# Patient Record
Sex: FEMALE | Race: WHITE | NOT HISPANIC OR LATINO | URBAN - METROPOLITAN AREA
[De-identification: names, ages, dates, MRNs, and addresses within clinical notes are randomized per-mention and may not be internally consistent; named-entity substitution may affect disease eponyms.]

---

## 2018-06-07 ENCOUNTER — EMERGENCY (EMERGENCY)
Facility: HOSPITAL | Age: 38
LOS: 1 days | Discharge: HOME | End: 2018-06-07
Attending: EMERGENCY MEDICINE | Admitting: EMERGENCY MEDICINE
Payer: MEDICARE

## 2018-06-07 VITALS
OXYGEN SATURATION: 100 % | HEART RATE: 67 BPM | TEMPERATURE: 98 F | DIASTOLIC BLOOD PRESSURE: 77 MMHG | RESPIRATION RATE: 15 BRPM | SYSTOLIC BLOOD PRESSURE: 115 MMHG

## 2018-06-07 DIAGNOSIS — Z79.1 LONG TERM (CURRENT) USE OF NON-STEROIDAL ANTI-INFLAMMATORIES (NSAID): ICD-10-CM

## 2018-06-07 DIAGNOSIS — R53.83 OTHER FATIGUE: ICD-10-CM

## 2018-06-07 DIAGNOSIS — R20.0 ANESTHESIA OF SKIN: ICD-10-CM

## 2018-06-07 DIAGNOSIS — Z79.899 OTHER LONG TERM (CURRENT) DRUG THERAPY: ICD-10-CM

## 2018-06-07 DIAGNOSIS — Z79.2 LONG TERM (CURRENT) USE OF ANTIBIOTICS: ICD-10-CM

## 2018-06-07 PROCEDURE — 99283 EMERGENCY DEPT VISIT LOW MDM: CPT

## 2018-06-07 NOTE — ED PROVIDER NOTE - OBJECTIVE STATEMENT
c/o one week of fatigue, entire body feels numb. has been drinking less caffeine than usual. no fevers/n/v/abdo apin/dairrhea. no cp/sob. no dydsuira/cough. c/o L neck abscess x1 year, no changes to neck. no neck pain/mouth swelling

## 2018-06-07 NOTE — ED ADULT TRIAGE NOTE - CHIEF COMPLAINT QUOTE
pt c/o lethergy. stopped drinking caffeine this week. also with abscess in throat x1year. denies res distress

## 2018-06-07 NOTE — ED PROVIDER NOTE - MEDICAL DECISION MAKING DETAILS
fatigue x1 week. vitals wnl. pink conjunctiva, low cf anemia. no infectious sx. well appearing. will check upt    no e/o neck abscess, airway patent    to f/u w pmd

## 2018-06-07 NOTE — ED PROVIDER NOTE - PROGRESS NOTE DETAILS
pt states she has not been sexually active. refusing upt. wants to go home. dc home.  no abdo pain/ vag bleed.  low c/f complications from pregnancy   wants to f/u w pmd dc home

## 2018-06-28 ENCOUNTER — TRANSCRIPTION ENCOUNTER (OUTPATIENT)
Age: 38
End: 2018-06-28

## 2018-12-01 ENCOUNTER — EMERGENCY (EMERGENCY)
Facility: HOSPITAL | Age: 38
LOS: 1 days | Discharge: HOME | End: 2018-12-01
Attending: EMERGENCY MEDICINE | Admitting: EMERGENCY MEDICINE
Payer: MEDICARE

## 2018-12-01 VITALS
RESPIRATION RATE: 18 BRPM | HEART RATE: 76 BPM | SYSTOLIC BLOOD PRESSURE: 129 MMHG | DIASTOLIC BLOOD PRESSURE: 87 MMHG | OXYGEN SATURATION: 99 % | TEMPERATURE: 98 F

## 2018-12-01 DIAGNOSIS — Z79.2 LONG TERM (CURRENT) USE OF ANTIBIOTICS: ICD-10-CM

## 2018-12-01 DIAGNOSIS — R22.1 LOCALIZED SWELLING, MASS AND LUMP, NECK: ICD-10-CM

## 2018-12-01 DIAGNOSIS — Z79.1 LONG TERM (CURRENT) USE OF NON-STEROIDAL ANTI-INFLAMMATORIES (NSAID): ICD-10-CM

## 2018-12-01 DIAGNOSIS — Z79.899 OTHER LONG TERM (CURRENT) DRUG THERAPY: ICD-10-CM

## 2018-12-01 DIAGNOSIS — Z87.891 PERSONAL HISTORY OF NICOTINE DEPENDENCE: ICD-10-CM

## 2018-12-01 LAB
ALBUMIN SERPL ELPH-MCNC: 3.4 G/DL — SIGNIFICANT CHANGE UP (ref 3.4–5)
ALP SERPL-CCNC: 75 U/L — SIGNIFICANT CHANGE UP (ref 40–120)
ALT FLD-CCNC: 23 U/L — SIGNIFICANT CHANGE UP (ref 12–42)
ANION GAP SERPL CALC-SCNC: 5 MMOL/L — LOW (ref 9–16)
APPEARANCE UR: CLEAR — SIGNIFICANT CHANGE UP
APTT BLD: 28.9 SEC — SIGNIFICANT CHANGE UP (ref 27.5–36.3)
AST SERPL-CCNC: 21 U/L — SIGNIFICANT CHANGE UP (ref 15–37)
BASOPHILS NFR BLD AUTO: 0.5 % — SIGNIFICANT CHANGE UP (ref 0–2)
BILIRUB SERPL-MCNC: 0.2 MG/DL — SIGNIFICANT CHANGE UP (ref 0.2–1.2)
BILIRUB UR-MCNC: NEGATIVE — SIGNIFICANT CHANGE UP
BUN SERPL-MCNC: 20 MG/DL — SIGNIFICANT CHANGE UP (ref 7–23)
CALCIUM SERPL-MCNC: 8.8 MG/DL — SIGNIFICANT CHANGE UP (ref 8.5–10.5)
CHLORIDE SERPL-SCNC: 107 MMOL/L — SIGNIFICANT CHANGE UP (ref 96–108)
CO2 SERPL-SCNC: 27 MMOL/L — SIGNIFICANT CHANGE UP (ref 22–31)
COLOR SPEC: YELLOW — SIGNIFICANT CHANGE UP
CREAT SERPL-MCNC: 0.7 MG/DL — SIGNIFICANT CHANGE UP (ref 0.5–1.3)
DIFF PNL FLD: NEGATIVE — SIGNIFICANT CHANGE UP
EOSINOPHIL NFR BLD AUTO: 3.9 % — SIGNIFICANT CHANGE UP (ref 0–6)
GLUCOSE SERPL-MCNC: 106 MG/DL — HIGH (ref 70–99)
GLUCOSE UR QL: NEGATIVE — SIGNIFICANT CHANGE UP
HCG UR QL: NEGATIVE — SIGNIFICANT CHANGE UP
HCT VFR BLD CALC: 35.9 % — SIGNIFICANT CHANGE UP (ref 34.5–45)
HGB BLD-MCNC: 11.9 G/DL — SIGNIFICANT CHANGE UP (ref 11.5–15.5)
IMM GRANULOCYTES NFR BLD AUTO: 0.3 % — SIGNIFICANT CHANGE UP (ref 0–1.5)
INR BLD: 0.96 — SIGNIFICANT CHANGE UP (ref 0.88–1.16)
KETONES UR-MCNC: NEGATIVE — SIGNIFICANT CHANGE UP
LACTATE SERPL-SCNC: 0.9 MMOL/L — SIGNIFICANT CHANGE UP (ref 0.4–2)
LEUKOCYTE ESTERASE UR-ACNC: ABNORMAL
LYMPHOCYTES # BLD AUTO: 29.8 % — SIGNIFICANT CHANGE UP (ref 13–44)
MCHC RBC-ENTMCNC: 29.1 PG — SIGNIFICANT CHANGE UP (ref 27–34)
MCHC RBC-ENTMCNC: 33.1 G/DL — SIGNIFICANT CHANGE UP (ref 32–36)
MCV RBC AUTO: 87.8 FL — SIGNIFICANT CHANGE UP (ref 80–100)
MONOCYTES NFR BLD AUTO: 11.5 % — SIGNIFICANT CHANGE UP (ref 2–14)
NEUTROPHILS NFR BLD AUTO: 54 % — SIGNIFICANT CHANGE UP (ref 43–77)
NITRITE UR-MCNC: NEGATIVE — SIGNIFICANT CHANGE UP
PH UR: 6.5 — SIGNIFICANT CHANGE UP (ref 5–8)
PLATELET # BLD AUTO: 210 K/UL — SIGNIFICANT CHANGE UP (ref 150–400)
POTASSIUM SERPL-MCNC: 4.2 MMOL/L — SIGNIFICANT CHANGE UP (ref 3.5–5.3)
POTASSIUM SERPL-SCNC: 4.2 MMOL/L — SIGNIFICANT CHANGE UP (ref 3.5–5.3)
PROT SERPL-MCNC: 6.4 G/DL — SIGNIFICANT CHANGE UP (ref 6.4–8.2)
PROT UR-MCNC: NEGATIVE MG/DL — SIGNIFICANT CHANGE UP
PROTHROM AB SERPL-ACNC: 10.6 SEC — SIGNIFICANT CHANGE UP (ref 10–12.9)
RBC # BLD: 4.09 M/UL — SIGNIFICANT CHANGE UP (ref 3.8–5.2)
RBC # FLD: 13.4 % — SIGNIFICANT CHANGE UP (ref 10.3–14.5)
SODIUM SERPL-SCNC: 139 MMOL/L — SIGNIFICANT CHANGE UP (ref 132–145)
SP GR SPEC: 1.01 — SIGNIFICANT CHANGE UP (ref 1–1.03)
UROBILINOGEN FLD QL: 0.2 E.U./DL — SIGNIFICANT CHANGE UP
WBC # BLD: 6.2 K/UL — SIGNIFICANT CHANGE UP (ref 3.8–10.5)
WBC # FLD AUTO: 6.2 K/UL — SIGNIFICANT CHANGE UP (ref 3.8–10.5)

## 2018-12-01 PROCEDURE — 93010 ELECTROCARDIOGRAM REPORT: CPT

## 2018-12-01 PROCEDURE — 70491 CT SOFT TISSUE NECK W/DYE: CPT | Mod: 26

## 2018-12-01 PROCEDURE — 99284 EMERGENCY DEPT VISIT MOD MDM: CPT | Mod: 25

## 2018-12-01 RX ORDER — AMPICILLIN SODIUM AND SULBACTAM SODIUM 250; 125 MG/ML; MG/ML
3 INJECTION, POWDER, FOR SUSPENSION INTRAMUSCULAR; INTRAVENOUS ONCE
Qty: 0 | Refills: 0 | Status: COMPLETED | OUTPATIENT
Start: 2018-12-01 | End: 2018-12-01

## 2018-12-01 RX ORDER — SODIUM CHLORIDE 9 MG/ML
1850 INJECTION INTRAMUSCULAR; INTRAVENOUS; SUBCUTANEOUS ONCE
Qty: 0 | Refills: 0 | Status: COMPLETED | OUTPATIENT
Start: 2018-12-01 | End: 2018-12-01

## 2018-12-01 RX ORDER — MORPHINE SULFATE 50 MG/1
4 CAPSULE, EXTENDED RELEASE ORAL ONCE
Qty: 0 | Refills: 0 | Status: DISCONTINUED | OUTPATIENT
Start: 2018-12-01 | End: 2018-12-01

## 2018-12-01 RX ADMIN — SODIUM CHLORIDE 1850 MILLILITER(S): 9 INJECTION INTRAMUSCULAR; INTRAVENOUS; SUBCUTANEOUS at 23:51

## 2018-12-01 RX ADMIN — AMPICILLIN SODIUM AND SULBACTAM SODIUM 3 GRAM(S): 250; 125 INJECTION, POWDER, FOR SUSPENSION INTRAMUSCULAR; INTRAVENOUS at 23:51

## 2018-12-01 RX ADMIN — MORPHINE SULFATE 4 MILLIGRAM(S): 50 CAPSULE, EXTENDED RELEASE ORAL at 23:51

## 2018-12-01 RX ADMIN — AMPICILLIN SODIUM AND SULBACTAM SODIUM 200 GRAM(S): 250; 125 INJECTION, POWDER, FOR SUSPENSION INTRAMUSCULAR; INTRAVENOUS at 21:43

## 2018-12-01 RX ADMIN — SODIUM CHLORIDE 1850 MILLILITER(S): 9 INJECTION INTRAMUSCULAR; INTRAVENOUS; SUBCUTANEOUS at 21:43

## 2018-12-02 VITALS
SYSTOLIC BLOOD PRESSURE: 150 MMHG | RESPIRATION RATE: 20 BRPM | TEMPERATURE: 98 F | OXYGEN SATURATION: 98 % | DIASTOLIC BLOOD PRESSURE: 71 MMHG | HEART RATE: 69 BPM

## 2018-12-02 NOTE — ED PROVIDER NOTE - MEDICAL DECISION MAKING DETAILS
Left neck mass for months, states she had it drained in past and was told it would not come back.  States she can not stay in the hospital overnight because she has an important court date Monday morning.  No fever or chills, no shortness of breath or airway compromise.  CT scan today shows left neck mass and differential includes neoplasm or abscess.  Mass is firm to palpation with no erythema of skin or fluctuance.  Patient states she will go to [place] right after her court date tomorrow and will take oral antibiotic until then.  Will Rx Augmentin.  Pt understands she must be seen by [facility] Ne[facility] or ENT urgently.

## 2018-12-02 NOTE — ED PROVIDER NOTE - CHPI ED SYMPTOMS NEG
no vomiting/no fever/no numbness/no loss of consciousness/no nausea/no syncope/no blurred vision/no weakness/no change in level of consciousness/no chills

## 2018-12-02 NOTE — ED PROVIDER NOTE - NSFOLLOWUPINSTRUCTIONS_ED_ALL_ED_FT
Return to [place] tomorrow or see your ENT as soon as possible.  Augmentin every 12 hours for 10 days.

## 2018-12-05 ENCOUNTER — APPOINTMENT (OUTPATIENT)
Dept: OTOLARYNGOLOGY | Facility: CLINIC | Age: 38
End: 2018-12-05
Payer: MEDICARE

## 2018-12-05 VITALS
TEMPERATURE: 98.1 F | DIASTOLIC BLOOD PRESSURE: 71 MMHG | SYSTOLIC BLOOD PRESSURE: 123 MMHG | RESPIRATION RATE: 16 BRPM | OXYGEN SATURATION: 99 % | HEART RATE: 72 BPM

## 2018-12-05 PROCEDURE — 99204 OFFICE O/P NEW MOD 45 MIN: CPT | Mod: 25

## 2018-12-05 PROCEDURE — 31575 DIAGNOSTIC LARYNGOSCOPY: CPT

## 2018-12-05 PROCEDURE — 10021 FNA BX W/O IMG GDN 1ST LES: CPT

## 2018-12-07 LAB
CULTURE RESULTS: SIGNIFICANT CHANGE UP
CULTURE RESULTS: SIGNIFICANT CHANGE UP
SPECIMEN SOURCE: SIGNIFICANT CHANGE UP
SPECIMEN SOURCE: SIGNIFICANT CHANGE UP

## 2018-12-13 ENCOUNTER — APPOINTMENT (OUTPATIENT)
Dept: OTOLARYNGOLOGY | Facility: CLINIC | Age: 38
End: 2018-12-13
Payer: MEDICARE

## 2018-12-13 VITALS
OXYGEN SATURATION: 100 % | HEART RATE: 68 BPM | DIASTOLIC BLOOD PRESSURE: 66 MMHG | TEMPERATURE: 98.1 F | SYSTOLIC BLOOD PRESSURE: 99 MMHG

## 2018-12-13 PROCEDURE — 99213 OFFICE O/P EST LOW 20 MIN: CPT | Mod: 25

## 2018-12-13 PROCEDURE — 31575 DIAGNOSTIC LARYNGOSCOPY: CPT

## 2018-12-17 ENCOUNTER — APPOINTMENT (OUTPATIENT)
Dept: OTOLARYNGOLOGY | Facility: CLINIC | Age: 38
End: 2018-12-17
Payer: MEDICARE

## 2018-12-17 VITALS
HEIGHT: 65 IN | DIASTOLIC BLOOD PRESSURE: 81 MMHG | BODY MASS INDEX: 25.33 KG/M2 | SYSTOLIC BLOOD PRESSURE: 125 MMHG | WEIGHT: 152 LBS | OXYGEN SATURATION: 98 % | HEART RATE: 65 BPM

## 2018-12-17 DIAGNOSIS — Z78.9 OTHER SPECIFIED HEALTH STATUS: ICD-10-CM

## 2018-12-17 PROCEDURE — 31575 DIAGNOSTIC LARYNGOSCOPY: CPT

## 2018-12-17 PROCEDURE — 99214 OFFICE O/P EST MOD 30 MIN: CPT | Mod: 25

## 2018-12-21 ENCOUNTER — OUTPATIENT (OUTPATIENT)
Dept: OUTPATIENT SERVICES | Facility: HOSPITAL | Age: 38
LOS: 1 days | End: 2018-12-21
Payer: MEDICARE

## 2018-12-21 PROCEDURE — 78815 PET IMAGE W/CT SKULL-THIGH: CPT

## 2018-12-21 PROCEDURE — 82962 GLUCOSE BLOOD TEST: CPT

## 2018-12-21 PROCEDURE — A9552: CPT

## 2018-12-21 PROCEDURE — 78815 PET IMAGE W/CT SKULL-THIGH: CPT | Mod: 26

## 2018-12-26 DIAGNOSIS — C76.0 MALIGNANT NEOPLASM OF HEAD, FACE AND NECK: ICD-10-CM

## 2018-12-31 ENCOUNTER — APPOINTMENT (OUTPATIENT)
Dept: OTOLARYNGOLOGY | Facility: CLINIC | Age: 38
End: 2018-12-31

## 2019-01-07 ENCOUNTER — APPOINTMENT (OUTPATIENT)
Dept: OTOLARYNGOLOGY | Facility: CLINIC | Age: 39
End: 2019-01-07
Payer: MEDICARE

## 2019-01-07 VITALS
DIASTOLIC BLOOD PRESSURE: 75 MMHG | HEIGHT: 65 IN | WEIGHT: 152 LBS | BODY MASS INDEX: 25.33 KG/M2 | OXYGEN SATURATION: 98 % | HEART RATE: 71 BPM | SYSTOLIC BLOOD PRESSURE: 111 MMHG

## 2019-01-07 PROCEDURE — 99213 OFFICE O/P EST LOW 20 MIN: CPT

## 2019-01-08 NOTE — HISTORY OF PRESENT ILLNESS
[de-identified] : 37 yo F with left neck mass first noticed 2 years ago. At that time she had FNA which was non-diagnostic and received antibiotic therapy, the combination caused remission of the lesion which reappeared approximately 18 months ago. Again, she received FNA and antibiotics as the result was non-diagnostic and was told it was likely infections or immune in nature but unlikely cancer. She had seen several providers and hadn't been told that it was malignancy until recent FNA and imaging completed by Dr. 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 \par \par 12/21/18 PET/CT: Large supra and infrahyoid left neck mass measuring 5.4 cm. In view of the biopsy results, this is compatible with a diagnosis of squamous cell carcinoma. Left supraclavicular and right level 3 hypermetabolic lymphadenopathy is noted, likely associated pathologic. \par \par Her case was presented at tumor board today: plan for chemoRT and surgery to identify the primary.\par \par She is here today alone, focused on the possibility of non-sexual transmission of HPV that she has read from unknown sources and concerns about potential release of her medical records to exes/others that plan to do her harm.

## 2019-01-08 NOTE — PHYSICAL EXAM
[Normal] : no rashes [de-identified] : there is a non mobile, fixed left neck mass approximately 6cm overlying the SCM from anterior to posterior border in the mid neck level II-III, overlying skin seems uninvolved, is not fixed [de-identified] : fixed left neck node conglomerate 5-6cm in diamater, overlying SCM, skin seems uninvolved, is not fixed [de-identified] : no fixation to underlying mass

## 2019-01-08 NOTE — END OF VISIT
[>50% of Time Spent on Counseling for ____] : Greater than 50% of the encounter time was spent on counseling for [unfilled] [Time Spent: ___ minutes] : I have spent [unfilled] minutes of face to face time with the patient [] : Fellow

## 2019-01-08 NOTE — ASSESSMENT
[FreeTextEntry1] : 37 yo F with HPV + left neck klarissa mass with bilateral lymphadenopathy with unknown primary lesion based upon exam and PET/CT.\par \par Plan:\par - DL with BOT and tonsil biopsy to identify the primary lesion in order to reduce the radiation field, followed by chemoRT.  Advised her that surgical removal of the left neck lymph node is not indicated given she has bilateral lymphadenopathy and unidentified primary and that chemoRT will treat the left neck klarissa mass.  \par - PST given.\par - Long discussion regarding HPV transmission and concerns of medical record release.  Reassured pt that we are a HIPAA-compliant institution and will not release records without her consent.\par - Pt wishes to seek a second opinion and will contact us should she decide to proceed with care here. \par

## 2019-02-15 NOTE — DISCUSSION/SUMMARY
[Cancer Type / Location / Histology Subtype: ________] : Cancer Type / Location / Histology Subtype: [unfilled] [Diagnosis Date (year): ____] : Diagnosis Date (year): [unfilled] [Not Applicable] : not applicable [SPONCC] : SPONCC [Surgery] : Surgery: No [Radiation] : Radiation: No [Systemic Therapy (chemotherapy, hormonal therapy, other)] : Systemic Therapy (chemotherapy, hormonal therapy, other): No [FreeTextEntry2] : Recommended tonsillectomies and base of tongue biopsy to identify primary tumor to reduce radiation field and adverse effects.  Biopsies to be followed by chemotherapy and radiation therapy. Advised to start treatment as soon as possible. [de-identified] : Referred to radiation oncologist Marcelina Londono [phone] and medical oncologist Luther Meeks [phone] [FreeTextEntry7] : For psychosocial emotional support, please call [phone]. [FreeTextEntry8] : Sophie Haro, NP [FreeTextEntry9] : Mailed 2/15/19

## 2019-03-04 ENCOUNTER — APPOINTMENT (OUTPATIENT)
Dept: RADIATION ONCOLOGY | Facility: CLINIC | Age: 39
End: 2019-03-04
Payer: MEDICARE

## 2019-03-04 VITALS
TEMPERATURE: 97.5 F | WEIGHT: 166 LBS | RESPIRATION RATE: 16 BRPM | SYSTOLIC BLOOD PRESSURE: 128 MMHG | BODY MASS INDEX: 27.66 KG/M2 | HEIGHT: 65 IN | DIASTOLIC BLOOD PRESSURE: 82 MMHG | HEART RATE: 83 BPM | OXYGEN SATURATION: 100 %

## 2019-03-04 DIAGNOSIS — Z78.9 OTHER SPECIFIED HEALTH STATUS: ICD-10-CM

## 2019-03-04 DIAGNOSIS — Z80.6 FAMILY HISTORY OF LEUKEMIA: ICD-10-CM

## 2019-03-04 DIAGNOSIS — Z82.49 FAMILY HISTORY OF ISCHEMIC HEART DISEASE AND OTHER DISEASES OF THE CIRCULATORY SYSTEM: ICD-10-CM

## 2019-03-04 DIAGNOSIS — F32.9 MAJOR DEPRESSIVE DISORDER, SINGLE EPISODE, UNSPECIFIED: ICD-10-CM

## 2019-03-04 DIAGNOSIS — Z80.1 FAMILY HISTORY OF MALIGNANT NEOPLASM OF TRACHEA, BRONCHUS AND LUNG: ICD-10-CM

## 2019-03-04 DIAGNOSIS — Z87.891 PERSONAL HISTORY OF NICOTINE DEPENDENCE: ICD-10-CM

## 2019-03-04 PROCEDURE — 99205 OFFICE O/P NEW HI 60 MIN: CPT | Mod: 25

## 2019-03-04 NOTE — PHYSICAL EXAM
[General Appearance - Alert] : alert [General Appearance - In No Acute Distress] : in no acute distress [Normal] : normal skin color and pigmentation and no rash [Sensation] : the sensory exam was normal to light touch and pinprick [Motor Exam] : the motor exam was normal [Oriented To Time, Place, And Person] : oriented to person, place, and time [de-identified] : nonmobile LEFT neck mass, measuring approx 10 cm, with surrounding firmness (in total, area of firmness measures 15 cm). Mass extends from just below the ear to the supraclavicular space. [de-identified] : CN II- XII grossly intact.  [de-identified] : Anxious. Impaired attention span requiring frequent redirecting.

## 2019-03-04 NOTE — REVIEW OF SYSTEMS
[Patient Intake Form Reviewed] : Patient intake form was reviewed [Night Sweats] : night sweats [Fatigue] : fatigue [Recent Change In Weight] : ~T recent weight change [SOB on Exertion] : shortness of breath during exertion [Joint Pain] : joint pain [Anxiety] : anxiety [Depression] : depression [Easy Bruising] : a tendency for easy bruising [Negative] : Allergic/Immunologic [Fever] : no fever [Chills] : no chills [Dysphagia] : no dysphagia [Loss of Hearing] : no loss of hearing [Nosebleeds] : no nosebleeds [Hearing Disturbances] : no hearing disturbances [Cough] : no cough [Vomiting] : no vomiting [Constipation] : no constipation [Diarrhea] : no diarrhea [Suicidal] : not suicidal [FreeTextEntry2] : weight gain.  [FreeTextEntry4] : LEFT neck mass [FreeTextEntry7] : abdominal cramping. [FreeTextEntry9] : joint stiffness.  [de-identified] : Cold intolerance.

## 2019-03-04 NOTE — DATA REVIEWED
[FreeTextEntry1] : I have personally reviewed all relevant imaging studies (PET, CT) and I have discussed the case with the referring physician.\par

## 2019-03-04 NOTE — VITALS
[Maximal Pain Intensity: 4/10] : 4/10 [Least Pain Intensity: 0/10] : 0/10 [Pain Location: ___] : Pain Location: [unfilled] [NoTreatment Scheduled] : no treatment scheduled [Patient Refusal] : Patient refused psychosocial distress assessment [80: Normal activity with effort; some signs or symptoms of disease.] : 80: Normal activity with effort; some signs or symptoms of disease.  [ECOG Performance Status: 1 - Restricted in physically strenuous activity but ambulatory and able to carry out work of a light or sedentary nature] : Performance Status: 1 - Restricted in physically strenuous activity but ambulatory and able to carry out work of a light or sedentary nature, e.g., light house work, office work

## 2019-03-04 NOTE — HISTORY OF PRESENT ILLNESS
[FreeTextEntry1] : Ms. Rachael Foy is a 39F, former smoker (1/2 ppd x 15 years) referred by Dr. 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 \par \par Patient first noticed a left neck mass approx 2 years ago. At that time, she reportedly had drainage of the growth at [facility] that did not show any infection and received antibiotics. She states the mass was drained and she was told it was a cyst. She does not know if the specimen was sent for pathology. Remission of the mass occurred, and it reappeared approx 20 months ago. She again had FNA (non-diagnostic) and received antibiotics. Patient states she has seen other ENTs "at several other institutions," but would not tell us who she has seen or where. She reports feeling fatigued in the summer of 2018, had been to urgent care on a few occasions but did not receive a diagnosis.  Starting in the summer of 2018, she also became "displaced" due to family issues and breaking up with a boyfriend, and she was in and out of homeless shelters for several months. \par \par Patient reports she was having pain in her teeth and gums around November 2018. She went to the ED at [facility] because she thought she could get emergent dental work done at the ED.  Imaging was done. \par \par CT neck on 12/1/18 revealed the following:\par -Soft tissues mass noted in the LEFT neck extending deep to the mandible on the left compressing the submandibular gland anteriorly with internal heterogeneity which is predominantly solid. This mass is lateral to the carotid artery with apparent compression and effacement of the internal jugular vein. The mass measures up to 5 x 3.7 cm. This mass is deep to the flattened sternocleidomastoid muscle and extends to the lower left neck just  the supraclavicular fossa. The bulk of the mass appears below the left parotid gland- cannot exclude involvement of the lower portion of the parotid. \par -Soft tissue mass measuring up to 1.9 cm with central low attenuation possibly necrosis above this mass, possibly representing a necrotic lymph node. Other prominent left sided lymph nodes in the neck in the area of the jugulodigastric region and posterior triangle are also noted including a posterior triangle lymph node posterior to the sternocleidomastoid. \par \par She saw Dr. Weller for consultation on 12/5/18, and at the time reported left neck mass for many months with cervical nodes. She had FNA of the left neck mass at that time, and pathology showed squamous cell carcinoma, HPV related. She then saw Dr. Alvarado on 12/17/18 for consultation; discussed treatment options based on PET/ CT results. \par \par PET/ CT done 12/21/18 showed the following:\par Impression: Large supra and infrahyoid left neck mass measuring 5.4 cm, similar as compared to 12/1/2018, and increased in size as compared to 11/10/2016 as above. In view of the biopsy results, this is compatible with a diagnosis of squamous cell carcinoma. Left supraclavicular and right level 3 hypermetabolic lymphadenopathy is noted, likely associated pathologic lymphadenopathy.\par \par Her case was presented at [facility]; consensus was for surgery (left neck dissection, tonsillectomy, BOT biopsy), followed by chemoRT. She saw Dr. Alvarado on 1/7/19 to discuss surgical plan. However, patient grew increasingly uncomfortable with the biopsies because she does not want to undergo anesthesia. She ultimately opted against surgery and would like to proceed with chemoRT. She was referred here and to Dr. Meeks.  She has not yet seen Dr. Meeks or scheduled a consultation with him. \par \par Today, she expresses she is "not convinced" of her diagnosis and questions if the mass is due to Cushing's disease or if it could be related to high stress (she reports she has "family issues that have caused a great deal of stress"). She questions that she could have HPV related disease as she "does not partake in that kind of activity," and she fixated on this during the clinical encounter. She admits that she went to a hospital in [place] to try to get another biopsy but that they would not perform another biopsy since she has a known diagnosis of cancer. Today, she is evasive with answering questions and providing her health history. She denies any pain or dysphagia, fever, CP, SOB, cough, weight loss. She reports weight gain, as well as throat clearing. She also reports night sweats, fatigue, and "feeling cold all the time." She reports she sees a psychiatrist, who prescribes medications for anxiety and depression, but she declined to say which medications she takes or who her psychiatrist is. Upon further questioning, she stated that she is off her antidepressant. She notes that she does not have a dentist nor does she have dental coverage. She further declined to provide the name of her PCP; states she "might be looking for a new one." \par \par Of note, she had been living in shelters for a few months. She is now still homeless and has been staying with friends. She also reports she had been working but is on disability.

## 2019-05-22 ENCOUNTER — MESSAGE (OUTPATIENT)
Age: 39
End: 2019-05-22

## 2019-05-22 ENCOUNTER — MOBILE ON CALL (OUTPATIENT)
Age: 39
End: 2019-05-22

## 2019-05-23 ENCOUNTER — EMERGENCY (EMERGENCY)
Facility: HOSPITAL | Age: 39
LOS: 1 days | Discharge: HOME | End: 2019-05-23
Attending: EMERGENCY MEDICINE | Admitting: EMERGENCY MEDICINE
Payer: MEDICARE

## 2019-05-23 VITALS
TEMPERATURE: 98 F | SYSTOLIC BLOOD PRESSURE: 106 MMHG | DIASTOLIC BLOOD PRESSURE: 69 MMHG | OXYGEN SATURATION: 97 % | RESPIRATION RATE: 18 BRPM | HEART RATE: 68 BPM

## 2019-05-23 VITALS
WEIGHT: 166.45 LBS | HEART RATE: 84 BPM | SYSTOLIC BLOOD PRESSURE: 133 MMHG | RESPIRATION RATE: 18 BRPM | OXYGEN SATURATION: 99 % | TEMPERATURE: 98 F | DIASTOLIC BLOOD PRESSURE: 82 MMHG

## 2019-05-23 DIAGNOSIS — Z79.899 OTHER LONG TERM (CURRENT) DRUG THERAPY: ICD-10-CM

## 2019-05-23 DIAGNOSIS — R22.1 LOCALIZED SWELLING, MASS AND LUMP, NECK: ICD-10-CM

## 2019-05-23 DIAGNOSIS — Z79.1 LONG TERM (CURRENT) USE OF NON-STEROIDAL ANTI-INFLAMMATORIES (NSAID): ICD-10-CM

## 2019-05-23 DIAGNOSIS — Z79.2 LONG TERM (CURRENT) USE OF ANTIBIOTICS: ICD-10-CM

## 2019-05-23 DIAGNOSIS — F25.9 SCHIZOAFFECTIVE DISORDER, UNSPECIFIED: ICD-10-CM

## 2019-05-23 LAB
ALBUMIN SERPL ELPH-MCNC: 3.5 G/DL — SIGNIFICANT CHANGE UP (ref 3.3–5)
ALP SERPL-CCNC: 58 U/L — SIGNIFICANT CHANGE UP (ref 40–120)
ALT FLD-CCNC: 12 U/L — SIGNIFICANT CHANGE UP (ref 10–45)
ANION GAP SERPL CALC-SCNC: 9 MMOL/L — SIGNIFICANT CHANGE UP (ref 5–17)
APTT BLD: 28.1 SEC — SIGNIFICANT CHANGE UP (ref 27.5–36.3)
AST SERPL-CCNC: 19 U/L — SIGNIFICANT CHANGE UP (ref 10–40)
BASOPHILS # BLD AUTO: 0.02 K/UL — SIGNIFICANT CHANGE UP (ref 0–0.2)
BASOPHILS NFR BLD AUTO: 0.3 % — SIGNIFICANT CHANGE UP (ref 0–2)
BILIRUB SERPL-MCNC: <0.2 MG/DL — SIGNIFICANT CHANGE UP (ref 0.2–1.2)
BLD GP AB SCN SERPL QL: NEGATIVE — SIGNIFICANT CHANGE UP
BUN SERPL-MCNC: 10 MG/DL — SIGNIFICANT CHANGE UP (ref 7–23)
CALCIUM SERPL-MCNC: 8.6 MG/DL — SIGNIFICANT CHANGE UP (ref 8.4–10.5)
CHLORIDE SERPL-SCNC: 104 MMOL/L — SIGNIFICANT CHANGE UP (ref 96–108)
CO2 SERPL-SCNC: 26 MMOL/L — SIGNIFICANT CHANGE UP (ref 22–31)
CREAT SERPL-MCNC: 0.63 MG/DL — SIGNIFICANT CHANGE UP (ref 0.5–1.3)
EOSINOPHIL # BLD AUTO: 0.25 K/UL — SIGNIFICANT CHANGE UP (ref 0–0.5)
EOSINOPHIL NFR BLD AUTO: 3.6 % — SIGNIFICANT CHANGE UP (ref 0–6)
GLUCOSE SERPL-MCNC: 104 MG/DL — HIGH (ref 70–99)
HCT VFR BLD CALC: 35 % — SIGNIFICANT CHANGE UP (ref 34.5–45)
HGB BLD-MCNC: 11.4 G/DL — LOW (ref 11.5–15.5)
IMM GRANULOCYTES NFR BLD AUTO: 0.7 % — SIGNIFICANT CHANGE UP (ref 0–1.5)
INR BLD: 1 — SIGNIFICANT CHANGE UP (ref 0.88–1.16)
LYMPHOCYTES # BLD AUTO: 1.46 K/UL — SIGNIFICANT CHANGE UP (ref 1–3.3)
LYMPHOCYTES # BLD AUTO: 20.9 % — SIGNIFICANT CHANGE UP (ref 13–44)
MCHC RBC-ENTMCNC: 28.7 PG — SIGNIFICANT CHANGE UP (ref 27–34)
MCHC RBC-ENTMCNC: 32.6 GM/DL — SIGNIFICANT CHANGE UP (ref 32–36)
MCV RBC AUTO: 88.2 FL — SIGNIFICANT CHANGE UP (ref 80–100)
MONOCYTES # BLD AUTO: 0.73 K/UL — SIGNIFICANT CHANGE UP (ref 0–0.9)
MONOCYTES NFR BLD AUTO: 10.5 % — SIGNIFICANT CHANGE UP (ref 2–14)
NEUTROPHILS # BLD AUTO: 4.47 K/UL — SIGNIFICANT CHANGE UP (ref 1.8–7.4)
NEUTROPHILS NFR BLD AUTO: 64 % — SIGNIFICANT CHANGE UP (ref 43–77)
NRBC # BLD: 0 /100 WBCS — SIGNIFICANT CHANGE UP (ref 0–0)
PLATELET # BLD AUTO: 242 K/UL — SIGNIFICANT CHANGE UP (ref 150–400)
POTASSIUM SERPL-MCNC: 4.2 MMOL/L — SIGNIFICANT CHANGE UP (ref 3.5–5.3)
POTASSIUM SERPL-SCNC: 4.2 MMOL/L — SIGNIFICANT CHANGE UP (ref 3.5–5.3)
PROT SERPL-MCNC: 6 G/DL — SIGNIFICANT CHANGE UP (ref 6–8.3)
PROTHROM AB SERPL-ACNC: 11.3 SEC — SIGNIFICANT CHANGE UP (ref 10–12.9)
RBC # BLD: 3.97 M/UL — SIGNIFICANT CHANGE UP (ref 3.8–5.2)
RBC # FLD: 12.9 % — SIGNIFICANT CHANGE UP (ref 10.3–14.5)
RH IG SCN BLD-IMP: POSITIVE — SIGNIFICANT CHANGE UP
SODIUM SERPL-SCNC: 139 MMOL/L — SIGNIFICANT CHANGE UP (ref 135–145)
WBC # BLD: 6.98 K/UL — SIGNIFICANT CHANGE UP (ref 3.8–10.5)
WBC # FLD AUTO: 6.98 K/UL — SIGNIFICANT CHANGE UP (ref 3.8–10.5)

## 2019-05-23 PROCEDURE — 99284 EMERGENCY DEPT VISIT MOD MDM: CPT | Mod: 25

## 2019-05-23 PROCEDURE — 85730 THROMBOPLASTIN TIME PARTIAL: CPT

## 2019-05-23 PROCEDURE — 85025 COMPLETE CBC W/AUTO DIFF WBC: CPT

## 2019-05-23 PROCEDURE — 36415 COLL VENOUS BLD VENIPUNCTURE: CPT

## 2019-05-23 PROCEDURE — 83735 ASSAY OF MAGNESIUM: CPT

## 2019-05-23 PROCEDURE — 80053 COMPREHEN METABOLIC PANEL: CPT

## 2019-05-23 PROCEDURE — 86850 RBC ANTIBODY SCREEN: CPT

## 2019-05-23 PROCEDURE — 84702 CHORIONIC GONADOTROPIN TEST: CPT

## 2019-05-23 PROCEDURE — 86901 BLOOD TYPING SEROLOGIC RH(D): CPT

## 2019-05-23 PROCEDURE — 99284 EMERGENCY DEPT VISIT MOD MDM: CPT

## 2019-05-23 PROCEDURE — 85610 PROTHROMBIN TIME: CPT

## 2019-05-23 PROCEDURE — 86900 BLOOD TYPING SEROLOGIC ABO: CPT

## 2019-05-23 NOTE — CONSULT NOTE ADULT - SUBJECTIVE AND OBJECTIVE BOX
Ms. 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 The patient is well known to Dr. Alvarado and Dr. Londono and there have been numerous issues in completing appropriate diagnostic workup and pursuing appropriate treatment. [employer] notes were thoroughly reviewed with multiple failed contact notes and missed appointments noted.     ENT consulted to evaluate patient. The pt reports she feels the neck mass has grown in size in the last few weeks and is now oozing clear fluid. Denies fevers, chills, nausea, vomiting, difficulty breathing, stridor, neck pain, dysphagia, odynophagia, dysphonia, chest pain. Denies bleeding in oral cavity.  Does report occasional shortness of breath when going upstairs. She was last imaged in December 2018 and the mass has significantly grown in the interim though there is still no obvious primary. The patient continues to report concerns over "foul play" and a "malicious etiology of her HPV diagnosis". She is also very hesitant to reveal information and recounts prior history very differently from recorded documentation on [employer].     The patient does indicate she wants to pursue treatment but again is resistant to the idea of repeat CT scans or PET scans with contrast as well as the idea of anesthesia for any procedure.    Labs were all benign with no leukocytosis or fevers.    PMH: reviewed  All: NKDA  SH: former smoker    PE:  VSS  NAD, alert and oriented, unclear if patient truly has insight into the nature of her disease  Resp: breathing comfortably on RA, no stridor, speaking comfortably in full sentences  Nose: clear anteriorly  OC/OP: clear of masses or lesions, bifid uvula, no obvious tonsillar tissue, no masses noted, BOT no masses palpated, posterior OP clear  Neck: right neck normal, left neck with large fungating mass with tumor eroding through skin and dripping serous fluid, mass is fixed with multiple smaller fixed lymph nodes left cervical neck in levels 2-5, nontender to palpation   FOE; pt deferred    Labs reviewed    A/P: 39 F with HPV +, SCC of left neck with unknown primary, untreated due to patient not willing to proceed to proposed therapies and diagnostic procedures  -Consider CT neck with contrast to reassess mass with specific attention to great vessels and potential risk for carotid blowout  -Extensive discussion with patient regarding high likelihood of death if she does not pursue treatment and that she must follow up with Dr. Londono, Dr. Alvraado, and Dr. Meeks; patient indicates she will call tomorrow morning to determine appropriate plan  -Patient states she understands the severity of her diagnosis and that she will now follow up with intention of pursing treatment  -However, given her prior history of refusing or missing treatment, psychiatric history, and possible lack of capacity/insight, ED is considering psychiatric evaluation and/or involuntary admission  -Decision per ED; however, if patient is admitted, please notify ENT service as additional workup and discussion with the patient can be pursued  -If patient is discharged, please have patient urgently call and follow up with Dr. Alvarado, Dr. Londono, and Dr. Meeks  -Please page ENT with questions or concerns  D/w Dr. Alvarado fellow, Dr. Hook

## 2019-05-23 NOTE — ED PROVIDER NOTE - CARE PROVIDER_API CALL
Zacarias Alvarado)  Otolaryngology  [address] [place] [address]  Phone: [phone]  Fax: [phone]  Follow Up Time:     Marcelina Londono)  Radiation Oncology  [address] [address]  Phone: [phone]  Fax: [phone]  Follow Up Time:

## 2019-05-23 NOTE — ED PROVIDER NOTE - CARE PROVIDERS DIRECT ADDRESSES
[email or web address]

## 2019-05-23 NOTE — ED PROVIDER NOTE - OBJECTIVE STATEMENT
patient with left sided cancerous neck mass dating back > 2 years with initial thoughts as abscess process but as time passing with increase in size / lymphadenopathy and further testing / evaluations care for neck cancerous mass leaning towards Chemo/ Radiation -> Dr Alvarado following from [facility] -> patient to ED as last several weeks there has been more color change and weeping ulcerations No fever espoused

## 2019-05-23 NOTE — ED PROVIDER NOTE - ATTENDING CONTRIBUTION TO CARE
large left neck mass with ulceration.  ent consulted.  apparently well known to service and diagnosed with cancer, refusing treatment plan.  afebrile.  appears somewhat paranoid/ suspect schizophrenia.  patient became agitated after ent eval and started yelling at staff.  counseled on need for continued f/u and treatment of mass and said she would call to make appointment then walked out

## 2019-05-23 NOTE — ED ADULT NURSE NOTE - OBJECTIVE STATEMENT
Pt alert and oriented X3. Pt coming from home. Abscess to left neck diagnosed in 2016 which was later diagnosed with neck cancer. Pt states she has been asking for another biopsy, but unable to obtain. Pt is no on chemo or radiation. Pt also states "something in the mouth needs to be fixed" before starting radiation. Pt states neck mass is becoming bigger and redder. Pt represents to the ER today due to ulcers and redness to neck mass since about 1 week ago. Mass open about 3cmX 2Cm, indurated and red, draining Serosanguinous fluid. Pt denies SOB at the moment or difficulty swallowing. However, sometimes she "needs to catch her breath" while ambulating. Denies pain

## 2019-05-23 NOTE — ED PROVIDER NOTE - PROGRESS NOTE DETAILS
patient in ED with cancerous neck mass -> care in place and follow up in [facility] [address] with ENT evaluation in ED tonight -> patient states will f/u tomorrow and in fact attempted to go to Dr Alvarado office this afternoon but was closed -> difficulty with care as patient lost to follow up in past and has baseline psychiatric disease though making no such request here today does has shown some bizarre behavior including some paranoia ( very concerned when [employer] in ED for another patient ) no SI/HI espoused -> some discussion on need for psychiatric assessment towards furthering ENT care though obstacles towards this ed ( tele psych only at this time ) and likely involuntary nature that this work up may precipitate may further mar care towards ENT end of patient needing protracted courses of care -> currently stating plans to see team tomorrow after d/c tonight and d/c placed for such

## 2019-05-23 NOTE — ED PROVIDER NOTE - PHYSICAL EXAMINATION
large ulcerating weeping violaceous mass left side of neck  speech clear no resp compromise appreciated

## 2019-05-23 NOTE — ED ADULT NURSE NOTE - CHPI ED NUR SYMPTOMS NEG
no numbness/no chills/no vomiting/no weakness/no fever/no bleeding gums/no loss of consciousness/no syncope/no nausea

## 2019-05-23 NOTE — ED PROVIDER NOTE - CLINICAL SUMMARY MEDICAL DECISION MAKING FREE TEXT BOX
ENT incorporated into care and planning for furthering course for patient with known neck mass ENT incorporated into care and planning for furthering course for patient with known neck mass->plan f/u Dr Alvarado

## 2019-06-05 ENCOUNTER — APPOINTMENT (OUTPATIENT)
Dept: OTOLARYNGOLOGY | Facility: CLINIC | Age: 39
End: 2019-06-05
Payer: MEDICARE

## 2019-06-05 VITALS
OXYGEN SATURATION: 99 % | WEIGHT: 160 LBS | HEART RATE: 68 BPM | HEIGHT: 65 IN | BODY MASS INDEX: 26.66 KG/M2 | SYSTOLIC BLOOD PRESSURE: 112 MMHG | DIASTOLIC BLOOD PRESSURE: 68 MMHG

## 2019-06-05 PROBLEM — R22.9 LOCALIZED SWELLING, MASS AND LUMP, UNSPECIFIED: Chronic | Status: ACTIVE | Noted: 2019-05-23

## 2019-06-05 PROCEDURE — 99213 OFFICE O/P EST LOW 20 MIN: CPT

## 2019-06-07 ENCOUNTER — EMERGENCY (EMERGENCY)
Facility: HOSPITAL | Age: 39
LOS: 1 days | Discharge: HOME | End: 2019-06-07
Attending: EMERGENCY MEDICINE | Admitting: EMERGENCY MEDICINE
Payer: MEDICARE

## 2019-06-07 VITALS
OXYGEN SATURATION: 97 % | HEART RATE: 88 BPM | SYSTOLIC BLOOD PRESSURE: 129 MMHG | DIASTOLIC BLOOD PRESSURE: 88 MMHG | RESPIRATION RATE: 20 BRPM

## 2019-06-07 VITALS
WEIGHT: 160.06 LBS | TEMPERATURE: 98 F | HEART RATE: 81 BPM | DIASTOLIC BLOOD PRESSURE: 83 MMHG | RESPIRATION RATE: 16 BRPM | OXYGEN SATURATION: 98 % | SYSTOLIC BLOOD PRESSURE: 126 MMHG

## 2019-06-07 DIAGNOSIS — R22.1 LOCALIZED SWELLING, MASS AND LUMP, NECK: ICD-10-CM

## 2019-06-07 DIAGNOSIS — C44.42 SQUAMOUS CELL CARCINOMA OF SKIN OF SCALP AND NECK: ICD-10-CM

## 2019-06-07 LAB
ALBUMIN SERPL ELPH-MCNC: 3.1 G/DL — LOW (ref 3.4–5)
ALP SERPL-CCNC: 64 U/L — SIGNIFICANT CHANGE UP (ref 40–120)
ALT FLD-CCNC: 15 U/L — SIGNIFICANT CHANGE UP (ref 12–42)
ANION GAP SERPL CALC-SCNC: 12 MMOL/L — SIGNIFICANT CHANGE UP (ref 9–16)
APTT BLD: 28.6 SEC — SIGNIFICANT CHANGE UP (ref 27.5–36.3)
AST SERPL-CCNC: 44 U/L — HIGH (ref 15–37)
BASOPHILS NFR BLD AUTO: 0.3 % — SIGNIFICANT CHANGE UP (ref 0–2)
BILIRUB SERPL-MCNC: 0.3 MG/DL — SIGNIFICANT CHANGE UP (ref 0.2–1.2)
BUN SERPL-MCNC: 10 MG/DL — SIGNIFICANT CHANGE UP (ref 7–23)
CALCIUM SERPL-MCNC: 8.8 MG/DL — SIGNIFICANT CHANGE UP (ref 8.5–10.5)
CHLORIDE SERPL-SCNC: 104 MMOL/L — SIGNIFICANT CHANGE UP (ref 96–108)
CO2 SERPL-SCNC: 21 MMOL/L — LOW (ref 22–31)
CREAT SERPL-MCNC: 0.6 MG/DL — SIGNIFICANT CHANGE UP (ref 0.5–1.3)
EOSINOPHIL NFR BLD AUTO: 2.8 % — SIGNIFICANT CHANGE UP (ref 0–6)
GLUCOSE SERPL-MCNC: 95 MG/DL — SIGNIFICANT CHANGE UP (ref 70–99)
HCT VFR BLD CALC: 34.8 % — SIGNIFICANT CHANGE UP (ref 34.5–45)
HGB BLD-MCNC: 11.4 G/DL — LOW (ref 11.5–15.5)
IMM GRANULOCYTES NFR BLD AUTO: 0.3 % — SIGNIFICANT CHANGE UP (ref 0–1.5)
INR BLD: 1 — SIGNIFICANT CHANGE UP (ref 0.88–1.16)
LYMPHOCYTES # BLD AUTO: 18.4 % — SIGNIFICANT CHANGE UP (ref 13–44)
MCHC RBC-ENTMCNC: 28.5 PG — SIGNIFICANT CHANGE UP (ref 27–34)
MCHC RBC-ENTMCNC: 32.8 G/DL — SIGNIFICANT CHANGE UP (ref 32–36)
MCV RBC AUTO: 87 FL — SIGNIFICANT CHANGE UP (ref 80–100)
MONOCYTES NFR BLD AUTO: 9.7 % — SIGNIFICANT CHANGE UP (ref 2–14)
NEUTROPHILS NFR BLD AUTO: 68.5 % — SIGNIFICANT CHANGE UP (ref 43–77)
PLATELET # BLD AUTO: 282 K/UL — SIGNIFICANT CHANGE UP (ref 150–400)
POTASSIUM SERPL-MCNC: 4.8 MMOL/L — SIGNIFICANT CHANGE UP (ref 3.5–5.3)
POTASSIUM SERPL-SCNC: 4.8 MMOL/L — SIGNIFICANT CHANGE UP (ref 3.5–5.3)
PROT SERPL-MCNC: 6.6 G/DL — SIGNIFICANT CHANGE UP (ref 6.4–8.2)
PROTHROM AB SERPL-ACNC: 11 SEC — SIGNIFICANT CHANGE UP (ref 10–12.9)
RBC # BLD: 4 M/UL — SIGNIFICANT CHANGE UP (ref 3.8–5.2)
RBC # FLD: 13.2 % — SIGNIFICANT CHANGE UP (ref 10.3–14.5)
SODIUM SERPL-SCNC: 137 MMOL/L — SIGNIFICANT CHANGE UP (ref 132–145)
WBC # BLD: 7.4 K/UL — SIGNIFICANT CHANGE UP (ref 3.8–10.5)
WBC # FLD AUTO: 7.4 K/UL — SIGNIFICANT CHANGE UP (ref 3.8–10.5)

## 2019-06-07 PROCEDURE — 99284 EMERGENCY DEPT VISIT MOD MDM: CPT

## 2019-06-07 NOTE — ED PROVIDER NOTE - OBJECTIVE STATEMENT
Patient with pmhx of schizoaffective disorder, bipolar depression, hx of squamous cell carcinoma of L side of neck with tumor. SCC diagnosed 2.5 years ago, post biopsy and PET scan. secondary to some patient noncompliance and psychosocial her follow up has been intermittent. currently no radiation or chemo. patient is scheduled for a PET/MRI this sunday at [facility]. H&N Sx Dr Zacarias Peace, and oncologist . No recent psychiatric or medical admissions. per patient currently on no medications. patient presents after 2 short episodes of bleeding from L neck mass. Notes in the last 3 weeks, the mass has eroded to the surface, and seen H&N one week ago for evaluation. advised to keep covered and dressed. She currently applies a cloth to keep it covered. last evening had 10 min of bleeding from periphery of mass, ems arrived but declined transport to ED. called her oncologist, and advised to go to ED. Today had a similar episode, which stopped after several minutes. denies hemoptysis, denies fever, chills, facial swelling, or palpitaitons, cp, or syncope. denies visual changes, changes in voice or speech. denies sob or cough. currently not on anticoagulants.

## 2019-06-07 NOTE — ED PROVIDER NOTE - PROGRESS NOTE DETAILS
spoke to ent fellow dr nguyen, will inform dr díaz that patient is  in ed. currently there is no active bleeding, with stable vs. plan to dc patient, has appt this sunday at [facility] for imaging and pet scan.

## 2019-06-07 NOTE — PHYSICAL EXAM
[Normal] : assessment of respiratory effort is normal [FreeTextEntry1] : Neck covered with towel and scarf [de-identified] : right neck normal, left neck with large fungating mass with tumor eroding through skin and weeping serous fluid, mass is fixed with multiple smaller fixed LNs, violaceous borders. See photo. [de-identified] : able to speak in full sentences, no stridor, breathing comfortably

## 2019-06-07 NOTE — ED PROVIDER NOTE - CLINICAL SUMMARY MEDICAL DECISION MAKING FREE TEXT BOX
plan to call her H&N surgeon dr zavala, currently there is no active bleeding and vss. will place sterile dressing, discuss proper wound care management, and ck labs for coags and cbc. continue to monitor.

## 2019-06-07 NOTE — HISTORY OF PRESENT ILLNESS
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  She was previously recommended biopsy of the tonsils and BOT to identify the primary and reduce radiation effects, then post-biopsy chemoRT.  She decided not to proceed with the biopsy because she does not want to undergo general anesthesia.  She has been repeatedly instructed to commence chemoRT immediately and given the contact information for medical and radiation oncology.  She reported that the mass has been growing. Over the past 3 weeks, it has turned purplish in color and has become a weeping, bleeding sore.  She went to [facility] ER on 5/23/19 but walked out before further evaluation could be performed.  She has in the interim seen Dr. Meeks who is working on obtaining imaging studies (MRI, PET) and hospital admission for chemotherapy.  Denies dysphagia, fever, chills, nausea, vomiting, dyspnea.

## 2019-06-07 NOTE — ED PROVIDER NOTE - CARE PROVIDER_API CALL
Zacarias Alvarado)  Otolaryngology  [address] [place] [address]  Phone: [phone]  Fax: [phone]  Follow Up Time:

## 2019-06-07 NOTE — ASSESSMENT
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 Meeks for MRI, PET to evaluate progression of disease, and hospital admission for induction chemo.\par - Pt will stop by Dr. Londono's office today to make an appointment.\par - Gave pt number to michelle Porras and information on [facility] support groups.

## 2019-06-07 NOTE — ED PROVIDER NOTE - LEFT FACE
LYMPH NODE ENLARGEMENT/complex and large left lateral neck mass, over mid lateral anterior neck region approximately 5x8 cm, with open and fibrinous base, exposed fat and healing pink epithelium, with no active bleeding, no palpable thrill or audible bruits, unable to palpate carotid pulse secondary to mass effect of tumor, palpable lymphadenopathy, to supraclavicular region./TENDERNESS TO PALPATION

## 2019-06-07 NOTE — REASON FOR VISIT
[Subsequent Evaluation] : a subsequent evaluation for [FreeTextEntry2] : metastatic SCC to cervical lymph nodes, unknown primary

## 2019-06-07 NOTE — ED ADULT NURSE NOTE - OBJECTIVE STATEMENT
Pt with complaint of bleeding to the a mass on the right side of her neck. Pt reports history of lymphoma and states she has been having intermittent bleeding to the mass. No bleeding noted upon assessment. Mass has an open wound, appears red and beefy. Noted to have serous drainage. Pt assisted with cleaning wound and given telfa and dressing for home use.

## 2019-06-09 ENCOUNTER — APPOINTMENT (OUTPATIENT)
Dept: MRI IMAGING | Facility: HOSPITAL | Age: 39
End: 2019-06-09
Payer: MEDICARE

## 2019-06-09 ENCOUNTER — OUTPATIENT (OUTPATIENT)
Dept: OUTPATIENT SERVICES | Facility: HOSPITAL | Age: 39
LOS: 1 days | End: 2019-06-09
Payer: MEDICARE

## 2019-06-09 LAB — GLUCOSE BLDC GLUCOMTR-MCNC: 98 MG/DL — SIGNIFICANT CHANGE UP (ref 70–99)

## 2019-06-09 PROCEDURE — 70543 MRI ORBT/FAC/NCK W/O &W/DYE: CPT | Mod: 26

## 2019-06-09 PROCEDURE — 82962 GLUCOSE BLOOD TEST: CPT

## 2019-06-09 PROCEDURE — A9585: CPT

## 2019-06-09 PROCEDURE — 78815 PET IMAGE W/CT SKULL-THIGH: CPT

## 2019-06-09 PROCEDURE — 70543 MRI ORBT/FAC/NCK W/O &W/DYE: CPT

## 2019-06-09 PROCEDURE — A9552: CPT

## 2019-06-09 PROCEDURE — 78815 PET IMAGE W/CT SKULL-THIGH: CPT | Mod: 26

## 2019-06-14 ENCOUNTER — EMERGENCY (EMERGENCY)
Facility: HOSPITAL | Age: 39
LOS: 1 days | Discharge: HOME | End: 2019-06-14
Attending: EMERGENCY MEDICINE | Admitting: EMERGENCY MEDICINE
Payer: MEDICARE

## 2019-06-14 VITALS
SYSTOLIC BLOOD PRESSURE: 123 MMHG | TEMPERATURE: 99 F | WEIGHT: 160.06 LBS | DIASTOLIC BLOOD PRESSURE: 85 MMHG | RESPIRATION RATE: 18 BRPM | HEART RATE: 80 BPM | OXYGEN SATURATION: 100 %

## 2019-06-14 VITALS
OXYGEN SATURATION: 100 % | SYSTOLIC BLOOD PRESSURE: 126 MMHG | RESPIRATION RATE: 17 BRPM | TEMPERATURE: 99 F | DIASTOLIC BLOOD PRESSURE: 83 MMHG | HEART RATE: 75 BPM

## 2019-06-14 DIAGNOSIS — Z03.89 ENCOUNTER FOR OBSERVATION FOR OTHER SUSPECTED DISEASES AND CONDITIONS RULED OUT: ICD-10-CM

## 2019-06-14 DIAGNOSIS — Z79.899 OTHER LONG TERM (CURRENT) DRUG THERAPY: ICD-10-CM

## 2019-06-14 DIAGNOSIS — Z79.2 LONG TERM (CURRENT) USE OF ANTIBIOTICS: ICD-10-CM

## 2019-06-14 DIAGNOSIS — Z79.1 LONG TERM (CURRENT) USE OF NON-STEROIDAL ANTI-INFLAMMATORIES (NSAID): ICD-10-CM

## 2019-06-14 PROCEDURE — 93010 ELECTROCARDIOGRAM REPORT: CPT

## 2019-06-14 PROCEDURE — 99283 EMERGENCY DEPT VISIT LOW MDM: CPT

## 2019-06-14 NOTE — ED ADULT NURSE NOTE - NSIMPLEMENTINTERV_GEN_ALL_ED
Nursing Transfer Note      4/27/2017     Transfer to CT and then 6078    Transfer via Stretcher    Transfer with portable monitor/iv pump/belonging bag and wheelchair    Transported by DAVID Christina RN, JAYASHREE Guido RN and Dai PERALTA    Medicines sent: cardene and fentanyl gtt    Chart send with patient: yes    Notified:  notified    Patient reassessed at: 4/27/17 @ 1930    Upon arrival to floor: pt transferred to ICU bed and placed on bedside monitor and ventilator.  Updates given and bedside.    Implemented All Universal Safety Interventions:  [place] to call system. Call bell, personal items and telephone within reach. Instruct patient to call for assistance. Room bathroom lighting operational. Non-slip footwear when patient is off stretcher. Physically safe environment: no spills, clutter or unnecessary equipment. Stretcher in lowest position, wheels locked, appropriate side rails in place.

## 2019-06-14 NOTE — ED PROVIDER NOTE - ATTENDING CONTRIBUTION TO CARE
39F hx SCC, psych d/o, with fungating L neck mass. pt states being treated for by ENT.  pt states had some bleeding from mass and was evaluated at [place]. states they placed some sutures for bleeding.  pt was concerned as she had some R arm tingling tonight. no bleeding. no chest pain. no SOB. no vomiting. no fevers.  gen- nad  heent- ncat, clear conj, large fungating L neck mass, friable  cv -rrr  lungs -ctab  abd - soft, nt, nd  ext -wwp, no edema  neuro -aox3, steady gait, [place]  sensation intact throughout, 5/5 strength to ue  no focal neuro deficits on exam, no bleeding to mass.  doubt cva.

## 2019-06-14 NOTE — ED ADULT NURSE REASSESSMENT NOTE - NS ED NURSE REASSESS COMMENT FT1
following d/c pt. c/o chest "fullness," PA notified and re-eval, EKG ordered, edt notified for EKG, pt. asleep awaiting EKG

## 2019-06-14 NOTE — ED ADULT NURSE NOTE - CHIEF COMPLAINT QUOTE
pt. with hx of neck tumor (Lt side) presents with c/o intermittent bleeding from the tumor and pain to the site, pt. reports an episode of chest discomfort and Rt arm numbness around 10-11am.

## 2019-06-14 NOTE — ED PROVIDER NOTE - CLINICAL SUMMARY MEDICAL DECISION MAKING FREE TEXT BOX
40 y/o f hx SCC with mass to left neck, schizophrenia presents stating had vessels tied off 3 days ago because tumor has been bleeding, had episode this morning of right arm numbness which resolved.  Pt was concerned about internal bleeding which prompted ED visit.  No hematoma around mass which is on left neck, right arm is NVI, asymptomatic, no indication for labs, vss, no active bleeding, will d/c to f/u

## 2019-06-14 NOTE — ED PROVIDER NOTE - NSFOLLOWUPINSTRUCTIONS_ED_ALL_ED_FT
Chronic Wounds    WHAT YOU NEED TO KNOW:    A chronic wound is a wound that does not heal completely in 6 weeks. A wound is an injury that causes a break in the skin. There may also be damage to nearby tissues. Examples of wounds that can become chronic are deep ulcers (open sores), large burns, and infected cuts.    DISCHARGE INSTRUCTIONS:    Contact your healthcare provider if:     You have a fever.       You have increased or new pain, swelling, redness, or bleeding in or around your wound.      You have pus or a foul odor coming from your wound.      Your skin itches or has a rash.      You have open sores, blisters, or changes in the color or temperature of your skin.       You have questions or concerns about your condition or care.     Medicines:     NSAIDs, such as ibuprofen, help decrease swelling, pain, and fever. This medicine is available with or without a doctor's order. NSAIDs can cause stomach bleeding or kidney problems in certain people. If you take blood thinner medicine, always ask if NSAIDs are safe for you. Always read the medicine label and follow directions. Do not give these medicines to children under 6 months of age without direction from your child's healthcare provider.      Acetaminophen decreases pain and fever. It is available without a doctor's order. Ask how much to take and how often to take it. Follow directions. Read the labels of all other medicines you are using to see if they also contain acetaminophen, or ask your doctor or pharmacist. Acetaminophen can cause liver damage if not taken correctly. Do not use more than 4 grams (4,000 milligrams) total of acetaminophen in one day.       Antibiotics may be given to prevent or treat an infection caused by bacteria.      Take your medicine as directed. Contact your healthcare provider if you think your medicine is not helping or if you have side effects. Tell him or her if you are allergic to any medicine. Keep a list of the medicines, vitamins, and herbs you take. Include the amounts, and when and why you take them. Bring the list or the pill bottles to follow-up visits. Carry your medicine list with you in case of an emergency.    Follow up with your healthcare provider as directed: You need to return to have your wound checked. You may also need to have the bandage changed. Write down your questions so you remember to ask them during your visits.    What you need to know about wound care:     Wash your hands before and after you take care of your wound.      Keep the bandage clean and dry. Do not stop using the bandage on your wound unless your healthcare provider says it is okay.      Clean the wound and change the dressing as often as directed by your healthcare provider.     Eat healthy foods and drink liquids as directed: Healthy foods give your body the nutrients it needs to heal your wound. Liquids prevent dehydration that can decrease the blood supply to your wound. Healthy foods include fruits, vegetables, grains (breads and cereals), dairy, and protein foods. Protein foods include meat, fish, nuts, and soy products. Protein, calories, vitamin C, and zinc help wounds heal. Ask for more information about the foods you should eat to improve healing.     Do not smoke: If you smoke, it is never too late to quit. Smoking delays wound healing. Smoking also increases your risk for infection after surgery. Ask your healthcare provider for information if you need help quitting.    Prevent pressure wounds: Pressure wounds can develop when blood flow to an area is blocked. For example, you sit or lie in the same position without moving and put pressure on your heels. You can prevent pressure wounds by doing any of the following:    Change your position every 15 minutes while you are sitting.       Change your position every 2 hours while you lie in bed.      Prop your legs on pillows to lift your heels while you are lying down.      Check your skin or have someone else check your skin daily. Check the areas that are common to pressure wounds, such as elbows, heels, and buttocks. Common early signs of pressure wounds are open sores, blisters, or changes in color or temperature.

## 2019-06-14 NOTE — ED PROVIDER NOTE - OBJECTIVE STATEMENT
40 y/o f hx SCC with left neck mass presents stating the tumor has been bleeding intermittently over the past few weeks, she went to [facility] 3 days ago and "had some blood vessels tied off."  Pt stating earlier today, had "numbness to my right arm" which lasted a few minutes and went away.  Pt stating she is worried it could be internal bleeding which cause these symptoms, wants to be evaluated.  Denies fever, chills, CP, SOB, weakness, headache, all other ROS negative.

## 2019-06-14 NOTE — ED ADULT NURSE NOTE - OBJECTIVE STATEMENT
Patient came in for L neck tumor, s/p suturing on 6/11 at [facility]. Per patient site with increased bleeding. Hx of malignancy. No other c/o at this time

## 2019-06-17 ENCOUNTER — INPATIENT (INPATIENT)
Facility: HOSPITAL | Age: 39
LOS: 5 days | Discharge: HOME | DRG: 847 | End: 2019-06-23
Attending: STUDENT IN AN ORGANIZED HEALTH CARE EDUCATION/TRAINING PROGRAM | Admitting: STUDENT IN AN ORGANIZED HEALTH CARE EDUCATION/TRAINING PROGRAM
Payer: MEDICARE

## 2019-06-17 VITALS
DIASTOLIC BLOOD PRESSURE: 85 MMHG | HEART RATE: 84 BPM | RESPIRATION RATE: 16 BRPM | TEMPERATURE: 98 F | WEIGHT: 162.48 LBS | OXYGEN SATURATION: 100 % | SYSTOLIC BLOOD PRESSURE: 125 MMHG

## 2019-06-17 DIAGNOSIS — F25.8 OTHER SCHIZOAFFECTIVE DISORDERS: ICD-10-CM

## 2019-06-17 DIAGNOSIS — D64.9 ANEMIA, UNSPECIFIED: ICD-10-CM

## 2019-06-17 DIAGNOSIS — Z91.89 OTHER SPECIFIED PERSONAL RISK FACTORS, NOT ELSEWHERE CLASSIFIED: ICD-10-CM

## 2019-06-17 DIAGNOSIS — F32.9 MAJOR DEPRESSIVE DISORDER, SINGLE EPISODE, UNSPECIFIED: ICD-10-CM

## 2019-06-17 DIAGNOSIS — C44.42 SQUAMOUS CELL CARCINOMA OF SKIN OF SCALP AND NECK: ICD-10-CM

## 2019-06-17 DIAGNOSIS — R63.8 OTHER SYMPTOMS AND SIGNS CONCERNING FOOD AND FLUID INTAKE: ICD-10-CM

## 2019-06-17 LAB
ALBUMIN SERPL ELPH-MCNC: 3.7 G/DL — SIGNIFICANT CHANGE UP (ref 3.3–5)
ALP SERPL-CCNC: 52 U/L — SIGNIFICANT CHANGE UP (ref 40–120)
ALT FLD-CCNC: 11 U/L — SIGNIFICANT CHANGE UP (ref 10–45)
ANION GAP SERPL CALC-SCNC: 9 MMOL/L — SIGNIFICANT CHANGE UP (ref 5–17)
AST SERPL-CCNC: 20 U/L — SIGNIFICANT CHANGE UP (ref 10–40)
BASOPHILS # BLD AUTO: 0.02 K/UL — SIGNIFICANT CHANGE UP (ref 0–0.2)
BASOPHILS NFR BLD AUTO: 0.3 % — SIGNIFICANT CHANGE UP (ref 0–2)
BILIRUB SERPL-MCNC: 0.2 MG/DL — SIGNIFICANT CHANGE UP (ref 0.2–1.2)
BLD GP AB SCN SERPL QL: NEGATIVE — SIGNIFICANT CHANGE UP
BUN SERPL-MCNC: 7 MG/DL — SIGNIFICANT CHANGE UP (ref 7–23)
CALCIUM SERPL-MCNC: 8.7 MG/DL — SIGNIFICANT CHANGE UP (ref 8.4–10.5)
CHLORIDE SERPL-SCNC: 104 MMOL/L — SIGNIFICANT CHANGE UP (ref 96–108)
CO2 SERPL-SCNC: 25 MMOL/L — SIGNIFICANT CHANGE UP (ref 22–31)
CREAT SERPL-MCNC: 0.7 MG/DL — SIGNIFICANT CHANGE UP (ref 0.5–1.3)
EOSINOPHIL # BLD AUTO: 0.11 K/UL — SIGNIFICANT CHANGE UP (ref 0–0.5)
EOSINOPHIL NFR BLD AUTO: 1.8 % — SIGNIFICANT CHANGE UP (ref 0–6)
GLUCOSE SERPL-MCNC: 126 MG/DL — HIGH (ref 70–99)
HCT VFR BLD CALC: 27.6 % — LOW (ref 34.5–45)
HGB BLD-MCNC: 8.6 G/DL — LOW (ref 11.5–15.5)
IMM GRANULOCYTES NFR BLD AUTO: 0.6 % — SIGNIFICANT CHANGE UP (ref 0–1.5)
LDH SERPL L TO P-CCNC: 207 U/L — SIGNIFICANT CHANGE UP (ref 50–242)
LYMPHOCYTES # BLD AUTO: 1.32 K/UL — SIGNIFICANT CHANGE UP (ref 1–3.3)
LYMPHOCYTES # BLD AUTO: 21.3 % — SIGNIFICANT CHANGE UP (ref 13–44)
MAGNESIUM SERPL-MCNC: 1.8 MG/DL — SIGNIFICANT CHANGE UP (ref 1.6–2.6)
MCHC RBC-ENTMCNC: 28.5 PG — SIGNIFICANT CHANGE UP (ref 27–34)
MCHC RBC-ENTMCNC: 31.2 GM/DL — LOW (ref 32–36)
MCV RBC AUTO: 91.4 FL — SIGNIFICANT CHANGE UP (ref 80–100)
MONOCYTES # BLD AUTO: 0.57 K/UL — SIGNIFICANT CHANGE UP (ref 0–0.9)
MONOCYTES NFR BLD AUTO: 9.2 % — SIGNIFICANT CHANGE UP (ref 2–14)
NEUTROPHILS # BLD AUTO: 4.15 K/UL — SIGNIFICANT CHANGE UP (ref 1.8–7.4)
NEUTROPHILS NFR BLD AUTO: 66.8 % — SIGNIFICANT CHANGE UP (ref 43–77)
NRBC # BLD: 0 /100 WBCS — SIGNIFICANT CHANGE UP (ref 0–0)
PHOSPHATE SERPL-MCNC: 3.9 MG/DL — SIGNIFICANT CHANGE UP (ref 2.5–4.5)
PLATELET # BLD AUTO: 293 K/UL — SIGNIFICANT CHANGE UP (ref 150–400)
POTASSIUM SERPL-MCNC: 3.9 MMOL/L — SIGNIFICANT CHANGE UP (ref 3.5–5.3)
POTASSIUM SERPL-SCNC: 3.9 MMOL/L — SIGNIFICANT CHANGE UP (ref 3.5–5.3)
PROT SERPL-MCNC: 6.3 G/DL — SIGNIFICANT CHANGE UP (ref 6–8.3)
RBC # BLD: 3.02 M/UL — LOW (ref 3.8–5.2)
RBC # FLD: 13.7 % — SIGNIFICANT CHANGE UP (ref 10.3–14.5)
RH IG SCN BLD-IMP: POSITIVE — SIGNIFICANT CHANGE UP
SODIUM SERPL-SCNC: 138 MMOL/L — SIGNIFICANT CHANGE UP (ref 135–145)
URATE SERPL-MCNC: 3.3 MG/DL — SIGNIFICANT CHANGE UP (ref 2.5–7)
WBC # BLD: 6.21 K/UL — SIGNIFICANT CHANGE UP (ref 3.8–10.5)
WBC # FLD AUTO: 6.21 K/UL — SIGNIFICANT CHANGE UP (ref 3.8–10.5)

## 2019-06-17 PROCEDURE — 99223 1ST HOSP IP/OBS HIGH 75: CPT | Mod: GC

## 2019-06-17 RX ORDER — ENOXAPARIN SODIUM 100 MG/ML
40 INJECTION SUBCUTANEOUS EVERY 12 HOURS
Refills: 0 | Status: DISCONTINUED | OUTPATIENT
Start: 2019-06-17 | End: 2019-06-17

## 2019-06-17 RX ORDER — ENOXAPARIN SODIUM 100 MG/ML
40 INJECTION SUBCUTANEOUS EVERY 24 HOURS
Refills: 0 | Status: DISCONTINUED | OUTPATIENT
Start: 2019-06-17 | End: 2019-06-18

## 2019-06-17 RX ORDER — ACETAMINOPHEN 500 MG
650 TABLET ORAL EVERY 6 HOURS
Refills: 0 | Status: DISCONTINUED | OUTPATIENT
Start: 2019-06-17 | End: 2019-06-23

## 2019-06-17 RX ORDER — DEXAMETHASONE 0.5 MG/5ML
20 ELIXIR ORAL ONCE
Refills: 0 | Status: COMPLETED | OUTPATIENT
Start: 2019-06-17 | End: 2019-06-17

## 2019-06-17 RX ORDER — ONDANSETRON 8 MG/1
4 TABLET, FILM COATED ORAL EVERY 6 HOURS
Refills: 0 | Status: DISCONTINUED | OUTPATIENT
Start: 2019-06-17 | End: 2019-06-23

## 2019-06-17 RX ORDER — SODIUM CHLORIDE 9 MG/ML
1000 INJECTION INTRAMUSCULAR; INTRAVENOUS; SUBCUTANEOUS
Refills: 0 | Status: DISCONTINUED | OUTPATIENT
Start: 2019-06-17 | End: 2019-06-23

## 2019-06-17 RX ADMIN — Medication 110 MILLIGRAM(S): at 18:43

## 2019-06-17 RX ADMIN — SODIUM CHLORIDE 75 MILLILITER(S): 9 INJECTION INTRAMUSCULAR; INTRAVENOUS; SUBCUTANEOUS at 21:49

## 2019-06-17 NOTE — H&P ADULT - NSTOBACCOSCREENHP_GEN_A_NCS
Add High Risk Medication Management Associated Diagnosis?: No Date Of Last Negative Ppd (Optional): 11/17 Comments: Pt has been off Humira x2 months due to pharmacy stating he needed RF authorization from our office but we never received a request. We will resend RX today. Detail Level: Zone No

## 2019-06-17 NOTE — H&P ADULT - PROBLEM SELECTOR PLAN 1
-Patient SCC of left neck with FNA biopsy and CT imaging consistent with this.  -Follows Dr. Meeks on an outpatient basis, planned for doci, paclo, and 5FU  -Patient was generally noncompliant prior to coming  -PET scan in Dec 2018 and most recent in June 2019 showed enlarging left mass with cervical lymphadenopathy  -Follows Dr. Londono outpt as well, has not had RT yet and no chemo yet either.   -patient will need daily, CBC, CMP, Mag, uric acid, and LDH  -oncology team following, will contact head and neck sx  -NS 75 cc/hr for 10 hours  -dexamethasone 20 mg iv tonight.  -bedside wound care and light dressing. -Patient SCC of left neck with FNA biopsy and CT imaging consistent with this.  -Follows Dr. Meeks on an outpatient basis, planned for doci, paclo, and 5FU  -Patient was generally noncompliant prior to coming  -PET scan in Dec 2018 and most recent in June 2019 showed enlarging left mass with cervical lymphadenopathy  -Follows Dr. Londono outpt as well, has not had RT yet and no chemo yet either.   -patient will need daily, CBC, CMP, Mag, uric acid, and LDH  -oncology team following, will contact head and neck sx  -NS 75 cc/hr for 10 hours  -dexamethasone 20 mg iv tonight.  -bedside wound care and light dressing.  -will have PICC line placed tomorrow for chemotherapy

## 2019-06-17 NOTE — H&P ADULT - HISTORY OF PRESENT ILLNESS
Ms. Cano is a 38 y/o f w/ [id number] HPV positive SCC of the left neck, depression, and schizoaffective disorder who presented to the emergency department for chemotherapy. She noticed the mass 2.5 years ago on the left side of her face and she had some discharge associated with this. She was treated with outpatient at that time with oral antibiotics and needed to follow up with ENT but was poorly adherant to appointments at that time. She has been following more recently Dr. Meeks for oncology and is being admitted to [facility] for chemotherapy with paclitaxel, doxirubicin, and 5FU. CT neck at 12/2018 showed mass below the submandibular gland 5 by 3.7 centimeters. She had biopsy at the site with Dr. Weller FNA and this showed squamous cell carcinoma. She also follows Dr. Londono for radiation, but has not received any doses of radiation treatment. Of note patient does have a history of smoking half for 15 years.     She denies fevers, chills, nausea, vomiting, diarrhea, sob, or chest. Patient states 10 pound weight loss Ms. Cano is a 40 y/o f w/ [id number] HPV positive SCC of the left neck, depression, and schizoaffective disorder who presented to the emergency department for chemotherapy. She noticed the mass 2.5 years ago on the left side of her face and she had some discharge associated with this. She was treated with outpatient at that time with oral antibiotics and needed to follow up with ENT but was poorly adherant to appointments at that time. She has been following more recently Dr. Meeks for oncology and is being admitted to [facility] for chemotherapy with paclitaxel, doxirubicin, and 5FU. CT neck at 12/2018 showed mass below the submandibular gland 5 by 3.7 centimeters. She had biopsy at the site with Dr. Weller FNA and this showed squamous cell carcinoma. She also follows Dr. Londono for radiation, but has not received any doses of radiation treatment. Of note patient does have a history of smoking half for 15 years.     She denies fevers, chills, nausea, vomiting, diarrhea, sob, or chest. Patient states 10 pound weight loss Ms. Cano is a 40 y/o f w/ [id number] HPV positive SCC of the left neck, depression, and schizoaffective disorder who presented to the emergency department for chemotherapy. She noticed the mass 2.5 years ago on the left side of her face and she had some discharge associated with this. She was treated with outpatient at that time with oral antibiotics and needed to follow up with ENT but was poorly adherant to appointments at that time. She has been following more recently Dr. Meeks for oncology and is being admitted to [facility] for chemotherapy with paclitaxel, doxirubicin, and 5FU. CT neck at 12/2018 showed mass below the submandibular gland 5 by 3.7 centimeters. She had biopsy at the site with Dr. Weller FNA and this showed squamous cell carcinoma. She also follows Dr. Londono for radiation, but has not received any doses of radiation treatment. Of note patient does have a history of smoking half for 15 years.     She denies fevers, chills, nausea, vomiting, diarrhea, sob, or chest. Patient states 10 pound weight loss Ms. Cano is a 40 y/o f w/ [id number] HPV positive SCC of the left neck, depression, and schizoaffective disorder who presented to [facility] for chemotherapy. She noticed the mass 2.5 years ago on the left side of her face and she had some discharge associated with this. She was treated with outpatient at that time with oral antibiotics and needed to follow up with ENT but was poorly adherant to appointments at that time. She has been following more recently Dr. Meeks for oncology and is being admitted to [facility] for chemotherapy with paclitaxel, doxirubicin, and 5FU. CT neck at 12/2018 showed mass below the submandibular gland 5 by 3.7 centimeters. She had biopsy at the site with Dr. Weller FNA and this showed squamous cell carcinoma. She also follows Dr. Londono for radiation, but has not received any doses of radiation treatment. Of note patient does have a history of smoking half for 15 years.     She denies fevers, chills, nausea, vomiting, diarrhea, sob, or chest. Patient states 10 pound weight loss

## 2019-06-17 NOTE — H&P ADULT - PROBLEM SELECTOR PLAN 6
1) PCP Contacted on Admission: (Y/N) --> Name & Phone #: Follows Dr. Meeks for oncology, interested in [facility] for PCP management  2) Date of Contact with PCP:  3) PCP Contacted at Discharge: (Y/N, N/A)  4) Summary of Handoff Given to PCP:   5) Post-Discharge Appointment Date and Location:

## 2019-06-17 NOTE — H&P ADULT - PROBLEM SELECTOR PLAN 2
-Patient has anemia with Hg stable in the low 11s  -no signs of GI or  bleeding  -has had mild blood oozing at mass site, but not out of proportion. -Patient has anemia with Hg stable in the low 11s at baseline, CBC here was 8.6  -Patient is HD stable currently  -no signs of GI or  bleeding, will follow up morning iron studies  -maintain 2 active type and screen  -has had mild blood oozing at mass site, but not out of proportion.

## 2019-06-17 NOTE — H&P ADULT - PROBLEM SELECTOR PLAN 5
F 75 cc NS/hr  E replete K<4 Mag <2  N Full diet passed bedside dysphagia  lovenox for DVT prophylaxis

## 2019-06-17 NOTE — H&P ADULT - PROBLEM SELECTOR PLAN 3
-Patient has a history of depression and also anxiety  -Currently not taking any medications and does not want any current treatments  -Denies suicidal ideation

## 2019-06-17 NOTE — H&P ADULT - NSHPPHYSICALEXAM_GEN_ALL_CORE
PHYSICAL EXAM:  GENERAL: NAD, well-developed  HEAD:  Atraumatic, Normocephalic, large fungating 5 by 3 cm pinkish grey tissue on left side of neck.  Trachea midline, mild posterior lymphadenopathy palpated. Airway intact.   EYES: EOMI, PERRLA, conjunctiva and sclera clear  NECK: Supple, No JVD  CHEST/LUNG: Clear to auscultation bilaterally; No wheeze  HEART: Regular rate and rhythm; No murmurs, rubs, or gallops  ABDOMEN: Soft, Nontender, Nondistended; Bowel sounds present  EXTREMITIES:  2+ Peripheral Pulses, No clubbing, cyanosis, or edema  PSYCH: AAOx3, patient is tangential in speech  NEUROLOGY: non-focal  SKIN: No rashes or lesions

## 2019-06-18 LAB
ALBUMIN SERPL ELPH-MCNC: 3.3 G/DL — SIGNIFICANT CHANGE UP (ref 3.3–5)
ALP SERPL-CCNC: 44 U/L — SIGNIFICANT CHANGE UP (ref 40–120)
ALT FLD-CCNC: 9 U/L — LOW (ref 10–45)
ANION GAP SERPL CALC-SCNC: 8 MMOL/L — SIGNIFICANT CHANGE UP (ref 5–17)
AST SERPL-CCNC: 14 U/L — SIGNIFICANT CHANGE UP (ref 10–40)
BASOPHILS # BLD AUTO: 0.01 K/UL — SIGNIFICANT CHANGE UP (ref 0–0.2)
BASOPHILS NFR BLD AUTO: 0.1 % — SIGNIFICANT CHANGE UP (ref 0–2)
BILIRUB SERPL-MCNC: 0.2 MG/DL — SIGNIFICANT CHANGE UP (ref 0.2–1.2)
BLD GP AB SCN SERPL QL: NEGATIVE — SIGNIFICANT CHANGE UP
BUN SERPL-MCNC: 9 MG/DL — SIGNIFICANT CHANGE UP (ref 7–23)
CALCIUM SERPL-MCNC: 8.2 MG/DL — LOW (ref 8.4–10.5)
CHLORIDE SERPL-SCNC: 108 MMOL/L — SIGNIFICANT CHANGE UP (ref 96–108)
CO2 SERPL-SCNC: 20 MMOL/L — LOW (ref 22–31)
CREAT SERPL-MCNC: 0.55 MG/DL — SIGNIFICANT CHANGE UP (ref 0.5–1.3)
EOSINOPHIL # BLD AUTO: 0 K/UL — SIGNIFICANT CHANGE UP (ref 0–0.5)
EOSINOPHIL NFR BLD AUTO: 0 % — SIGNIFICANT CHANGE UP (ref 0–6)
FERRITIN SERPL-MCNC: 18 NG/ML — SIGNIFICANT CHANGE UP (ref 15–150)
FOLATE SERPL-MCNC: 14.5 NG/ML — SIGNIFICANT CHANGE UP
GLUCOSE SERPL-MCNC: 135 MG/DL — HIGH (ref 70–99)
HAPTOGLOB SERPL-MCNC: 115 MG/DL — SIGNIFICANT CHANGE UP (ref 34–200)
HAV IGM SER-ACNC: SIGNIFICANT CHANGE UP
HBV CORE AB SER-ACNC: SIGNIFICANT CHANGE UP
HBV CORE IGM SER-ACNC: SIGNIFICANT CHANGE UP
HBV SURFACE AB SER-ACNC: REACTIVE — SIGNIFICANT CHANGE UP
HBV SURFACE AG SER-ACNC: SIGNIFICANT CHANGE UP
HCG SERPL-ACNC: 0 MIU/ML — SIGNIFICANT CHANGE UP
HCG UR QL: NEGATIVE — SIGNIFICANT CHANGE UP
HCT VFR BLD CALC: 25.1 % — LOW (ref 34.5–45)
HCV AB S/CO SERPL IA: 0.07 S/CO — SIGNIFICANT CHANGE UP
HCV AB SERPL-IMP: SIGNIFICANT CHANGE UP
HGB BLD-MCNC: 8 G/DL — LOW (ref 11.5–15.5)
HIV 1+2 AB+HIV1 P24 AG SERPL QL IA: SIGNIFICANT CHANGE UP
IMM GRANULOCYTES NFR BLD AUTO: 1.2 % — SIGNIFICANT CHANGE UP (ref 0–1.5)
IRON SATN MFR SERPL: 10 % — LOW (ref 14–50)
IRON SATN MFR SERPL: 25 UG/DL — LOW (ref 30–160)
LDH SERPL L TO P-CCNC: 159 U/L — SIGNIFICANT CHANGE UP (ref 50–242)
LYMPHOCYTES # BLD AUTO: 0.37 K/UL — LOW (ref 1–3.3)
LYMPHOCYTES # BLD AUTO: 4.2 % — LOW (ref 13–44)
MAGNESIUM SERPL-MCNC: 1.7 MG/DL — SIGNIFICANT CHANGE UP (ref 1.6–2.6)
MCHC RBC-ENTMCNC: 28.3 PG — SIGNIFICANT CHANGE UP (ref 27–34)
MCHC RBC-ENTMCNC: 31.9 GM/DL — LOW (ref 32–36)
MCV RBC AUTO: 88.7 FL — SIGNIFICANT CHANGE UP (ref 80–100)
MONOCYTES # BLD AUTO: 0.3 K/UL — SIGNIFICANT CHANGE UP (ref 0–0.9)
MONOCYTES NFR BLD AUTO: 3.4 % — SIGNIFICANT CHANGE UP (ref 2–14)
NEUTROPHILS # BLD AUTO: 8.05 K/UL — HIGH (ref 1.8–7.4)
NEUTROPHILS NFR BLD AUTO: 91.1 % — HIGH (ref 43–77)
NRBC # BLD: 0 /100 WBCS — SIGNIFICANT CHANGE UP (ref 0–0)
PLATELET # BLD AUTO: 285 K/UL — SIGNIFICANT CHANGE UP (ref 150–400)
POTASSIUM SERPL-MCNC: 4 MMOL/L — SIGNIFICANT CHANGE UP (ref 3.5–5.3)
POTASSIUM SERPL-SCNC: 4 MMOL/L — SIGNIFICANT CHANGE UP (ref 3.5–5.3)
PROT SERPL-MCNC: 5.6 G/DL — LOW (ref 6–8.3)
RBC # BLD: 2.83 M/UL — LOW (ref 3.8–5.2)
RBC # FLD: 13.5 % — SIGNIFICANT CHANGE UP (ref 10.3–14.5)
RETICS #: 93.6 K/UL — SIGNIFICANT CHANGE UP (ref 25–125)
RETICS/RBC NFR: 3.2 % — HIGH (ref 0.5–2.5)
RH IG SCN BLD-IMP: POSITIVE — SIGNIFICANT CHANGE UP
SODIUM SERPL-SCNC: 136 MMOL/L — SIGNIFICANT CHANGE UP (ref 135–145)
TIBC SERPL-MCNC: 261 UG/DL — SIGNIFICANT CHANGE UP (ref 220–430)
TRANSFERRIN SERPL-MCNC: 194 MG/DL — LOW (ref 200–360)
UIBC SERPL-MCNC: 236 UG/DL — SIGNIFICANT CHANGE UP (ref 110–370)
URATE SERPL-MCNC: 2.7 MG/DL — SIGNIFICANT CHANGE UP (ref 2.5–7)
VIT B12 SERPL-MCNC: 237 PG/ML — SIGNIFICANT CHANGE UP (ref 232–1245)
WBC # BLD: 8.84 K/UL — SIGNIFICANT CHANGE UP (ref 3.8–10.5)
WBC # FLD AUTO: 8.84 K/UL — SIGNIFICANT CHANGE UP (ref 3.8–10.5)

## 2019-06-18 PROCEDURE — 36569 INSJ PICC 5 YR+ W/O IMAGING: CPT

## 2019-06-18 PROCEDURE — 76937 US GUIDE VASCULAR ACCESS: CPT | Mod: 26,59

## 2019-06-18 PROCEDURE — 99233 SBSQ HOSP IP/OBS HIGH 50: CPT | Mod: GC

## 2019-06-18 RX ORDER — FERROUS SULFATE 325(65) MG
325 TABLET ORAL
Refills: 0 | Status: DISCONTINUED | OUTPATIENT
Start: 2019-06-18 | End: 2019-06-23

## 2019-06-18 RX ORDER — CISPLATIN 1 MG/ML
135 INJECTION, SOLUTION INTRAVENOUS ONCE
Refills: 0 | Status: COMPLETED | OUTPATIENT
Start: 2019-06-18 | End: 2019-06-18

## 2019-06-18 RX ORDER — DEXAMETHASONE 0.5 MG/5ML
20 ELIXIR ORAL DAILY
Refills: 0 | Status: COMPLETED | OUTPATIENT
Start: 2019-06-18 | End: 2019-06-20

## 2019-06-18 RX ORDER — CHLORHEXIDINE GLUCONATE 213 G/1000ML
1 SOLUTION TOPICAL
Refills: 0 | Status: DISCONTINUED | OUTPATIENT
Start: 2019-06-19 | End: 2019-06-23

## 2019-06-18 RX ORDER — SODIUM CHLORIDE 9 MG/ML
10 INJECTION INTRAMUSCULAR; INTRAVENOUS; SUBCUTANEOUS
Refills: 0 | Status: DISCONTINUED | OUTPATIENT
Start: 2019-06-18 | End: 2019-06-23

## 2019-06-18 RX ORDER — SODIUM CHLORIDE 9 MG/ML
1000 INJECTION INTRAMUSCULAR; INTRAVENOUS; SUBCUTANEOUS
Refills: 0 | Status: DISCONTINUED | OUTPATIENT
Start: 2019-06-18 | End: 2019-06-23

## 2019-06-18 RX ORDER — SODIUM CHLORIDE 9 MG/ML
1500 INJECTION INTRAMUSCULAR; INTRAVENOUS; SUBCUTANEOUS
Refills: 0 | Status: DISCONTINUED | OUTPATIENT
Start: 2019-06-18 | End: 2019-06-18

## 2019-06-18 RX ORDER — FOSAPREPITANT DIMEGLUMINE 150 MG/5ML
150 INJECTION, POWDER, LYOPHILIZED, FOR SOLUTION INTRAVENOUS ONCE
Refills: 0 | Status: COMPLETED | OUTPATIENT
Start: 2019-06-18 | End: 2019-06-18

## 2019-06-18 RX ORDER — DOCETAXEL 20 MG/ML
135 INJECTION, SOLUTION, CONCENTRATE INTRAVENOUS ONCE
Refills: 0 | Status: COMPLETED | OUTPATIENT
Start: 2019-06-18 | End: 2019-06-18

## 2019-06-18 RX ORDER — FLUOROURACIL 50 MG/ML
1810 INJECTION, SOLUTION INTRAVENOUS DAILY
Refills: 0 | Status: COMPLETED | OUTPATIENT
Start: 2019-06-18 | End: 2019-06-22

## 2019-06-18 RX ORDER — ONDANSETRON 8 MG/1
16 TABLET, FILM COATED ORAL ONCE
Refills: 0 | Status: COMPLETED | OUTPATIENT
Start: 2019-06-18 | End: 2019-06-18

## 2019-06-18 RX ORDER — DOCETAXEL 20 MG/ML
135 INJECTION, SOLUTION, CONCENTRATE INTRAVENOUS ONCE
Refills: 0 | Status: DISCONTINUED | OUTPATIENT
Start: 2019-06-18 | End: 2019-06-18

## 2019-06-18 RX ADMIN — Medication 220 MILLIGRAM(S): at 15:16

## 2019-06-18 RX ADMIN — DOCETAXEL 256.75 MILLIGRAM(S): 20 INJECTION, SOLUTION, CONCENTRATE INTRAVENOUS at 16:22

## 2019-06-18 RX ADMIN — Medication 325 MILLIGRAM(S): at 18:18

## 2019-06-18 RX ADMIN — SODIUM CHLORIDE 500 MILLILITER(S): 9 INJECTION INTRAMUSCULAR; INTRAVENOUS; SUBCUTANEOUS at 14:03

## 2019-06-18 RX ADMIN — Medication 650 MILLIGRAM(S): at 07:23

## 2019-06-18 RX ADMIN — FOSAPREPITANT DIMEGLUMINE 310 MILLIGRAM(S): 150 INJECTION, POWDER, LYOPHILIZED, FOR SOLUTION INTRAVENOUS at 15:16

## 2019-06-18 RX ADMIN — FLUOROURACIL 43.18 MILLIGRAM(S): 50 INJECTION, SOLUTION INTRAVENOUS at 17:46

## 2019-06-18 RX ADMIN — SODIUM CHLORIDE 500 MILLILITER(S): 9 INJECTION INTRAMUSCULAR; INTRAVENOUS; SUBCUTANEOUS at 12:26

## 2019-06-18 RX ADMIN — ONDANSETRON 232 MILLIGRAM(S): 8 TABLET, FILM COATED ORAL at 16:06

## 2019-06-18 RX ADMIN — CISPLATIN 47.29 MILLIGRAM(S): 1 INJECTION, SOLUTION INTRAVENOUS at 17:45

## 2019-06-18 RX ADMIN — SODIUM CHLORIDE 500 MILLILITER(S): 9 INJECTION INTRAMUSCULAR; INTRAVENOUS; SUBCUTANEOUS at 13:16

## 2019-06-18 RX ADMIN — Medication 650 MILLIGRAM(S): at 07:54

## 2019-06-18 NOTE — PROGRESS NOTE ADULT - SUBJECTIVE AND OBJECTIVE BOX
Patient is a 39y old  Female who presents with a chief complaint of chemotherapy for squamous cell carcinoma (18 Jun 2019 10:03)      INTERVAL HPI/OVERNIGHT EVENTS:  ICU Vital Signs Last 24 Hrs  T(C): 36.7 (18 Jun 2019 11:04), Max: 36.9 (17 Jun 2019 21:47)  T(F): 98 (18 Jun 2019 11:04), Max: 98.5 (17 Jun 2019 21:47)  HR: 68 (18 Jun 2019 11:04) (68 - 98)  BP: 98/55 (18 Jun 2019 11:04) (98/55 - 125/85)  BP(mean): --  ABP: --  ABP(mean): --  RR: 16 (18 Jun 2019 11:04) (16 - 18)  SpO2: 98% (18 Jun 2019 11:04) (97% - 100%)    I&O's Summary    17 Jun 2019 07:01  -  18 Jun 2019 07:00  --------------------------------------------------------  IN: 1200 mL / OUT: 0 mL / NET: 1200 mL          LABS:                        8.0    8.84  )-----------( 285      ( 18 Jun 2019 08:52 )             25.1     06-18    136  |  108  |  9   ----------------------------<  135<H>  4.0   |  20<L>  |  0.55    Ca    8.2<L>      18 Jun 2019 08:52  Phos  3.9     06-17  Mg     1.7     06-18    TPro  5.6<L>  /  Alb  3.3  /  TBili  0.2  /  DBili  x   /  AST  14  /  ALT  9<L>  /  AlkPhos  44  06-18        CAPILLARY BLOOD GLUCOSE            RADIOLOGY & ADDITIONAL TESTS:    Consultant(s) Notes Reviewed:  [x ] YES  [ ] NO    MEDICATIONS  (STANDING):  enoxaparin Injectable 40 milliGRAM(s) SubCutaneous every 24 hours  sodium chloride 0.9%. 1000 milliLiter(s) (75 mL/Hr) IV Continuous <Continuous>  sodium chloride 0.9%. 1000 milliLiter(s) (500 mL/Hr) IV Continuous <Continuous>    MEDICATIONS  (PRN):  acetaminophen   Tablet .. 650 milliGRAM(s) Oral every 6 hours PRN Temp greater or equal to 38C (100.4F), Mild Pain (1 - 3)  ondansetron Injectable 4 milliGRAM(s) IV Push every 6 hours PRN Nausea      PHYSICAL EXAM:  GENERAL: well built, well nourished  HEAD:  Atraumatic, Normocephalic  EYES: EOMI, PERRLA, conjunctiva and sclera clear  ENT: No tonsillar erythema, exudates, or enlargement; Moist mucous membranes, Good dentition, No lesions  NECK: Supple, No JVD, Normal thyroid, no enlarged nodes  NERVOUS SYSTEM:  Alert & Oriented X3, Good concentration; Motor Strength 5/5 B/L upper and lower extremities; DTRs 2+ intact and symmetric, sensory intact  CHEST/LUNG: B/L good air entry; No rales, rhonchi, or wheezing  HEART: S1S2 normal, no S3, Regular rate and rhythm; No murmurs, rubs, or gallops  ABDOMEN: Soft, Nontender, Nondistended; Bowel sounds present  EXTREMITIES:  2+ Peripheral Pulses, No clubbing, cyanosis, or edema  LYMPH: No lymphadenopathy noted  SKIN: No rashes or lesions    Care Discussed with Consultants/Other Providers [ x] YES  [ ] NO Patient is a 39y old  Female who presents with a chief complaint of chemotherapy for squamous cell carcinoma (18 Jun 2019 10:03)    no neck pain  no dyspnea, cough, fevers      INTERVAL HPI/OVERNIGHT EVENTS:  ICU Vital Signs Last 24 Hrs  T(C): 36.7 (18 Jun 2019 11:04), Max: 36.9 (17 Jun 2019 21:47)  T(F): 98 (18 Jun 2019 11:04), Max: 98.5 (17 Jun 2019 21:47)  HR: 68 (18 Jun 2019 11:04) (68 - 98)  BP: 98/55 (18 Jun 2019 11:04) (98/55 - 125/85)  BP(mean): --  ABP: --  ABP(mean): --  RR: 16 (18 Jun 2019 11:04) (16 - 18)  SpO2: 98% (18 Jun 2019 11:04) (97% - 100%)    I&O's Summary    17 Jun 2019 07:01  -  18 Jun 2019 07:00  --------------------------------------------------------  IN: 1200 mL / OUT: 0 mL / NET: 1200 mL          LABS:                        8.0    8.84  )-----------( 285      ( 18 Jun 2019 08:52 )             25.1     06-18    136  |  108  |  9   ----------------------------<  135<H>  4.0   |  20<L>  |  0.55    Ca    8.2<L>      18 Jun 2019 08:52  Phos  3.9     06-17  Mg     1.7     06-18    TPro  5.6<L>  /  Alb  3.3  /  TBili  0.2  /  DBili  x   /  AST  14  /  ALT  9<L>  /  AlkPhos  44  06-18        CAPILLARY BLOOD GLUCOSE            RADIOLOGY & ADDITIONAL TESTS:    Consultant(s) Notes Reviewed:  [x ] YES  [ ] NO    MEDICATIONS  (STANDING):  enoxaparin Injectable 40 milliGRAM(s) SubCutaneous every 24 hours  sodium chloride 0.9%. 1000 milliLiter(s) (75 mL/Hr) IV Continuous <Continuous>  sodium chloride 0.9%. 1000 milliLiter(s) (500 mL/Hr) IV Continuous <Continuous>    MEDICATIONS  (PRN):  acetaminophen   Tablet .. 650 milliGRAM(s) Oral every 6 hours PRN Temp greater or equal to 38C (100.4F), Mild Pain (1 - 3)  ondansetron Injectable 4 milliGRAM(s) IV Push every 6 hours PRN Nausea      PHYSICAL EXAM:  GENERAL: well built, well nourished  HEAD:  Atraumatic, Normocephalic  EYES: EOMI, PERRLA, conjunctiva and sclera clear  ENT: No tonsillar erythema, exudates, or enlargement; Moist mucous membranes, Good dentition, No lesions  NECK: Supple, left neck fungating mass, no bleeding or purulence. mild serosang discharge.   NERVOUS SYSTEM:  Alert & Oriented X3, Good concentration; Motor Strength 5/5 B/L upper and lower extremities; DTRs 2+ intact and symmetric, sensory intact  CHEST/LUNG: B/L good air entry; No rales, rhonchi, or wheezing  HEART: S1S2 normal, no S3, Regular rate and rhythm; No murmurs, rubs, or gallops  ABDOMEN: Soft, Nontender, Nondistended; Bowel sounds present  EXTREMITIES:  2+ Peripheral Pulses, No clubbing, cyanosis, or edema  LYMPH: No lymphadenopathy noted  SKIN: No rashes or lesions    Care Discussed with Consultants/Other Providers [ x] YES  [ ] NO OVERNIGHT EVENTS:  TRANG    INTERVAL HPI:  Pt is examined at bedside. She appears well in NAD. Pt says she had acute episode of bleeding from tumor last week which was ligated at [facility] ED. Since then she has not noticed any acute bleeding but has noticed some oozing and purulence. She denies any neck pain, SOB, CP, throat pain, abdominal pain, nausea/vomiting, fevers/chills.     ICU Vital Signs Last 24 Hrs  T(C): 36.7 (18 Jun 2019 11:04), Max: 36.9 (17 Jun 2019 21:47)  T(F): 98 (18 Jun 2019 11:04), Max: 98.5 (17 Jun 2019 21:47)  HR: 68 (18 Jun 2019 11:04) (68 - 98)  BP: 98/55 (18 Jun 2019 11:04) (98/55 - 125/85)  BP(mean): --  ABP: --  ABP(mean): --  RR: 16 (18 Jun 2019 11:04) (16 - 18)  SpO2: 98% (18 Jun 2019 11:04) (97% - 100%)    I&O's Summary    17 Jun 2019 07:01  -  18 Jun 2019 07:00  --------------------------------------------------------  IN: 1200 mL / OUT: 0 mL / NET: 1200 mL          LABS:                        8.0    8.84  )-----------( 285      ( 18 Jun 2019 08:52 )             25.1     06-18    136  |  108  |  9   ----------------------------<  135<H>  4.0   |  20<L>  |  0.55    Ca    8.2<L>      18 Jun 2019 08:52  Phos  3.9     06-17  Mg     1.7     06-18    TPro  5.6<L>  /  Alb  3.3  /  TBili  0.2  /  DBili  x   /  AST  14  /  ALT  9<L>  /  AlkPhos  44  06-18        CAPILLARY BLOOD GLUCOSE            RADIOLOGY & ADDITIONAL TESTS:    Consultant(s) Notes Reviewed:  [x ] YES  [ ] NO    MEDICATIONS  (STANDING):  enoxaparin Injectable 40 milliGRAM(s) SubCutaneous every 24 hours  sodium chloride 0.9%. 1000 milliLiter(s) (75 mL/Hr) IV Continuous <Continuous>  sodium chloride 0.9%. 1000 milliLiter(s) (500 mL/Hr) IV Continuous <Continuous>    MEDICATIONS  (PRN):  acetaminophen   Tablet .. 650 milliGRAM(s) Oral every 6 hours PRN Temp greater or equal to 38C (100.4F), Mild Pain (1 - 3)  ondansetron Injectable 4 milliGRAM(s) IV Push every 6 hours PRN Nausea      PHYSICAL EXAM:  GENERAL: well built, well nourished  HEAD:  Atraumatic, Normocephalic  EYES: EOMI, PERRLA, conjunctiva and sclera clear  ENT: No tonsillar erythema, exudates, or enlargement; Moist mucous membranes, Good dentition, No lesions  NECK: Supple, left neck fungating mass, no bleeding or purulence. mild serosang discharge.   NERVOUS SYSTEM:  Alert & Oriented X3, Good concentration; Motor Strength 5/5 B/L upper and lower extremities; DTRs 2+ intact and symmetric, sensory intact  CHEST/LUNG: B/L good air entry; No rales, rhonchi, or wheezing  HEART: S1S2 normal, no S3, Regular rate and rhythm; No murmurs, rubs, or gallops  ABDOMEN: Soft, Nontender, Nondistended; Bowel sounds present  EXTREMITIES:  2+ Peripheral Pulses, No clubbing, cyanosis, or edema  LYMPH: No lymphadenopathy noted  SKIN: No rashes or lesions    Care Discussed with Consultants/Other Providers [ x] YES  [ ] NO OVERNIGHT EVENTS:  TRANG    INTERVAL HPI:  Pt is examined at bedside. She appears well in NAD. Pt says she had acute episode of bleeding from tumor last week which was ligated at [facility] ED. Since then she has not noticed any acute bleeding but has noticed some oozing and purulence. She denies any neck pain, SOB, CP, throat pain, abdominal pain, nausea/vomiting, fevers/chills.     Vital Signs Last 24 Hrs  T(C): 36.7 (18 Jun 2019 11:04), Max: 36.9 (17 Jun 2019 21:47)  T(F): 98 (18 Jun 2019 11:04), Max: 98.5 (17 Jun 2019 21:47)  HR: 68 (18 Jun 2019 11:04) (68 - 98)  BP: 98/55 (18 Jun 2019 11:04) (98/55 - 125/85)  BP(mean): --  ABP: --  ABP(mean): --  RR: 16 (18 Jun 2019 11:04) (16 - 18)  SpO2: 98% (18 Jun 2019 11:04) (97% - 100%)    I&O's Summary    17 Jun 2019 07:01  -  18 Jun 2019 07:00  --------------------------------------------------------  IN: 1200 mL / OUT: 0 mL / NET: 1200 mL          LABS:                        8.0    8.84  )-----------( 285      ( 18 Jun 2019 08:52 )             25.1     06-18    136  |  108  |  9   ----------------------------<  135<H>  4.0   |  20<L>  |  0.55    Ca    8.2<L>      18 Jun 2019 08:52  Phos  3.9     06-17  Mg     1.7     06-18    TPro  5.6<L>  /  Alb  3.3  /  TBili  0.2  /  DBili  x   /  AST  14  /  ALT  9<L>  /  AlkPhos  44  06-18        CAPILLARY BLOOD GLUCOSE            RADIOLOGY & ADDITIONAL TESTS:    Consultant(s) Notes Reviewed:  [x ] YES  [ ] NO    MEDICATIONS  (STANDING):  enoxaparin Injectable 40 milliGRAM(s) SubCutaneous every 24 hours  sodium chloride 0.9%. 1000 milliLiter(s) (75 mL/Hr) IV Continuous <Continuous>  sodium chloride 0.9%. 1000 milliLiter(s) (500 mL/Hr) IV Continuous <Continuous>    MEDICATIONS  (PRN):  acetaminophen   Tablet .. 650 milliGRAM(s) Oral every 6 hours PRN Temp greater or equal to 38C (100.4F), Mild Pain (1 - 3)  ondansetron Injectable 4 milliGRAM(s) IV Push every 6 hours PRN Nausea      PHYSICAL EXAM:  GENERAL: NAD.   HEAD:  Atraumatic, Normocephalic  EYES: EOMI, PERRLA, conjunctiva and sclera clear  NECK: Supple, left neck fungating mass, no bleeding or purulence. mild serosanguinous discharge.   NERVOUS SYSTEM:  Alert & Oriented X3, Good concentration; Motor Strength 5/5 B/L upper and lower extremities; DTRs 2+ intact and symmetric, sensory intact  CHEST/LUNG: B/L good air entry; No rales, rhonchi, or wheezing  HEART: S1S2 normal, no S3, Regular rate and rhythm; No murmurs, rubs, or gallops  ABDOMEN: Soft, Nontender, Nondistended; Bowel sounds present  EXTREMITIES:  2+ Peripheral Pulses, No clubbing, cyanosis, or edema  LYMPH: No lymphadenopathy noted  SKIN: No rashes or lesions    Care Discussed with Consultants/Other Providers [ x] YES  [ ] NO

## 2019-06-18 NOTE — PROGRESS NOTE ADULT - PROBLEM SELECTOR PLAN 2
-Patient has anemia with Hg stable in the low 11s at baseline, CBC here was 8.6  -Patient is HD stable currently  -no signs of GI or  bleeding, will follow up morning iron studies  -maintain 2 active type and screen  -has had mild blood oozing at mass site, but not out of proportion. Patient has anemia with Hg stable in the low 11s at baseline, CBC here was 8.6 on admission.   -Patient is HD stable currently  -no signs of GI or  bleeding, will follow up morning iron studies  -maintain 2 active type and screen  -has had mild blood oozing at mass site, but not out of proportion. Patient has anemia with Hg stable in the low 11s at baseline, CBC here was 8.6 on admission. No signs of GI or  bleeding. Pt reports recent hx of active bleeding from tumor and went to [facility] and bleeding site was ligated in ED. Pt unsure of hgb at the time. Iron studies show iron def anemia likely 2/2 tumor bleed. Pt has had mild blood oozing at mass site, but not out of proportion.  - Patient is HD stable currently  - maintain 2 active type and screen  - Hgb goal >7   - ENT consulted as stated above   - c/w ferrous sulfate 325mg BID   - f/up b12/folate   - continue to monitor

## 2019-06-18 NOTE — PROGRESS NOTE ADULT - ASSESSMENT
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     #Head and Neck Cancer   Very large left neck mass that has eroded; on imaging there is encasement of the internal carotid arteries as well as extension of the mass into skin, parotid and submandibular glands. She has extensive lymphadenopthy with cervical, retropharyngeal and supraclavicular nodes involved. No evidence of distant mets. Given extent of disease, patient being admitted for induction chemotherapy with DCF.     -Patient hydrated with NS overnight at 75 cc/hr  -Awaiting dual lumen PICC line placement by IR prior to administration of chemotherapy  -We will use induction DCF: Docetaxel 75 mg/m2 over 1 hour, Cisplatin 75/m2 over 24 hours, and infusional 5-FU 1000 mg/m2 over 96 hours  -Please monitor CBC and CMP including lytes such as mag and phos; uric acid and LDH WNL  -Will pre-medicate with Emend and Zofran  -Decadron 20 mg IV day prior (given already), today (day 1) and tomorrow  -Will give G-CSF 24 hours after completion of chemotherapy  -Chemo consent not obtained as of yet, patient wanted to drink her coffee first prior to signing consent. Will re-attempt.    #Anemia   Normocytic, likely due to bleeding vessel (ligated a few days ago). Iron studies show likely iron deficiency anemia  -Recommend repletion of iron stores with PO Ferrous Sulfate 325 mg PO BID  -Recommend checking B12 and folate studies    Case d/w Dr. Meeks

## 2019-06-18 NOTE — PROVIDER CONTACT NOTE (OTHER) - SITUATION
Pt with order for urine pregnancy profile. Pt using restroom but continuously forgetting to provide urine sample.

## 2019-06-18 NOTE — PROGRESS NOTE ADULT - SUBJECTIVE AND OBJECTIVE BOX
Heme/Onc Progress Note (Dr. High )  Discussed with Dr. Meeks and plan reviewed with the primary team.    The patient was seen and examined.      The patient is a 38 yo woman who was admitted yesterday for induction chemotherapy for a large left neck squamous cell carcinoma. A neck CT scan on 11/10/16 revealed a 3.1x2.9x5.0 cm multiloculated rim-enhancing fluid collection within the left level IIb soft tissues just deep to the sternocleidomastoid muscle. DDx included abscess, suppurative adenitis or infected second brachial cleft cyst. The patient was treated with antibiotics and some white material was drained from the lesion. The lesion, according to the patient came back in 2017. She again took antibiotic therapy. A CT scan was done but no additional biopsy was obtained. The patient saw Dr. Weller in early 12/18. A biopsy was obtained on 12/6/18.   This revealed a squamous cell carcinoma, HPV related. The patient was seen by Dr. Alvarado and Dr. Londono. She was referred to me but did not make an appointment. She finally saw me on 5/29/19 and I have been advising induction chemotherapy since. She was admitted yesterday and will begin chemotherapy today. She has recently been having intermittent bleeding from the eroded open large left neck mass. She has been to the ER on several occasions lately because of this.    Hb dropped to 8.0 g/dL likely due to oozing of blood from open wound of the neck mass. No purulent drainage. She has been afebrile.     ROS is otherwise negative.    Medications:  MEDICATIONS  (STANDING):  MEDICATIONS  (STANDING):  enoxaparin Injectable 40 milliGRAM(s) SubCutaneous every 24 hours  sodium chloride 0.9%. 1000 milliLiter(s) (75 mL/Hr) IV Continuous <Continuous>  sodium chloride 0.9%. 1500 milliLiter(s) (500 mL/Hr) IV Continuous <Continuous>    MEDICATIONS  (PRN):  acetaminophen   Tablet .. 650 milliGRAM(s) Oral every 6 hours PRN Temp greater or equal to 38C (100.4F), Mild Pain (1 - 3)  ondansetron Injectable 4 milliGRAM(s) IV Push every 6 hours PRN Nausea        PHYSICAL EXAM:  T(C): 36.8 (06-18-19 @ 05:24), Max: 36.9 (06-17-19 @ 21:47)  T(F): 98.2 (06-18-19 @ 05:24), Max: 98.5 (06-17-19 @ 21:47)  HR: 98 (06-18-19 @ 05:24) (83 - 98)  BP: 110/74 (06-18-19 @ 05:24) (106/84 - 125/85)  ABP: --  ABP(mean): --  RR: 18 (06-18-19 @ 05:24) (16 - 18)  SpO2: 97% (06-18-19 @ 05:24) (97% - 100%)      Gen: well developed, well nourished, sleeping but arousable   HEENT: normocephalic/atraumatic, no conjunctival pallor, no scleral icterus, no oral thrush/mucosal bleeding/mucositis  Neck: Large left neck mass that has eroded, open wound noted   Cardiovascular: RR, nl S1S2, no murmurs/rubs/gallops  Respiratory: clear air entry b/l  Gastrointestinal: BS+, soft, NT/ND, no masses, no splenomegaly, no hepatomegaly, no evidence for ascites  Extremities: no clubbing/cyanosis, no edema, no calf tenderness  Vascular:  DP/PT 2+ b/l  Neurological: CN 2-12 grossly intact, no focal deficits  Skin: no rash on visible skin        Labs:                          8.0    8.84  )-----------( 285      ( 18 Jun 2019 08:52 )             25.1     CBC Full  -  ( 18 Jun 2019 08:52 )  WBC Count : 8.84 K/uL  RBC Count : 2.83 M/uL  Hemoglobin : 8.0 g/dL  Hematocrit : 25.1 %  Platelet Count - Automated : 285 K/uL  Mean Cell Volume : 88.7 fl  Mean Cell Hemoglobin : 28.3 pg  Mean Cell Hemoglobin Concentration : 31.9 gm/dL  Auto Neutrophil # : 8.05 K/uL  Auto Lymphocyte # : 0.37 K/uL  Auto Monocyte # : 0.30 K/uL  Auto Eosinophil # : 0.00 K/uL  Auto Basophil # : 0.01 K/uL  Auto Neutrophil % : 91.1 %  Auto Lymphocyte % : 4.2 %  Auto Monocyte % : 3.4 %  Auto Eosinophil % : 0.0 %  Auto Basophil % : 0.1 %        06-18    136  |  108  |  9   ----------------------------<  135<H>  4.0   |  20<L>  |  0.55    Ca    8.2<L>      18 Jun 2019 08:52  Phos  3.9     06-17  Mg     1.7     06-18    TPro  5.6<L>  /  Alb  3.3  /  TBili  0.2  /  DBili  x   /  AST  14  /  ALT  9<L>  /  AlkPhos  44  06-18          Other Labs:    Cultures:    Pathology:    Imaging Studies:  < from: MR Orbit, Face, and/or Neck w/wo IV Cont, Bilateral (06.09.19 @ 15:48) >  EXAM:  MR ORBIT FACE AND ORNECK [facility]                          PROCEDURE DATE:  06/09/2019          INTERPRETATION:  Sagittal, axial and coronal imaging of the soft tissues   of the neck was performed both before and following intravenous contrast   administration, utilizing T1 and T2 weighting with fat suppression   techniques.    Contrast dose: 7.5 cc of intravenous Gadavist.    Clinical information: Squamous cell carcinoma, HPV-related.    The patient was unable to complete the examination, and postcontrast   sagittal and coronal images were not performed.    The current study is interpreted in conjunction with concurrent PET/CT,   as well as prior CT studies of 12/2018 and 11/2016.    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 This mass   involves the skin and extends from the level of the parotid gland   cranially to the thyroid gland caudally. It is associated with complete   encasement without narrowing of both the common and internal carotid   arteries along its medial extent. More inferiorly, the mass is   inseparable from the subclavian vasculature. Neither the left   sternocleidomastoid muscle nor the left-sided strap musculature can be   identified in their entirety. In addition to this dominant conglomerate   klarissa mass, discrete pathologic lymph nodes are identified in left levels   1, 5 and 6 and the supraclavicular fossa as well as in the left   retropharyngeal chain. On the right side, pathologic lymph nodes are   identified in levels 3 and 4, with a focus of ill-defined enhancing soft   tissue noted anterior to the right sided strap muscles at and above the   level of the clavicular head. The primary tumor is not identified within   the visualized portions of the upper aerodigestive tract, and tumor is   radiographically categorized as T0N3 utilizing the HPV mediated   oropharyngeal cancer staging form.    As above, the tumor invades the inferior aspect of the left parotid gland   and is inseparable from the left submandibular gland. The right parotid   and submandibular glands are unremarkable in appearance. Tumor is   inseparable from the left thyroid lobe with the right thyroid lobe   normal. The visualized portions of the orbital and intracranial contents   are grossly normal. The central skull base appears intact. The cervical   vertebrae are normal in height and alignment. There is no large apical   lung mass.    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 There is also pathologic lymphadenopathy in the   right infrahyoid neck. The primary tumor is not identified. Please see   report above for further detail.              "Thank you for the opportunity to participate in the care of this   patient."      < end of copied text >    < from: NM PET/CT Onc FDG Skull to Thigh, Subsq (06.09.19 @ 14:51) >  EXAM:  PETCT [facility] ONC FDG SUBS                          PROCEDURE DATE:  06/09/2019          INTERPRETATION:    PET-CT ONCOLOGIC STUDY    INDICATION: Squamous cell cancer of the left neck. Restaging to help   determine subsequent treatment strategy    TECHNIQUE: Intravenous access was established and the patient injected   with 12.1 mCi of [id number]. After resting in a quiet room   for about one hour, the patient was positioned on the scanning table and   images were obtained using standard PET/CT technique from the mid thigh   to the axilla.  This acquisition was performed with the patient's arms   lifted as high as possible over the head.    A second PET/CT acquisition of the head and neck was then performed with   the patient's arms at the side.    Simultaneous low-dose CT scanning is used for attenuation correction and   localization only.    PET images were reconstructed in axial, sagittal and coronal planes using   CT based attenuation correction. Images were displayed as PET, CT and   fused data sets as well as maximum intensity pixel projections.      COMPARISON: MRI of the head from same date and additional imaging   modalities dating back to 11/10/2016    FINDINGS:    HEAD AND NECK:  No abnormal activity is identified within the brain. There is has been   interval enlargement of the hypermetabolic left-sided neck mass which now   measures up to 11.1 cm in craniocaudal dimension with an SUV of 21.3.   There is invasion of the adjacent soft tissues structures, with   involvement of the left common and internal carotid arteries, as well as   the left-sided neck musculature.   There are enlarged, hypermetabolic lymph nodes in the left   retropharyngeal lymph region and cervical levels 1, 5, and 6.   Additionally there are enlarged hypermetabolic lymph nodes at the right   cervical levels 3 and 6, as well as the bilateral supraclavicular regions.    No abnormal uptake is seen in the thyroid.    CHEST:  No hypermetabolic lung nodules are seen. No pleural effusions.    The heart is normal in size. No pericardial effusion. There is normal   physiologic left ventricular myocardial uptake. No enlarged or FDG-avid   lymph nodes are seen in the chest.    ABDOMEN/PELVIS:  There is no adenopathy or klarissa hypermetabolism in the abdomen or pelvis.   The liver, gallbladder, pancreas and spleen are normal in appearance.   There are no adrenal nodules. Physiologic FDG excretion is seen in the   kidneys and bladder. No abdominal aortic aneurysm. No bowel abnormalities   are seen. There is no ascites.    MUSCULOSKELETAL:  Normal FDG activity is present in the axial skeleton. Stable 0.7 cm   sclerotic focus in the right sacrum without increased metabolic activity.   Stable 0.8 cm sclerotic focus in the left C3 facet without increased   metabolic activity. No hypermetabolic lytic or blastic osseous lesions   are identified.      IMPRESSION:  Compared with prior PET/CT dated 12/21/2018 has been interval enlargement   of large left-sided hypermetabolicneck mass.     There are an increased number of hypermetabolic lymph nodes in the   bilateral cervical regions, retropharyngeal region, and supraclavicular   region as above.      < end of copied text >

## 2019-06-18 NOTE — PROGRESS NOTE ADULT - ATTENDING COMMENTS
The patient is now more anemic due to ongoing intermittent bleeding from the tumor. Blood work otherwise looks good. Continue IV hydration. For double lumen PICC line insertion today. Hopefully systemic chemotherapy will begin today. The patient is now more anemic due to ongoing intermittent bleeding from the tumor. Blood work otherwise looks good. Continue IV hydration. For double lumen PICC line insertion today. Hopefully systemic chemotherapy will begin today. I concur with holding lovenox for now. I also concur with obtaining the pregnancy test.

## 2019-06-18 NOTE — PROGRESS NOTE ADULT - ASSESSMENT
40 y/o f w/ [id number] HPV positive SCC of the left neck, depression, and schizoaffective disorder who is following here for chemotherapy.

## 2019-06-18 NOTE — CONSULT NOTE ADULT - ATTENDING COMMENTS
Continue Regimen: Minocycline, clindamycin, and retain-a
Discontinue Regimen: Clindamycin soln
Detail Level: Zone
I saw Rachael on 6/19/2019, she is admitted for induction chemotherapy.  She has had recent bleeding from the fungating neck mass which is currently hemostatic.  I have discussed the case with my neurosurgical endovascular colleagues given her risk for catastrophic bleeding as her disease has completely encased her carotid.  They will obtain a CTA of the neck and are available to aid in her care should there be an issue with bleeding.

## 2019-06-18 NOTE — PROVIDER CONTACT NOTE (MEDICATION) - BACKGROUND
40 y/o female with SCC. Left neck tumor with moderate bleeding. Bed linens and pt shirt moderately soiled.

## 2019-06-18 NOTE — PROGRESS NOTE ADULT - PROBLEM SELECTOR PLAN 5
F 75 cc NS/hr  E replete K<4 Mag <2  N Full diet passed bedside dysphagia  lovenox for DVT prophylaxis F 75 cc NS/hr, Pre hydration for chemo   E replete K<4 Mag <2  N Full diet passed bedside dysphagia    DVT: hold lovenox given bleed   GI: none

## 2019-06-18 NOTE — PROVIDER CONTACT NOTE (MEDICATION) - ACTION/TREATMENT ORDERED:
ABD pad and kerlix applied to neck tumor. MD Munoz informed of above and ordered to hold Lovenox dose.

## 2019-06-18 NOTE — ADVANCED PRACTICE NURSE CONSULT - ASSESSMENT
Ms. Cano is a 40 y/o f w/ [id number] HPV positive SCC of the left neck, depression, and schizoaffective disorder who presented to [facility] for chemotherapy. Large mass to left side of neck with erosion over the top of the mass which bleeds frequently.

## 2019-06-18 NOTE — PROGRESS NOTE ADULT - SUBJECTIVE AND OBJECTIVE BOX
The patient is a 38 yo woman who was admitted yesterday for induction chemotherapy for a large left neck squamous cell carcinoma. A neck CT scan on 11/10/16 revealed a 3.1x2.9x5.0 multiloculated rim-enhancing fluid collection within the left level IIb soft tissues just deep to the sternocleidomastoid muscle. DDx included abscess, suppurative adenitis or infected second brachial cleft cyst. The patient was treated with antibiotics and some white material was drained from the lesion. The lesion, according to the patient came back in 2017. She again took antibiotic therapy. A CT scan was done but no additional biopsy was obtained. The patient saw Dr. Weller in early 12/18. A biopsy was obtained on 12/6/18.   This revealed a squamous cell carcinoma, HPV related. The patient was seen by Dr. Alvarado and Dr. Londono. She was referred to me but did not make an appointment. She finally saw me on 5/29/19 and I have been advising induction chemotherapy since. She was admitted yesterday and will begin chemotherapy today. She has recently been having intermittent bleeding from the eroded open large left neck mass. She has been to the ER on several occasions lately because of this.     CHEMOTHERAPY REGIMEN:        Day:         1                 Diet:  Protocol:   docetaxel, cisplatin and infusional 5-FU.                                IVF:      MEDICATIONS  (STANDING):  enoxaparin Injectable 40 milliGRAM(s) SubCutaneous every 24 hours  sodium chloride 0.9%. 1000 milliLiter(s) (75 mL/Hr) IV Continuous <Continuous>    MEDICATIONS  (PRN):  acetaminophen   Tablet .. 650 milliGRAM(s) Oral every 6 hours PRN Temp greater or equal to 38C (100.4F), Mild Pain (1 - 3)  ondansetron Injectable 4 milliGRAM(s) IV Push every 6 hours PRN Nausea      Allergies    No Known Allergies    Intolerances        DVT Prophylaxis: [x ] YES [ ] NO      Antibiotics: [ ] YES [x ] NO    Pain Scale (1-10):       Location:    Vital Signs Last 24 Hrs  T(C): 36.8 (18 Jun 2019 05:24), Max: 36.9 (17 Jun 2019 21:47)  T(F): 98.2 (18 Jun 2019 05:24), Max: 98.5 (17 Jun 2019 21:47)  HR: 98 (18 Jun 2019 05:24) (83 - 98)  BP: 110/74 (18 Jun 2019 05:24) (106/84 - 125/85)  BP(mean): --  RR: 18 (18 Jun 2019 05:24) (16 - 18)  SpO2: 97% (18 Jun 2019 05:24) (97% - 100%)    Drug Dosing Weight  Height (cm): 165.1 (17 Jun 2019 18:02)  Weight (kg): 73.7 (17 Jun 2019 18:02)  BMI (kg/m2): 27 (17 Jun 2019 18:02)  BSA (m2): 1.81 (17 Jun 2019 18:02)    PHYSICAL EXAM:      Constitutional: she has finally agreed to have systemic chemotherapy for her cancer.  Eyes: conjunctival pallor.  ENMT: buccal mucosa without lesions, oropharynx is clear.  Neck: large eroded left neck mass. There is bleeding from this lesion at times.  Breasts: not examined.  Back: no SPT.  Respiratory: chest clear.  Cardiovascular: S1>S2 at apex. RSR.  Gastrointestinal: soft, nontender, active bowel sounds. No palp liver, spleen or masses.  Genitourinary: voiding without difficulty.  Extremities: no leg edema.  Vascular: radial pulses equal bilaterally.    Neurological: no gross focal deficits.  Skin: warm and dry.  Lymph Nodes: left sided neck nodes present.  Musculoskeletal: full ROM.  Psychiatric: affect normal but concerned.        URINARY CATHETER: [ ] YES [x ] NO     LABS:  CBC Full  -  ( 17 Jun 2019 18:42 )  WBC Count : 6.21 K/uL  RBC Count : 3.02 M/uL  Hemoglobin : 8.6 g/dL  Hematocrit : 27.6 %  Platelet Count - Automated : 293 K/uL  Mean Cell Volume : 91.4 fl  Mean Cell Hemoglobin : 28.5 pg  Mean Cell Hemoglobin Concentration : 31.2 gm/dL  Auto Neutrophil # : 4.15 K/uL  Auto Lymphocyte # : 1.32 K/uL  Auto Monocyte # : 0.57 K/uL  Auto Eosinophil # : 0.11 K/uL  Auto Basophil # : 0.02 K/uL  Auto Neutrophil % : 66.8 %  Auto Lymphocyte % : 21.3 %  Auto Monocyte % : 9.2 %  Auto Eosinophil % : 1.8 %  Auto Basophil % : 0.3 %    06-17    138  |  104  |  7   ----------------------------<  126<H>  3.9   |  25  |  0.70    Ca    8.7      17 Jun 2019 18:42  Phos  3.9     06-17  Mg     1.8     06-17    TPro  6.3  /  Alb  3.7  /  TBili  0.2  /  DBili  x   /  AST  20  /  ALT  11  /  AlkPhos  52  06-17          CULTURES:    RADIOLOGY & ADDITIONAL STUDIES:< from: MR Orbit, Face, and/or Neck w/wo IV Cont, Bilateral (06.09.19 @ 15:48) >  IMPRESSION:    In this patient with left neck conglomerate klarissa mass that exceeds 10 cm   in maximal dimension, there is complete encasement of the left common and   internal carotid arteries as well as invasion of the skin, parotid and   submandibular glands. There is also pathologic lymphadenopathy in the   right infrahyoid neck. The primary tumor is not identified. Please see   report above for further detail.              "Thank you for the opportunity to participate in the care of this   patient."        DANIEL RODRIGUEZ M.D., ATTENDING RADIOLOGIST  This document has been electronically signed. Dylan 10 2019  2:40PM    < end of copied text >  < from: NM PET/CT Onc FDG Skull to Thigh, Subsq (06.09.19 @ 14:51) >  IMPRESSION:  Compared with prior PET/CT dated 12/21/2018 has been interval enlargement   of large left-sided hypermetabolicneck mass.     There are an increased number of hypermetabolic lymph nodes in the   bilateral cervical regions, retropharyngeal region, and supraclavicular   region as above.                "Thank you for the opportunity to participate in the care ofthis   patient."    AMPARO WHEELER M.D., RADIOLOGY RESIDENT  This document has been electronically signed.  MIRIAM HELMS M.D., ATTENDING RADIOLOGIST  This document has been electronically signed. Jun 11 2019  9:51AM              < end of copied text > The patient is a 40 yo woman who was admitted yesterday for induction chemotherapy for a large left neck squamous cell carcinoma. A neck CT scan on 11/10/16 revealed a 3.1x2.9x5.0 multiloculated rim-enhancing fluid collection within the left level IIb soft tissues just deep to the sternocleidomastoid muscle. DDx included abscess, suppurative adenitis or infected second brachial cleft cyst. The patient was treated with antibiotics and some white material was drained from the lesion. The lesion, according to the patient came back in 2017. She again took antibiotic therapy. A CT scan was done but no additional biopsy was obtained. The patient saw Dr. Weller in early 12/18. A biopsy was obtained on 12/6/18.   This revealed a squamous cell carcinoma, HPV related. The patient was seen by Dr. Alvarado and Dr. Londono. She was referred to me but did not make an appointment. She finally saw me on 5/29/19 and I have been advising induction chemotherapy since. She was admitted yesterday and will begin chemotherapy today. She has recently been having intermittent bleeding from the eroded open large left neck mass. She has been to the ER on several occasions lately because of this.     CHEMOTHERAPY REGIMEN:        Day:         1                 Diet:  Protocol:   docetaxel, cisplatin and infusional 5-FU.                                IVF:      MEDICATIONS  (STANDING):  enoxaparin Injectable 40 milliGRAM(s) SubCutaneous every 24 hours  sodium chloride 0.9%. 1000 milliLiter(s) (75 mL/Hr) IV Continuous <Continuous>    MEDICATIONS  (PRN):  acetaminophen   Tablet .. 650 milliGRAM(s) Oral every 6 hours PRN Temp greater or equal to 38C (100.4F), Mild Pain (1 - 3)  ondansetron Injectable 4 milliGRAM(s) IV Push every 6 hours PRN Nausea      Allergies    No Known Allergies    Intolerances        DVT Prophylaxis: [ ] YES [x ] NO      Antibiotics: [ ] YES [x ] NO    Pain Scale (1-10):       Location:    Vital Signs Last 24 Hrs  T(C): 36.8 (18 Jun 2019 05:24), Max: 36.9 (17 Jun 2019 21:47)  T(F): 98.2 (18 Jun 2019 05:24), Max: 98.5 (17 Jun 2019 21:47)  HR: 98 (18 Jun 2019 05:24) (83 - 98)  BP: 110/74 (18 Jun 2019 05:24) (106/84 - 125/85)  BP(mean): --  RR: 18 (18 Jun 2019 05:24) (16 - 18)  SpO2: 97% (18 Jun 2019 05:24) (97% - 100%)    Drug Dosing Weight  Height (cm): 165.1 (17 Jun 2019 18:02)  Weight (kg): 73.7 (17 Jun 2019 18:02)  BMI (kg/m2): 27 (17 Jun 2019 18:02)  BSA (m2): 1.81 (17 Jun 2019 18:02)    PHYSICAL EXAM:      Constitutional: she has finally agreed to have systemic chemotherapy for her cancer.  Eyes: conjunctival pallor.  ENMT: buccal mucosa without lesions, oropharynx is clear.  Neck: large eroded left neck mass. There is bleeding from this lesion at times.  Breasts: not examined.  Back: no SPT.  Respiratory: chest clear.  Cardiovascular: S1>S2 at apex. RSR.  Gastrointestinal: soft, nontender, active bowel sounds. No palp liver, spleen or masses.  Genitourinary: voiding without difficulty.  Extremities: no leg edema.  Vascular: radial pulses equal bilaterally.    Neurological: no gross focal deficits.  Skin: warm and dry.  Lymph Nodes: left sided neck nodes present.  Musculoskeletal: full ROM.  Psychiatric: affect normal but concerned.        URINARY CATHETER: [ ] YES [x ] NO     LABS:  CBC Full  -  ( 17 Jun 2019 18:42 )  WBC Count : 6.21 K/uL  RBC Count : 3.02 M/uL  Hemoglobin : 8.6 g/dL  Hematocrit : 27.6 %  Platelet Count - Automated : 293 K/uL  Mean Cell Volume : 91.4 fl  Mean Cell Hemoglobin : 28.5 pg  Mean Cell Hemoglobin Concentration : 31.2 gm/dL  Auto Neutrophil # : 4.15 K/uL  Auto Lymphocyte # : 1.32 K/uL  Auto Monocyte # : 0.57 K/uL  Auto Eosinophil # : 0.11 K/uL  Auto Basophil # : 0.02 K/uL  Auto Neutrophil % : 66.8 %  Auto Lymphocyte % : 21.3 %  Auto Monocyte % : 9.2 %  Auto Eosinophil % : 1.8 %  Auto Basophil % : 0.3 %    06-17    138  |  104  |  7   ----------------------------<  126<H>  3.9   |  25  |  0.70    Ca    8.7      17 Jun 2019 18:42  Phos  3.9     06-17  Mg     1.8     06-17    TPro  6.3  /  Alb  3.7  /  TBili  0.2  /  DBili  x   /  AST  20  /  ALT  11  /  AlkPhos  52  06-17          CULTURES:    RADIOLOGY & ADDITIONAL STUDIES:< from: MR Orbit, Face, and/or Neck w/wo IV Cont, Bilateral (06.09.19 @ 15:48) >  IMPRESSION:    In this patient with left neck conglomerate klarissa mass that exceeds 10 cm   in maximal dimension, there is complete encasement of the left common and   internal carotid arteries as well as invasion of the skin, parotid and   submandibular glands. There is also pathologic lymphadenopathy in the   right infrahyoid neck. The primary tumor is not identified. Please see   report above for further detail.              "Thank you for the opportunity to participate in the care of this   patient."        DANIEL RODRIGUEZ M.D., ATTENDING RADIOLOGIST  This document has been electronically signed. Dylan 10 2019  2:40PM    < end of copied text >  < from: NM PET/CT Onc FDG Skull to Thigh, Subsq (06.09.19 @ 14:51) >  IMPRESSION:  Compared with prior PET/CT dated 12/21/2018 has been interval enlargement   of large left-sided hypermetabolicneck mass.     There are an increased number of hypermetabolic lymph nodes in the   bilateral cervical regions, retropharyngeal region, and supraclavicular   region as above.                "Thank you for the opportunity to participate in the care ofthis   patient."    AMPARO WHEELER M.D., RADIOLOGY RESIDENT  This document has been electronically signed.  MIRIAM HELMS M.D., ATTENDING RADIOLOGIST  This document has been electronically signed. Jun 11 2019  9:51AM              < end of copied text > The patient is a 38 yo woman who was admitted yesterday for induction chemotherapy for a large left neck squamous cell carcinoma. A neck CT scan on 11/10/16 revealed a 3.1x2.9x5.0 cm multiloculated rim-enhancing fluid collection within the left level IIb soft tissues just deep to the sternocleidomastoid muscle. DDx included abscess, suppurative adenitis or infected second brachial cleft cyst. The patient was treated with antibiotics and some white material was drained from the lesion. The lesion, according to the patient came back in 2017. She again took antibiotic therapy. A CT scan was done but no additional biopsy was obtained. The patient saw Dr. Weller in early 12/18. A biopsy was obtained on 12/6/18.   This revealed a squamous cell carcinoma, HPV related. The patient was seen by Dr. Alvarado and Dr. Londono. She was referred to me but did not make an appointment. She finally saw me on 5/29/19 and I have been advising induction chemotherapy since. She was admitted yesterday and will begin chemotherapy today. She has recently been having intermittent bleeding from the eroded open large left neck mass. She has been to the ER on several occasions lately because of this.     CHEMOTHERAPY REGIMEN:        Day:         1                 Diet:  Protocol:   docetaxel, cisplatin and infusional 5-FU.                                IVF:      MEDICATIONS  (STANDING):  enoxaparin Injectable 40 milliGRAM(s) SubCutaneous every 24 hours  sodium chloride 0.9%. 1000 milliLiter(s) (75 mL/Hr) IV Continuous <Continuous>    MEDICATIONS  (PRN):  acetaminophen   Tablet .. 650 milliGRAM(s) Oral every 6 hours PRN Temp greater or equal to 38C (100.4F), Mild Pain (1 - 3)  ondansetron Injectable 4 milliGRAM(s) IV Push every 6 hours PRN Nausea      Allergies    No Known Allergies    Intolerances        DVT Prophylaxis: [ ] YES [x ] NO      Antibiotics: [ ] YES [x ] NO    Pain Scale (1-10):       Location:    Vital Signs Last 24 Hrs  T(C): 36.8 (18 Jun 2019 05:24), Max: 36.9 (17 Jun 2019 21:47)  T(F): 98.2 (18 Jun 2019 05:24), Max: 98.5 (17 Jun 2019 21:47)  HR: 98 (18 Jun 2019 05:24) (83 - 98)  BP: 110/74 (18 Jun 2019 05:24) (106/84 - 125/85)  BP(mean): --  RR: 18 (18 Jun 2019 05:24) (16 - 18)  SpO2: 97% (18 Jun 2019 05:24) (97% - 100%)    Drug Dosing Weight  Height (cm): 165.1 (17 Jun 2019 18:02)  Weight (kg): 73.7 (17 Jun 2019 18:02)  BMI (kg/m2): 27 (17 Jun 2019 18:02)  BSA (m2): 1.81 (17 Jun 2019 18:02)    PHYSICAL EXAM:      Constitutional: she has finally agreed to have systemic chemotherapy for her cancer.  Eyes: conjunctival pallor.  ENMT: buccal mucosa without lesions, oropharynx is clear.  Neck: large eroded left neck mass. There is bleeding from this lesion at times.  Breasts: not examined.  Back: no SPT.  Respiratory: chest clear.  Cardiovascular: S1>S2 at apex. RSR.  Gastrointestinal: soft, nontender, active bowel sounds. No palp liver, spleen or masses.  Genitourinary: voiding without difficulty.  Extremities: no leg edema.  Vascular: radial pulses equal bilaterally.    Neurological: no gross focal deficits.  Skin: warm and dry.  Lymph Nodes: left sided neck nodes present.  Musculoskeletal: full ROM.  Psychiatric: affect normal but concerned.        URINARY CATHETER: [ ] YES [x ] NO     LABS:  CBC Full  -  ( 17 Jun 2019 18:42 )  WBC Count : 6.21 K/uL  RBC Count : 3.02 M/uL  Hemoglobin : 8.6 g/dL  Hematocrit : 27.6 %  Platelet Count - Automated : 293 K/uL  Mean Cell Volume : 91.4 fl  Mean Cell Hemoglobin : 28.5 pg  Mean Cell Hemoglobin Concentration : 31.2 gm/dL  Auto Neutrophil # : 4.15 K/uL  Auto Lymphocyte # : 1.32 K/uL  Auto Monocyte # : 0.57 K/uL  Auto Eosinophil # : 0.11 K/uL  Auto Basophil # : 0.02 K/uL  Auto Neutrophil % : 66.8 %  Auto Lymphocyte % : 21.3 %  Auto Monocyte % : 9.2 %  Auto Eosinophil % : 1.8 %  Auto Basophil % : 0.3 %    06-17    138  |  104  |  7   ----------------------------<  126<H>  3.9   |  25  |  0.70    Ca    8.7      17 Jun 2019 18:42  Phos  3.9     06-17  Mg     1.8     06-17    TPro  6.3  /  Alb  3.7  /  TBili  0.2  /  DBili  x   /  AST  20  /  ALT  11  /  AlkPhos  52  06-17          CULTURES:    RADIOLOGY & ADDITIONAL STUDIES:< from: MR Orbit, Face, and/or Neck w/wo IV Cont, Bilateral (06.09.19 @ 15:48) >  IMPRESSION:    In this patient with left neck conglomerate klarissa mass that exceeds 10 cm   in maximal dimension, there is complete encasement of the left common and   internal carotid arteries as well as invasion of the skin, parotid and   submandibular glands. There is also pathologic lymphadenopathy in the   right infrahyoid neck. The primary tumor is not identified. Please see   report above for further detail.              "Thank you for the opportunity to participate in the care of this   patient."        DANIEL RODRIGUEZ M.D., ATTENDING RADIOLOGIST  This document has been electronically signed. Dylan 10 2019  2:40PM    < end of copied text >  < from: NM PET/CT Onc FDG Skull to Thigh, Subsq (06.09.19 @ 14:51) >  IMPRESSION:  Compared with prior PET/CT dated 12/21/2018 has been interval enlargement   of large left-sided hypermetabolicneck mass.     There are an increased number of hypermetabolic lymph nodes in the   bilateral cervical regions, retropharyngeal region, and supraclavicular   region as above.                "Thank you for the opportunity to participate in the care ofthis   patient."    AMPARO WHEELER M.D., RADIOLOGY RESIDENT  This document has been electronically signed.  MIRIAM HELMS M.D., ATTENDING RADIOLOGIST  This document has been electronically signed. Jun 11 2019  9:51AM              < end of copied text >

## 2019-06-18 NOTE — ADVANCED PRACTICE NURSE CONSULT - RECOMMEDATIONS
Recommend Aquacel Extra hydrofiber to be placed over top of mass to help contain drainage, change daily. Spoke with NASIM Mojica and house staff.

## 2019-06-18 NOTE — CONSULT NOTE ADULT - SUBJECTIVE AND OBJECTIVE BOX
Ms. 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 The patient is well known to Dr. Alvarado and Dr. Londono and there have been numerous issues in completing appropriate diagnostic workup and pursuing appropriate treatment. She recently had large bleeding from the tumor which was treated with ligation at [place] (exact history unclear) and now was admitted last night for initiation of therapy. ENT consulted in case of any additional bleeding and to be on board for any management the patient may require.    No respiratory or airway distress, no fevers.    PAST MEDICAL & SURGICAL HISTORY:  Depression  Squamous cell carcinoma of neck  Mass: left neck  Schizoaffective disorder  No significant past surgical history  No significant past surgical history      PE:  VSS  Gen: NAD, sleeping in bed  Resp: breathing comfortably on RA, no stridor  Neck: exophytic fungating tumor, no bleeding or purulence in left neck    06-18    136  |  108  |  9   ----------------------------<  135<H>  4.0   |  20<L>  |  0.55    Ca    8.2<L>      18 Jun 2019 08:52  Phos  3.9     06-17  Mg     1.7     06-18    TPro  5.6<L>  /  Alb  3.3  /  TBili  0.2  /  DBili  x   /  AST  14  /  ALT  9<L>  /  AlkPhos  44  06-18                        8.0    8.84  )-----------( 285      ( 18 Jun 2019 08:52 )             25.1       A/P: 39 F with [id number] 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 Alvarado if any additional management or recommendations are needed  -Continue management per primary team  -please page with questions or concerns  Seen with chief resident Ms. 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 The patient is well known to Dr. Alvarado and Dr. Londono and there have been numerous issues in completing appropriate diagnostic workup and pursuing appropriate treatment. She recently had large bleeding from the tumor which was treated with ligation at [place] (exact history unclear) and now was admitted last night for initiation of therapy. ENT consulted in case of any additional bleeding and to be on board for any management the patient may require.    No respiratory or airway distress, no fevers.    PAST MEDICAL & SURGICAL HISTORY:  Depression  Squamous cell carcinoma of neck  Mass: left neck  Schizoaffective disorder  No significant past surgical history  No significant past surgical history      PE:  VSS  Gen: NAD, sleeping in bed  Resp: breathing comfortably on RA, no stridor  Neck: exophytic fungating tumor, no bleeding or purulence in left neck    06-18    136  |  108  |  9   ----------------------------<  135<H>  4.0   |  20<L>  |  0.55    Ca    8.2<L>      18 Jun 2019 08:52  Phos  3.9     06-17  Mg     1.7     06-18    TPro  5.6<L>  /  Alb  3.3  /  TBili  0.2  /  DBili  x   /  AST  14  /  ALT  9<L>  /  AlkPhos  44  06-18                        8.0    8.84  )-----------( 285      ( 18 Jun 2019 08:52 )             25.1       A/P: 39 F with [id number] 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 Alvarado if any additional management or recommendations are needed  -Continue management per primary team  -please page with questions or concerns  Seen with chief resident    Update:  -Neuroendovascular team notified of patient in case of rare but severe bleeding event  -From an ENT standpoint, no need for additional monitoring  -Please page with questions or concerns

## 2019-06-18 NOTE — PROGRESS NOTE ADULT - PROBLEM SELECTOR PLAN 1
-Patient SCC of left neck with FNA biopsy and CT imaging consistent with this.  -Follows Dr. Meeks on an outpatient basis, planned for doci, paclo, and 5FU  -Patient was generally noncompliant prior to coming  -PET scan in Dec 2018 and most recent in June 2019 showed enlarging left mass with cervical lymphadenopathy  -Follows Dr. Londono outpt as well, has not had RT yet and no chemo yet either.   -patient will need daily, CBC, CMP, Mag, uric acid, and LDH  -oncology team following, will contact head and neck sx  -NS 75 cc/hr for 10 hours  -dexamethasone 20 mg iv tonight.  -bedside wound care and light dressing.  -will have PICC line placed tomorrow for chemotherapy Patient SCC of left neck with FNA biopsy and CT imaging consistent with this. Follows Dr. Meeks on an outpatient basis, planned for doci, paclo, and 5FU. Patient was generally noncompliant prior to coming. PET scan in Dec 2018 and most recent in June 2019 showed enlarging left mass with cervical lymphadenopathy. Follows Dr. Londono outpt as well, has not had RT yet and no chemo yet either.   -oncology team following, will contact head and neck sx and Dr. Londono   -NS 75 cc/hr for 10 hours  -dexamethasone 20 mg today   -PICC line placed today for chemotherapy tonight.   -bedside wound care and light dressing.  -f/up daily, CBC, CMP, Mag, uric acid, and LDH  - ENT consulted given recent hx of active bleeding from tumor and mass encompassing internal/carotid arteries.

## 2019-06-18 NOTE — CONSULT NOTE ADULT - SUBJECTIVE AND OBJECTIVE BOX
Vascular [facility] Service Consult Note    [facility] - [facility]OBLEM [facility]x:  Transition of care performed with sharing of clinical summary: Transition of care performed with sharing of clinical summary  Nutrition, metabolism, and development symptoms: Nutrition, metabolism, and development symptoms  Other schizoaffective disorders: Other schizoaffective disorders  Depression, unspecified depression type: Depression, unspecified depression type  Anemia, unspecified type: Anemia, unspecified type  Squamous cell carcinoma of neck: Squamous cell carcinoma of neck             Diagnosis: scc neck    Indications for Vascular Access (Check all that apply)  [  ]  Antibiotic Therapy       Antibiotic Prescribed:              [  ]  IV Hydration  [  ]  Total Parenteral Nutrition  [ x ]  Chemotherapy  [  ]  Difficult Venous Access  [  ]  CVP monitoring  [  ]  Medications with high potential for tissue necrosis on extravasation  [  ]  Other    Screening (Check all that apply)  Previous Radiation to chest  [  ] Yes      [x  ]  No  Breast Cancer                          [  ] Left     [  ]  Right    [ x ]  No  Lymph Node Dissection         [  ] Left     [  ]  Right    [ x ]  No  Pacemaker or ICD                   [  ] Left     [  ]  Right    [ x ]  No  Upper Extremity DVT             [  ] Left     [  ]  Right    [ x ]  No  Chronic Kidney Disease         [  ]  Yes     [x  ]  No  Hemodialysis                           [  ]  Yes     [  x]  No  AV Fistula/ Graft                     [  ]  Left    [  ]  Right    [ x ]  No  Temp>101F in past 24 H       [  ]  Yes     [x  ]  No  H/O PICC/Midline                   [  ]  Yes     [ x ]  No    Lab data:                        8.0    8.84  )-----------( 285      ( 18 Jun 2019 08:52 )             25.1     06-18    136  |  108  |  9   ----------------------------<  135<H>  4.0   |  20<L>  |  0.55    Ca    8.2<L>      18 Jun 2019 08:52  Phos  3.9     06-17  Mg     1.7     06-18    TPro  5.6<L>  /  Alb  3.3  /  TBili  0.2  /  DBili  x   /  AST  14  /  ALT  9<L>  /  AlkPhos  44  06-18              I have reviewed the chart, interviewed and examined the patient and determined that this patient:  [ x ] Is a candidate for a PICC line  [  ] Is a candidate for a Midline  [  ] Is not a candidate for vascular access device (reason)    Lumens:    [  ] Single  [ x ] Double

## 2019-06-18 NOTE — PROGRESS NOTE ADULT - ASSESSMENT
The patient has a large left neck SCC. The skin surface is eroded by the tumor and the open area bleeds frequently. She was in the ER at [facility] on 6/11 because of bleeding and a vessel was ligated. Her H/H has recently fallen as a result.

## 2019-06-18 NOTE — PROCEDURE NOTE - NSPOSTCAREGUIDE_GEN_A_CORE
Keep the cast/splint/dressing clean and dry/Care for catheter as per unit/ICU protocols/Verbal/written post procedure instructions were given to patient/caregiver

## 2019-06-18 NOTE — PROVIDER CONTACT NOTE (MEDICATION) - ASSESSMENT
Left neck tumor with moderate bleeding. Bed linens and pt shirt moderately soiled. VSS. Pt denied dizziness, headache, SOB, chest pain, neck pain.

## 2019-06-19 LAB
ALBUMIN SERPL ELPH-MCNC: 3 G/DL — LOW (ref 3.3–5)
ALP SERPL-CCNC: 38 U/L — LOW (ref 40–120)
ALT FLD-CCNC: 11 U/L — SIGNIFICANT CHANGE UP (ref 10–45)
ANION GAP SERPL CALC-SCNC: 10 MMOL/L — SIGNIFICANT CHANGE UP (ref 5–17)
AST SERPL-CCNC: 17 U/L — SIGNIFICANT CHANGE UP (ref 10–40)
BILIRUB SERPL-MCNC: <0.2 MG/DL — SIGNIFICANT CHANGE UP (ref 0.2–1.2)
BUN SERPL-MCNC: 9 MG/DL — SIGNIFICANT CHANGE UP (ref 7–23)
CALCIUM SERPL-MCNC: 7.5 MG/DL — LOW (ref 8.4–10.5)
CHLORIDE SERPL-SCNC: 110 MMOL/L — HIGH (ref 96–108)
CO2 SERPL-SCNC: 19 MMOL/L — LOW (ref 22–31)
CREAT SERPL-MCNC: 0.59 MG/DL — SIGNIFICANT CHANGE UP (ref 0.5–1.3)
GLUCOSE SERPL-MCNC: 144 MG/DL — HIGH (ref 70–99)
HCT VFR BLD CALC: 26.1 % — LOW (ref 34.5–45)
HGB BLD-MCNC: 8.1 G/DL — LOW (ref 11.5–15.5)
LDH SERPL L TO P-CCNC: 155 U/L — SIGNIFICANT CHANGE UP (ref 50–242)
MAGNESIUM SERPL-MCNC: 1.9 MG/DL — SIGNIFICANT CHANGE UP (ref 1.6–2.6)
MCHC RBC-ENTMCNC: 28.1 PG — SIGNIFICANT CHANGE UP (ref 27–34)
MCHC RBC-ENTMCNC: 31 GM/DL — LOW (ref 32–36)
MCV RBC AUTO: 90.6 FL — SIGNIFICANT CHANGE UP (ref 80–100)
NRBC # BLD: 0 /100 WBCS — SIGNIFICANT CHANGE UP (ref 0–0)
PHOSPHATE SERPL-MCNC: 3.1 MG/DL — SIGNIFICANT CHANGE UP (ref 2.5–4.5)
PLATELET # BLD AUTO: 272 K/UL — SIGNIFICANT CHANGE UP (ref 150–400)
POTASSIUM SERPL-MCNC: 3.9 MMOL/L — SIGNIFICANT CHANGE UP (ref 3.5–5.3)
POTASSIUM SERPL-SCNC: 3.9 MMOL/L — SIGNIFICANT CHANGE UP (ref 3.5–5.3)
PROT SERPL-MCNC: 5.3 G/DL — LOW (ref 6–8.3)
RBC # BLD: 2.88 M/UL — LOW (ref 3.8–5.2)
RBC # FLD: 13.6 % — SIGNIFICANT CHANGE UP (ref 10.3–14.5)
SODIUM SERPL-SCNC: 139 MMOL/L — SIGNIFICANT CHANGE UP (ref 135–145)
URATE SERPL-MCNC: 2.4 MG/DL — LOW (ref 2.5–7)
WBC # BLD: 7.04 K/UL — SIGNIFICANT CHANGE UP (ref 3.8–10.5)
WBC # FLD AUTO: 7.04 K/UL — SIGNIFICANT CHANGE UP (ref 3.8–10.5)

## 2019-06-19 PROCEDURE — 99233 SBSQ HOSP IP/OBS HIGH 50: CPT

## 2019-06-19 RX ADMIN — Medication 325 MILLIGRAM(S): at 12:38

## 2019-06-19 RX ADMIN — Medication 325 MILLIGRAM(S): at 21:49

## 2019-06-19 NOTE — PROGRESS NOTE ADULT - SUBJECTIVE AND OBJECTIVE BOX
Heme/Onc Progress Note (Dr. Meeks )  Discussed with Dr. Meeks and plan reviewed with the primary team.    Interval Hx:   Patient tolerated D1 of chemotherapy, currently has no acute complaints. 5-FU and cisplatin infusing.     HPI:  The patient is a 40 yo woman who was admitted yesterday for induction chemotherapy for a large left neck squamous cell carcinoma. A neck CT scan on 11/10/16 revealed a 3.1x2.9x5.0 cm multiloculated rim-enhancing fluid collection within the left level IIb soft tissues just deep to the sternocleidomastoid muscle. DDx included abscess, suppurative adenitis or infected second brachial cleft cyst. The patient was treated with antibiotics and some white material was drained from the lesion. The lesion, according to the patient came back in 2017. She again took antibiotic therapy. A CT scan was done but no additional biopsy was obtained. The patient saw Dr. Weller in early 12/18. A biopsy was obtained on 12/6/18.   This revealed a squamous cell carcinoma, HPV related. The patient was seen by Dr. Alvarado and Dr. Londono. She was referred to me but did not make an appointment. She finally saw me on 5/29/19 and I have been advising induction chemotherapy since. She was admitted yesterday and will begin chemotherapy today. She has recently been having intermittent bleeding from the eroded open large left neck mass. She has been to the ER on several occasions lately because of this.    Hb dropped to 8.0 g/dL likely due to oozing of blood from open wound of the neck mass. No purulent drainage. She has been afebrile.     ROS is otherwise negative.    Medications:  MEDICATIONS  (STANDING):  chlorhexidine 2% Cloths 1 Application(s) Topical <User Schedule>  dexamethasone  IVPB 20 milliGRAM(s) IV Intermittent daily  ferrous    sulfate 325 milliGRAM(s) Oral two times a day  fluorouracil IVPB (eMAR) 1810 milliGRAM(s) IV Intermittent daily  sodium chloride 0.9%. 1000 milliLiter(s) (500 mL/Hr) IV Continuous <Continuous>  sodium chloride 0.9%. 1000 milliLiter(s) (75 mL/Hr) IV Continuous <Continuous>    MEDICATIONS  (PRN):  acetaminophen   Tablet .. 650 milliGRAM(s) Oral every 6 hours PRN Temp greater or equal to 38C (100.4F), Mild Pain (1 - 3)  ondansetron Injectable 4 milliGRAM(s) IV Push every 6 hours PRN Nausea  sodium chloride 0.9% lock flush 10 milliLiter(s) IV Push every 1 hour PRN Pre/post blood products, medications, blood draw, and to maintain line patency          PHYSICAL EXAM:  T(C): 36.7 (06-19-19 @ 12:06), Max: 36.7 (06-18-19 @ 21:33)  T(F): 98.1 (06-19-19 @ 12:06), Max: 98.1 (06-19-19 @ 12:06)  HR: 71 (06-19-19 @ 12:06) (68 - 78)  BP: 119/72 (06-19-19 @ 12:06) (105/68 - 119/72)  ABP: --  ABP(mean): --  RR: 16 (06-19-19 @ 12:06) (16 - 18)  SpO2: 95% (06-19-19 @ 12:06) (95% - 98%)      Gen: well developed, well nourished, sleeping but arousable   HEENT: normocephalic/atraumatic, no conjunctival pallor, no scleral icterus, no oral thrush/mucosal bleeding/mucositis  Neck: Large left neck mass that has eroded, open wound noted   Cardiovascular: RR, nl S1S2, no murmurs/rubs/gallops  Respiratory: clear air entry b/l  Gastrointestinal: BS+, soft, NT/ND, no masses, no splenomegaly, no hepatomegaly, no evidence for ascites  Extremities: no clubbing/cyanosis, no edema, no calf tenderness  Vascular:  DP/PT 2+ b/l  Neurological: CN 2-12 grossly intact, no focal deficits  Skin: no rash on visible skin        Labs:                          8.0    8.84  )-----------( 285      ( 18 Jun 2019 08:52 )             25.1     CBC Full  -  ( 18 Jun 2019 08:52 )  WBC Count : 8.84 K/uL  RBC Count : 2.83 M/uL  Hemoglobin : 8.0 g/dL  Hematocrit : 25.1 %  Platelet Count - Automated : 285 K/uL  Mean Cell Volume : 88.7 fl  Mean Cell Hemoglobin : 28.3 pg  Mean Cell Hemoglobin Concentration : 31.9 gm/dL  Auto Neutrophil # : 8.05 K/uL  Auto Lymphocyte # : 0.37 K/uL  Auto Monocyte # : 0.30 K/uL  Auto Eosinophil # : 0.00 K/uL  Auto Basophil # : 0.01 K/uL  Auto Neutrophil % : 91.1 %  Auto Lymphocyte % : 4.2 %  Auto Monocyte % : 3.4 %  Auto Eosinophil % : 0.0 %  Auto Basophil % : 0.1 %        06-18    136  |  108  |  9   ----------------------------<  135<H>  4.0   |  20<L>  |  0.55    Ca    8.2<L>      18 Jun 2019 08:52  Phos  3.9     06-17  Mg     1.7     06-18    TPro  5.6<L>  /  Alb  3.3  /  TBili  0.2  /  DBili  x   /  AST  14  /  ALT  9<L>  /  AlkPhos  44  06-18          Other Labs:    Cultures:    Pathology:    Imaging Studies:  < from: MR Orbit, Face, and/or Neck w/wo IV Cont, Bilateral (06.09.19 @ 15:48) >  EXAM:  MR ORBIT FACE AND ORNECK [facility]                          PROCEDURE DATE:  06/09/2019          INTERPRETATION:  Sagittal, axial and coronal imaging of the soft tissues   of the neck was performed both before and following intravenous contrast   administration, utilizing T1 and T2 weighting with fat suppression   techniques.    Contrast dose: 7.5 cc of intravenous Gadavist.    Clinical information: Squamous cell carcinoma, HPV-related.    The patient was unable to complete the examination, and postcontrast   sagittal and coronal images were not performed.    The current study is interpreted in conjunction with concurrent PET/CT,   as well as prior CT studies of 12/2018 and 11/2016.    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 This mass   involves the skin and extends from the level of the parotid gland   cranially to the thyroid gland caudally. It is associated with complete   encasement without narrowing of both the common and internal carotid   arteries along its medial extent. More inferiorly, the mass is   inseparable from the subclavian vasculature. Neither the left   sternocleidomastoid muscle nor the left-sided strap musculature can be   identified in their entirety. In addition to this dominant conglomerate   klarissa mass, discrete pathologic lymph nodes are identified in left levels   1, 5 and 6 and the supraclavicular fossa as well as in the left   retropharyngeal chain. On the right side, pathologic lymph nodes are   identified in levels 3 and 4, with a focus of ill-defined enhancing soft   tissue noted anterior to the right sided strap muscles at and above the   level of the clavicular head. The primary tumor is not identified within   the visualized portions of the upper aerodigestive tract, and tumor is   radiographically categorized as T0N3 utilizing the HPV mediated   oropharyngeal cancer staging form.    As above, the tumor invades the inferior aspect of the left parotid gland   and is inseparable from the left submandibular gland. The right parotid   and submandibular glands are unremarkable in appearance. Tumor is   inseparable from the left thyroid lobe with the right thyroid lobe   normal. The visualized portions of the orbital and intracranial contents   are grossly normal. The central skull base appears intact. The cervical   vertebrae are normal in height and alignment. There is no large apical   lung mass.    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 There is also pathologic lymphadenopathy in the   right infrahyoid neck. The primary tumor is not identified. Please see   report above for further detail.              "Thank you for the opportunity to participate in the care of this   patient."      < end of copied text >    < from: NM PET/CT Onc FDG Skull to Thigh, Subsq (06.09.19 @ 14:51) >  EXAM:  PETCT [facility] [place] [facility]\"" ONC FDG SUBS                          PROCEDURE DATE:  06/09/2019          INTERPRETATION:    PET-CT ONCOLOGIC STUDY    INDICATION: Squamous cell cancer of the left neck. Restaging to help   determine subsequent treatment strategy    TECHNIQUE: Intravenous access was established and the patient injected   with 12.1 mCi of [id number]. After resting in a quiet room   for about one hour, the patient was positioned on the scanning table and   images were obtained using standard PET/CT technique from the mid thigh   to the axilla.  This acquisition was performed with the patient's arms   lifted as high as possible over the head.    A second PET/CT acquisition of the head and neck was then performed with   the patient's arms at the side.    Simultaneous low-dose CT scanning is used for attenuation correction and   localization only.    PET images were reconstructed in axial, sagittal and coronal planes using   CT based attenuation correction. Images were displayed as PET, CT and   fused data sets as well as maximum intensity pixel projections.      COMPARISON: MRI of the head from same date and additional imaging   modalities dating back to 11/10/2016    FINDINGS:    HEAD AND NECK:  No abnormal activity is identified within the brain. There is has been   interval enlargement of the hypermetabolic left-sided neck mass which now   measures up to 11.1 cm in craniocaudal dimension with an SUV of 21.3.   There is invasion of the adjacent soft tissues structures, with   involvement of the left common and internal carotid arteries, as well as   the left-sided neck musculature.   There are enlarged, hypermetabolic lymph nodes in the left   retropharyngeal lymph region and cervical levels 1, 5, and 6.   Additionally there are enlarged hypermetabolic lymph nodes at the right   cervical levels 3 and 6, as well as the bilateral supraclavicular regions.    No abnormal uptake is seen in the thyroid.    CHEST:  No hypermetabolic lung nodules are seen. No pleural effusions.    The heart is normal in size. No pericardial effusion. There is normal   physiologic left ventricular myocardial uptake. No enlarged or FDG-avid   lymph nodes are seen in the chest.    ABDOMEN/PELVIS:  There is no adenopathy or klarissa hypermetabolism in the abdomen or pelvis.   The liver, gallbladder, pancreas and spleen are normal in appearance.   There are no adrenal nodules. Physiologic FDG excretion is seen in the   kidneys and bladder. No abdominal aortic aneurysm. No bowel abnormalities   are seen. There is no ascites.    MUSCULOSKELETAL:  Normal FDG activity is present in the axial skeleton. Stable 0.7 cm   sclerotic focus in the right sacrum without increased metabolic activity.   Stable 0.8 cm sclerotic focus in the left C3 facet without increased   metabolic activity. No hypermetabolic lytic or blastic osseous lesions   are identified.      IMPRESSION:  Compared with prior PET/CT dated 12/21/2018 has been interval enlargement   of large left-sided hypermetabolicneck mass.     There are an increased number of hypermetabolic lymph nodes in the   bilateral cervical regions, retropharyngeal region, and supraclavicular   region as above.      < end of copied text > Heme/Onc Progress Note (Dr. Meeks )  Discussed with Dr. Meeks and plan reviewed with the primary team.    Interval Hx:   Patient tolerated D1 of chemotherapy, currently has no acute complaints. 5-FU and cisplatin infusing. She does note not having a bowel movement since admission and feeling bloated.     HPI:  The patient is a 38 yo woman who was admitted yesterday for induction chemotherapy for a large left neck squamous cell carcinoma. A neck CT scan on 11/10/16 revealed a 3.1x2.9x5.0 cm multiloculated rim-enhancing fluid collection within the left level IIb soft tissues just deep to the sternocleidomastoid muscle. DDx included abscess, suppurative adenitis or infected second brachial cleft cyst. The patient was treated with antibiotics and some white material was drained from the lesion. The lesion, according to the patient came back in 2017. She again took antibiotic therapy. A CT scan was done but no additional biopsy was obtained. The patient saw Dr. Weller in early 12/18. A biopsy was obtained on 12/6/18.   This revealed a squamous cell carcinoma, HPV related. The patient was seen by Dr. Alvarado and Dr. Londono. She was referred to me but did not make an appointment. She finally saw me on 5/29/19 and I have been advising induction chemotherapy since. She was admitted yesterday and will begin chemotherapy today. She has recently been having intermittent bleeding from the eroded open large left neck mass. She has been to the ER on several occasions lately because of this.    Hb dropped to 8.0 g/dL likely due to oozing of blood from open wound of the neck mass. No purulent drainage. She has been afebrile.     ROS is otherwise negative.    Medications:  MEDICATIONS  (STANDING):  chlorhexidine 2% Cloths 1 Application(s) Topical <User Schedule>  dexamethasone  IVPB 20 milliGRAM(s) IV Intermittent daily  ferrous    sulfate 325 milliGRAM(s) Oral two times a day  fluorouracil IVPB (eMAR) 1810 milliGRAM(s) IV Intermittent daily  sodium chloride 0.9%. 1000 milliLiter(s) (500 mL/Hr) IV Continuous <Continuous>  sodium chloride 0.9%. 1000 milliLiter(s) (75 mL/Hr) IV Continuous <Continuous>    MEDICATIONS  (PRN):  acetaminophen   Tablet .. 650 milliGRAM(s) Oral every 6 hours PRN Temp greater or equal to 38C (100.4F), Mild Pain (1 - 3)  ondansetron Injectable 4 milliGRAM(s) IV Push every 6 hours PRN Nausea  sodium chloride 0.9% lock flush 10 milliLiter(s) IV Push every 1 hour PRN Pre/post blood products, medications, blood draw, and to maintain line patency          PHYSICAL EXAM:  T(C): 36.7 (06-19-19 @ 12:06), Max: 36.7 (06-18-19 @ 21:33)  T(F): 98.1 (06-19-19 @ 12:06), Max: 98.1 (06-19-19 @ 12:06)  HR: 71 (06-19-19 @ 12:06) (68 - 78)  BP: 119/72 (06-19-19 @ 12:06) (105/68 - 119/72)  ABP: --  ABP(mean): --  RR: 16 (06-19-19 @ 12:06) (16 - 18)  SpO2: 95% (06-19-19 @ 12:06) (95% - 98%)      Gen: well developed, well nourished, sleeping but arousable   HEENT: normocephalic/atraumatic, no conjunctival pallor, no scleral icterus, no oral thrush/mucosal bleeding/mucositis  Neck: Large left neck mass that has eroded, open wound noted   Cardiovascular: RR, nl S1S2, no murmurs/rubs/gallops  Respiratory: clear air entry b/l  Gastrointestinal: BS+, soft, NT/ND, no masses, no splenomegaly, no hepatomegaly, no evidence for ascites  Extremities: no clubbing/cyanosis, no edema, no calf tenderness  Vascular:  DP/PT 2+ b/l  Neurological: CN 2-12 grossly intact, no focal deficits  Skin: no rash on visible skin        Labs:                            8.1    7.04  )-----------( 272      ( 19 Jun 2019 07:31 )             26.1         CBC Full  -  ( 19 Jun 2019 07:31 )  WBC Count : 7.04 K/uL  RBC Count : 2.88 M/uL  Hemoglobin : 8.1 g/dL  Hematocrit : 26.1 %  Platelet Count - Automated : 272 K/uL  Mean Cell Volume : 90.6 fl  Mean Cell Hemoglobin : 28.1 pg  Mean Cell Hemoglobin Concentration : 31.0 gm/dL  Auto Neutrophil # : x  Auto Lymphocyte # : x  Auto Monocyte # : x  Auto Eosinophil # : x  Auto Basophil # : x  Auto Neutrophil % : x  Auto Lymphocyte % : x  Auto Monocyte % : x  Auto Eosinophil % : x  Auto Basophil % : x        06-19    139  |  110<H>  |  9   ----------------------------<  144<H>  3.9   |  19<L>  |  0.59    Ca    7.5<L>      19 Jun 2019 07:31  Phos  3.1     06-19  Mg     1.9     06-19    TPro  5.3<L>  /  Alb  3.0<L>  /  TBili  <0.2  /  DBili  x   /  AST  17  /  ALT  11  /  AlkPhos  38<L>  06-19            Pathology:    Imaging Studies:  < from: MR Orbit, Face, and/or Neck w/wo IV Cont, Bilateral (06.09.19 @ 15:48) >  EXAM:  MR ORBIT FACE AND ORNECK [facility]                          PROCEDURE DATE:  06/09/2019          INTERPRETATION:  Sagittal, axial and coronal imaging of the soft tissues   of the neck was performed both before and following intravenous contrast   administration, utilizing T1 and T2 weighting with fat suppression   techniques.    Contrast dose: 7.5 cc of intravenous Gadavist.    Clinical information: Squamous cell carcinoma, HPV-related.    The patient was unable to complete the examination, and postcontrast   sagittal and coronal images were not performed.    The current study is interpreted in conjunction with concurrent PET/CT,   as well as prior CT studies of 12/2018 and 11/2016.    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 This mass   involves the skin and extends from the level of the parotid gland   cranially to the thyroid gland caudally. It is associated with complete   encasement without narrowing of both the common and internal carotid   arteries along its medial extent. More inferiorly, the mass is   inseparable from the subclavian vasculature. Neither the left   sternocleidomastoid muscle nor the left-sided strap musculature can be   identified in their entirety. In addition to this dominant conglomerate   klarissa mass, discrete pathologic lymph nodes are identified in left levels   1, 5 and 6 and the supraclavicular fossa as well as in the left   retropharyngeal chain. On the right side, pathologic lymph nodes are   identified in levels 3 and 4, with a focus of ill-defined enhancing soft   tissue noted anterior to the right sided strap muscles at and above the   level of the clavicular head. The primary tumor is not identified within   the visualized portions of the upper aerodigestive tract, and tumor is   radiographically categorized as T0N3 utilizing the HPV mediated   oropharyngeal cancer staging form.    As above, the tumor invades the inferior aspect of the left parotid gland   and is inseparable from the left submandibular gland. The right parotid   and submandibular glands are unremarkable in appearance. Tumor is   inseparable from the left thyroid lobe with the right thyroid lobe   normal. The visualized portions of the orbital and intracranial contents   are grossly normal. The central skull base appears intact. The cervical   vertebrae are normal in height and alignment. There is no large apical   lung mass.    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 There is also pathologic lymphadenopathy in the   right infrahyoid neck. The primary tumor is not identified. Please see   report above for further detail.              "Thank you for the opportunity to participate in the care of this   patient."      < end of copied text >    < from: NM PET/CT Onc FDG Skull to Thigh, Subsq (06.09.19 @ 14:51) >  [facility]  [facility]                          PROCEDURE DATE:  06/09/2019          INTERPRETATION:    PET-CT ONCOLOGIC STUDY    INDICATION: Squamous cell cancer of the left neck. Restaging to help   determine subsequent treatment strategy    TECHNIQUE: Intravenous access was established and the patient injected   with 12.1 mCi of [id number]. After resting in a quiet room   for about one hour, the patient was positioned on the scanning table and   images were obtained using standard PET/CT technique from the mid thigh   to the axilla.  This acquisition was performed with the patient's arms   lifted as high as possible over the head.    A second PET/CT acquisition of the head and neck was then performed with   the patient's arms at the side.    Simultaneous low-dose CT scanning is used for attenuation correction and   localization only.    PET images were reconstructed in axial, sagittal and coronal planes using   CT based attenuation correction. Images were displayed as PET, CT and   fused data sets as well as maximum intensity pixel projections.      COMPARISON: MRI of the head from same date and additional imaging   modalities dating back to 11/10/2016    FINDINGS:    HEAD AND NECK:  No abnormal activity is identified within the brain. There is has been   interval enlargement of the hypermetabolic left-sided neck mass which now   measures up to 11.1 cm in craniocaudal dimension with an SUV of 21.3.   There is invasion of the adjacent soft tissues structures, with   involvement of the left common and internal carotid arteries, as well as   the left-sided neck musculature.   There are enlarged, hypermetabolic lymph nodes in the left   retropharyngeal lymph region and cervical levels 1, 5, and 6.   Additionally there are enlarged hypermetabolic lymph nodes at the right   cervical levels 3 and 6, as well as the bilateral supraclavicular regions.    No abnormal uptake is seen in the thyroid.    CHEST:  No hypermetabolic lung nodules are seen. No pleural effusions.    The heart is normal in size. No pericardial effusion. There is normal   physiologic left ventricular myocardial uptake. No enlarged or FDG-avid   lymph nodes are seen in the chest.    ABDOMEN/PELVIS:  There is no adenopathy or klarissa hypermetabolism in the abdomen or pelvis.   The liver, gallbladder, pancreas and spleen are normal in appearance.   There are no adrenal nodules. Physiologic FDG excretion is seen in the   kidneys and bladder. No abdominal aortic aneurysm. No bowel abnormalities   are seen. There is no ascites.    MUSCULOSKELETAL:  Normal FDG activity is present in the axial skeleton. Stable 0.7 cm   sclerotic focus in the right sacrum without increased metabolic activity.   Stable 0.8 cm sclerotic focus in the left C3 facet without increased   metabolic activity. No hypermetabolic lytic or blastic osseous lesions   are identified.      IMPRESSION:  Compared with prior PET/CT dated 12/21/2018 has been interval enlargement   of large left-sided hypermetabolicneck mass.     There are an increased number of hypermetabolic lymph nodes in the   bilateral cervical regions, retropharyngeal region, and supraclavicular   region as above.      < end of copied text >

## 2019-06-19 NOTE — PROGRESS NOTE ADULT - ASSESSMENT
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

## 2019-06-19 NOTE — PROGRESS NOTE ADULT - PROBLEM SELECTOR PLAN 2
This is due to the long standing cancer and intermittent bleeding from the lesion in the left neck, She is now on iron therapy.

## 2019-06-19 NOTE — PROGRESS NOTE ADULT - SUBJECTIVE AND OBJECTIVE BOX
The patient is resting comfortably this am. Chemotherapy is now in progress. She tolerated docetaxel yesterday without difficulty. Infusional cisplatin and 5-FU are running now. She is eating a bit. She had some abdominal cramping discomfort yesterday but no bowel movement.    CHEMOTHERAPY REGIMEN:        Day:          2                Diet:  Protocol:          DCF                          IVF:      MEDICATIONS  (STANDING):  chlorhexidine 2% Cloths 1 Application(s) Topical <User Schedule>  dexamethasone  IVPB 20 milliGRAM(s) IV Intermittent daily  ferrous    sulfate 325 milliGRAM(s) Oral two times a day  fluorouracil IVPB (eMAR) 1810 milliGRAM(s) IV Intermittent daily  sodium chloride 0.9%. 1000 milliLiter(s) (500 mL/Hr) IV Continuous <Continuous>  sodium chloride 0.9%. 1000 milliLiter(s) (75 mL/Hr) IV Continuous <Continuous>    MEDICATIONS  (PRN):  acetaminophen   Tablet .. 650 milliGRAM(s) Oral every 6 hours PRN Temp greater or equal to 38C (100.4F), Mild Pain (1 - 3)  ondansetron Injectable 4 milliGRAM(s) IV Push every 6 hours PRN Nausea  sodium chloride 0.9% lock flush 10 milliLiter(s) IV Push every 1 hour PRN Pre/post blood products, medications, blood draw, and to maintain line patency      Allergies    No Known Allergies    Intolerances        DVT Prophylaxis: [ ] YES [x ] NO      Antibiotics: [ ] YES [x ] NO    Pain Scale (1-10):       Location:    Vital Signs Last 24 Hrs  T(C): 36.7 (19 Jun 2019 05:42), Max: 36.7 (18 Jun 2019 11:04)  T(F): 98 (19 Jun 2019 05:42), Max: 98 (18 Jun 2019 11:04)  HR: 68 (19 Jun 2019 05:42) (68 - 88)  BP: 111/72 (19 Jun 2019 05:42) (98/55 - 111/72)  BP(mean): --  RR: 18 (19 Jun 2019 05:42) (16 - 18)  SpO2: 98% (19 Jun 2019 05:42) (97% - 99%)    Drug Dosing Weight  Height (cm): 165.1 (17 Jun 2019 18:02)  Weight (kg): 73.7 (17 Jun 2019 18:02)  BMI (kg/m2): 27 (17 Jun 2019 18:02)  BSA (m2): 1.81 (17 Jun 2019 18:02)    PHYSICAL EXAM:      Constitutional: tolerating chemotherapy well thus far.  Eyes: conjunctival pallor.  ENMT: buccal mucosa without lesions. Oropharynx clear.  Neck: no nodes palp on the right. Left neck tumor mass persists.   Respiratory: chest clear.  Cardiovascular: S1>S2 at apex. RSR.  Gastrointestinal: soft, nontender, active bowel sounds.  Genitourinary: voiding without difficulty.    Extremities: minimal leg edema.  Neurological: no gross focal deficits.  Skin: warm and dry.  Lymph Nodes: left neck not palpated.  Musculoskeletal: full ROM.  Psychiatric: affect normal.         URINARY CATHETER: [ ] YES [x ] NO     LABS:  CBC Full  -  ( 18 Jun 2019 08:52 )  WBC Count : 8.84 K/uL  RBC Count : 2.83 M/uL  Hemoglobin : 8.0 g/dL  Hematocrit : 25.1 %  Platelet Count - Automated : 285 K/uL  Mean Cell Volume : 88.7 fl  Mean Cell Hemoglobin : 28.3 pg  Mean Cell Hemoglobin Concentration : 31.9 gm/dL  Auto Neutrophil # : 8.05 K/uL  Auto Lymphocyte # : 0.37 K/uL  Auto Monocyte # : 0.30 K/uL  Auto Eosinophil # : 0.00 K/uL  Auto Basophil # : 0.01 K/uL  Auto Neutrophil % : 91.1 %  Auto Lymphocyte % : 4.2 %  Auto Monocyte % : 3.4 %  Auto Eosinophil % : 0.0 %  Auto Basophil % : 0.1 %    06-18    136  |  108  |  9   ----------------------------<  135<H>  4.0   |  20<L>  |  0.55    Ca    8.2<L>      18 Jun 2019 08:52  Phos  3.9     06-17  Mg     1.7     06-18    TPro  5.6<L>  /  Alb  3.3  /  TBili  0.2  /  DBili  x   /  AST  14  /  ALT  9<L>  /  AlkPhos  44  06-18          CULTURES:    RADIOLOGY & ADDITIONAL STUDIES:

## 2019-06-19 NOTE — CONSULT NOTE ADULT - ASSESSMENT
38 y/o female with anemia in the setting of neck SCC s/p chemo  recommend CTA head and neck in the event she requires neurointervention  trend cbc  remainder of care per medicine  ENT following  Oncology aware  will continue to follow with primary services  as above d/w Dr. Galindo

## 2019-06-19 NOTE — PROGRESS NOTE ADULT - ASSESSMENT
The patient is tolerating chemotherapy well thus far. She offers no complaints re. the ongoing treatment. With IV hydration she is more anemic. I concur with the start of iron therapy.

## 2019-06-19 NOTE — PROGRESS NOTE ADULT - PROBLEM SELECTOR PLAN 3
-Patient has a history of depression and also anxiety  -Currently not taking any medications and does not want any current treatments  -Denies suicidal ideation Patient has a history of depression and also anxiety. Currently not taking any medications and does not want any current treatments. Denies suicidal ideation  - Pt with previous issues with psychiatry and health care in general   - pt exhibiting signs of paranoid type behavior. Not trusting medical team. Although in agreeance with plan as of now.   - Pt does not want family to know of her hospital stay  - pt refusing to be seen by psychiatry

## 2019-06-19 NOTE — CONSULT NOTE ADULT - SUBJECTIVE AND OBJECTIVE BOX
HISTORY OF PRESENT ILLNESS:   38 y/o female pmhx neck SCC recently treated at OSH for neck CA bleeding last week presented to [facility] 6/17 for chemo. Patient noted to anemic and concerned for subsequent bleeding of the tumor. Patient followed by oncology at [facility]. She denies any radiation therapy but states that it is planned. She denies any current pain, headaches. Patient reports feeling weak. Denies any trauma.     PAST MEDICAL & SURGICAL HISTORY:  Depression  Squamous cell carcinoma of neck  Mass: left neck  Schizoaffective disorder  No significant past surgical history  No significant past surgical history    FAMILY HISTORY:  No pertinent family history in first degree relatives  Family history unknown      SOCIAL HISTORY:  Tobacco Use: 15 yrs  EtOH use: social  Substance: denies    Allergies    No Known Allergies    Intolerances        REVIEW OF SYSTEMS  negative otherwise states in HPI      MEDICATIONS:  Antibiotics:    Neuro:  acetaminophen   Tablet .. 650 milliGRAM(s) Oral every 6 hours PRN  ondansetron Injectable 4 milliGRAM(s) IV Push every 6 hours PRN    Anticoagulation:    OTHER:  chlorhexidine 2% Cloths 1 Application(s) Topical <User Schedule>  dexamethasone  IVPB 20 milliGRAM(s) IV Intermittent daily  fluorouracil IVPB (eMAR) 1810 milliGRAM(s) IV Intermittent daily    IVF:  ferrous    sulfate 325 milliGRAM(s) Oral two times a day  sodium chloride 0.9% lock flush 10 milliLiter(s) IV Push every 1 hour PRN  sodium chloride 0.9%. 1000 milliLiter(s) IV Continuous <Continuous>  sodium chloride 0.9%. 1000 milliLiter(s) IV Continuous <Continuous>      Vital Signs Last 24 Hrs  T(C): 36.7 (19 Jun 2019 12:06), Max: 36.7 (18 Jun 2019 15:37)  T(F): 98.1 (19 Jun 2019 12:06), Max: 98.1 (19 Jun 2019 12:06)  HR: 71 (19 Jun 2019 12:06) (68 - 88)  BP: 119/72 (19 Jun 2019 12:06) (103/67 - 119/72)  BP(mean): --  RR: 16 (19 Jun 2019 12:06) (16 - 18)  SpO2: 95% (19 Jun 2019 12:06) (95% - 99%)    PHYSICAL EXAM:  A&O x 3, appears comfortable   left neck dressing c/d/i  EOMI, PERRL  face symmetric  neg drift  FISH x 4, no focal deficits  motor strength 5/5 throughout  sensation intact b/l    LABS:                        8.1    7.04  )-----------( 272      ( 19 Jun 2019 07:31 )             26.1     06-19    139  |  110<H>  |  9   ----------------------------<  144<H>  3.9   |  19<L>  |  0.59    Ca    7.5<L>      19 Jun 2019 07:31  Phos  3.1     06-19  Mg     1.9     06-19    TPro  5.3<L>  /  Alb  3.0<L>  /  TBili  <0.2  /  DBili  x   /  AST  17  /  ALT  11  /  AlkPhos  38<L>  06-19        CULTURES:      RADIOLOGY & ADDITIONAL STUDIES:

## 2019-06-19 NOTE — CONSULT NOTE ADULT - REASON FOR ADMISSION
chemotherapy for squamous cell carcinoma

## 2019-06-19 NOTE — PROGRESS NOTE ADULT - SUBJECTIVE AND OBJECTIVE BOX
Patient is a 39y old  Female who presents with a chief complaint of chemotherapy for squamous cell carcinoma (19 Jun 2019 06:25)      INTERVAL HPI/OVERNIGHT EVENTS:  ICU Vital Signs Last 24 Hrs  T(C): 36.7 (19 Jun 2019 05:42), Max: 36.7 (18 Jun 2019 15:37)  T(F): 98 (19 Jun 2019 05:42), Max: 98 (18 Jun 2019 15:37)  HR: 68 (19 Jun 2019 05:42) (68 - 88)  BP: 111/72 (19 Jun 2019 05:42) (103/67 - 111/72)  BP(mean): --  ABP: --  ABP(mean): --  RR: 18 (19 Jun 2019 05:42) (18 - 18)  SpO2: 98% (19 Jun 2019 05:42) (97% - 99%)    I&O's Summary    18 Jun 2019 07:01  -  19 Jun 2019 07:00  --------------------------------------------------------  IN: 4852.2 mL / OUT: 0 mL / NET: 4852.2 mL          LABS:                        8.1    7.04  )-----------( 272      ( 19 Jun 2019 07:31 )             26.1     06-19    139  |  110<H>  |  9   ----------------------------<  144<H>  3.9   |  19<L>  |  0.59    Ca    7.5<L>      19 Jun 2019 07:31  Phos  3.1     06-19  Mg     1.9     06-19    TPro  5.3<L>  /  Alb  3.0<L>  /  TBili  <0.2  /  DBili  x   /  AST  17  /  ALT  11  /  AlkPhos  38<L>  06-19        CAPILLARY BLOOD GLUCOSE            RADIOLOGY & ADDITIONAL TESTS:    Consultant(s) Notes Reviewed:  [x ] YES  [ ] NO    MEDICATIONS  (STANDING):  chlorhexidine 2% Cloths 1 Application(s) Topical <User Schedule>  dexamethasone  IVPB 20 milliGRAM(s) IV Intermittent daily  ferrous    sulfate 325 milliGRAM(s) Oral two times a day  fluorouracil IVPB (eMAR) 1810 milliGRAM(s) IV Intermittent daily  sodium chloride 0.9%. 1000 milliLiter(s) (500 mL/Hr) IV Continuous <Continuous>  sodium chloride 0.9%. 1000 milliLiter(s) (75 mL/Hr) IV Continuous <Continuous>    MEDICATIONS  (PRN):  acetaminophen   Tablet .. 650 milliGRAM(s) Oral every 6 hours PRN Temp greater or equal to 38C (100.4F), Mild Pain (1 - 3)  ondansetron Injectable 4 milliGRAM(s) IV Push every 6 hours PRN Nausea  sodium chloride 0.9% lock flush 10 milliLiter(s) IV Push every 1 hour PRN Pre/post blood products, medications, blood draw, and to maintain line patency      PHYSICAL EXAM:  GENERAL: well built, well nourished  HEAD:  Atraumatic, Normocephalic  EYES: EOMI, PERRLA, conjunctiva and sclera clear  ENT: No tonsillar erythema, exudates, or enlargement; Moist mucous membranes, Good dentition, No lesions  NECK: Supple, No JVD, Normal thyroid, no enlarged nodes  NERVOUS SYSTEM:  Alert & Oriented X3, Good concentration; Motor Strength 5/5 B/L upper and lower extremities; DTRs 2+ intact and symmetric, sensory intact  CHEST/LUNG: B/L good air entry; No rales, rhonchi, or wheezing  HEART: S1S2 normal, no S3, Regular rate and rhythm; No murmurs, rubs, or gallops  ABDOMEN: Soft, Nontender, Nondistended; Bowel sounds present  EXTREMITIES:  2+ Peripheral Pulses, No clubbing, cyanosis, or edema  LYMPH: No lymphadenopathy noted  SKIN: No rashes or lesions    Care Discussed with Consultants/Other Providers [ x] YES  [ ] NO Patient is a 39y old  Female who presents with a chief complaint of chemotherapy for squamous cell carcinoma (19 Jun 2019 06:25)      INTERVAL HPI/OVERNIGHT EVENTS:  ICU Vital Signs Last 24 Hrs  T(C): 36.7 (19 Jun 2019 05:42), Max: 36.7 (18 Jun 2019 15:37)  T(F): 98 (19 Jun 2019 05:42), Max: 98 (18 Jun 2019 15:37)  HR: 68 (19 Jun 2019 05:42) (68 - 88)  BP: 111/72 (19 Jun 2019 05:42) (103/67 - 111/72)  BP(mean): --  ABP: --  ABP(mean): --  RR: 18 (19 Jun 2019 05:42) (18 - 18)  SpO2: 98% (19 Jun 2019 05:42) (97% - 99%)    I&O's Summary    18 Jun 2019 07:01  -  19 Jun 2019 07:00  --------------------------------------------------------  IN: 4852.2 mL / OUT: 0 mL / NET: 4852.2 mL          LABS:                        8.1    7.04  )-----------( 272      ( 19 Jun 2019 07:31 )             26.1     06-19    139  |  110<H>  |  9   ----------------------------<  144<H>  3.9   |  19<L>  |  0.59    Ca    7.5<L>      19 Jun 2019 07:31  Phos  3.1     06-19  Mg     1.9     06-19    TPro  5.3<L>  /  Alb  3.0<L>  /  TBili  <0.2  /  DBili  x   /  AST  17  /  ALT  11  /  AlkPhos  38<L>  06-19        CAPILLARY BLOOD GLUCOSE            RADIOLOGY & ADDITIONAL TESTS:    Consultant(s) Notes Reviewed:  [x ] YES  [ ] NO    MEDICATIONS  (STANDING):  chlorhexidine 2% Cloths 1 Application(s) Topical <User Schedule>  dexamethasone  IVPB 20 milliGRAM(s) IV Intermittent daily  ferrous    sulfate 325 milliGRAM(s) Oral two times a day  fluorouracil IVPB (eMAR) 1810 milliGRAM(s) IV Intermittent daily  sodium chloride 0.9%. 1000 milliLiter(s) (500 mL/Hr) IV Continuous <Continuous>  sodium chloride 0.9%. 1000 milliLiter(s) (75 mL/Hr) IV Continuous <Continuous>    MEDICATIONS  (PRN):  acetaminophen   Tablet .. 650 milliGRAM(s) Oral every 6 hours PRN Temp greater or equal to 38C (100.4F), Mild Pain (1 - 3)  ondansetron Injectable 4 milliGRAM(s) IV Push every 6 hours PRN Nausea  sodium chloride 0.9% lock flush 10 milliLiter(s) IV Push every 1 hour PRN Pre/post blood products, medications, blood draw, and to maintain line patency    PHYSICAL EXAM:  GENERAL: NAD.   HEAD:  Atraumatic, Normocephalic  EYES: EOMI, PERRLA, conjunctiva and sclera clear  NECK: Supple, left neck fungating mass, no bleeding or purulence. mild serosanguinous discharge.   NERVOUS SYSTEM:  Alert & Oriented X3, Good concentration; Motor Strength 5/5 B/L upper and lower extremities; DTRs 2+ intact and symmetric, sensory intact  CHEST/LUNG: B/L good air entry; No rales, rhonchi, or wheezing  HEART: S1S2 normal, no S3, Regular rate and rhythm; No murmurs, rubs, or gallops  ABDOMEN: Soft, Nontender, Nondistended; Bowel sounds present  EXTREMITIES:  2+ Peripheral Pulses, No clubbing, cyanosis, or edema  LYMPH: No lymphadenopathy noted  SKIN: No rashes or lesions  Psych: pt exhibiting paranoid type behavior    Care Discussed with Consultants/Other Providers [ x] YES  [ ] NO

## 2019-06-19 NOTE — PROGRESS NOTE ADULT - PROBLEM SELECTOR PLAN 1
Patient SCC of left neck with FNA biopsy and CT imaging consistent with this. Follows Dr. Meeks on an outpatient basis, planned for doci, paclo, and 5FU. Patient was generally noncompliant prior to coming. PET scan in Dec 2018 and most recent in June 2019 showed enlarging left mass with cervical lymphadenopathy. Follows Dr. Londono outpt as well, has not had RT yet and no chemo yet either.   -oncology team following, will contact head and neck sx and Dr. Londono   -NS 75 cc/hr for 10 hours  -dexamethasone 20 mg today   -PICC line placed today for chemotherapy tonight.   -bedside wound care and light dressing.  -f/up daily, CBC, CMP, Mag, uric acid, and LDH  - ENT consulted given recent hx of active bleeding from tumor and mass encompassing internal/carotid arteries. Patient SCC of left neck with FNA biopsy and CT imaging consistent with this. Follows Dr. Meeks on an outpatient basis, planned for doci, paclo, and 5FU. Patient was generally noncompliant prior to coming. PET scan in Dec 2018 and most recent in June 2019 showed enlarging left mass with cervical lymphadenopathy. Follows Dr. Londono outpt as well, has not had RT yet and no chemo yet either.   -oncology team following as well at Radiation therapy Dr. Londono, ENT and neurosurgery   - s/p piCC line  - Pt tolerating chemo well.   -NS 75 cc/hr for 10 hours  -dexamethasone 20 mg today   -bedside wound care and light dressing.  -f/up daily, CBC, CMP, Mag, uric acid, and LDH  - ENT consulted given recent hx of active bleeding from tumor, recommend CTA for mass encompassing internal/carotid arteries and possible risk of severe bleeding.   - neurosurgery on board.   - f/up CTA

## 2019-06-19 NOTE — PROGRESS NOTE ADULT - PROBLEM SELECTOR PLAN 2
Patient has anemia with Hg stable in the low 11s at baseline, CBC here was 8.6 on admission. No signs of GI or  bleeding. Pt reports recent hx of active bleeding from tumor and went to [facility] and bleeding site was ligated in ED. Pt unsure of hgb at the time. Iron studies show iron def anemia likely 2/2 tumor bleed. Pt has had mild blood oozing at mass site, but not out of proportion.  - Patient is HD stable currently  - maintain 2 active type and screen  - Hgb goal >7   - ENT consulted as stated above   - c/w ferrous sulfate 325mg BID   - f/up b12/folate   - continue to monitor

## 2019-06-19 NOTE — PROGRESS NOTE ADULT - ASSESSMENT
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     #Head and Neck Cancer   Very large left neck mass that has eroded; on imaging there is encasement of the internal carotid arteries as well as extension of the mass into skin, parotid and submandibular glands. She has extensive lymphadenopthy with cervical, retropharyngeal and supraclavicular nodes involved. No evidence of distant mets. Given extent of disease, patient being admitted for induction chemotherapy with DCF.     -Patient hydrated with NS overnight at 75 cc/hr  -Awaiting dual lumen PICC line placement by IR prior to administration of chemotherapy  -We will use induction DCF: Docetaxel 75 mg/m2 over 1 hour, Cisplatin 75/m2 over 24 hours, and infusional 5-FU 1000 mg/m2 over 96 hours  -Please monitor CBC and CMP including lytes such as mag and phos; uric acid and LDH WNL  -Will pre-medicate with Emend and Zofran  -Decadron 20 mg IV day prior (given already), today (day 1) and tomorrow  -Will give G-CSF 24 hours after completion of chemotherapy  -Chemo consent not obtained as of yet, patient wanted to drink her coffee first prior to signing consent. Will re-attempt.    #Anemia   Normocytic, likely due to bleeding vessel (ligated a few days ago). Iron studies show likely iron deficiency anemia  -Recommend repletion of iron stores with PO Ferrous Sulfate 325 mg PO BID  -Recommend checking B12 and folate studies    Case d/w Dr. 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     #Head and Neck Cancer   Very large left neck mass that has eroded; on imaging there is encasement of the internal carotid arteries as well as extension of the mass into skin, parotid and submandibular glands. She has extensive lymphadenopthy with cervical, retropharyngeal and supraclavicular nodes involved. No evidence of distant mets. Given extent of disease, patient being admitted for induction chemotherapy with DCF.     -Cycle 1 Day 1/2 of induction DCF, completed Docetaxel tolerated well. 5-FU and Cisplatin infusing via PICC Line  -Please continue to monitor CBC and CMP including lytes such as mag and phos; uric acid and LDH WNL  -Decadron last dose today  -Will give G-CSF 24 hours after completion of chemotherapy  -Per neurosurgery and ENT, there is high risk for carotid artery blowout given that tumor is encasing artery; They recommended urgent CTA head and neck for further evaluation. After discussion [facility] neurosurgery over the phone, we will have patient go down for CTA head and neck after completion of the current bag of 5-FU and cisplatin is completed. We discussed with nursing staff and they will arrange with techs to have test completed after bag has finished tonight and to have the patient come right back up as soon as test is over    #Anemia   Normocytic, likely due to bleeding vessel (ligated a few days ago). Iron studies show likely iron deficiency anemia. Hb is stable.   -c/w ferrous sulfate   -CTM CBC; if hb drops acutely, recommend calling neurosurgery/ENT urgently. Transfuse to keep Hb > 7 g/dL  -B12 low normal stores and folate levels WNL; please send off MMA and homocysteine levels. Will order IM B12 injection     Case d/w Dr. Meeks

## 2019-06-19 NOTE — PROGRESS NOTE ADULT - PROBLEM SELECTOR PLAN 5
F 75 cc NS/hr, Pre hydration for chemo   E replete K<4 Mag <2  N Full diet passed bedside dysphagia    DVT: hold lovenox given bleed   GI: none F 75 cc NS/hr,   E replete K<4 Mag <2  N Full diet passed bedside dysphagia    DVT: hold lovenox given bleed   GI: none

## 2019-06-19 NOTE — PROGRESS NOTE ADULT - ATTENDING COMMENTS
Systemic chemotherapy to continue as planned. Medicine and ENT input greatly appreciated. I concur with the start of iron therapy. Continue IV hydration with normal saline around the clock at 75 cc/hr. The patient is encouraged to ambulate in the halls. Systemic chemotherapy to continue as planned. Medicine and ENT input greatly appreciated. I concur with the start of iron therapy. Continue IV hydration with normal saline around the clock at 75 cc/hr. The patient is encouraged to ambulate in the halls. Input of the Advanced Practice Nurse is also appreciated.

## 2019-06-20 LAB
ALBUMIN SERPL ELPH-MCNC: 3.2 G/DL — LOW (ref 3.3–5)
ALP SERPL-CCNC: 39 U/L — LOW (ref 40–120)
ALT FLD-CCNC: 19 U/L — SIGNIFICANT CHANGE UP (ref 10–45)
ANION GAP SERPL CALC-SCNC: 9 MMOL/L — SIGNIFICANT CHANGE UP (ref 5–17)
AST SERPL-CCNC: 31 U/L — SIGNIFICANT CHANGE UP (ref 10–40)
BILIRUB SERPL-MCNC: 0.2 MG/DL — SIGNIFICANT CHANGE UP (ref 0.2–1.2)
BUN SERPL-MCNC: 11 MG/DL — SIGNIFICANT CHANGE UP (ref 7–23)
CALCIUM SERPL-MCNC: 7.7 MG/DL — LOW (ref 8.4–10.5)
CHLORIDE SERPL-SCNC: 104 MMOL/L — SIGNIFICANT CHANGE UP (ref 96–108)
CO2 SERPL-SCNC: 24 MMOL/L — SIGNIFICANT CHANGE UP (ref 22–31)
CREAT SERPL-MCNC: 0.67 MG/DL — SIGNIFICANT CHANGE UP (ref 0.5–1.3)
EXTRA SST TUBE: SIGNIFICANT CHANGE UP
GLUCOSE SERPL-MCNC: 100 MG/DL — HIGH (ref 70–99)
HCT VFR BLD CALC: 27.9 % — LOW (ref 34.5–45)
HGB BLD-MCNC: 8.8 G/DL — LOW (ref 11.5–15.5)
LDH SERPL L TO P-CCNC: 198 U/L — SIGNIFICANT CHANGE UP (ref 50–242)
MAGNESIUM SERPL-MCNC: 1.6 MG/DL — SIGNIFICANT CHANGE UP (ref 1.6–2.6)
MCHC RBC-ENTMCNC: 28.4 PG — SIGNIFICANT CHANGE UP (ref 27–34)
MCHC RBC-ENTMCNC: 31.5 GM/DL — LOW (ref 32–36)
MCV RBC AUTO: 90 FL — SIGNIFICANT CHANGE UP (ref 80–100)
NRBC # BLD: 0 /100 WBCS — SIGNIFICANT CHANGE UP (ref 0–0)
PHOSPHATE SERPL-MCNC: 2.8 MG/DL — SIGNIFICANT CHANGE UP (ref 2.5–4.5)
PLATELET # BLD AUTO: 259 K/UL — SIGNIFICANT CHANGE UP (ref 150–400)
POTASSIUM SERPL-MCNC: 3.4 MMOL/L — LOW (ref 3.5–5.3)
POTASSIUM SERPL-SCNC: 3.4 MMOL/L — LOW (ref 3.5–5.3)
PROT SERPL-MCNC: 5.5 G/DL — LOW (ref 6–8.3)
RBC # BLD: 3.1 M/UL — LOW (ref 3.8–5.2)
RBC # FLD: 13.3 % — SIGNIFICANT CHANGE UP (ref 10.3–14.5)
SODIUM SERPL-SCNC: 137 MMOL/L — SIGNIFICANT CHANGE UP (ref 135–145)
WBC # BLD: 7.33 K/UL — SIGNIFICANT CHANGE UP (ref 3.8–10.5)
WBC # FLD AUTO: 7.33 K/UL — SIGNIFICANT CHANGE UP (ref 3.8–10.5)

## 2019-06-20 PROCEDURE — 99233 SBSQ HOSP IP/OBS HIGH 50: CPT

## 2019-06-20 PROCEDURE — 70496 CT ANGIOGRAPHY HEAD: CPT | Mod: 26

## 2019-06-20 PROCEDURE — 70498 CT ANGIOGRAPHY NECK: CPT | Mod: 26

## 2019-06-20 RX ORDER — POTASSIUM CHLORIDE 20 MEQ
40 PACKET (EA) ORAL EVERY 4 HOURS
Refills: 0 | Status: COMPLETED | OUTPATIENT
Start: 2019-06-20 | End: 2019-06-20

## 2019-06-20 RX ORDER — MAGNESIUM SULFATE 500 MG/ML
1 VIAL (ML) INJECTION ONCE
Refills: 0 | Status: COMPLETED | OUTPATIENT
Start: 2019-06-20 | End: 2019-06-20

## 2019-06-20 RX ADMIN — Medication 325 MILLIGRAM(S): at 16:26

## 2019-06-20 RX ADMIN — SODIUM CHLORIDE 75 MILLILITER(S): 9 INJECTION INTRAMUSCULAR; INTRAVENOUS; SUBCUTANEOUS at 11:15

## 2019-06-20 RX ADMIN — ONDANSETRON 4 MILLIGRAM(S): 8 TABLET, FILM COATED ORAL at 13:00

## 2019-06-20 RX ADMIN — Medication 220 MILLIGRAM(S): at 11:15

## 2019-06-20 RX ADMIN — FLUOROURACIL 43.18 MILLIGRAM(S): 50 INJECTION, SOLUTION INTRAVENOUS at 11:43

## 2019-06-20 RX ADMIN — Medication 325 MILLIGRAM(S): at 08:07

## 2019-06-20 RX ADMIN — Medication 650 MILLIGRAM(S): at 13:00

## 2019-06-20 RX ADMIN — CHLORHEXIDINE GLUCONATE 1 APPLICATION(S): 213 SOLUTION TOPICAL at 06:00

## 2019-06-20 RX ADMIN — Medication 650 MILLIGRAM(S): at 14:09

## 2019-06-20 RX ADMIN — Medication 100 GRAM(S): at 11:14

## 2019-06-20 RX ADMIN — Medication 40 MILLIEQUIVALENT(S): at 16:18

## 2019-06-20 RX ADMIN — SODIUM CHLORIDE 75 MILLILITER(S): 9 INJECTION INTRAMUSCULAR; INTRAVENOUS; SUBCUTANEOUS at 16:18

## 2019-06-20 NOTE — PROGRESS NOTE ADULT - SUBJECTIVE AND OBJECTIVE BOX
The patient is feeling better this am after an EKG was done because of some chest tightness. The EKG was fine. The patient has been off of her chemotherapy awaiting to go to radiology for a CTA scan to assess the vascular status of the large neck wound. She has tolerated chemotherapy well thus far.     CHEMOTHERAPY REGIMEN:        Day:                          Diet:  Protocol:                                    IVF:      MEDICATIONS  (STANDING):  chlorhexidine 2% Cloths 1 Application(s) Topical <User Schedule>  dexamethasone  IVPB 20 milliGRAM(s) IV Intermittent daily  ferrous    sulfate 325 milliGRAM(s) Oral two times a day  fluorouracil IVPB (eMAR) 1810 milliGRAM(s) IV Intermittent daily  sodium chloride 0.9%. 1000 milliLiter(s) (500 mL/Hr) IV Continuous <Continuous>  sodium chloride 0.9%. 1000 milliLiter(s) (75 mL/Hr) IV Continuous <Continuous>    MEDICATIONS  (PRN):  acetaminophen   Tablet .. 650 milliGRAM(s) Oral every 6 hours PRN Temp greater or equal to 38C (100.4F), Mild Pain (1 - 3)  ondansetron Injectable 4 milliGRAM(s) IV Push every 6 hours PRN Nausea  sodium chloride 0.9% lock flush 10 milliLiter(s) IV Push every 1 hour PRN Pre/post blood products, medications, blood draw, and to maintain line patency      Allergies    No Known Allergies    Intolerances        DVT Prophylaxis: [ ] YES [ x] NO      Antibiotics: [ ] YES [ x] NO    Pain Scale (1-10):       Location:    Vital Signs Last 24 Hrs  T(C): 36.9 (20 Jun 2019 06:07), Max: 36.9 (19 Jun 2019 22:00)  T(F): 98.5 (20 Jun 2019 06:07), Max: 98.5 (20 Jun 2019 06:07)  HR: 78 (20 Jun 2019 06:07) (71 - 92)  BP: 144/81 (20 Jun 2019 06:07) (119/72 - 144/81)  BP(mean): --  RR: 18 (20 Jun 2019 06:07) (16 - 18)  SpO2: 99% (20 Jun 2019 06:07) (95% - 100%)    Drug Dosing Weight  Height (cm): 165.1 (17 Jun 2019 18:02)  Weight (kg): 73.7 (17 Jun 2019 18:02)  BMI (kg/m2): 27 (17 Jun 2019 18:02)  BSA (m2): 1.81 (17 Jun 2019 18:02)    PHYSICAL EXAM:      Constitutional: more calm now after the EKG.  Eyes: conjunctival pallor.  ENMT: buccal mucosa without lesions.  Neck: left neck mass is covered.  Back: no SPT.  Respiratory: chest clear.  Cardiovascular: S1>S2 at apex. RSR.  Gastrointestinal: soft, nontender, active bowel sounds.  Genitourinary: voiding without difficulty.  Extremities: no leg edema.  Neurological: no gross focal deficits.  Skin: warm and dry.  Lymph Nodes: none palp. Left neck not examined.     Musculoskeletal: full ROM.  Psychiatric: affect normal.        URINARY CATHETER: [ ] YES x[ ] NO     LABS:  CBC Full  -  ( 19 Jun 2019 07:31 )  WBC Count : 7.04 K/uL  RBC Count : 2.88 M/uL  Hemoglobin : 8.1 g/dL  Hematocrit : 26.1 %  Platelet Count - Automated : 272 K/uL  Mean Cell Volume : 90.6 fl  Mean Cell Hemoglobin : 28.1 pg  Mean Cell Hemoglobin Concentration : 31.0 gm/dL  Auto Neutrophil # : x  Auto Lymphocyte # : x  Auto Monocyte # : x  Auto Eosinophil # : x  Auto Basophil # : x  Auto Neutrophil % : x  Auto Lymphocyte % : x  Auto Monocyte % : x  Auto Eosinophil % : x  Auto Basophil % : x    06-19    139  |  110<H>  |  9   ----------------------------<  144<H>  3.9   |  19<L>  |  0.59    Ca    7.5<L>      19 Jun 2019 07:31  Phos  3.1     06-19  Mg     1.9     06-19    TPro  5.3<L>  /  Alb  3.0<L>  /  TBili  <0.2  /  DBili  x   /  AST  17  /  ALT  11  /  AlkPhos  38<L>  06-19          CULTURES:    RADIOLOGY & ADDITIONAL STUDIES:

## 2019-06-20 NOTE — PROGRESS NOTE ADULT - ASSESSMENT
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

## 2019-06-20 NOTE — DIETITIAN INITIAL EVALUATION ADULT. - PROBLEM SELECTOR PLAN 2
-Patient has anemia with Hg stable in the low 11s at baseline, CBC here was 8.6  -Patient is HD stable currently  -no signs of GI or  bleeding, will follow up morning iron studies  -maintain 2 active type and screen  -has had mild blood oozing at mass site, but not out of proportion.

## 2019-06-20 NOTE — PROGRESS NOTE ADULT - PROBLEM SELECTOR PLAN 3
Patient has a history of depression and also anxiety. Currently not taking any medications and does not want any current treatments. Denies suicidal ideation  - Pt with previous issues with psychiatry and health care in general   - pt exhibiting signs of paranoid type behavior. Not trusting medical team. Although in agreeance with plan as of now.   - Pt does not want family to know of her hospital stay  - pt refusing to be seen by psychiatry

## 2019-06-20 NOTE — PROGRESS NOTE ADULT - SUBJECTIVE AND OBJECTIVE BOX
Patient is a 39y old  Female who presents with a chief complaint of chemotherapy for squamous cell carcinoma (20 Jun 2019 06:37)      INTERVAL HPI/OVERNIGHT EVENTS:  ICU Vital Signs Last 24 Hrs  T(C): 36.9 (20 Jun 2019 06:07), Max: 36.9 (19 Jun 2019 22:00)  T(F): 98.5 (20 Jun 2019 06:07), Max: 98.5 (20 Jun 2019 06:07)  HR: 78 (20 Jun 2019 06:07) (71 - 92)  BP: 144/81 (20 Jun 2019 06:07) (119/72 - 144/81)  BP(mean): --  ABP: --  ABP(mean): --  RR: 18 (20 Jun 2019 06:07) (16 - 18)  SpO2: 99% (20 Jun 2019 06:07) (95% - 100%)    I&O's Summary    19 Jun 2019 07:01  -  20 Jun 2019 07:00  --------------------------------------------------------  IN: 1936.8 mL / OUT: 0 mL / NET: 1936.8 mL          LABS:                        8.8    7.33  )-----------( 259      ( 20 Jun 2019 07:57 )             27.9     06-19    139  |  110<H>  |  9   ----------------------------<  144<H>  3.9   |  19<L>  |  0.59    Ca    7.5<L>      19 Jun 2019 07:31  Phos  3.1     06-19  Mg     1.9     06-19    TPro  5.3<L>  /  Alb  3.0<L>  /  TBili  <0.2  /  DBili  x   /  AST  17  /  ALT  11  /  AlkPhos  38<L>  06-19        CAPILLARY BLOOD GLUCOSE            RADIOLOGY & ADDITIONAL TESTS:    Consultant(s) Notes Reviewed:  [x ] YES  [ ] NO    MEDICATIONS  (STANDING):  chlorhexidine 2% Cloths 1 Application(s) Topical <User Schedule>  dexamethasone  IVPB 20 milliGRAM(s) IV Intermittent daily  ferrous    sulfate 325 milliGRAM(s) Oral two times a day  fluorouracil IVPB (eMAR) 1810 milliGRAM(s) IV Intermittent daily  sodium chloride 0.9%. 1000 milliLiter(s) (500 mL/Hr) IV Continuous <Continuous>  sodium chloride 0.9%. 1000 milliLiter(s) (75 mL/Hr) IV Continuous <Continuous>    MEDICATIONS  (PRN):  acetaminophen   Tablet .. 650 milliGRAM(s) Oral every 6 hours PRN Temp greater or equal to 38C (100.4F), Mild Pain (1 - 3)  ondansetron Injectable 4 milliGRAM(s) IV Push every 6 hours PRN Nausea  sodium chloride 0.9% lock flush 10 milliLiter(s) IV Push every 1 hour PRN Pre/post blood products, medications, blood draw, and to maintain line patency      PHYSICAL EXAM:  GENERAL: well built, well nourished  HEAD:  Atraumatic, Normocephalic  EYES: EOMI, PERRLA, conjunctiva and sclera clear  ENT: No tonsillar erythema, exudates, or enlargement; Moist mucous membranes, Good dentition, No lesions  NECK: Supple, No JVD, Normal thyroid, no enlarged nodes  NERVOUS SYSTEM:  Alert & Oriented X3, Good concentration; Motor Strength 5/5 B/L upper and lower extremities; DTRs 2+ intact and symmetric, sensory intact  CHEST/LUNG: B/L good air entry; No rales, rhonchi, or wheezing  HEART: S1S2 normal, no S3, Regular rate and rhythm; No murmurs, rubs, or gallops  ABDOMEN: Soft, Nontender, Nondistended; Bowel sounds present  EXTREMITIES:  2+ Peripheral Pulses, No clubbing, cyanosis, or edema  LYMPH: No lymphadenopathy noted  SKIN: No rashes or lesions    Care Discussed with Consultants/Other Providers [ x] YES  [ ] NO OVERNIGHT EVENTS:  TRANG    INTERVAL HPI:  Pt is examined at bedside.     Vital Signs Last 24 Hrs  T(C): 36.9 (20 Jun 2019 06:07), Max: 36.9 (19 Jun 2019 22:00)  T(F): 98.5 (20 Jun 2019 06:07), Max: 98.5 (20 Jun 2019 06:07)  HR: 78 (20 Jun 2019 06:07) (71 - 92)  BP: 144/81 (20 Jun 2019 06:07) (119/72 - 144/81)  BP(mean): --  ABP: --  ABP(mean): --  RR: 18 (20 Jun 2019 06:07) (16 - 18)  SpO2: 99% (20 Jun 2019 06:07) (95% - 100%)    I&O's Summary    19 Jun 2019 07:01  -  20 Jun 2019 07:00  --------------------------------------------------------  IN: 1936.8 mL / OUT: 0 mL / NET: 1936.8 mL          LABS:                        8.8    7.33  )-----------( 259      ( 20 Jun 2019 07:57 )             27.9     06-19    139  |  110<H>  |  9   ----------------------------<  144<H>  3.9   |  19<L>  |  0.59    Ca    7.5<L>      19 Jun 2019 07:31  Phos  3.1     06-19  Mg     1.9     06-19    TPro  5.3<L>  /  Alb  3.0<L>  /  TBili  <0.2  /  DBili  x   /  AST  17  /  ALT  11  /  AlkPhos  38<L>  06-19        CAPILLARY BLOOD GLUCOSE            RADIOLOGY & ADDITIONAL TESTS:    Consultant(s) Notes Reviewed:  [x ] YES  [ ] NO    MEDICATIONS  (STANDING):  chlorhexidine 2% Cloths 1 Application(s) Topical <User Schedule>  dexamethasone  IVPB 20 milliGRAM(s) IV Intermittent daily  ferrous    sulfate 325 milliGRAM(s) Oral two times a day  fluorouracil IVPB (eMAR) 1810 milliGRAM(s) IV Intermittent daily  sodium chloride 0.9%. 1000 milliLiter(s) (500 mL/Hr) IV Continuous <Continuous>  sodium chloride 0.9%. 1000 milliLiter(s) (75 mL/Hr) IV Continuous <Continuous>    MEDICATIONS  (PRN):  acetaminophen   Tablet .. 650 milliGRAM(s) Oral every 6 hours PRN Temp greater or equal to 38C (100.4F), Mild Pain (1 - 3)  ondansetron Injectable 4 milliGRAM(s) IV Push every 6 hours PRN Nausea  sodium chloride 0.9% lock flush 10 milliLiter(s) IV Push every 1 hour PRN Pre/post blood products, medications, blood draw, and to maintain line patency      PHYSICAL EXAM:  GENERAL: well built, well nourished  HEAD:  Atraumatic, Normocephalic  EYES: EOMI, PERRLA, conjunctiva and sclera clear  ENT: No tonsillar erythema, exudates, or enlargement; Moist mucous membranes, Good dentition, No lesions  NECK: Supple, No JVD, Normal thyroid, no enlarged nodes  NERVOUS SYSTEM:  Alert & Oriented X3, Good concentration; Motor Strength 5/5 B/L upper and lower extremities; DTRs 2+ intact and symmetric, sensory intact  CHEST/LUNG: B/L good air entry; No rales, rhonchi, or wheezing  HEART: S1S2 normal, no S3, Regular rate and rhythm; No murmurs, rubs, or gallops  ABDOMEN: Soft, Nontender, Nondistended; Bowel sounds present  EXTREMITIES:  2+ Peripheral Pulses, No clubbing, cyanosis, or edema  LYMPH: No lymphadenopathy noted  SKIN: No rashes or lesions    Care Discussed with Consultants/Other Providers [ x] YES  [ ] NO OVERNIGHT EVENTS:  TRANG    INTERVAL HPI:  Pt is examined at bedside.     Vital Signs Last 24 Hrs  T(C): 36.9 (20 Jun 2019 06:07), Max: 36.9 (19 Jun 2019 22:00)  T(F): 98.5 (20 Jun 2019 06:07), Max: 98.5 (20 Jun 2019 06:07)  HR: 78 (20 Jun 2019 06:07) (71 - 92)  BP: 144/81 (20 Jun 2019 06:07) (119/72 - 144/81)  BP(mean): --  ABP: --  ABP(mean): --  RR: 18 (20 Jun 2019 06:07) (16 - 18)  SpO2: 99% (20 Jun 2019 06:07) (95% - 100%)    I&O's Summary    19 Jun 2019 07:01  -  20 Jun 2019 07:00  --------------------------------------------------------  IN: 1936.8 mL / OUT: 0 mL / NET: 1936.8 mL          LABS:                        8.8    7.33  )-----------( 259      ( 20 Jun 2019 07:57 )             27.9     06-19    139  |  110<H>  |  9   ----------------------------<  144<H>  3.9   |  19<L>  |  0.59    Ca    7.5<L>      19 Jun 2019 07:31  Phos  3.1     06-19  Mg     1.9     06-19    TPro  5.3<L>  /  Alb  3.0<L>  /  TBili  <0.2  /  DBili  x   /  AST  17  /  ALT  11  /  AlkPhos  38<L>  06-19        CAPILLARY BLOOD GLUCOSE            RADIOLOGY & ADDITIONAL TESTS:    Consultant(s) Notes Reviewed:  [x ] YES  [ ] NO    MEDICATIONS  (STANDING):  chlorhexidine 2% Cloths 1 Application(s) Topical <User Schedule>  dexamethasone  IVPB 20 milliGRAM(s) IV Intermittent daily  ferrous    sulfate 325 milliGRAM(s) Oral two times a day  fluorouracil IVPB (eMAR) 1810 milliGRAM(s) IV Intermittent daily  sodium chloride 0.9%. 1000 milliLiter(s) (500 mL/Hr) IV Continuous <Continuous>  sodium chloride 0.9%. 1000 milliLiter(s) (75 mL/Hr) IV Continuous <Continuous>    MEDICATIONS  (PRN):  acetaminophen   Tablet .. 650 milliGRAM(s) Oral every 6 hours PRN Temp greater or equal to 38C (100.4F), Mild Pain (1 - 3)  ondansetron Injectable 4 milliGRAM(s) IV Push every 6 hours PRN Nausea  sodium chloride 0.9% lock flush 10 milliLiter(s) IV Push every 1 hour PRN Pre/post blood products, medications, blood draw, and to maintain line patency      PHYSICAL EXAM:  GENERAL: NAD.   HEAD:  Atraumatic, Normocephalic  EYES: EOMI, PERRLA, conjunctiva and sclera clear  NECK: Supple, left neck fungating mass, no bleeding or purulence. mild serosanguinous discharge.   NERVOUS SYSTEM:  Alert & Oriented X3, Good concentration; Motor Strength 5/5 B/L upper and lower extremities; DTRs 2+ intact and symmetric, sensory intact  CHEST/LUNG: B/L good air entry; No rales, rhonchi, or wheezing  HEART: S1S2 normal, no S3, Regular rate and rhythm; No murmurs, rubs, or gallops  ABDOMEN: Soft, Nontender, Nondistended; Bowel sounds present  EXTREMITIES:  2+ Peripheral Pulses, No clubbing, cyanosis, or edema  LYMPH: No lymphadenopathy noted  SKIN: No rashes or lesions  Psych: pt exhibiting paranoid type behavior    Care Discussed with Consultants/Other Providers [ x] YES  [ ] NO

## 2019-06-20 NOTE — PROGRESS NOTE ADULT - ASSESSMENT
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     #Head and Neck Cancer   Very large left neck mass that has eroded; on imaging there is encasement of the internal carotid arteries as well as extension of the mass into skin, parotid and submandibular glands. She has extensive lymphadenopthy with cervical, retropharyngeal and supraclavicular nodes involved. No evidence of distant mets. Given extent of disease, patient being admitted for induction chemotherapy with DCF.     -Cycle 1 Day 2 of induction DCF, tolerating chemo. Minor delay hanging second bag of chemotherapy in anticipation for CTA head adn neck. Second bag of 5-FU infusing now.   -Please continue to monitor CBC and CMP including lytes such as mag and phos; uric acid and LDH WNL  -Will give G-CSF 24 hours after completion of chemotherapy  -Cervical adenopathy seen encasing CCA, ECA, and ICA causing luminal narrowing however states that there is no psuedoaneurysm or other evidence to suggest risk of impending blowout  -f/u ENT and neurosurgery recs     #Anemia   Normocytic, likely due to bleeding vessel (ligated a few days ago). Iron studies show likely iron deficiency anemia. Hb improving.   -c/w ferrous sulfate   -CTM CBC; if hb drops acutely, recommend calling neurosurgery/ENT urgently. Transfuse to keep Hb > 7 g/dL  -B12 low normal stores and folate levels WNL; please send off MMA and homocysteine levels. Will order IM B12 injection     Case d/w Dr. Meeks

## 2019-06-20 NOTE — PROGRESS NOTE ADULT - PROBLEM SELECTOR PLAN 5
F 75 cc NS/hr,   E replete K<4 Mag <2  N Full diet passed bedside dysphagia    DVT: hold lovenox given bleed   GI: none

## 2019-06-20 NOTE — DIETITIAN INITIAL EVALUATION ADULT. - ENERGY NEEDS
Ht:5ft 5inches,IBW;125lbs +/-10%,130% of IBW.BMI:27/Increased nutrient needs due to CA demands with Chemo

## 2019-06-20 NOTE — DIETITIAN INITIAL EVALUATION ADULT. - PROBLEM SELECTOR PLAN 6
1) PCP Contacted on Admission: (Y/N) --> Name & Phone #: Follows Dr. Meeks for oncology, interested in [facility] for PCP management  2) Date of Contact with PCP:  3) PCP Contacted at Discharge: (Y/N, N/A)  4) Summary of Handoff Given to PCP:   5) Post-Discharge Appointment Date and Location:

## 2019-06-20 NOTE — DIETITIAN INITIAL EVALUATION ADULT. - OTHER INFO
38 y/o female with Squamous .cell CA of left neck admitted for chemo/PICC line.Noted history of schizo.D/O and depression.As per RN,eating adequate amounts and does not appear to be having any N/V/D or note able pain .Skin intact PU(noted bandage on neck).

## 2019-06-20 NOTE — PROGRESS NOTE ADULT - PROBLEM SELECTOR PLAN 2
Patient has anemia with Hg stable in the low 11s at baseline, CBC here was 8.6 on admission. No signs of GI or  bleeding. Pt reports recent hx of active bleeding from tumor and went to [facility] and bleeding site was ligated in ED. Pt unsure of hgb at the time. Iron studies show iron def anemia likely 2/2 tumor bleed. Pt has had mild blood oozing at mass site, but not out of proportion.  - Patient is HD stable currently  - maintain 2 active type and screen  - Hgb goal >7   - ENT consulted as stated above   - c/w ferrous sulfate 325mg BID   - f/up b12/folate   - continue to monitor Patient has anemia with Hg stable in the low 11s at baseline, CBC here was 8.6 on admission. No signs of GI or  bleeding. Pt reports recent hx of active bleeding from tumor and went to [facility] and bleeding site was ligated in ED. Pt unsure of hgb at the time. Iron studies show iron def anemia likely 2/2 tumor bleed. Pt has had mild blood oozing at mass site, but not out of proportion.  - Patient is HD stable currently  - maintain 2 active type and screen  - Hgb goal >7   - ENT consulted as stated above   - c/w ferrous sulfate 325mg BID   - continue to monitor

## 2019-06-20 NOTE — PROGRESS NOTE ADULT - ATTENDING COMMENTS
Would restart IV of NS and run it at 75 cc/hr around the clock. Resume chemotherapy upon return to the floor after the CTA is done.

## 2019-06-20 NOTE — PROGRESS NOTE ADULT - SUBJECTIVE AND OBJECTIVE BOX
Heme/Onc Progress Note (Dr. Meeks )  Discussed with Dr. Meeks and plan reviewed with the primary team.    Interval Hx:   No acute complaints overnight. Chemo was being held in anticipation for CTA head and neck which was supposed to be done last night however unable to be performed until this morning. Second bag of 5-FU started, tolerating well thus far.      HPI:  The patient is a 38 yo woman who was admitted yesterday for induction chemotherapy for a large left neck squamous cell carcinoma. A neck CT scan on 11/10/16 revealed a 3.1x2.9x5.0 cm multiloculated rim-enhancing fluid collection within the left level IIb soft tissues just deep to the sternocleidomastoid muscle. DDx included abscess, suppurative adenitis or infected second brachial cleft cyst. The patient was treated with antibiotics and some white material was drained from the lesion. The lesion, according to the patient came back in 2017. She again took antibiotic therapy. A CT scan was done but no additional biopsy was obtained. The patient saw Dr. Weller in early 12/18. A biopsy was obtained on 12/6/18.   This revealed a squamous cell carcinoma, HPV related. The patient was seen by Dr. Alvarado and Dr. Londono. She was referred to me but did not make an appointment. She finally saw me on 5/29/19 and I have been advising induction chemotherapy since. She was admitted yesterday and will begin chemotherapy today. She has recently been having intermittent bleeding from the eroded open large left neck mass. She has been to the ER on several occasions lately because of this.    Hb dropped to 8.0 g/dL likely due to oozing of blood from open wound of the neck mass. No purulent drainage. She has been afebrile.     ROS is otherwise negative.    Medications:  MEDICATIONS  (STANDING):  chlorhexidine 2% Cloths 1 Application(s) Topical <User Schedule>  ferrous    sulfate 325 milliGRAM(s) Oral two times a day  fluorouracil IVPB (eMAR) 1810 milliGRAM(s) IV Intermittent daily  potassium chloride    Tablet ER 40 milliEquivalent(s) Oral every 4 hours  sodium chloride 0.9%. 1000 milliLiter(s) (500 mL/Hr) IV Continuous <Continuous>  sodium chloride 0.9%. 1000 milliLiter(s) (75 mL/Hr) IV Continuous <Continuous>    MEDICATIONS  (PRN):  acetaminophen   Tablet .. 650 milliGRAM(s) Oral every 6 hours PRN Temp greater or equal to 38C (100.4F), Mild Pain (1 - 3)  ondansetron Injectable 4 milliGRAM(s) IV Push every 6 hours PRN Nausea  sodium chloride 0.9% lock flush 10 milliLiter(s) IV Push every 1 hour PRN Pre/post blood products, medications, blood draw, and to maintain line patency      PHYSICAL EXAM:  T(C): 37.1 (06-20-19 @ 11:11), Max: 37.1 (06-20-19 @ 11:11)  T(F): 98.7 (06-20-19 @ 11:11), Max: 98.7 (06-20-19 @ 11:11)  HR: 80 (06-20-19 @ 11:11) (78 - 92)  BP: 124/79 (06-20-19 @ 11:11) (124/79 - 144/81)  ABP: --  ABP(mean): --  RR: 18 (06-20-19 @ 11:11) (16 - 18)  SpO2: 97% (06-20-19 @ 11:11) (97% - 100%)      Gen: well developed, well nourished, sleeping but arousable   HEENT: normocephalic/atraumatic, no conjunctival pallor, no scleral icterus, no oral thrush/mucosal bleeding/mucositis  Neck: Large left neck mass that has eroded, open wound noted   Cardiovascular: RR, nl S1S2, no murmurs/rubs/gallops  Respiratory: clear air entry b/l  Gastrointestinal: BS+, soft, NT/ND, no masses, no splenomegaly, no hepatomegaly, no evidence for ascites  Extremities: no clubbing/cyanosis, no edema, no calf tenderness  Vascular:  DP/PT 2+ b/l  Neurological: CN 2-12 grossly intact, no focal deficits  Skin: no rash on visible skin        Labs:                          8.8    7.33  )-----------( 259      ( 20 Jun 2019 07:57 )             27.9         CBC Full  -  ( 20 Jun 2019 07:57 )  WBC Count : 7.33 K/uL  RBC Count : 3.10 M/uL  Hemoglobin : 8.8 g/dL  Hematocrit : 27.9 %  Platelet Count - Automated : 259 K/uL  Mean Cell Volume : 90.0 fl  Mean Cell Hemoglobin : 28.4 pg  Mean Cell Hemoglobin Concentration : 31.5 gm/dL  Auto Neutrophil # : x  Auto Lymphocyte # : x  Auto Monocyte # : x  Auto Eosinophil # : x  Auto Basophil # : x  Auto Neutrophil % : x  Auto Lymphocyte % : x  Auto Monocyte % : x  Auto Eosinophil % : x  Auto Basophil % : x        06-20    137  |  104  |  11  ----------------------------<  100<H>  3.4<L>   |  24  |  0.67    Ca    7.7<L>      20 Jun 2019 07:57  Phos  2.8     06-20  Mg     1.6     06-20    TPro  5.5<L>  /  Alb  3.2<L>  /  TBili  0.2  /  DBili  x   /  AST  31  /  ALT  19  /  AlkPhos  39<L>  06-20                Pathology:    Imaging Studies:  < from: MR Orbit, Face, and/or Neck w/wo IV Cont, Bilateral (06.09.19 @ 15:48) >  EXAM:  MR ORBIT FACE AND ORNECK [facility]                          PROCEDURE DATE:  06/09/2019          INTERPRETATION:  Sagittal, axial and coronal imaging of the soft tissues   of the neck was performed both before and following intravenous contrast   administration, utilizing T1 and T2 weighting with fat suppression   techniques.    Contrast dose: 7.5 cc of intravenous Gadavist.    Clinical information: Squamous cell carcinoma, HPV-related.    The patient was unable to complete the examination, and postcontrast   sagittal and coronal images were not performed.    The current study is interpreted in conjunction with concurrent PET/CT,   as well as prior CT studies of 12/2018 and 11/2016.    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 This mass   involves the skin and extends from the level of the parotid gland   cranially to the thyroid gland caudally. It is associated with complete   encasement without narrowing of both the common and internal carotid   arteries along its medial extent. More inferiorly, the mass is   inseparable from the subclavian vasculature. Neither the left   sternocleidomastoid muscle nor the left-sided strap musculature can be   identified in their entirety. In addition to this dominant conglomerate   klarissa mass, discrete pathologic lymph nodes are identified in left levels   1, 5 and 6 and the supraclavicular fossa as well as in the left   retropharyngeal chain. On the right side, pathologic lymph nodes are   identified in levels 3 and 4, with a focus of ill-defined enhancing soft   tissue noted anterior to the right sided strap muscles at and above the   level of the clavicular head. The primary tumor is not identified within   the visualized portions of the upper aerodigestive tract, and tumor is   radiographically categorized as T0N3 utilizing the HPV mediated   oropharyngeal cancer staging form.    As above, the tumor invades the inferior aspect of the left parotid gland   and is inseparable from the left submandibular gland. The right parotid   and submandibular glands are unremarkable in appearance. Tumor is   inseparable from the left thyroid lobe with the right thyroid lobe   normal. The visualized portions of the orbital and intracranial contents   are grossly normal. The central skull base appears intact. The cervical   vertebrae are normal in height and alignment. There is no large apical   lung mass.    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 There is also pathologic lymphadenopathy in the   right infrahyoid neck. The primary tumor is not identified. Please see   report above for further detail.              "Thank you for the opportunity to participate in the care of this   patient."      < end of copied text >    < from: NM PET/CT Onc FDG Skull to Thigh, Subsq (06.09.19 @ 14:51) >  EXAM:  PETCT [facility]\"" ONC FDG SUBS                          PROCEDURE DATE:  06/09/2019          INTERPRETATION:    PET-CT ONCOLOGIC STUDY    INDICATION: Squamous cell cancer of the left neck. Restaging to help   determine subsequent treatment strategy    TECHNIQUE: Intravenous access was established and the patient injected   with 12.1 mCi of [id number]. After resting in a quiet room   for about one hour, the patient was positioned on the scanning table and   images were obtained using standard PET/CT technique from the mid thigh   to the axilla.  This acquisition was performed with the patient's arms   lifted as high as possible over the head.    A second PET/CT acquisition of the head and neck was then performed with   the patient's arms at the side.    Simultaneous low-dose CT scanning is used for attenuation correction and   localization only.    PET images were reconstructed in axial, sagittal and coronal planes using   CT based attenuation correction. Images were displayed as PET, CT and   fused data sets as well as maximum intensity pixel projections.      COMPARISON: MRI of the head from same date and additional imaging   modalities dating back to 11/10/2016    FINDINGS:    HEAD AND NECK:  No abnormal activity is identified within the brain. There is has been   interval enlargement of the hypermetabolic left-sided neck mass which now   measures up to 11.1 cm in craniocaudal dimension with an SUV of 21.3.   There is invasion of the adjacent soft tissues structures, with   involvement of the left common and internal carotid arteries, as well as   the left-sided neck musculature.   There are enlarged, hypermetabolic lymph nodes in the left   retropharyngeal lymph region and cervical levels 1, 5, and 6.   Additionally there are enlarged hypermetabolic lymph nodes at the right   cervical levels 3 and 6, as well as the bilateral supraclavicular regions.    No abnormal uptake is seen in the thyroid.    CHEST:  No hypermetabolic lung nodules are seen. No pleural effusions.    The heart is normal in size. No pericardial effusion. There is normal   physiologic left ventricular myocardial uptake. No enlarged or FDG-avid   lymph nodes are seen in the chest.    ABDOMEN/PELVIS:  There is no adenopathy or klarissa hypermetabolism in the abdomen or pelvis.   The liver, gallbladder, pancreas and spleen are normal in appearance.   There are no adrenal nodules. Physiologic FDG excretion is seen in the   kidneys and bladder. No abdominal aortic aneurysm. No bowel abnormalities   are seen. There is no ascites.    MUSCULOSKELETAL:  Normal FDG activity is present in the axial skeleton. Stable 0.7 cm   sclerotic focus in the right sacrum without increased metabolic activity.   Stable 0.8 cm sclerotic focus in the left C3 facet without increased   metabolic activity. No hypermetabolic lytic or blastic osseous lesions   are identified.      IMPRESSION:  Compared with prior PET/CT dated 12/21/2018 has been interval enlargement   of large left-sided hypermetabolicneck mass.     There are an increased number of hypermetabolic lymph nodes in the   bilateral cervical regions, retropharyngeal region, and supraclavicular   region as above.      < end of copied text >    < from: CT Angio Neck w/ IV Cont (06.20.19 @ 10:54) >  EXAM:  CT ANGIO NECK (W)AW IC                          EXAM:  CT ANGIO BRAIN (W)AW IC                          PROCEDURE DATE:  06/20/2019          INTERPRETATION:  PROCEDURE: CTA brain with intravenous contrast.    INDICATION: Left neck squamous cell carcinoma with conglomerate   lymphadenopathy encasing the left carotid artery.    TECHNIQUE: Multiple axial thin section were obtained through the [place]   of [place] following the intravenous bolus injection of 90 cc Optiray-350.   MIP series areprovided.    COMPARISON: MRI of the neck from 06/09/2019 is reviewed    FINDINGS: The internal carotid arteries are patent at the skull base and   intracranial compartment without occlusion or high grade stenosis. The   anterior and middle cerebral arteries are patent at their 1st and 2nd   order segments, and appear symmetric caliber. The posterior circulation   shows no high grade stenosis or occlusion. The intracranial vertebral   arteries, the basilar artery and both posterior cerebral arteries are   patent with hypoplasia of the right P1 segment. There is no site of   aneurysm within the resolution limitations of CTA.    IMPRESSION: Unremarkable CTA examination of the brain.      PROCEDURE: CTA neck with intravenous contrast.    INDICATION: Left neck squamous cell carcinoma with conglomerate   lymphadenopathy encasing the left carotid artery.    TECHNIQUE: Multiple axial thin section were obtained through the neck   following the intravenous bolus injection of Optiray-350 as above. MIP   series are provided.    COMPARISON: MRI of the neck from 06/09/2019 is reviewed    FINDINGS:     As identified on the recent MRI, the left common carotid artery,   bifurcation and both proximal internal and external carotid arteries are   encasedby large left neck mass there is similar to maybe slightly   increased in size compared to 06/09/2019. Retropharyngeal edema is   increased from the prior study and could reflect third spacing of fluid.   Complete evaluation is deferred on this CTA study. If desired,   surveillance of the mass would best be accomplished with diagnostic MRI   or CT of the neck soft tissues.    There is mild narrowing of the both the left common and internal carotid   artery secondary to mass. For example, the leftcommon carotid artery   measures 6 mm in diameter, compared to 8 mm on the right. The left   internal carotid artery measures 3 mm diameter compared to 5 mm on the   right. No hemodynamically significant stenosis.     The aortic arch is normal size and shows patency, with variant 4 vessel   configuration inclusive of the left vertebral artery.    The cervical vertebral arteries are patent throughout, without   hemodynamically significant stenosis or occlusion.    Pleural effusions are partially seen.    IMPRESSION:    As seen on MRI, massive pathologic cervical lymphadenopathy encases the   left CCA, ECA and ICA with mild luminal narrowing relative to the normal   right side. No pseudoaneurysm or other evidence to portend a risk of   impending blowout.    CTA brain is normal.        < end of copied text >

## 2019-06-20 NOTE — DIETITIAN INITIAL EVALUATION ADULT. - PROBLEM SELECTOR PLAN 1
-Patient SCC of left neck with FNA biopsy and CT imaging consistent with this.  -Follows Dr. Meeks on an outpatient basis, planned for doci, paclo, and 5FU  -Patient was generally noncompliant prior to coming  -PET scan in Dec 2018 and most recent in June 2019 showed enlarging left mass with cervical lymphadenopathy  -Follows Dr. Londono outpt as well, has not had RT yet and no chemo yet either.   -patient will need daily, CBC, CMP, Mag, uric acid, and LDH  -oncology team following, will contact head and neck sx  -NS 75 cc/hr for 10 hours  -dexamethasone 20 mg iv tonight.  -bedside wound care and light dressing.  -will have PICC line placed tomorrow for chemotherapy

## 2019-06-20 NOTE — PROGRESS NOTE ADULT - PROBLEM SELECTOR PLAN 1
Patient SCC of left neck with FNA biopsy and CT imaging consistent with this. Follows Dr. Meeks on an outpatient basis, planned for doci, paclo, and 5FU. Patient was generally noncompliant prior to coming. PET scan in Dec 2018 and most recent in June 2019 showed enlarging left mass with cervical lymphadenopathy. Follows Dr. Londono outpt as well, has not had RT yet and no chemo yet either.   -oncology team following as well at Rad[facility] therapy Dr. Londono, ENT and neurosurgery   - s/p piCC line  - Pt tolerating chemo well.   -NS 75 cc/hr for 10 hours  -dexamethasone 20 mg today   -bedside wound care and light dressing.  -f/up daily, CBC, CMP, Mag, uric acid, and LDH  - ENT consulted given recent hx of active bleeding from tumor, recommend CTA for mass encompassing internal/carotid arteries and possible risk of severe bleeding.   - neurosurgery on board.   - f/up CTA Patient SCC of left neck with FNA biopsy and CT imaging consistent with this. Follows Dr. Meeks on an outpatient basis, planned for doci, paclo, and 5FU. Patient was generally noncompliant prior to coming. PET scan in Dec 2018 and most recent in June 2019 showed enlarging left mass with cervical lymphadenopathy. Follows Dr. Londono outpt as well, has not had RT yet and no chemo yet either.   -oncology team following as well at Radiation therapy Dr. Londono, ENT and neurosurgery   - s/p piCC line- Pt tolerating chemo well.   -NS 75 cc/hr    -bedside wound care and light dressing.  -f/up daily, CBC, CMP, Mag, uric acid, and LDH  - ENT consulted given recent hx of active bleeding from tumor, recommend CTA for mass encompassing internal/carotid arteries and possible risk of severe bleeding.   - CTA shows Cervical adenopathy seen encasing CCA, ECA, and ICA causing luminal narrowing however states that there is no pseudoaneurysm or other evidence to suggest risk of impending blowout  - neurosurgery on board, f/up recs

## 2019-06-21 LAB
ALBUMIN SERPL ELPH-MCNC: 3 G/DL — LOW (ref 3.3–5)
ALP SERPL-CCNC: 36 U/L — LOW (ref 40–120)
ALT FLD-CCNC: 14 U/L — SIGNIFICANT CHANGE UP (ref 10–45)
ANION GAP SERPL CALC-SCNC: 9 MMOL/L — SIGNIFICANT CHANGE UP (ref 5–17)
AST SERPL-CCNC: 19 U/L — SIGNIFICANT CHANGE UP (ref 10–40)
BILIRUB SERPL-MCNC: 0.3 MG/DL — SIGNIFICANT CHANGE UP (ref 0.2–1.2)
BUN SERPL-MCNC: 10 MG/DL — SIGNIFICANT CHANGE UP (ref 7–23)
CALCIUM SERPL-MCNC: 7.4 MG/DL — LOW (ref 8.4–10.5)
CHLORIDE SERPL-SCNC: 104 MMOL/L — SIGNIFICANT CHANGE UP (ref 96–108)
CO2 SERPL-SCNC: 24 MMOL/L — SIGNIFICANT CHANGE UP (ref 22–31)
CREAT SERPL-MCNC: 0.63 MG/DL — SIGNIFICANT CHANGE UP (ref 0.5–1.3)
GLUCOSE SERPL-MCNC: 114 MG/DL — HIGH (ref 70–99)
HCT VFR BLD CALC: 24.8 % — LOW (ref 34.5–45)
HGB BLD-MCNC: 8.1 G/DL — LOW (ref 11.5–15.5)
LDH SERPL L TO P-CCNC: 167 U/L — SIGNIFICANT CHANGE UP (ref 50–242)
MCHC RBC-ENTMCNC: 28.1 PG — SIGNIFICANT CHANGE UP (ref 27–34)
MCHC RBC-ENTMCNC: 32.7 GM/DL — SIGNIFICANT CHANGE UP (ref 32–36)
MCV RBC AUTO: 86.1 FL — SIGNIFICANT CHANGE UP (ref 80–100)
NRBC # BLD: 0 /100 WBCS — SIGNIFICANT CHANGE UP (ref 0–0)
PLATELET # BLD AUTO: 221 K/UL — SIGNIFICANT CHANGE UP (ref 150–400)
POTASSIUM SERPL-MCNC: 3.6 MMOL/L — SIGNIFICANT CHANGE UP (ref 3.5–5.3)
POTASSIUM SERPL-SCNC: 3.6 MMOL/L — SIGNIFICANT CHANGE UP (ref 3.5–5.3)
PROT SERPL-MCNC: 5.1 G/DL — LOW (ref 6–8.3)
RBC # BLD: 2.88 M/UL — LOW (ref 3.8–5.2)
RBC # FLD: 13.2 % — SIGNIFICANT CHANGE UP (ref 10.3–14.5)
SODIUM SERPL-SCNC: 137 MMOL/L — SIGNIFICANT CHANGE UP (ref 135–145)
WBC # BLD: 4.37 K/UL — SIGNIFICANT CHANGE UP (ref 3.8–10.5)
WBC # FLD AUTO: 4.37 K/UL — SIGNIFICANT CHANGE UP (ref 3.8–10.5)

## 2019-06-21 PROCEDURE — 99233 SBSQ HOSP IP/OBS HIGH 50: CPT

## 2019-06-21 RX ORDER — POTASSIUM CHLORIDE 20 MEQ
40 PACKET (EA) ORAL ONCE
Refills: 0 | Status: COMPLETED | OUTPATIENT
Start: 2019-06-21 | End: 2019-06-21

## 2019-06-21 RX ADMIN — CHLORHEXIDINE GLUCONATE 1 APPLICATION(S): 213 SOLUTION TOPICAL at 07:16

## 2019-06-21 RX ADMIN — Medication 325 MILLIGRAM(S): at 18:21

## 2019-06-21 RX ADMIN — Medication 325 MILLIGRAM(S): at 07:16

## 2019-06-21 RX ADMIN — FLUOROURACIL 43.18 MILLIGRAM(S): 50 INJECTION, SOLUTION INTRAVENOUS at 13:47

## 2019-06-21 RX ADMIN — Medication 40 MILLIEQUIVALENT(S): at 18:21

## 2019-06-21 RX ADMIN — ONDANSETRON 4 MILLIGRAM(S): 8 TABLET, FILM COATED ORAL at 11:47

## 2019-06-21 NOTE — PROGRESS NOTE ADULT - ASSESSMENT
The patient is more calm this am. The neck mass continues to bleed from time to time. Infusional 5-FU is back in progress.

## 2019-06-21 NOTE — PROGRESS NOTE ADULT - PROBLEM SELECTOR PLAN 1
Patient SCC of left neck with FNA biopsy and CT imaging consistent with this. Follows Dr. Meeks on an outpatient basis, planned for doci, paclo, and 5FU. Patient was generally noncompliant prior to coming. PET scan in Dec 2018 and most recent in June 2019 showed enlarging left mass with cervical lymphadenopathy. Follows Dr. Londono outpt as well, has not had RT yet and no chemo yet either.   -oncology team following as well at Radiation therapy Dr. Londono, ENT and neurosurgery   - s/p piCC line- Pt tolerating chemo well.   -NS 75 cc/hr    -bedside wound care and light dressing.  -f/up daily, CBC, CMP, Mag, uric acid, and LDH  - ENT consulted given recent hx of active bleeding from tumor, recommend CTA for mass encompassing internal/carotid arteries and possible risk of severe bleeding.   - CTA shows Cervical adenopathy seen encasing CCA, ECA, and ICA causing luminal narrowing however states that there is no pseudoaneurysm or other evidence to suggest risk of impending blowout  - neurosurgery on board, f/up recs Patient SCC of left neck with FNA biopsy and CT imaging consistent with this. Follows Dr. Meeks on an outpatient basis, planned for doci, paclo, and 5FU. Patient was generally noncompliant prior to coming. PET scan in Dec 2018 and most recent in June 2019 showed enlarging left mass with cervical lymphadenopathy. Follows Dr. Londono outpt as well, has not had RT yet and no chemo yet either.   -oncology team following as well at Radiation therapy Dr. Londono, ENT and neurosurgery   - s/p piCC line- Pt tolerating chemo well.   -NS 75 cc/hr    -bedside wound care and light dressing.  -f/up daily, CBC, CMP, Mag, uric acid, and LDH  - ENT consulted given recent hx of active bleeding from tumor, recommend CTA for mass encompassing internal/carotid arteries and possible risk of severe bleeding.   - CTA shows Cervical adenopathy seen encasing CCA, ECA, and ICA causing luminal narrowing however states that there is no pseudoaneurysm or other evidence to suggest risk of impending blowout

## 2019-06-21 NOTE — PROGRESS NOTE ADULT - PROBLEM SELECTOR PLAN 2
The patient continues to have intermittent bleeding from the left neck tumor site. continue po iron therapy.

## 2019-06-21 NOTE — PROGRESS NOTE ADULT - PROBLEM SELECTOR PLAN 4
-As above, patient does not want treatment currently

## 2019-06-21 NOTE — PROGRESS NOTE ADULT - ASSESSMENT
40 y/o f w/ [id number] HPV positive SCC of the left neck, depression, and schizoaffective disorder who is following here for chemotherapy

## 2019-06-21 NOTE — PROGRESS NOTE ADULT - ATTENDING COMMENTS
Induction chemotherapy to proceed. The patient is encouraged to eat and to be up OOB. Monitor electrolytes and supplement as required.

## 2019-06-21 NOTE — PROGRESS NOTE ADULT - PROBLEM SELECTOR PROBLEM 4
Other schizoaffective disorders

## 2019-06-21 NOTE — PROGRESS NOTE ADULT - SUBJECTIVE AND OBJECTIVE BOX
Patient is a 39y old  Female who presents with a chief complaint of chemotherapy for squamous cell carcinoma (21 Jun 2019 06:25)      INTERVAL HPI/OVERNIGHT EVENTS:  ICU Vital Signs Last 24 Hrs  T(C): 36.9 (21 Jun 2019 05:38), Max: 37.1 (20 Jun 2019 11:11)  T(F): 98.4 (21 Jun 2019 05:38), Max: 98.7 (20 Jun 2019 11:11)  HR: 78 (21 Jun 2019 05:38) (78 - 80)  BP: 122/77 (21 Jun 2019 05:38) (120/78 - 124/79)  BP(mean): --  ABP: --  ABP(mean): --  RR: 15 (21 Jun 2019 05:38) (15 - 18)  SpO2: 95% (21 Jun 2019 05:38) (95% - 97%)    I&O's Summary    20 Jun 2019 07:01  -  21 Jun 2019 07:00  --------------------------------------------------------  IN: 715.5 mL / OUT: 900 mL / NET: -184.5 mL          LABS:                        8.1    4.37  )-----------( 221      ( 21 Jun 2019 08:22 )             24.8     06-21    137  |  104  |  10  ----------------------------<  114<H>  3.6   |  24  |  0.63    Ca    7.4<L>      21 Jun 2019 08:22  Phos  2.8     06-20  Mg     1.6     06-20    TPro  5.1<L>  /  Alb  3.0<L>  /  TBili  0.3  /  DBili  x   /  AST  19  /  ALT  14  /  AlkPhos  36<L>  06-21        CAPILLARY BLOOD GLUCOSE            RADIOLOGY & ADDITIONAL TESTS:    Consultant(s) Notes Reviewed:  [x ] YES  [ ] NO    MEDICATIONS  (STANDING):  chlorhexidine 2% Cloths 1 Application(s) Topical <User Schedule>  ferrous    sulfate 325 milliGRAM(s) Oral two times a day  fluorouracil IVPB (eMAR) 1810 milliGRAM(s) IV Intermittent daily  sodium chloride 0.9%. 1000 milliLiter(s) (500 mL/Hr) IV Continuous <Continuous>  sodium chloride 0.9%. 1000 milliLiter(s) (75 mL/Hr) IV Continuous <Continuous>    MEDICATIONS  (PRN):  acetaminophen   Tablet .. 650 milliGRAM(s) Oral every 6 hours PRN Temp greater or equal to 38C (100.4F), Mild Pain (1 - 3)  ondansetron Injectable 4 milliGRAM(s) IV Push every 6 hours PRN Nausea  sodium chloride 0.9% lock flush 10 milliLiter(s) IV Push every 1 hour PRN Pre/post blood products, medications, blood draw, and to maintain line patency      PHYSICAL EXAM:  GENERAL: well built, well nourished  HEAD:  Atraumatic, Normocephalic  EYES: EOMI, PERRLA, conjunctiva and sclera clear  ENT: No tonsillar erythema, exudates, or enlargement; Moist mucous membranes, Good dentition, No lesions  NECK: Supple, No JVD, Normal thyroid, no enlarged nodes  NERVOUS SYSTEM:  Alert & Oriented X3, Good concentration; Motor Strength 5/5 B/L upper and lower extremities; DTRs 2+ intact and symmetric, sensory intact  CHEST/LUNG: B/L good air entry; No rales, rhonchi, or wheezing  HEART: S1S2 normal, no S3, Regular rate and rhythm; No murmurs, rubs, or gallops  ABDOMEN: Soft, Nontender, Nondistended; Bowel sounds present  EXTREMITIES:  2+ Peripheral Pulses, No clubbing, cyanosis, or edema  LYMPH: No lymphadenopathy noted  SKIN: No rashes or lesions    Care Discussed with Consultants/Other Providers [ x] YES  [ ] NO OVERNIGHT EVENTS:   TRANG    INTERVAL HPI:  Pt is examined at bedside. She appears well in NAD. Pt denies CP, SOB, abdominal pain, nausea/vomiting, fevers/chills. No complaints.     Vital Signs Last 24 Hrs  T(C): 36.9 (21 Jun 2019 05:38), Max: 37.1 (20 Jun 2019 11:11)  T(F): 98.4 (21 Jun 2019 05:38), Max: 98.7 (20 Jun 2019 11:11)  HR: 78 (21 Jun 2019 05:38) (78 - 80)  BP: 122/77 (21 Jun 2019 05:38) (120/78 - 124/79)  BP(mean): --  ABP: --  ABP(mean): --  RR: 15 (21 Jun 2019 05:38) (15 - 18)  SpO2: 95% (21 Jun 2019 05:38) (95% - 97%)    I&O's Summary    20 Jun 2019 07:01  -  21 Jun 2019 07:00  --------------------------------------------------------  IN: 715.5 mL / OUT: 900 mL / NET: -184.5 mL          LABS:                        8.1    4.37  )-----------( 221      ( 21 Jun 2019 08:22 )             24.8     06-21    137  |  104  |  10  ----------------------------<  114<H>  3.6   |  24  |  0.63    Ca    7.4<L>      21 Jun 2019 08:22  Phos  2.8     06-20  Mg     1.6     06-20    TPro  5.1<L>  /  Alb  3.0<L>  /  TBili  0.3  /  DBili  x   /  AST  19  /  ALT  14  /  AlkPhos  36<L>  06-21        CAPILLARY BLOOD GLUCOSE            RADIOLOGY & ADDITIONAL TESTS:    Consultant(s) Notes Reviewed:  [x ] YES  [ ] NO    MEDICATIONS  (STANDING):  chlorhexidine 2% Cloths 1 Application(s) Topical <User Schedule>  ferrous    sulfate 325 milliGRAM(s) Oral two times a day  fluorouracil IVPB (eMAR) 1810 milliGRAM(s) IV Intermittent daily  sodium chloride 0.9%. 1000 milliLiter(s) (500 mL/Hr) IV Continuous <Continuous>  sodium chloride 0.9%. 1000 milliLiter(s) (75 mL/Hr) IV Continuous <Continuous>    MEDICATIONS  (PRN):  acetaminophen   Tablet .. 650 milliGRAM(s) Oral every 6 hours PRN Temp greater or equal to 38C (100.4F), Mild Pain (1 - 3)  ondansetron Injectable 4 milliGRAM(s) IV Push every 6 hours PRN Nausea  sodium chloride 0.9% lock flush 10 milliLiter(s) IV Push every 1 hour PRN Pre/post blood products, medications, blood draw, and to maintain line patency      PHYSICAL EXAM:  GENERAL: NAD.   HEAD:  Atraumatic, Normocephalic  EYES: EOMI, PERRLA, conjunctiva and sclera clear  NECK: Supple, left neck fungating mass, no bleeding or purulence. mild serosanguinous discharge.   NERVOUS SYSTEM:  Alert & Oriented X3, Good concentration; Motor Strength 5/5 B/L upper and lower extremities; DTRs 2+ intact and symmetric, sensory intact  CHEST/LUNG: B/L good air entry; No rales, rhonchi, or wheezing  HEART: S1S2 normal, no S3, Regular rate and rhythm; No murmurs, rubs, or gallops  ABDOMEN: Soft, Nontender, Nondistended; Bowel sounds present  EXTREMITIES:  2+ Peripheral Pulses, No clubbing, cyanosis, or edema  LYMPH: No lymphadenopathy noted  SKIN: No rashes or lesions    Care Discussed with Consultants/Other Providers [ x] YES  [ ] NO

## 2019-06-21 NOTE — PROGRESS NOTE ADULT - PROBLEM SELECTOR PLAN 6
1) PCP Contacted on Admission: (Y/N) --> Name & Phone #: Follows Dr. Meeks for oncology, interested in [facility] for PCP management  2) Date of Contact with PCP:  3) PCP Contacted at Discharge: (Y/N, N/A)  4) Summary of Handoff Given to PCP:   5) Post-Discharge Appointment Date and Location:
1) PCP Contacted on Admission: (Y/N) --> Name & Phone #: Follows Dr. Meeks for oncology, interested in [facility] for PCP management  2) Date of Contact with PCP:  3) PCP Contacted at Discharge: (Y/N, N/A)  4) Summary of Handoff Given to PCP:   5) Post-Discharge Appointment Date and Location:
1) PCP Contacted on Admission: (Y/N) --> Name & Phone #: Follows Dr. Meeks for oncology, interested in [facility] for PCP management  2) Date of Contact with PCP:  3) PCP Contacted at Discharge: (Y/N, N/A)  4) Summary of Handoff Given to PCP:   5) Post-Discharge Appointment Date and Location:
1) PCP Contacted on Admission: (Y/N) --> Name & Phone #: Follows Dr. Meeks for oncology, interested in [facility] for PCP management  2) Date of Contact with PCP:  3) PCP Contacted at Discharge: (Y/N, N/A)  4) Summary of Handoff Given to PCP:   5) Post-Discharge Appointment Date and Location:

## 2019-06-21 NOTE — PROGRESS NOTE ADULT - PROBLEM SELECTOR PLAN 2
Patient has anemia with Hg stable in the low 11s at baseline, CBC here was 8.6 on admission. No signs of GI or  bleeding. Pt reports recent hx of active bleeding from tumor and went to [facility] and bleeding site was ligated in ED. Pt unsure of hgb at the time. Iron studies show iron def anemia likely 2/2 tumor bleed. Pt has had mild blood oozing at mass site, but not out of proportion.  - Patient is HD stable currently  - maintain 2 active type and screen  - Hgb goal >7   - ENT consulted as stated above   - c/w ferrous sulfate 325mg BID   - continue to monitor

## 2019-06-21 NOTE — PROGRESS NOTE ADULT - SUBJECTIVE AND OBJECTIVE BOX
The patient reports feeling better this am. She is more calm. Appetite has not been so good. When she peeled the left neck dressing back the tumor mass started to bleed briskly. With time and pressure on the wound site the bleeding subsided. The nurse then cleaned the area and a new dressing was applied.    CHEMOTHERAPY REGIMEN:        Day:                          Diet:  Protocol:   second bag of 5-FU is running now, two more to follow.                                 IVF:      MEDICATIONS  (STANDING):  chlorhexidine 2% Cloths 1 Application(s) Topical <User Schedule>  ferrous    sulfate 325 milliGRAM(s) Oral two times a day  fluorouracil IVPB (eMAR) 1810 milliGRAM(s) IV Intermittent daily  sodium chloride 0.9%. 1000 milliLiter(s) (500 mL/Hr) IV Continuous <Continuous>  sodium chloride 0.9%. 1000 milliLiter(s) (75 mL/Hr) IV Continuous <Continuous>    MEDICATIONS  (PRN):  acetaminophen   Tablet .. 650 milliGRAM(s) Oral every 6 hours PRN Temp greater or equal to 38C (100.4F), Mild Pain (1 - 3)  ondansetron Injectable 4 milliGRAM(s) IV Push every 6 hours PRN Nausea  sodium chloride 0.9% lock flush 10 milliLiter(s) IV Push every 1 hour PRN Pre/post blood products, medications, blood draw, and to maintain line patency      Allergies    No Known Allergies    Intolerances        DVT Prophylaxis: [ ] YES [x ] NO      Antibiotics: [ ] YES [x ] NO    Pain Scale (1-10):       Location:    Vital Signs Last 24 Hrs  T(C): 36.9 (21 Jun 2019 05:38), Max: 37.1 (20 Jun 2019 11:11)  T(F): 98.4 (21 Jun 2019 05:38), Max: 98.7 (20 Jun 2019 11:11)  HR: 78 (21 Jun 2019 05:38) (78 - 80)  BP: 122/77 (21 Jun 2019 05:38) (120/78 - 124/79)  BP(mean): --  RR: 15 (21 Jun 2019 05:38) (15 - 18)  SpO2: 95% (21 Jun 2019 05:38) (95% - 97%)    Drug Dosing Weight  Height (cm): 165.1 (17 Jun 2019 18:02)  Weight (kg): 73.7 (17 Jun 2019 18:02)  BMI (kg/m2): 27 (17 Jun 2019 18:02)  BSA (m2): 1.81 (17 Jun 2019 18:02)    PHYSICAL EXAM:      Constitutional: better today.  Eyes: conjunctival pallor.  ENMT: no evidence of mucostomatitis.       Neck: left neck tumor site has stopped bleeding for now.  Back: no SPT.  Respiratory: chest clear.  Cardiovascular: S1>S2 at apex, RSR.  Gastrointestinal: soft, nontender, active bowel sounds.  Genitourinary: voiding without difficulty.    Extremities: no leg edema.  Neurological: no gross focal deficits.  Skin: warm and dry.  Lymph Nodes: none palp although the left neck was not examined due to bleeding.  Musculoskeletal: full ROM.  Psychiatric: affect better, no paranoia at this time.             URINARY CATHETER: [ ] YES [ x] NO     LABS:  CBC Full  -  ( 20 Jun 2019 07:57 )  WBC Count : 7.33 K/uL  RBC Count : 3.10 M/uL  Hemoglobin : 8.8 g/dL  Hematocrit : 27.9 %  Platelet Count - Automated : 259 K/uL  Mean Cell Volume : 90.0 fl  Mean Cell Hemoglobin : 28.4 pg  Mean Cell Hemoglobin Concentration : 31.5 gm/dL  Auto Neutrophil # : x  Auto Lymphocyte # : x  Auto Monocyte # : x  Auto Eosinophil # : x  Auto Basophil # : x  Auto Neutrophil % : x  Auto Lymphocyte % : x  Auto Monocyte % : x  Auto Eosinophil % : x  Auto Basophil % : x    06-20    137  |  104  |  11  ----------------------------<  100<H>  3.4<L>   |  24  |  0.67    Ca    7.7<L>      20 Jun 2019 07:57  Phos  2.8     06-20  Mg     1.6     06-20    TPro  5.5<L>  /  Alb  3.2<L>  /  TBili  0.2  /  DBili  x   /  AST  31  /  ALT  19  /  AlkPhos  39<L>  06-20          CULTURES:    RADIOLOGY & ADDITIONAL STUDIES:< from: CT Angio Neck w/ IV Cont (06.20.19 @ 10:54) >  IMPRESSION:    As seen on MRI, massive pathologic cervical lymphadenopathy encases the   left CCA, ECA and ICA with mild luminal narrowing relative to the normal   right side. No pseudoaneurysm or other evidence to portend a risk of   impending blowout.    CTA brain is normal.            Thank you for the opportunity to participate in the care of this patient.        MUKUL RACHEL M.D. ATTENDING RADIOLOGIST  This document has been electronically signed. Jun 20 2019  1:16PM        < end of copied text >

## 2019-06-22 LAB
ALBUMIN SERPL ELPH-MCNC: 3.2 G/DL — LOW (ref 3.3–5)
ALP SERPL-CCNC: 34 U/L — LOW (ref 40–120)
ALT FLD-CCNC: 16 U/L — SIGNIFICANT CHANGE UP (ref 10–45)
ANION GAP SERPL CALC-SCNC: 9 MMOL/L — SIGNIFICANT CHANGE UP (ref 5–17)
AST SERPL-CCNC: 20 U/L — SIGNIFICANT CHANGE UP (ref 10–40)
BILIRUB SERPL-MCNC: 0.4 MG/DL — SIGNIFICANT CHANGE UP (ref 0.2–1.2)
BUN SERPL-MCNC: 11 MG/DL — SIGNIFICANT CHANGE UP (ref 7–23)
CALCIUM SERPL-MCNC: 7.9 MG/DL — LOW (ref 8.4–10.5)
CHLORIDE SERPL-SCNC: 104 MMOL/L — SIGNIFICANT CHANGE UP (ref 96–108)
CO2 SERPL-SCNC: 25 MMOL/L — SIGNIFICANT CHANGE UP (ref 22–31)
CREAT SERPL-MCNC: 0.65 MG/DL — SIGNIFICANT CHANGE UP (ref 0.5–1.3)
GLUCOSE SERPL-MCNC: 105 MG/DL — HIGH (ref 70–99)
HCT VFR BLD CALC: 24.5 % — LOW (ref 34.5–45)
HGB BLD-MCNC: 8.1 G/DL — LOW (ref 11.5–15.5)
LDH SERPL L TO P-CCNC: 176 U/L — SIGNIFICANT CHANGE UP (ref 50–242)
MCHC RBC-ENTMCNC: 28.6 PG — SIGNIFICANT CHANGE UP (ref 27–34)
MCHC RBC-ENTMCNC: 33.1 GM/DL — SIGNIFICANT CHANGE UP (ref 32–36)
MCV RBC AUTO: 86.6 FL — SIGNIFICANT CHANGE UP (ref 80–100)
NRBC # BLD: 0 /100 WBCS — SIGNIFICANT CHANGE UP (ref 0–0)
PHOSPHATE SERPL-MCNC: 3.7 MG/DL — SIGNIFICANT CHANGE UP (ref 2.5–4.5)
PLATELET # BLD AUTO: 211 K/UL — SIGNIFICANT CHANGE UP (ref 150–400)
POTASSIUM SERPL-MCNC: 3.9 MMOL/L — SIGNIFICANT CHANGE UP (ref 3.5–5.3)
POTASSIUM SERPL-SCNC: 3.9 MMOL/L — SIGNIFICANT CHANGE UP (ref 3.5–5.3)
PROT SERPL-MCNC: 5.3 G/DL — LOW (ref 6–8.3)
RBC # BLD: 2.83 M/UL — LOW (ref 3.8–5.2)
RBC # FLD: 13 % — SIGNIFICANT CHANGE UP (ref 10.3–14.5)
SODIUM SERPL-SCNC: 138 MMOL/L — SIGNIFICANT CHANGE UP (ref 135–145)
URATE SERPL-MCNC: 2.4 MG/DL — LOW (ref 2.5–7)
WBC # BLD: 3.66 K/UL — LOW (ref 3.8–10.5)
WBC # FLD AUTO: 3.66 K/UL — LOW (ref 3.8–10.5)

## 2019-06-22 PROCEDURE — 99233 SBSQ HOSP IP/OBS HIGH 50: CPT

## 2019-06-22 RX ORDER — CALCIUM CARBONATE 500(1250)
1 TABLET ORAL ONCE
Refills: 0 | Status: COMPLETED | OUTPATIENT
Start: 2019-06-22 | End: 2019-06-22

## 2019-06-22 RX ORDER — PEGFILGRASTIM-CBQV 6 MG/.6ML
6 INJECTION, SOLUTION SUBCUTANEOUS ONCE
Refills: 0 | Status: COMPLETED | OUTPATIENT
Start: 2019-06-23 | End: 2019-06-23

## 2019-06-22 RX ORDER — POTASSIUM CHLORIDE 20 MEQ
10 PACKET (EA) ORAL ONCE
Refills: 0 | Status: COMPLETED | OUTPATIENT
Start: 2019-06-22 | End: 2019-06-22

## 2019-06-22 RX ADMIN — Medication 650 MILLIGRAM(S): at 00:34

## 2019-06-22 RX ADMIN — FLUOROURACIL 43.18 MILLIGRAM(S): 50 INJECTION, SOLUTION INTRAVENOUS at 14:42

## 2019-06-22 RX ADMIN — Medication 10 MILLIEQUIVALENT(S): at 18:09

## 2019-06-22 RX ADMIN — Medication 1 TABLET(S): at 06:30

## 2019-06-22 RX ADMIN — Medication 325 MILLIGRAM(S): at 07:08

## 2019-06-22 RX ADMIN — Medication 325 MILLIGRAM(S): at 18:09

## 2019-06-22 RX ADMIN — Medication 650 MILLIGRAM(S): at 00:17

## 2019-06-22 RX ADMIN — CHLORHEXIDINE GLUCONATE 1 APPLICATION(S): 213 SOLUTION TOPICAL at 07:08

## 2019-06-22 NOTE — PROGRESS NOTE ADULT - SUBJECTIVE AND OBJECTIVE BOX
Patient is a 39y old  Female who presents with a chief complaint of chemotherapy for squamous cell carcinoma (22 Jun 2019 16:27)    INTERVAL EVENTS:      SUBJECTIVE:      MEDICATIONS:  MEDICATIONS  (STANDING):  chlorhexidine 2% Cloths 1 Application(s) Topical <User Schedule>  ferrous    sulfate 325 milliGRAM(s) Oral two times a day  potassium chloride    Tablet ER 10 milliEquivalent(s) Oral once  sodium chloride 0.9%. 1000 milliLiter(s) (500 mL/Hr) IV Continuous <Continuous>  sodium chloride 0.9%. 1000 milliLiter(s) (75 mL/Hr) IV Continuous <Continuous>    MEDICATIONS  (PRN):  acetaminophen   Tablet .. 650 milliGRAM(s) Oral every 6 hours PRN Temp greater or equal to 38C (100.4F), Mild Pain (1 - 3)  ondansetron Injectable 4 milliGRAM(s) IV Push every 6 hours PRN Nausea  sodium chloride 0.9% lock flush 10 milliLiter(s) IV Push every 1 hour PRN Pre/post blood products, medications, blood draw, and to maintain line patency      Allergies    No Known Allergies    Intolerances        OBJECTIVE:  Vital Signs Last 24 Hrs  T(C): 37.1 (22 Jun 2019 14:20), Max: 37.1 (22 Jun 2019 14:20)  T(F): 98.7 (22 Jun 2019 14:20), Max: 98.7 (22 Jun 2019 14:20)  HR: 85 (22 Jun 2019 14:20) (82 - 95)  BP: 111/76 (22 Jun 2019 14:20) (111/76 - 124/77)  BP(mean): --  RR: 15 (22 Jun 2019 14:20) (14 - 16)  SpO2: 98% (22 Jun 2019 14:20) (98% - 100%)  I&O's Summary    21 Jun 2019 07:01  -  22 Jun 2019 07:00  --------------------------------------------------------  IN: 75 mL / OUT: 0 mL / NET: 75 mL        PHYSICAL EXAM:      LABS:                        8.1    3.66  )-----------( 211      ( 22 Jun 2019 05:46 )             24.5     06-22    138  |  104  |  11  ----------------------------<  105<H>  3.9   |  25  |  0.65    Ca    7.9<L>      22 Jun 2019 05:46  Phos  3.7     06-22    TPro  5.3<L>  /  Alb  3.2<L>  /  TBili  0.4  /  DBili  x   /  AST  20  /  ALT  16  /  AlkPhos  34<L>  06-22    LIVER FUNCTIONS - ( 22 Jun 2019 05:46 )  Alb: 3.2 g/dL / Pro: 5.3 g/dL / ALK PHOS: 34 U/L / ALT: 16 U/L / AST: 20 U/L / GGT: x             CAPILLARY BLOOD GLUCOSE            MICRODATA:      RADIOLOGY/OTHER STUDIES: Patient is a 39y old  Female who presents with a chief complaint of chemotherapy for squamous cell carcinoma (22 Jun 2019 16:27)    INTERVAL EVENTS:  no new events, receiving chemotherapy, last day today  denies f/c  no nausea/vomiting    MEDICATIONS:  MEDICATIONS  (STANDING):  chlorhexidine 2% Cloths 1 Application(s) Topical <User Schedule>  ferrous    sulfate 325 milliGRAM(s) Oral two times a day  potassium chloride    Tablet ER 10 milliEquivalent(s) Oral once  sodium chloride 0.9%. 1000 milliLiter(s) (500 mL/Hr) IV Continuous <Continuous>  sodium chloride 0.9%. 1000 milliLiter(s) (75 mL/Hr) IV Continuous <Continuous>    MEDICATIONS  (PRN):  acetaminophen   Tablet .. 650 milliGRAM(s) Oral every 6 hours PRN Temp greater or equal to 38C (100.4F), Mild Pain (1 - 3)  ondansetron Injectable 4 milliGRAM(s) IV Push every 6 hours PRN Nausea  sodium chloride 0.9% lock flush 10 milliLiter(s) IV Push every 1 hour PRN Pre/post blood products, medications, blood draw, and to maintain line patency      Allergies    No Known Allergies    Intolerances        OBJECTIVE:  Vital Signs Last 24 Hrs  T(C): 37.1 (22 Jun 2019 14:20), Max: 37.1 (22 Jun 2019 14:20)  T(F): 98.7 (22 Jun 2019 14:20), Max: 98.7 (22 Jun 2019 14:20)  HR: 85 (22 Jun 2019 14:20) (82 - 95)  BP: 111/76 (22 Jun 2019 14:20) (111/76 - 124/77)  BP(mean): --  RR: 15 (22 Jun 2019 14:20) (14 - 16)  SpO2: 98% (22 Jun 2019 14:20) (98% - 100%)  I&O's Summary    21 Jun 2019 07:01  -  22 Jun 2019 07:00  --------------------------------------------------------  IN: 75 mL / OUT: 0 mL / NET: 75 mL        PHYSICAL EXAM:    GEN: alert, awake, WD WN NAD  HEENT: anicteric, no LAD, no JVD, L neck mass  CHEST: CTA B/L  CVS: S1S2 RRR no murmers  ABD: BS+ NT ND soft  EXT: no edema, WWP      LABS:                        8.1    3.66  )-----------( 211      ( 22 Jun 2019 05:46 )             24.5     06-22    138  |  104  |  11  ----------------------------<  105<H>  3.9   |  25  |  0.65    Ca    7.9<L>      22 Jun 2019 05:46  Phos  3.7     06-22    TPro  5.3<L>  /  Alb  3.2<L>  /  TBili  0.4  /  DBili  x   /  AST  20  /  ALT  16  /  AlkPhos  34<L>  06-22    LIVER FUNCTIONS - ( 22 Jun 2019 05:46 )  Alb: 3.2 g/dL / Pro: 5.3 g/dL / ALK PHOS: 34 U/L / ALT: 16 U/L / AST: 20 U/L / GGT: x             CAPILLARY BLOOD GLUCOSE            MICRODATA:      RADIOLOGY/OTHER STUDIES:

## 2019-06-22 NOTE — PROGRESS NOTE ADULT - ASSESSMENT
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     #Head and Neck Cancer   Very large left neck mass that has eroded; on imaging there is encasement of the internal carotid arteries as well as extension of the mass into skin, parotid and submandibular glands. She has extensive lymphadenopthy with cervical, retropharyngeal and supraclavicular nodes involved. No evidence of distant mets. Given extent of disease, patient being admitted for induction chemotherapy with DCF.     Cycle 1 Day 4 of infusion of 5-FU last bag of 5-FU running through  pt has no complaints, tolerating well  onpro to be given pearl afternoon. if pt is not agreeable to onpro pearl, she will need to stay till Monday afternoon for neulasta. She must get onpro or neulasta after this chemo regimen as it is extremely myelosupressive.     #Anemia   Normocytic, likely due to bleeding vessel (ligated a few days ago). Iron studies show likely iron deficiency anemia.   Vit B12 low, s/p injection Vit B12  Hb stable cont to monitor    Case d/w Dr. Meeks

## 2019-06-22 NOTE — PROGRESS NOTE ADULT - SUBJECTIVE AND OBJECTIVE BOX
Heme/Onc Progress Note (Dr. Meeks )  Discussed with Dr. Meeks and plan reviewed with the primary team.    Interval Hx:   no overnight events, no acute complaints. denies f/c/ no diarrhea no chest pain  does not want on [facility] for discharge, also can't stay till Mon for neulasta?     HPI:  The patient is a 38 yo woman who was admitted yesterday for induction chemotherapy for a large left neck squamous cell carcinoma. A neck CT scan on 11/10/16 revealed a 3.1x2.9x5.0 cm multiloculated rim-enhancing fluid collection within the left level IIb soft tissues just deep to the sternocleidomastoid muscle. DDx included abscess, suppurative adenitis or infected second brachial cleft cyst. The patient was treated with antibiotics and some white material was drained from the lesion. The lesion, according to the patient came back in 2017. She again took antibiotic therapy. A CT scan was done but no additional biopsy was obtained. The patient saw Dr. Weller in early 12/18. A biopsy was obtained on 12/6/18.   This revealed a squamous cell carcinoma, HPV related. The patient was seen by Dr. Alvarado and Dr. Londono. She was referred to me but did not make an appointment. She finally saw me on 5/29/19 and I have been advising induction chemotherapy since. She was admitted yesterday and will begin chemotherapy today. She has recently been having intermittent bleeding from the eroded open large left neck mass. She has been to the ER on several occasions lately because of this.    Hb dropped to 8.0 g/dL likely due to oozing of blood from open wound of the neck mass. No purulent drainage. She has been afebrile.     ROS is otherwise negative.    Medications:  MEDICATIONS  (STANDING):  chlorhexidine 2% Cloths 1 Application(s) Topical <User Schedule>  ferrous    sulfate 325 milliGRAM(s) Oral two times a day  fluorouracil IVPB (eMAR) 1810 milliGRAM(s) IV Intermittent daily  potassium chloride    Tablet ER 40 milliEquivalent(s) Oral every 4 hours  sodium chloride 0.9%. 1000 milliLiter(s) (500 mL/Hr) IV Continuous <Continuous>  sodium chloride 0.9%. 1000 milliLiter(s) (75 mL/Hr) IV Continuous <Continuous>    MEDICATIONS  (PRN):  acetaminophen   Tablet .. 650 milliGRAM(s) Oral every 6 hours PRN Temp greater or equal to 38C (100.4F), Mild Pain (1 - 3)  ondansetron Injectable 4 milliGRAM(s) IV Push every 6 hours PRN Nausea  sodium chloride 0.9% lock flush 10 milliLiter(s) IV Push every 1 hour PRN Pre/post blood products, medications, blood draw, and to maintain line patency      PHYSICAL EXAM:  ICU Vital Signs Last 24 Hrs  T(C): 37.1 (22 Jun 2019 14:20), Max: 37.1 (22 Jun 2019 14:20)  T(F): 98.7 (22 Jun 2019 14:20), Max: 98.7 (22 Jun 2019 14:20)  HR: 85 (22 Jun 2019 14:20) (82 - 95)  BP: 111/76 (22 Jun 2019 14:20) (111/76 - 124/77)  BP(mean): --  ABP: --  ABP(mean): --  RR: 15 (22 Jun 2019 14:20) (14 - 16)  SpO2: 98% (22 Jun 2019 14:20) (98% - 100%)      Gen: well developed, well nourished, sleeping but arousable   HEENT: normocephalic/atraumatic, no conjunctival pallor, no scleral icterus, no oral thrush/mucosal bleeding/mucositis  Neck: Large left neck mass that has eroded, open wound noted   Cardiovascular: RR, nl S1S2, no murmurs/rubs/gallops  Respiratory: clear air entry b/l  Gastrointestinal: BS+, soft, NT/ND, no masses, no splenomegaly, no hepatomegaly, no evidence for ascites  Extremities: no clubbing/cyanosis, no edema, no calf tenderness  Vascular:  DP/PT 2+ b/l  Neurological: CN 2-12 grossly intact, no focal deficits  Skin: no rash on visible skin        Labs:                                      8.1    3.66  )-----------( 211      ( 22 Jun 2019 05:46 )             24.5         CBC Full  -  ( 22 Jun 2019 05:46 )  WBC Count : 3.66 K/uL  RBC Count : 2.83 M/uL  Hemoglobin : 8.1 g/dL  Hematocrit : 24.5 %  Platelet Count - Automated : 211 K/uL  Mean Cell Volume : 86.6 fl  Mean Cell Hemoglobin : 28.6 pg  Mean Cell Hemoglobin Concentration : 33.1 gm/dL  Auto Neutrophil # : x  Auto Lymphocyte # : x  Auto Monocyte # : x  Auto Eosinophil # : x  Auto Basophil # : x  Auto Neutrophil % : x  Auto Lymphocyte % : x  Auto Monocyte % : x  Auto Eosinophil % : x  Auto Basophil % : x        06-22    138  |  104  |  11  ----------------------------<  105<H>  3.9   |  25  |  0.65    Ca    7.9<L>      22 Jun 2019 05:46  Phos  3.7     06-22    TPro  5.3<L>  /  Alb  3.2<L>  /  TBili  0.4  /  DBili  x   /  AST  20  /  ALT  16  /  AlkPhos  34<L>  06-22                  Pathology:    Imaging Studies:  < from: MR Orbit, Face, and/or Neck w/wo IV Cont, Bilateral (06.09.19 @ 15:48) >  EXAM:  MR ORBIT FACE AND ORNECK [facility]                          PROCEDURE DATE:  06/09/2019          INTERPRETATION:  Sagittal, axial and coronal imaging of the soft tissues   of the neck was performed both before and following intravenous contrast   administration, utilizing T1 and T2 weighting with fat suppression   techniques.    Contrast dose: 7.5 cc of intravenous Gadavist.    Clinical information: Squamous cell carcinoma, HPV-related.    The patient was unable to complete the examination, and postcontrast   sagittal and coronal images were not performed.    The current study is interpreted in conjunction with concurrent PET/CT,   as well as prior CT studies of 12/2018 and 11/2016.    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 This mass   involves the skin and extends from the level of the parotid gland   cranially to the thyroid gland caudally. It is associated with complete   encasement without narrowing of both the common and internal carotid   arteries along its medial extent. More inferiorly, the mass is   inseparable from the subclavian vasculature. Neither the left   sternocleidomastoid muscle nor the left-sided strap musculature can be   identified in their entirety. In addition to this dominant conglomerate   klarissa mass, discrete pathologic lymph nodes are identified in left levels   1, 5 and 6 and the supraclavicular fossa as well as in the left   retropharyngeal chain. On the right side, pathologic lymph nodes are   identified in levels 3 and 4, with a focus of ill-defined enhancing soft   tissue noted anterior to the right sided strap muscles at and above the   level of the clavicular head. The primary tumor is not identified within   the visualized portions of the upper aerodigestive tract, and tumor is   radiographically categorized as T0N3 utilizing the HPV mediated   oropharyngeal cancer staging form.    As above, the tumor invades the inferior aspect of the left parotid gland   and is inseparable from the left submandibular gland. The right parotid   and submandibular glands are unremarkable in appearance. Tumor is   inseparable from the left thyroid lobe with the right thyroid lobe   normal. The visualized portions of the orbital and intracranial contents   are grossly normal. The central skull base appears intact. The cervical   vertebrae are normal in height and alignment. There is no large apical   lung mass.    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 There is also pathologic lymphadenopathy in the   right infrahyoid neck. The primary tumor is not identified. Please see   report above for further detail.              "Thank you for the opportunity to participate in the care of this   patient."      < end of copied text >    < from: NM PET/CT Onc FDG Skull to Thigh, Subsq (06.09.19 @ 14:51) >  EXAM:  PETCT [facility] ONC FDG SUBS                          PROCEDURE DATE:  06/09/2019          INTERPRETATION:    PET-CT ONCOLOGIC STUDY    INDICATION: Squamous cell cancer of the left neck. Restaging to help   determine subsequent treatment strategy    TECHNIQUE: Intravenous access was established and the patient injected   with 12.1 mCi of [id number]. After resting in a quiet room   for about one hour, the patient was positioned on the scanning table and   images were obtained using standard PET/CT technique from the mid thigh   to the axilla.  This acquisition was performed with the patient's arms   lifted as high as possible over the head.    A second PET/CT acquisition of the head and neck was then performed with   the patient's arms at the side.    Simultaneous low-dose CT scanning is used for attenuation correction and   localization only.    PET images were reconstructed in axial, sagittal and coronal planes using   CT based attenuation correction. Images were displayed as PET, CT and   fused data sets as well as maximum intensity pixel projections.      COMPARISON: MRI of the head from same date and additional imaging   modalities dating back to 11/10/2016    FINDINGS:    HEAD AND NECK:  No abnormal activity is identified within the brain. There is has been   interval enlargement of the hypermetabolic left-sided neck mass which now   measures up to 11.1 cm in craniocaudal dimension with an SUV of 21.3.   There is invasion of the adjacent soft tissues structures, with   involvement of the left common and internal carotid arteries, as well as   the left-sided neck musculature.   There are enlarged, hypermetabolic lymph nodes in the left   retropharyngeal lymph region and cervical levels 1, 5, and 6.   Additionally there are enlarged hypermetabolic lymph nodes at the right   cervical levels 3 and 6, as well as the bilateral supraclavicular regions.    No abnormal uptake is seen in the thyroid.    CHEST:  No hypermetabolic lung nodules are seen. No pleural effusions.    The heart is normal in size. No pericardial effusion. There is normal   physiologic left ventricular myocardial uptake. No enlarged or FDG-avid   lymph nodes are seen in the chest.    ABDOMEN/PELVIS:  There is no adenopathy or klarissa hypermetabolism in the abdomen or pelvis.   The liver, gallbladder, pancreas and spleen are normal in appearance.   There are no adrenal nodules. Physiologic FDG excretion is seen in the   kidneys and bladder. No abdominal aortic aneurysm. No bowel abnormalities   are seen. There is no ascites.    MUSCULOSKELETAL:  Normal FDG activity is present in the axial skeleton. Stable 0.7 cm   sclerotic focus in the right sacrum without increased metabolic activity.   Stable 0.8 cm sclerotic focus in the left C3 facet without increased   metabolic activity. No hypermetabolic lytic or blastic osseous lesions   are identified.      IMPRESSION:  Compared with prior PET/CT dated 12/21/2018 has been interval enlargement   of large left-sided hypermetabolicneck mass.     There are an increased number of hypermetabolic lymph nodes in the   bilateral cervical regions, retropharyngeal region, and supraclavicular   region as above.      < end of copied text >    < from: CT Angio Neck w/ IV Cont (06.20.19 @ 10:54) >  EXAM:  CT ANGIO NECK (W)AW IC                          EXAM:  CT ANGIO BRAIN (W)AW IC                          PROCEDURE DATE:  06/20/2019          INTERPRETATION:  PROCEDURE: CTA brain with intravenous contrast.    INDICATION: Left neck squamous cell carcinoma with conglomerate   lymphadenopathy encasing the left carotid artery.    TECHNIQUE: Multiple axial thin section were obtained through the [place]   of [place] following the intravenous bolus injection of 90 cc Optiray-350.   MIP series areprovided.    COMPARISON: MRI of the neck from 06/09/2019 is reviewed    FINDINGS: The internal carotid arteries are patent at the skull base and   intracranial compartment without occlusion or high grade stenosis. The   anterior and middle cerebral arteries are patent at their 1st and 2nd   order segments, and appear symmetric caliber. The posterior circulation   shows no high grade stenosis or occlusion. The intracranial vertebral   arteries, the basilar artery and both posterior cerebral arteries are   patent with hypoplasia of the right P1 segment. There is no site of   aneurysm within the resolution limitations of CTA.    IMPRESSION: Unremarkable CTA examination of the brain.      PROCEDURE: CTA neck with intravenous contrast.    INDICATION: Left neck squamous cell carcinoma with conglomerate   lymphadenopathy encasing the left carotid artery.    TECHNIQUE: Multiple axial thin section were obtained through the neck   following the intravenous bolus injection of Optiray-350 as above. MIP   series are provided.    COMPARISON: MRI of the neck from 06/09/2019 is reviewed    FINDINGS:     As identified on the recent MRI, the left common carotid artery,   bifurcation and both proximal internal and external carotid arteries are   encasedby large left neck mass there is similar to maybe slightly   increased in size compared to 06/09/2019. Retropharyngeal edema is   increased from the prior study and could reflect third spacing of fluid.   Complete evaluation is deferred on this CTA study. If desired,   surveillance of the mass would best be accomplished with diagnostic MRI   or CT of the neck soft tissues.    There is mild narrowing of the both the left common and internal carotid   artery secondary to mass. For example, the leftcommon carotid artery   measures 6 mm in diameter, compared to 8 mm on the right. The left   internal carotid artery measures 3 mm diameter compared to 5 mm on the   right. No hemodynamically significant stenosis.     The aortic arch is normal size and shows patency, with variant 4 vessel   configuration inclusive of the left vertebral artery.    The cervical vertebral arteries are patent throughout, without   hemodynamically significant stenosis or occlusion.    Pleural effusions are partially seen.    IMPRESSION:    As seen on MRI, massive pathologic cervical lymphadenopathy encases the   left CCA, ECA and ICA with mild luminal narrowing relative to the normal   right side. No pseudoaneurysm or other evidence to portend a risk of   impending blowout.    CTA brain is normal.        < end of copied text >

## 2019-06-22 NOTE — PROGRESS NOTE ADULT - ASSESSMENT
38 y/o f w/ [id number] HPV positive SCC of the left neck, depression, and schizoaffective disorder admitted for inpatient chemotherapy

## 2019-06-22 NOTE — PROGRESS NOTE ADULT - PROBLEM SELECTOR PLAN 3
has hx of depression and anxiety  today exhibits component of paranoia  pt refusing psych eval inhouse

## 2019-06-22 NOTE — PROGRESS NOTE ADULT - PROBLEM SELECTOR PLAN 1
SCC of head and neck, currently receiving chemotherapy, oncology following   she is tolerating it well  tentative discharge per onc recs likely sun/mon

## 2019-06-22 NOTE — PROGRESS NOTE ADULT - SUBJECTIVE AND OBJECTIVE BOX
The patient reports no further bleeding from her neck tumor. She is eating a bit. She is concerned about nausea, vomiting and trouble with her heart. She is anxious due to her tumor and also these other things which are not a problem for her now.     CHEMOTHERAPY REGIMEN:        Day:                          Diet:  Protocol:                                    IVF:      MEDICATIONS  (STANDING):  chlorhexidine 2% Cloths 1 Application(s) Topical <User Schedule>  ferrous    sulfate 325 milliGRAM(s) Oral two times a day  fluorouracil IVPB (eMAR) 1810 milliGRAM(s) IV Intermittent daily  sodium chloride 0.9%. 1000 milliLiter(s) (500 mL/Hr) IV Continuous <Continuous>  sodium chloride 0.9%. 1000 milliLiter(s) (75 mL/Hr) IV Continuous <Continuous>    MEDICATIONS  (PRN):  acetaminophen   Tablet .. 650 milliGRAM(s) Oral every 6 hours PRN Temp greater or equal to 38C (100.4F), Mild Pain (1 - 3)  ondansetron Injectable 4 milliGRAM(s) IV Push every 6 hours PRN Nausea  sodium chloride 0.9% lock flush 10 milliLiter(s) IV Push every 1 hour PRN Pre/post blood products, medications, blood draw, and to maintain line patency      Allergies    No Known Allergies    Intolerances        DVT Prophylaxis: [ ] YES [x ] NO      Antibiotics: [ ] YES [ x] NO    Pain Scale (1-10):       Location:    Vital Signs Last 24 Hrs  T(C): 36.9 (22 Jun 2019 05:46), Max: 36.9 (21 Jun 2019 18:19)  T(F): 98.4 (22 Jun 2019 05:46), Max: 98.4 (21 Jun 2019 18:19)  HR: 82 (22 Jun 2019 05:46) (82 - 95)  BP: 116/76 (22 Jun 2019 05:46) (116/76 - 126/76)  BP(mean): --  RR: 14 (22 Jun 2019 05:46) (14 - 16)  SpO2: 98% (22 Jun 2019 05:46) (95% - 100%)    Drug Dosing Weight  Height (cm): 165.1 (17 Jun 2019 18:02)  Weight (kg): 73.7 (17 Jun 2019 18:02)  BMI (kg/m2): 27 (17 Jun 2019 18:02)  BSA (m2): 1.81 (17 Jun 2019 18:02)    PHYSICAL EXAM:      Constitutional: anxious but feeling okay.  Eyes: conjunctival pallor.  ENMT: neck dressing remains in place on the left.  Neck: no right neck nodes palp.  Back: no SPT.  Respiratory: chest clear.   Cardiovascular: S1>S2 at apex. RSR.  Gastrointestinal: soft, nontender, active bowel sounds.    Genitourinary: voiding without difficulty.  Extremities: no leg edema.  Vascular: radial pulses equal bilaterally.  Neurological: no gross focal deficits.  Skin: warm and dry.  Lymph Nodes: none palp.  Musculoskeletal: full ROM.  Psychiatric: affect better but paranoia is there.        URINARY CATHETER: [ ] YES [x ] NO     LABS:  CBC Full  -  ( 22 Jun 2019 05:46 )  WBC Count : 3.66 K/uL  RBC Count : 2.83 M/uL  Hemoglobin : 8.1 g/dL  Hematocrit : 24.5 %  Platelet Count - Automated : 211 K/uL  Mean Cell Volume : 86.6 fl  Mean Cell Hemoglobin : 28.6 pg  Mean Cell Hemoglobin Concentration : 33.1 gm/dL  Auto Neutrophil # : x  Auto Lymphocyte # : x  Auto Monocyte # : x  Auto Eosinophil # : x  Auto Basophil # : x  Auto Neutrophil % : x  Auto Lymphocyte % : x  Auto Monocyte % : x  Auto Eosinophil % : x  Auto Basophil % : x    06-22    138  |  104  |  11  ----------------------------<  105<H>  3.9   |  25  |  0.65    Ca    7.9<L>      22 Jun 2019 05:46  Phos  3.7     06-22  Mg     1.6     06-20    TPro  5.3<L>  /  Alb  3.2<L>  /  TBili  0.4  /  DBili  x   /  AST  20  /  ALT  16  /  AlkPhos  34<L>  06-22          CULTURES:    RADIOLOGY & ADDITIONAL STUDIES:

## 2019-06-22 NOTE — PROGRESS NOTE ADULT - PROBLEM SELECTOR PLAN 2
Hgb is 8.1, currently stable and pt is asymptomatic, iron studies show iron def  on ferrous sulfate supplementation  heme following

## 2019-06-22 NOTE — PROGRESS NOTE ADULT - ATTENDING COMMENTS
The patient continues to tolerate ongoing chemotherapy well. After the last infusion of 5-FU is completed tomorrow, will then have on Onpro device placed on her arm for the administration of neulasta 24-hrs later.

## 2019-06-22 NOTE — PROGRESS NOTE ADULT - ASSESSMENT
The patient is tolerating the ongoing 5-FU infusion without difficulty so far. She has one more 24-hr infusion of 5-FU after the current bag is finished.

## 2019-06-22 NOTE — PROGRESS NOTE ADULT - PROBLEM SELECTOR PLAN 2
This is related to chronic illness due to the long standing presence of her tumor and intermittent bleeding from the lesion itself.

## 2019-06-23 ENCOUNTER — TRANSCRIPTION ENCOUNTER (OUTPATIENT)
Age: 39
End: 2019-06-23

## 2019-06-23 VITALS
RESPIRATION RATE: 16 BRPM | DIASTOLIC BLOOD PRESSURE: 69 MMHG | HEART RATE: 88 BPM | TEMPERATURE: 98 F | OXYGEN SATURATION: 98 % | SYSTOLIC BLOOD PRESSURE: 104 MMHG

## 2019-06-23 LAB
ANION GAP SERPL CALC-SCNC: 10 MMOL/L — SIGNIFICANT CHANGE UP (ref 5–17)
BUN SERPL-MCNC: 10 MG/DL — SIGNIFICANT CHANGE UP (ref 7–23)
CALCIUM SERPL-MCNC: 8.3 MG/DL — LOW (ref 8.4–10.5)
CHLORIDE SERPL-SCNC: 100 MMOL/L — SIGNIFICANT CHANGE UP (ref 96–108)
CO2 SERPL-SCNC: 25 MMOL/L — SIGNIFICANT CHANGE UP (ref 22–31)
CREAT SERPL-MCNC: 0.76 MG/DL — SIGNIFICANT CHANGE UP (ref 0.5–1.3)
GLUCOSE SERPL-MCNC: 114 MG/DL — HIGH (ref 70–99)
HCT VFR BLD CALC: 26.1 % — LOW (ref 34.5–45)
HGB BLD-MCNC: 8.6 G/DL — LOW (ref 11.5–15.5)
LDH SERPL L TO P-CCNC: 171 U/L — SIGNIFICANT CHANGE UP (ref 50–242)
MAGNESIUM SERPL-MCNC: 1.5 MG/DL — LOW (ref 1.6–2.6)
MCHC RBC-ENTMCNC: 28.2 PG — SIGNIFICANT CHANGE UP (ref 27–34)
MCHC RBC-ENTMCNC: 33 GM/DL — SIGNIFICANT CHANGE UP (ref 32–36)
MCV RBC AUTO: 85.6 FL — SIGNIFICANT CHANGE UP (ref 80–100)
NRBC # BLD: 0 /100 WBCS — SIGNIFICANT CHANGE UP (ref 0–0)
PHOSPHATE SERPL-MCNC: 3.8 MG/DL — SIGNIFICANT CHANGE UP (ref 2.5–4.5)
PLATELET # BLD AUTO: 241 K/UL — SIGNIFICANT CHANGE UP (ref 150–400)
POTASSIUM SERPL-MCNC: 3.8 MMOL/L — SIGNIFICANT CHANGE UP (ref 3.5–5.3)
POTASSIUM SERPL-SCNC: 3.8 MMOL/L — SIGNIFICANT CHANGE UP (ref 3.5–5.3)
RBC # BLD: 3.05 M/UL — LOW (ref 3.8–5.2)
RBC # FLD: 13 % — SIGNIFICANT CHANGE UP (ref 10.3–14.5)
SODIUM SERPL-SCNC: 135 MMOL/L — SIGNIFICANT CHANGE UP (ref 135–145)
URATE SERPL-MCNC: 2.5 MG/DL — SIGNIFICANT CHANGE UP (ref 2.5–7)
WBC # BLD: 4.15 K/UL — SIGNIFICANT CHANGE UP (ref 3.8–10.5)
WBC # FLD AUTO: 4.15 K/UL — SIGNIFICANT CHANGE UP (ref 3.8–10.5)

## 2019-06-23 PROCEDURE — 85045 AUTOMATED RETICULOCYTE COUNT: CPT

## 2019-06-23 PROCEDURE — 80048 BASIC METABOLIC PNL TOTAL CA: CPT

## 2019-06-23 PROCEDURE — 87389 HIV-1 AG W/HIV-1&-2 AB AG IA: CPT

## 2019-06-23 PROCEDURE — 86900 BLOOD TYPING SEROLOGIC ABO: CPT

## 2019-06-23 PROCEDURE — 86706 HEP B SURFACE ANTIBODY: CPT

## 2019-06-23 PROCEDURE — 86704 HEP B CORE ANTIBODY TOTAL: CPT

## 2019-06-23 PROCEDURE — 85025 COMPLETE CBC W/AUTO DIFF WBC: CPT

## 2019-06-23 PROCEDURE — 80074 ACUTE HEPATITIS PANEL: CPT

## 2019-06-23 PROCEDURE — 36573 INSJ PICC RS&I 5 YR+: CPT

## 2019-06-23 PROCEDURE — 93005 ELECTROCARDIOGRAM TRACING: CPT

## 2019-06-23 PROCEDURE — 84550 ASSAY OF BLOOD/URIC ACID: CPT

## 2019-06-23 PROCEDURE — 36415 COLL VENOUS BLD VENIPUNCTURE: CPT

## 2019-06-23 PROCEDURE — 70498 CT ANGIOGRAPHY NECK: CPT

## 2019-06-23 PROCEDURE — 83550 IRON BINDING TEST: CPT

## 2019-06-23 PROCEDURE — 85027 COMPLETE CBC AUTOMATED: CPT

## 2019-06-23 PROCEDURE — 86901 BLOOD TYPING SEROLOGIC RH(D): CPT

## 2019-06-23 PROCEDURE — 86850 RBC ANTIBODY SCREEN: CPT

## 2019-06-23 PROCEDURE — 82728 ASSAY OF FERRITIN: CPT

## 2019-06-23 PROCEDURE — 83540 ASSAY OF IRON: CPT

## 2019-06-23 PROCEDURE — 84100 ASSAY OF PHOSPHORUS: CPT

## 2019-06-23 PROCEDURE — 80053 COMPREHEN METABOLIC PANEL: CPT

## 2019-06-23 PROCEDURE — 84466 ASSAY OF TRANSFERRIN: CPT

## 2019-06-23 PROCEDURE — 70496 CT ANGIOGRAPHY HEAD: CPT

## 2019-06-23 PROCEDURE — 83010 ASSAY OF HAPTOGLOBIN QUANT: CPT

## 2019-06-23 PROCEDURE — 81025 URINE PREGNANCY TEST: CPT

## 2019-06-23 PROCEDURE — 84702 CHORIONIC GONADOTROPIN TEST: CPT

## 2019-06-23 PROCEDURE — 82607 VITAMIN B-12: CPT

## 2019-06-23 PROCEDURE — 83615 LACTATE (LD) (LDH) ENZYME: CPT

## 2019-06-23 PROCEDURE — 83735 ASSAY OF MAGNESIUM: CPT

## 2019-06-23 PROCEDURE — 82746 ASSAY OF FOLIC ACID SERUM: CPT

## 2019-06-23 RX ORDER — ONDANSETRON 8 MG/1
4 TABLET, FILM COATED ORAL ONCE
Refills: 0 | Status: DISCONTINUED | OUTPATIENT
Start: 2019-06-23 | End: 2019-06-23

## 2019-06-23 RX ORDER — FERROUS SULFATE 325(65) MG
1 TABLET ORAL
Qty: 90 | Refills: 0
Start: 2019-06-23 | End: 2019-07-22

## 2019-06-23 RX ORDER — LANOLIN ALCOHOL/MO/W.PET/CERES
3 CREAM (GRAM) TOPICAL AT BEDTIME
Refills: 0 | Status: DISCONTINUED | OUTPATIENT
Start: 2019-06-23 | End: 2019-06-23

## 2019-06-23 RX ORDER — ONDANSETRON 8 MG/1
1 TABLET, FILM COATED ORAL
Qty: 30 | Refills: 0
Start: 2019-06-23 | End: 2019-07-02

## 2019-06-23 RX ORDER — MAGNESIUM SULFATE 500 MG/ML
2 VIAL (ML) INJECTION ONCE
Refills: 0 | Status: COMPLETED | OUTPATIENT
Start: 2019-06-23 | End: 2019-06-23

## 2019-06-23 RX ORDER — SODIUM CHLORIDE 9 MG/ML
1000 INJECTION INTRAMUSCULAR; INTRAVENOUS; SUBCUTANEOUS ONCE
Refills: 0 | Status: COMPLETED | OUTPATIENT
Start: 2019-06-23 | End: 2019-06-23

## 2019-06-23 RX ADMIN — Medication 50 GRAM(S): at 07:08

## 2019-06-23 RX ADMIN — PEGFILGRASTIM-CBQV 6 MILLIGRAM(S): 6 INJECTION, SOLUTION SUBCUTANEOUS at 15:36

## 2019-06-23 RX ADMIN — CHLORHEXIDINE GLUCONATE 1 APPLICATION(S): 213 SOLUTION TOPICAL at 05:49

## 2019-06-23 RX ADMIN — ONDANSETRON 4 MILLIGRAM(S): 8 TABLET, FILM COATED ORAL at 13:27

## 2019-06-23 RX ADMIN — SODIUM CHLORIDE 1000 MILLILITER(S): 9 INJECTION INTRAMUSCULAR; INTRAVENOUS; SUBCUTANEOUS at 15:36

## 2019-06-23 RX ADMIN — Medication 325 MILLIGRAM(S): at 05:49

## 2019-06-23 NOTE — PROGRESS NOTE ADULT - PROVIDER SPECIALTY LIST ADULT
Heme/Onc
Hospitalist
Internal Medicine
Internal Medicine
Heme/Onc
Internal Medicine
Internal Medicine

## 2019-06-23 NOTE — DISCHARGE NOTE PROVIDER - CARE PROVIDER_API CALL
Ltuher Meeks)  Hematology; Internal Medicine; Medical Oncology  [address] [address]  Phone: [phone]  Fax: [phone]  Follow Up Time:     Marcelina Londono)  Radiation Oncology  [address] [place] [address]  Phone: [phone]  Fax: [phone]  Follow Up Time:     Zacarias Alvarado)  Otolaryngology  [address] [place] [address]  Phone: [phone]  Fax: [phone]  Follow Up Time:

## 2019-06-23 NOTE — PROGRESS NOTE ADULT - PROBLEM SELECTOR PROBLEM 3
Depression, unspecified depression type

## 2019-06-23 NOTE — DISCHARGE NOTE PROVIDER - PROVIDER TOKENS
PROVIDER:[TOKEN:[[id number]]],PROVIDER:[TOKEN:[[id number]]],PROVIDER:[TOKEN:[[id number]]]

## 2019-06-23 NOTE — DISCHARGE NOTE NURSING/CASE MANAGEMENT/SOCIAL WORK - NSDCPEWEB_GEN_ALL_CORE
[facility] for Tobacco [facility] website --- [email or web address] --- [email or web address]

## 2019-06-23 NOTE — PROGRESS NOTE ADULT - PROBLEM SELECTOR PLAN 2
This is due to chronic illness related to her long present tumor and intermittent bleeding from the tumor mass.

## 2019-06-23 NOTE — PROGRESS NOTE ADULT - SUBJECTIVE AND OBJECTIVE BOX
The patient continues to tolerate ongoing chemotherapy well. No mouth pain. She has some nausea at times. The left neck mass is not painful to her.    CHEMOTHERAPY REGIMEN:        Day:                          Diet:  Protocol:                                    IVF:      MEDICATIONS  (STANDING):  chlorhexidine 2% Cloths 1 Application(s) Topical <User Schedule>  ferrous    sulfate 325 milliGRAM(s) Oral two times a day  magnesium sulfate  IVPB 2 Gram(s) IV Intermittent once  melatonin 3 milliGRAM(s) Oral at bedtime  pegfilgrastim (NEULASTA ONPRO) Injectable 6 milliGRAM(s) SubCutaneous once  sodium chloride 0.9%. 1000 milliLiter(s) (500 mL/Hr) IV Continuous <Continuous>  sodium chloride 0.9%. 1000 milliLiter(s) (75 mL/Hr) IV Continuous <Continuous>    MEDICATIONS  (PRN):  acetaminophen   Tablet .. 650 milliGRAM(s) Oral every 6 hours PRN Temp greater or equal to 38C (100.4F), Mild Pain (1 - 3)  ondansetron Injectable 4 milliGRAM(s) IV Push every 6 hours PRN Nausea  sodium chloride 0.9% lock flush 10 milliLiter(s) IV Push every 1 hour PRN Pre/post blood products, medications, blood draw, and to maintain line patency      Allergies    No Known Allergies    Intolerances        DVT Prophylaxis: [ ] YES [x ] NO      Antibiotics: [ ] YES [x ] NO    Pain Scale (1-10):       Location:    Vital Signs Last 24 Hrs  T(C): 36.9 (23 Jun 2019 06:38), Max: 37.1 (22 Jun 2019 14:20)  T(F): 98.4 (23 Jun 2019 06:38), Max: 98.7 (22 Jun 2019 14:20)  HR: 93 (23 Jun 2019 06:38) (85 - 100)  BP: 129/64 (23 Jun 2019 06:38) (104/70 - 129/64)  BP(mean): --  RR: 15 (23 Jun 2019 06:38) (15 - 15)  SpO2: 98% (23 Jun 2019 06:38) (98% - 100%)    Drug Dosing Weight  Height (cm): 165.1 (17 Jun 2019 18:02)  Weight (kg): 73.7 (17 Jun 2019 18:02)  BMI (kg/m2): 27 (17 Jun 2019 18:02)  BSA (m2): 1.81 (17 Jun 2019 18:02)    PHYSICAL EXAM:      Constitutional: concerned about neulasta side effects.  Eyes: conjunctival pallor.  ENMT: no evidence of chemotherapy induced mucostomatitis.  Neck: the left neck wound is no longer rounded. It is flatter and as a result appears less dense overall.  Respiratory: chest clear.  Cardiovascular: S1>S2 at apex. RSR.  Gastrointestinal: soft, nontender, active bowel sounds.  Genitourinary: voiding without difficulty.  Extremities: no leg edema.  Neurological: no gross focal deficits.  Skin: warm and dry.  Lymph Nodes: none palp on the right.  Musculoskeletal: full ROM.  Psychiatric: affect normal.        URINARY CATHETER: [ ] YES [x ] NO     LABS:  CBC Full  -  ( 23 Jun 2019 05:42 )  WBC Count : 4.15 K/uL  RBC Count : 3.05 M/uL  Hemoglobin : 8.6 g/dL  Hematocrit : 26.1 %  Platelet Count - Automated : 241 K/uL  Mean Cell Volume : 85.6 fl  Mean Cell Hemoglobin : 28.2 pg  Mean Cell Hemoglobin Concentration : 33.0 gm/dL  Auto Neutrophil # : x  Auto Lymphocyte # : x  Auto Monocyte # : x  Auto Eosinophil # : x  Auto Basophil # : x  Auto Neutrophil % : x  Auto Lymphocyte % : x  Auto Monocyte % : x  Auto Eosinophil % : x  Auto Basophil % : x    06-23    135  |  100  |  10  ----------------------------<  114<H>  3.8   |  25  |  0.76    Ca    8.3<L>      23 Jun 2019 05:42  Phos  3.8     06-23  Mg     1.5     06-23    TPro  5.3<L>  /  Alb  3.2<L>  /  TBili  0.4  /  DBili  x   /  AST  20  /  ALT  16  /  AlkPhos  34<L>  06-22          CULTURES:    RADIOLOGY & ADDITIONAL STUDIES:

## 2019-06-23 NOTE — PROGRESS NOTE ADULT - ATTENDING COMMENTS
To complete chemotherapy later today. As of now Onpro will be placed on her arm at the completion of the 5-FU infusion. The patient may be discharged thereafter. She needs ondansetron tabs and should remain on iron replacement therapy. She will follow up with me on an outpatient basis. She is feeling a bit poorly this am and may decide to remain in the hospital for another day.

## 2019-06-23 NOTE — PROGRESS NOTE ADULT - PROBLEM SELECTOR PROBLEM 1
Squamous cell carcinoma of neck

## 2019-06-23 NOTE — DISCHARGE NOTE PROVIDER - HOSPITAL COURSE
Ms. 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 The patient is well known to Dr. Forrest, Dr. Alvarado and Dr. Londono and there have been numerous issues in completing appropriate diagnostic workup and pursuing appropriate treatment. Pt recently had large bleeding from the tumor which was treated with ligation at [place] (exact history unclear), hgb 8.6 on admission which was lower from baseline of 11. No active sign/symptoms of bleeding. Tumor with serisanguinous discharge since admission, no bleeding and hgb stable. ENT and neurosurgery consulted due to risk of bleed and tumor encompassing IC and EC arteries. CTA competed showing pseudoaneursym, or risk for blowout. Pt also exhibbing signs/symptoms of paranoia and not trusting of healthcare system, she also expresses she has no support at home. Pt with hx on depression and is not on any tx due to refusal. Pt denies sucidal ideations. She refuses psych consult and says she wants to focus on tumor for now and will follow with someone outpt. Chemotherapy completed and pt is HDS. She is cleared for discharge. Ms. 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 The patient is well known to Dr. Forrest, Dr. Alvarado and Dr. Londono and there have been numerous issues in completing appropriate diagnostic workup and pursuing appropriate treatment. Pt recently had large bleeding from the tumor which was treated with ligation at [place] (exact history unclear), hgb 8.6 on admission which was lower from baseline of 11. No active sign/symptoms of bleeding. Tumor with serisanguinous discharge since admission, no bleeding and hgb stable. ENT and neurosurgery consulted due to risk of bleed and tumor encompassing IC and EC arteries. CTA competed showing pseudoaneursym, or risk for blowout. Pt also exhibbing signs/symptoms of paranoia and not trusting of healthcare system, she also expresses she has no support at home. Pt with hx on depression and is not on any tx due to refusal. Pt denies sucidal ideations. She refuses psych consult and says she wants to focus on tumor for now and will follow with psychiatrist outpt. Chemotherapy completed and pt is HDS. She is cleared for discharge. Ms. 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 The patient is well known to Dr. Forrest, Dr. Alvarado and Dr. Londono and there have been numerous issues in completing appropriate diagnostic workup and pursuing appropriate treatment. Pt recently had large bleeding from the tumor which was treated with ligation at [place] (exact history unclear), hgb 8.6 on admission which was lower from baseline of 11. No active sign/symptoms of bleeding. Tumor with serisanguinous discharge since admission, no bleeding and hgb stable. ENT and neurosurgery consulted due to risk of bleed and tumor encompassing IC and EC arteries. CTA competed showing pseudoaneursym, or risk for blowout. Pt was extremely untrusting of healthcare system, she also expresses she has no support at home. she denied visual or audio hallucinations/hearing voices and was able to demonstrate an understanding of her disease process /illness.  Pt with hx on depression and is not on any tx due to refusal. Pt denies suicidal or homicidal ideations. She refused psych consult and says she wants to focus on the cancer/  tumor for now and will follow with psychiatrist outpt. Chemotherapy completed and pt is HDS. She is cleared for discharge.

## 2019-06-23 NOTE — DISCHARGE NOTE PROVIDER - CARE PROVIDERS DIRECT ADDRESSES
,DirectAddress_Unknown,[email or web address]

## 2019-06-23 NOTE — DISCHARGE NOTE NURSING/CASE MANAGEMENT/SOCIAL WORK - NSDCDPATPORTLINK_GEN_ALL_CORE
You can access the [facility] Patient Portal, offered by [facility], by registering with the following website: [email or web address]

## 2019-06-23 NOTE — PROGRESS NOTE ADULT - PROBLEM SELECTOR PROBLEM 2
Anemia, unspecified type

## 2019-06-23 NOTE — PROGRESS NOTE ADULT - ASSESSMENT
Tolerating chemotherapy well thus far. She is starting to feel fatigued by the treatment however. She is concerned about neulasta side effects. We discussed the possible bony pain that may occur as the neulasta is working. She is worried about nausea.

## 2019-06-23 NOTE — PROGRESS NOTE ADULT - REASON FOR ADMISSION
chemotherapy for squamous cell carcinoma

## 2019-06-23 NOTE — DISCHARGE NOTE NURSING/CASE MANAGEMENT/SOCIAL WORK - NSDCPEEMAIL_GEN_ALL_CORE
[facility] email [email or web address]

## 2019-06-23 NOTE — DISCHARGE NOTE PROVIDER - NSDCCPCAREPLAN_GEN_ALL_CORE_FT
PRINCIPAL DISCHARGE DIAGNOSIS  Diagnosis: Squamous cell carcinoma of neck  Assessment and Plan of Treatment: You presented to [facility] for initiation of treatment of HPV+ SCC of neck. Chemotherapy is competed and you are cleared for discharge with follow up to Dr. LANG. During your hospital course, CTA was completed due to risk of bleeding given tumor in close proximity to internal/external carotid arteries. CTA shows no pseudoaneursym and no intervention was recommended by ENT or neurosurgery. Please follow up with Dr. LANG , Dr. Londono and Dr. Trivedi to continue managment. If bleeding reoccurs or any other alarming symptoms such as fevers/chills, severe abdominal pain, etc, please seek medicalcare immediately PRINCIPAL DISCHARGE DIAGNOSIS  Diagnosis: Squamous cell carcinoma of neck  Assessment and Plan of Treatment: You presented to [facility] for initiation of treatment of HPV+ SCC of neck. Chemotherapy is competed and you are cleared for discharge with follow up to Dr. Meeks. During your hospital course, CTA was completed due to risk of bleeding given tumor in close proximity to internal/external carotid arteries. CTA shows no pseudoaneursym and no intervention was recommended by ENT or neurosurgery. Please follow up with Dr. Meeks, Dr. Londono and Dr. Trivedi to continue managment. If bleeding reoccurs or any other alarming symptoms such as fevers/chills, severe abdominal pain, etc, please seek medical care immediately. Please continue iron supplementation three times a  day and zofran as needed for nausea.

## 2019-06-27 DIAGNOSIS — F25.9 SCHIZOAFFECTIVE DISORDER, UNSPECIFIED: ICD-10-CM

## 2019-06-27 DIAGNOSIS — D63.0 ANEMIA IN NEOPLASTIC DISEASE: ICD-10-CM

## 2019-06-27 DIAGNOSIS — C77.9 SECONDARY AND UNSPECIFIED MALIGNANT NEOPLASM OF LYMPH NODE, UNSPECIFIED: ICD-10-CM

## 2019-06-27 DIAGNOSIS — Z51.11 ENCOUNTER FOR ANTINEOPLASTIC CHEMOTHERAPY: ICD-10-CM

## 2019-06-27 DIAGNOSIS — F41.9 ANXIETY DISORDER, UNSPECIFIED: ICD-10-CM

## 2019-06-27 DIAGNOSIS — C80.1 MALIGNANT (PRIMARY) NEOPLASM, UNSPECIFIED: ICD-10-CM

## 2019-06-27 DIAGNOSIS — F32.9 MAJOR DEPRESSIVE DISORDER, SINGLE EPISODE, UNSPECIFIED: ICD-10-CM

## 2019-06-27 DIAGNOSIS — C79.89 SECONDARY MALIGNANT NEOPLASM OF OTHER SPECIFIED SITES: ICD-10-CM

## 2019-06-27 DIAGNOSIS — F17.210 NICOTINE DEPENDENCE, CIGARETTES, UNCOMPLICATED: ICD-10-CM

## 2019-06-27 DIAGNOSIS — D64.9 ANEMIA, UNSPECIFIED: ICD-10-CM

## 2019-06-28 ENCOUNTER — EMERGENCY (EMERGENCY)
Facility: HOSPITAL | Age: 39
LOS: 1 days | Discharge: HOME | End: 2019-06-28
Attending: EMERGENCY MEDICINE | Admitting: EMERGENCY MEDICINE
Payer: MEDICARE

## 2019-06-28 VITALS
OXYGEN SATURATION: 98 % | DIASTOLIC BLOOD PRESSURE: 61 MMHG | SYSTOLIC BLOOD PRESSURE: 104 MMHG | HEART RATE: 117 BPM | TEMPERATURE: 98 F | RESPIRATION RATE: 15 BRPM

## 2019-06-28 VITALS
SYSTOLIC BLOOD PRESSURE: 100 MMHG | OXYGEN SATURATION: 98 % | DIASTOLIC BLOOD PRESSURE: 60 MMHG | TEMPERATURE: 99 F | RESPIRATION RATE: 14 BRPM | HEART RATE: 92 BPM

## 2019-06-28 PROBLEM — C44.42 SQUAMOUS CELL CARCINOMA OF SKIN OF SCALP AND NECK: Chronic | Status: ACTIVE | Noted: 2019-06-17

## 2019-06-28 PROBLEM — F32.9 MAJOR DEPRESSIVE DISORDER, SINGLE EPISODE, UNSPECIFIED: Chronic | Status: ACTIVE | Noted: 2019-06-17

## 2019-06-28 LAB
ALBUMIN SERPL ELPH-MCNC: 3.1 G/DL — LOW (ref 3.4–5)
ALP SERPL-CCNC: 45 U/L — SIGNIFICANT CHANGE UP (ref 40–120)
ALT FLD-CCNC: 17 U/L — SIGNIFICANT CHANGE UP (ref 12–42)
ANION GAP SERPL CALC-SCNC: 11 MMOL/L — SIGNIFICANT CHANGE UP (ref 9–16)
AST SERPL-CCNC: 19 U/L — SIGNIFICANT CHANGE UP (ref 15–37)
BASOPHILS NFR BLD AUTO: 0.8 % — SIGNIFICANT CHANGE UP (ref 0–2)
BILIRUB SERPL-MCNC: 0.3 MG/DL — SIGNIFICANT CHANGE UP (ref 0.2–1.2)
BUN SERPL-MCNC: 33 MG/DL — HIGH (ref 7–23)
CALCIUM SERPL-MCNC: 8.6 MG/DL — SIGNIFICANT CHANGE UP (ref 8.5–10.5)
CHLORIDE SERPL-SCNC: 99 MMOL/L — SIGNIFICANT CHANGE UP (ref 96–108)
CO2 SERPL-SCNC: 26 MMOL/L — SIGNIFICANT CHANGE UP (ref 22–31)
CREAT SERPL-MCNC: 1.6 MG/DL — HIGH (ref 0.5–1.3)
EOSINOPHIL NFR BLD AUTO: 2 % — SIGNIFICANT CHANGE UP (ref 0–6)
GLUCOSE SERPL-MCNC: 106 MG/DL — HIGH (ref 70–99)
HCT VFR BLD CALC: 22.8 % — LOW (ref 34.5–45)
HGB BLD-MCNC: 7.7 G/DL — LOW (ref 11.5–15.5)
IMM GRANULOCYTES NFR BLD AUTO: 2.5 % — HIGH (ref 0–1.5)
LYMPHOCYTES # BLD AUTO: 22 % — SIGNIFICANT CHANGE UP (ref 13–44)
MCHC RBC-ENTMCNC: 27.8 PG — SIGNIFICANT CHANGE UP (ref 27–34)
MCHC RBC-ENTMCNC: 33.8 G/DL — SIGNIFICANT CHANGE UP (ref 32–36)
MCV RBC AUTO: 82.3 FL — SIGNIFICANT CHANGE UP (ref 80–100)
MONOCYTES NFR BLD AUTO: 37 % — HIGH (ref 2–14)
NEUTROPHILS NFR BLD AUTO: 24 % — LOW (ref 43–77)
NEUTS BAND # BLD: 15 % — HIGH
PLAT MORPH BLD: NORMAL — SIGNIFICANT CHANGE UP
PLATELET # BLD AUTO: 196 K/UL — SIGNIFICANT CHANGE UP (ref 150–400)
POTASSIUM SERPL-MCNC: 3 MMOL/L — LOW (ref 3.5–5.3)
POTASSIUM SERPL-SCNC: 3 MMOL/L — LOW (ref 3.5–5.3)
PROT SERPL-MCNC: 6.5 G/DL — SIGNIFICANT CHANGE UP (ref 6.4–8.2)
RBC # BLD: 2.77 M/UL — LOW (ref 3.8–5.2)
RBC # FLD: 12.7 % — SIGNIFICANT CHANGE UP (ref 10.3–14.5)
RBC BLD AUTO: NORMAL — SIGNIFICANT CHANGE UP
SODIUM SERPL-SCNC: 136 MMOL/L — SIGNIFICANT CHANGE UP (ref 132–145)
WBC # BLD: 6 K/UL — SIGNIFICANT CHANGE UP (ref 3.8–10.5)
WBC # FLD AUTO: 6 K/UL — SIGNIFICANT CHANGE UP (ref 3.8–10.5)

## 2019-06-28 PROCEDURE — 99284 EMERGENCY DEPT VISIT MOD MDM: CPT

## 2019-06-28 RX ORDER — SODIUM CHLORIDE 9 MG/ML
1000 INJECTION INTRAMUSCULAR; INTRAVENOUS; SUBCUTANEOUS ONCE
Refills: 0 | Status: COMPLETED | OUTPATIENT
Start: 2019-06-28 | End: 2019-06-28

## 2019-06-28 RX ORDER — POTASSIUM CHLORIDE 20 MEQ
40 PACKET (EA) ORAL ONCE
Refills: 0 | Status: COMPLETED | OUTPATIENT
Start: 2019-06-28 | End: 2019-06-28

## 2019-06-28 RX ADMIN — SODIUM CHLORIDE 2000 MILLILITER(S): 9 INJECTION INTRAMUSCULAR; INTRAVENOUS; SUBCUTANEOUS at 03:16

## 2019-06-28 RX ADMIN — SODIUM CHLORIDE 2000 MILLILITER(S): 9 INJECTION INTRAMUSCULAR; INTRAVENOUS; SUBCUTANEOUS at 01:40

## 2019-06-28 RX ADMIN — Medication 40 MILLIEQUIVALENT(S): at 03:16

## 2019-06-28 NOTE — ED PROVIDER NOTE - ENMT, MLM
Airway patent. multiple mucosal blisters, one on left lower lip and one on left distal portion of her tongue otherwise no signs of thrush or oropharyngeal lesion

## 2019-06-28 NOTE — ED PROVIDER NOTE - SKIN, MLM
Multiple mucosal blisters, one on left lower lip and one on left distal portion of her tongue otherwise no signs of thrush or oropharyngeal lesion

## 2019-06-28 NOTE — ED ADULT TRIAGE NOTE - CHIEF COMPLAINT QUOTE
Pt with complaint of sores and thrush in her mouth. Pt states she had her first chemo treatment last week for a tumor on her neck.

## 2019-06-28 NOTE — ED PROVIDER NOTE - CLINICAL SUMMARY MEDICAL DECISION MAKING FREE TEXT BOX
38 y/o Female presents with mouth blisters. Will treat symptomatically for mouth pain, related to recent chemo.  Will order basic labs and check chemistry. Reassess. 38 y/o Female presents with mouth blisters. Will treat symptomatically for mouth pain, related to recent chemo.  Will order basic labs and trans H and H, check chemistry. Reassess. 38 y/o Female presents with mouth blisters. Will treat symptomatically for mouth pain, related to recent chemo.  Will order basic labs, trend H/H, and check chemistry. Reassess.

## 2019-06-28 NOTE — ED PROVIDER NOTE - CARE PLAN
Principal Discharge DX:	Dehydration  Secondary Diagnosis:	Mouth sores  Secondary Diagnosis:	Hypokalemia

## 2019-06-28 NOTE — ED PROVIDER NOTE - OBJECTIVE STATEMENT
38 y/o Female with PMHx of squamous cell carcinoma of neck( first chemo treatment last week, dc x5 days ago), presents to the ED c/o mouth blisters, with a sensation of "burnt tongue". Pt cannot tolerate PO, been losing appetite and have not been able to take her iron supplements. Denies fever, chills, and N/V.

## 2019-07-02 DIAGNOSIS — E86.0 DEHYDRATION: ICD-10-CM

## 2019-07-02 DIAGNOSIS — K13.79 OTHER LESIONS OF ORAL MUCOSA: ICD-10-CM

## 2019-07-02 DIAGNOSIS — E87.6 HYPOKALEMIA: ICD-10-CM

## 2019-07-08 ENCOUNTER — INPATIENT (INPATIENT)
Facility: HOSPITAL | Age: 39
LOS: 6 days | Discharge: HOME | DRG: 848 | End: 2019-07-15
Attending: STUDENT IN AN ORGANIZED HEALTH CARE EDUCATION/TRAINING PROGRAM | Admitting: STUDENT IN AN ORGANIZED HEALTH CARE EDUCATION/TRAINING PROGRAM
Payer: MEDICARE

## 2019-07-08 VITALS
RESPIRATION RATE: 24 BRPM | DIASTOLIC BLOOD PRESSURE: 77 MMHG | HEART RATE: 88 BPM | SYSTOLIC BLOOD PRESSURE: 119 MMHG | TEMPERATURE: 98 F | OXYGEN SATURATION: 100 % | WEIGHT: 161.6 LBS

## 2019-07-08 DIAGNOSIS — D64.9 ANEMIA, UNSPECIFIED: ICD-10-CM

## 2019-07-08 DIAGNOSIS — R63.8 OTHER SYMPTOMS AND SIGNS CONCERNING FOOD AND FLUID INTAKE: ICD-10-CM

## 2019-07-08 DIAGNOSIS — Z29.9 ENCOUNTER FOR PROPHYLACTIC MEASURES, UNSPECIFIED: ICD-10-CM

## 2019-07-08 DIAGNOSIS — F32.9 MAJOR DEPRESSIVE DISORDER, SINGLE EPISODE, UNSPECIFIED: ICD-10-CM

## 2019-07-08 DIAGNOSIS — Z91.89 OTHER SPECIFIED PERSONAL RISK FACTORS, NOT ELSEWHERE CLASSIFIED: ICD-10-CM

## 2019-07-08 DIAGNOSIS — C44.42 SQUAMOUS CELL CARCINOMA OF SKIN OF SCALP AND NECK: ICD-10-CM

## 2019-07-08 LAB
ALBUMIN SERPL ELPH-MCNC: 3.7 G/DL — SIGNIFICANT CHANGE UP (ref 3.3–5)
ALP SERPL-CCNC: 54 U/L — SIGNIFICANT CHANGE UP (ref 40–120)
ALT FLD-CCNC: 15 U/L — SIGNIFICANT CHANGE UP (ref 10–45)
ANION GAP SERPL CALC-SCNC: 11 MMOL/L — SIGNIFICANT CHANGE UP (ref 5–17)
AST SERPL-CCNC: 21 U/L — SIGNIFICANT CHANGE UP (ref 10–40)
BASOPHILS # BLD AUTO: 0.03 K/UL — SIGNIFICANT CHANGE UP (ref 0–0.2)
BASOPHILS NFR BLD AUTO: 0.6 % — SIGNIFICANT CHANGE UP (ref 0–2)
BILIRUB SERPL-MCNC: <0.2 MG/DL — SIGNIFICANT CHANGE UP (ref 0.2–1.2)
BLD GP AB SCN SERPL QL: NEGATIVE — SIGNIFICANT CHANGE UP
BUN SERPL-MCNC: 11 MG/DL — SIGNIFICANT CHANGE UP (ref 7–23)
CALCIUM SERPL-MCNC: 8.8 MG/DL — SIGNIFICANT CHANGE UP (ref 8.4–10.5)
CHLORIDE SERPL-SCNC: 107 MMOL/L — SIGNIFICANT CHANGE UP (ref 96–108)
CO2 SERPL-SCNC: 23 MMOL/L — SIGNIFICANT CHANGE UP (ref 22–31)
CREAT SERPL-MCNC: 0.79 MG/DL — SIGNIFICANT CHANGE UP (ref 0.5–1.3)
EOSINOPHIL # BLD AUTO: 0.02 K/UL — SIGNIFICANT CHANGE UP (ref 0–0.5)
EOSINOPHIL NFR BLD AUTO: 0.4 % — SIGNIFICANT CHANGE UP (ref 0–6)
GLUCOSE SERPL-MCNC: 86 MG/DL — SIGNIFICANT CHANGE UP (ref 70–99)
HCT VFR BLD CALC: 25.4 % — LOW (ref 34.5–45)
HGB BLD-MCNC: 7.5 G/DL — LOW (ref 11.5–15.5)
IMM GRANULOCYTES NFR BLD AUTO: 1.1 % — SIGNIFICANT CHANGE UP (ref 0–1.5)
LYMPHOCYTES # BLD AUTO: 1.37 K/UL — SIGNIFICANT CHANGE UP (ref 1–3.3)
LYMPHOCYTES # BLD AUTO: 25.1 % — SIGNIFICANT CHANGE UP (ref 13–44)
MAGNESIUM SERPL-MCNC: 1.4 MG/DL — LOW (ref 1.6–2.6)
MCHC RBC-ENTMCNC: 27.7 PG — SIGNIFICANT CHANGE UP (ref 27–34)
MCHC RBC-ENTMCNC: 29.5 GM/DL — LOW (ref 32–36)
MCV RBC AUTO: 93.7 FL — SIGNIFICANT CHANGE UP (ref 80–100)
MONOCYTES # BLD AUTO: 0.53 K/UL — SIGNIFICANT CHANGE UP (ref 0–0.9)
MONOCYTES NFR BLD AUTO: 9.7 % — SIGNIFICANT CHANGE UP (ref 2–14)
NEUTROPHILS # BLD AUTO: 3.44 K/UL — SIGNIFICANT CHANGE UP (ref 1.8–7.4)
NEUTROPHILS NFR BLD AUTO: 63.1 % — SIGNIFICANT CHANGE UP (ref 43–77)
NRBC # BLD: 0 /100 WBCS — SIGNIFICANT CHANGE UP (ref 0–0)
PHOSPHATE SERPL-MCNC: 4.4 MG/DL — SIGNIFICANT CHANGE UP (ref 2.5–4.5)
PLATELET # BLD AUTO: 368 K/UL — SIGNIFICANT CHANGE UP (ref 150–400)
POTASSIUM SERPL-MCNC: 3.8 MMOL/L — SIGNIFICANT CHANGE UP (ref 3.5–5.3)
POTASSIUM SERPL-SCNC: 3.8 MMOL/L — SIGNIFICANT CHANGE UP (ref 3.5–5.3)
PROT SERPL-MCNC: 5.7 G/DL — LOW (ref 6–8.3)
RBC # BLD: 2.71 M/UL — LOW (ref 3.8–5.2)
RBC # FLD: 15.3 % — HIGH (ref 10.3–14.5)
RH IG SCN BLD-IMP: POSITIVE — SIGNIFICANT CHANGE UP
SODIUM SERPL-SCNC: 141 MMOL/L — SIGNIFICANT CHANGE UP (ref 135–145)
WBC # BLD: 5.45 K/UL — SIGNIFICANT CHANGE UP (ref 3.8–10.5)
WBC # FLD AUTO: 5.45 K/UL — SIGNIFICANT CHANGE UP (ref 3.8–10.5)

## 2019-07-08 PROCEDURE — 99223 1ST HOSP IP/OBS HIGH 75: CPT | Mod: GC

## 2019-07-08 RX ORDER — SODIUM CHLORIDE 9 MG/ML
1000 INJECTION INTRAMUSCULAR; INTRAVENOUS; SUBCUTANEOUS
Refills: 0 | Status: DISCONTINUED | OUTPATIENT
Start: 2019-07-08 | End: 2019-07-15

## 2019-07-08 RX ORDER — POTASSIUM CHLORIDE 20 MEQ
20 PACKET (EA) ORAL ONCE
Refills: 0 | Status: COMPLETED | OUTPATIENT
Start: 2019-07-08 | End: 2019-07-08

## 2019-07-08 RX ORDER — MAGNESIUM SULFATE 500 MG/ML
2 VIAL (ML) INJECTION
Refills: 0 | Status: COMPLETED | OUTPATIENT
Start: 2019-07-08 | End: 2019-07-09

## 2019-07-08 RX ADMIN — Medication 20 MILLIEQUIVALENT(S): at 23:13

## 2019-07-08 RX ADMIN — SODIUM CHLORIDE 75 MILLILITER(S): 9 INJECTION INTRAMUSCULAR; INTRAVENOUS; SUBCUTANEOUS at 22:45

## 2019-07-08 RX ADMIN — Medication 100 GRAM(S): at 23:13

## 2019-07-08 NOTE — H&P ADULT - PROBLEM SELECTOR PLAN 3
Patient with hx of normocytic anemia (Fe studies on last admission with YOHANNES, also found to have low B12 s/p Vit B12 injection), also has a hx of bleeding vessel s/p ligation  - Hgb on discharge in June: 7.7  - f/u CBC  - maintain active T&S  - transfuse for Hgb < 7 Not on treatment, hasn't seen [facility]. She refused psych eval on last admission in June 2019  - pt to f/u with psych as outpatient    #Schizoaffective disorder  - management as above  - no acute intervention Patient with hx of normocytic anemia (Fe studies on last admission with YOHANNES, also found to have low B12 s/p Vit B12 injection), also has a hx of bleeding vessel s/p ligation at [place] (exact history unclear)  - Hgb on June 28: 7.7, now presenting with Hgb 7.5. No s/s of bleeding  - f/u CBC  - maintain active T&S  - transfuse for Hgb < 7 Patient with b/l LE edema, mostly at the end of the day or with prolonged sitting. Edema resolves with leg elevation. Likely dependent edema vs venous insufficiency. Will however r/o cardiomyopathy/ CHF as patient on Cisplatin for chemotherapy  - f/u ECHO  - monitor EKG

## 2019-07-08 NOTE — H&P ADULT - PROBLEM SELECTOR PLAN 2
Not on treatment, hasn't seen [facility]. She refused psych eval on last admission in June 2019  - pt to f/u with psych as outpatient    #Schizoaffective disorder  - management as above  - no acute intervention Patient with b/l LE edema, mostly at the end of the day or with prolonged sitting. Edema resolves with leg elevation. Likely dependent edema vs venous insufficiency. Will however r/o cardiomyopathy/ CHF as patient on Cisplatin for chemotherapy  - f/u ECHO  - monitor EKG Patient with hx of normocytic anemia (Fe studies on last admission with [facility], also found to have low B12 s/p Vit B12 injection), also has a hx of bleeding vessel s/p ligation at [place] (exact history unclear)  - Hgb on June 28: 7.7, now presenting with Hgb 7.5. No s/s of bleeding  - f/u CBC  - maintain active T&S  - transfuse for Hgb < 7

## 2019-07-08 NOTE — H&P ADULT - PROBLEM SELECTOR PLAN 1
patient is well known to Dr. Forrest, Dr. Alvarado and Dr. Londono  Large neck mass (measures up to 11.1 cm), with encasement of the ICA as well as extension of the mass into skin, parotid and submandibular glands, with extensive lymphadenopathy with cervical, retropharyngeal and supraclavicular nodes involved. With large neck mass on prior imaging, and encasement of the ICA as well as extension of the mass into skin, parotid and submandibular glands, with extensive lymphadenopathy with cervical, retropharyngeal and supraclavicular nodes involved. Patient is well known to Dr. Forrest, Dr. Alvarado and Dr. Londono; s/p 1 round of DCF induction chemotherapy in June 2019, now presenting for induction chemotherapy  - start on NS @75cc/hr  - will start DCF chemo on 7/9  - Heme/Onc (Dr. Meeks) on board  - monitor electrolytes  - monitor EKG  - NPO after mn for PICC placement on 7/9

## 2019-07-08 NOTE — H&P ADULT - PROBLEM SELECTOR PLAN 5
DVT ppx: holding for now, given bleeding on last admission  GI ppx: none    Dispo: F  Code: FULL F: NS @75cc/hr  E: replete PRN  N: Mechanical soft w/ thin liquids; NPO after mn

## 2019-07-08 NOTE — H&P ADULT - NSHPLABSRESULTS_GEN_ALL_CORE
LABS:              CAPILLARY BLOOD GLUCOSE          RADIOLOGY & ADDITIONAL TESTS: Reviewed. LABS:                        7.5    5.45  )-----------( 368      ( 08 Jul 2019 20:41 )             25.4     07-08    141  |  107  |  11  ----------------------------<  86  3.8   |  23  |  0.79    Ca    8.8      08 Jul 2019 20:41  Phos  4.4     07-08  Mg     1.4     07-08    TPro  5.7<L>  /  Alb  3.7  /  TBili  <0.2  /  DBili  x   /  AST  21  /  ALT  15  /  AlkPhos  54  07-08    CAPILLARY BLOOD GLUCOSE    RADIOLOGY & ADDITIONAL TESTS: Reviewed.

## 2019-07-08 NOTE — H&P ADULT - HISTORY OF PRESENT ILLNESS
Patient is a 38 yo F with a hx of HPV-associated SCC of the left neck (stage [id number] ) with unknown primary s/p 1st round of induction chemotherapy with DCF (docetaxel, cisplatin and 5-FU) in June 2019, depression and schizoaffective disorder (not on treatment), who presents for induction chemotherapy. Patient tolerated her last chemotherapy session well and has been following up with Dr. Meeks as outpatient. Currently without acute complaints Patient is a 38 yo F with a hx of HPV-associated SCC of the left neck (stage [id number] ) with unknown primary s/p 1st round of induction chemotherapy with DCF (docetaxel, cisplatin and 5-FU) in June 2019, depression and schizoaffective disorder (not on treatment), who presents for induction chemotherapy. Patient tolerated her last chemotherapy session well and has been following up with Dr. Meeks as outpatient. Currently without acute complaints, however reports b/l LE edema since her last admission, worse at the end of the day and when sitting for prolonged periods of time.   Of note, patient was recently seen in the ED on June 28th 2019 for mouth sores, was treated symptomatically w/ magic mouth wash. Reports her appetite has improved, and she is tolerating PO. Denies chest pain, palpitations, SOB, abdominal pain, nausea/vomiting, fever, chills, cough, headache, lightheadedness, dizziness.     Vitals on arrival: T 98, /77, HR 88, RR 24, SpO2 100% on RA. Repeat vitals T 97.7, /85, HR 75, RR 19, SpO2 100% on RA.   Labs: Hgb 7.5, Hct 25.4, Na 141, K 3.8, Mg 1.4, Phos 4.4, Prot 5.7   Patient is being admitted to [facility] for induction chemotherapy

## 2019-07-08 NOTE — H&P ADULT - NSHPREVIEWOFSYSTEMS_GEN_ALL_CORE
General:	no fever, no chills, no fatigue  ENMT: no tinnitus, no nasal discharge, no abnormal taste sensation  Respiratory and Thorax: no wheezing, no dyspnea, no cough  Cardiovascular: no chest pain, no palpitations, no HULL  Gastrointestinal: no nausea, no vomiting, no diarrhea, no constipation, no change in bowel habits  Genitourinary: no hematuria, no urine discoloration  Musculoskeletal: no arthralgia, no myalgia, no muscle cramps  Neurological: no weakness, no headache, no loss of consciousness  Hematology/Lymphatics: no gum bleeding, no nose bleeding  Endocrine: no enlarged lymph nodes  Allergic/Immunologic: no cold intolerance, no heat intolerance

## 2019-07-08 NOTE — H&P ADULT - PROBLEM SELECTOR PLAN 6
1) PCP Contacted on Admission: (N) --> Name & Phone #: Dr. Meeks [facility] [phone]  2) Date of Contact with PCP:  3) PCP Contacted at Discharge: (Y/N)  4) Summary of Handoff Given to PCP:   5) Post-Discharge Appointment Date and Location: DVT ppx: holding for now, given bleeding on last admission  GI ppx: none    Dispo: F  Code: FULL

## 2019-07-08 NOTE — PATIENT PROFILE ADULT - NSPROEDALEARNPREF_GEN_A_NUR
written material/verbal instruction/video/audio/group instruction/computer/internet/pictorial/individual instruction/skill demonstration

## 2019-07-08 NOTE — H&P ADULT - PROBLEM SELECTOR PLAN 7
1) PCP Contacted on Admission: (N) --> Name & Phone #: Dr. Meeks [facility] [phone]  2) Date of Contact with PCP:  3) PCP Contacted at Discharge: (Y/N)  4) Summary of Handoff Given to PCP:   5) Post-Discharge Appointment Date and Location:

## 2019-07-08 NOTE — H&P ADULT - ATTENDING COMMENTS
patient seen and examined; reviewed: H&P, labs,  previous admission chart notes , previous EKG, previous imaging     PE findings as above, except L neck wound covered in bandage.       1. SCC of neck: pending PICC, for chemotherapy, on IVFs o/n, followup labs, ekg, echo, heme/onc recs   2, agree w/ holding DVT ppx in setting of bleeding hx   3. will feed pt as pt received lidocaine injection for PICC last month, was not NPO             rest of plan as above

## 2019-07-08 NOTE — H&P ADULT - NSHPPHYSICALEXAM_GEN_ALL_CORE
VITAL SIGNS:  Vital Signs Last 24 Hrs  T(C): 36.7 (08 Jul 2019 19:25), Max: 36.7 (08 Jul 2019 19:25)  T(F): 98 (08 Jul 2019 19:25), Max: 98 (08 Jul 2019 19:25)  HR: 88 (08 Jul 2019 19:25) (88 - 88)  BP: 119/77 (08 Jul 2019 19:25) (119/77 - 119/77)  BP(mean): --  RR: 24 (08 Jul 2019 19:25) (24 - 24)  SpO2: 100% (08 Jul 2019 19:25) (100% - 100%)    PHYSICAL EXAM:    General: in NAD, lying comfortably in bed  HEENT: normocephalic, atraumatic; PERRL, anicteric sclera; MMM  Neck: supple, no JVD, no thyromegaly, no lymphadenopathy  Cardiovascular: +S1/S2, RRR, no M/G/R  Respiratory: clear to auscultation B/L; no wheezing, no rales, no rhonchi  Gastrointestinal: soft, NT/ND; +BSx4, no organomegaly  Extremities: WWP; no edema, clubbing or cyanosis  Vascular: 2+ radial, DP/PT pulses B/L  Neurological: AAOx3; no focal deficits VITAL SIGNS:  Vital Signs Last 24 Hrs  T(C): 36.7 (08 Jul 2019 19:25), Max: 36.7 (08 Jul 2019 19:25)  T(F): 98 (08 Jul 2019 19:25), Max: 98 (08 Jul 2019 19:25)  HR: 88 (08 Jul 2019 19:25) (88 - 88)  BP: 119/77 (08 Jul 2019 19:25) (119/77 - 119/77)  BP(mean): --  RR: 24 (08 Jul 2019 19:25) (24 - 24)  SpO2: 100% (08 Jul 2019 19:25) (100% - 100%)    PHYSICAL EXAM:  General: in NAD, lying comfortably in bed  HEENT: normocephalic, atraumatic; PERRL, anicteric sclera; MMM  Neck: supple, no JVD, no thyromegaly, +open wound on L neck, no discharge noted; +palpable posterior cervical lymphadenopathy  Cardiovascular: +S1/S2, RRR, no M/G/R  Respiratory: clear to auscultation B/L; no wheezing, no rales, no rhonchi  Gastrointestinal: soft, NT/ND; +BSx4, no organomegaly  Extremities: WWP; 1+ pitting edema on b/l LE, non-pitting pedal edema, no clubbing or cyanosis  Vascular: 2+ radial, DP/PT pulses B/L  Neurological: AAOx3; no focal deficits

## 2019-07-08 NOTE — H&P ADULT - NSICDXFAMILYHX_GEN_ALL_CORE_FT
FAMILY HISTORY:  Family history unknown  No pertinent family history in first degree relatives FAMILY HISTORY:  FH: myocardial infarction, father  FHx: cancer, maternal grandmother and aunt  No family history of cardiovascular disease, mother

## 2019-07-08 NOTE — H&P ADULT - ASSESSMENT
The patient is well known to Dr. Forrest, Dr. Alvarado and Dr. Londono and there have been numerous issues in completing appropriate diagnostic workup and pursuing appropriate treatment. Pt recently had large bleeding from the tumor which was treated with ligation at [place] (exact history unclear), hgb 8.6 on admission which was lower from baseline of 11. No active sign/symptoms of bleeding. Tumor with serisanguinous discharge since admission, no bleeding and hgb stable.    ENT and neurosurgery consulted due to risk of bleed and tumor encompassing IC and EC arteries. CTA competed showing pseudoaneursym, or risk for blowout. Pt was extremely untrusting of healthcare system, she also expresses she has no support at home. 40 yo F with a hx of HPV-associated SCC of the left neck (stage [id number] ) with unknown primary s/p 1st round of induction chemotherapy with DCF (docetaxel, cisplatin and 5-FU) in June 2019, depression and schizoaffective disorder (not on treatment), who presents for induction chemotherapy.

## 2019-07-08 NOTE — CHART NOTE - NSCHARTNOTEFT_GEN_A_CORE
Heme Onc Fellow Note:    Spoke to Dr. Stovall, NF resident.     Patient admitted to [address] [place] [address] for induction DCF for head and neck squamous cell carcinoma.     Recs as follows for right now:  -CBC, CMP, Mg, Phos  -NPO after midnight for IR guided picc line placement tomorrow   -Please start patient on maintenance NS at 75 cc/hr     Formal consultation tomorrow AM by Dr. Hussein/fellow.    Recs relayed to primary team.        Nik Martinez  PGY-6

## 2019-07-08 NOTE — H&P ADULT - NSICDXPASTMEDICALHX_GEN_ALL_CORE_FT
PAST MEDICAL HISTORY:  Depression     Mass left neck    Schizoaffective disorder     Squamous cell carcinoma of neck

## 2019-07-09 ENCOUNTER — TRANSCRIPTION ENCOUNTER (OUTPATIENT)
Age: 39
End: 2019-07-09

## 2019-07-09 DIAGNOSIS — R60.0 LOCALIZED EDEMA: ICD-10-CM

## 2019-07-09 DIAGNOSIS — F25.9 SCHIZOAFFECTIVE DISORDER, UNSPECIFIED: ICD-10-CM

## 2019-07-09 LAB
ALBUMIN SERPL ELPH-MCNC: 3.3 G/DL — SIGNIFICANT CHANGE UP (ref 3.3–5)
ALP SERPL-CCNC: 42 U/L — SIGNIFICANT CHANGE UP (ref 40–120)
ALT FLD-CCNC: 12 U/L — SIGNIFICANT CHANGE UP (ref 10–45)
ANION GAP SERPL CALC-SCNC: 8 MMOL/L — SIGNIFICANT CHANGE UP (ref 5–17)
AST SERPL-CCNC: 18 U/L — SIGNIFICANT CHANGE UP (ref 10–40)
BASOPHILS # BLD AUTO: 0.03 K/UL — SIGNIFICANT CHANGE UP (ref 0–0.2)
BASOPHILS NFR BLD AUTO: 0.7 % — SIGNIFICANT CHANGE UP (ref 0–2)
BILIRUB SERPL-MCNC: <0.2 MG/DL — SIGNIFICANT CHANGE UP (ref 0.2–1.2)
BLD GP AB SCN SERPL QL: NEGATIVE — SIGNIFICANT CHANGE UP
BUN SERPL-MCNC: 8 MG/DL — SIGNIFICANT CHANGE UP (ref 7–23)
CALCIUM SERPL-MCNC: 8.5 MG/DL — SIGNIFICANT CHANGE UP (ref 8.4–10.5)
CHLORIDE SERPL-SCNC: 107 MMOL/L — SIGNIFICANT CHANGE UP (ref 96–108)
CO2 SERPL-SCNC: 25 MMOL/L — SIGNIFICANT CHANGE UP (ref 22–31)
CREAT SERPL-MCNC: 0.71 MG/DL — SIGNIFICANT CHANGE UP (ref 0.5–1.3)
EOSINOPHIL # BLD AUTO: 0.06 K/UL — SIGNIFICANT CHANGE UP (ref 0–0.5)
EOSINOPHIL NFR BLD AUTO: 1.4 % — SIGNIFICANT CHANGE UP (ref 0–6)
GLUCOSE BLDC GLUCOMTR-MCNC: 124 MG/DL — HIGH (ref 70–99)
GLUCOSE SERPL-MCNC: 94 MG/DL — SIGNIFICANT CHANGE UP (ref 70–99)
HCG SERPL-ACNC: <0 MIU/ML — SIGNIFICANT CHANGE UP
HCT VFR BLD CALC: 23.7 % — LOW (ref 34.5–45)
HGB BLD-MCNC: 7.3 G/DL — LOW (ref 11.5–15.5)
IMM GRANULOCYTES NFR BLD AUTO: 0.7 % — SIGNIFICANT CHANGE UP (ref 0–1.5)
LYMPHOCYTES # BLD AUTO: 1.64 K/UL — SIGNIFICANT CHANGE UP (ref 1–3.3)
LYMPHOCYTES # BLD AUTO: 38.5 % — SIGNIFICANT CHANGE UP (ref 13–44)
MAGNESIUM SERPL-MCNC: 3 MG/DL — HIGH (ref 1.6–2.6)
MCHC RBC-ENTMCNC: 28.3 PG — SIGNIFICANT CHANGE UP (ref 27–34)
MCHC RBC-ENTMCNC: 30.8 GM/DL — LOW (ref 32–36)
MCV RBC AUTO: 91.9 FL — SIGNIFICANT CHANGE UP (ref 80–100)
MONOCYTES # BLD AUTO: 0.42 K/UL — SIGNIFICANT CHANGE UP (ref 0–0.9)
MONOCYTES NFR BLD AUTO: 9.9 % — SIGNIFICANT CHANGE UP (ref 2–14)
NEUTROPHILS # BLD AUTO: 2.08 K/UL — SIGNIFICANT CHANGE UP (ref 1.8–7.4)
NEUTROPHILS NFR BLD AUTO: 48.8 % — SIGNIFICANT CHANGE UP (ref 43–77)
NRBC # BLD: 0 /100 WBCS — SIGNIFICANT CHANGE UP (ref 0–0)
PHOSPHATE SERPL-MCNC: 3.4 MG/DL — SIGNIFICANT CHANGE UP (ref 2.5–4.5)
PLATELET # BLD AUTO: 356 K/UL — SIGNIFICANT CHANGE UP (ref 150–400)
POTASSIUM SERPL-MCNC: 3.6 MMOL/L — SIGNIFICANT CHANGE UP (ref 3.5–5.3)
POTASSIUM SERPL-SCNC: 3.6 MMOL/L — SIGNIFICANT CHANGE UP (ref 3.5–5.3)
PROT SERPL-MCNC: 5.3 G/DL — LOW (ref 6–8.3)
RBC # BLD: 2.58 M/UL — LOW (ref 3.8–5.2)
RBC # FLD: 15.4 % — HIGH (ref 10.3–14.5)
RH IG SCN BLD-IMP: POSITIVE — SIGNIFICANT CHANGE UP
SODIUM SERPL-SCNC: 140 MMOL/L — SIGNIFICANT CHANGE UP (ref 135–145)
WBC # BLD: 4.26 K/UL — SIGNIFICANT CHANGE UP (ref 3.8–10.5)
WBC # FLD AUTO: 4.26 K/UL — SIGNIFICANT CHANGE UP (ref 3.8–10.5)

## 2019-07-09 PROCEDURE — 93010 ELECTROCARDIOGRAM REPORT: CPT

## 2019-07-09 PROCEDURE — 93306 TTE W/DOPPLER COMPLETE: CPT | Mod: 26

## 2019-07-09 PROCEDURE — 99233 SBSQ HOSP IP/OBS HIGH 50: CPT | Mod: GC

## 2019-07-09 PROCEDURE — 76937 US GUIDE VASCULAR ACCESS: CPT | Mod: 26,59

## 2019-07-09 PROCEDURE — 36569 INSJ PICC 5 YR+ W/O IMAGING: CPT

## 2019-07-09 RX ORDER — SODIUM CHLORIDE 9 MG/ML
10 INJECTION INTRAMUSCULAR; INTRAVENOUS; SUBCUTANEOUS
Refills: 0 | Status: DISCONTINUED | OUTPATIENT
Start: 2019-07-09 | End: 2019-07-15

## 2019-07-09 RX ORDER — POLYETHYLENE GLYCOL 3350 17 G/17G
17 POWDER, FOR SOLUTION ORAL DAILY
Refills: 0 | Status: DISCONTINUED | OUTPATIENT
Start: 2019-07-09 | End: 2019-07-15

## 2019-07-09 RX ORDER — SODIUM CHLORIDE 9 MG/ML
1500 INJECTION INTRAMUSCULAR; INTRAVENOUS; SUBCUTANEOUS ONCE
Refills: 0 | Status: COMPLETED | OUTPATIENT
Start: 2019-07-09 | End: 2019-07-09

## 2019-07-09 RX ORDER — POTASSIUM CHLORIDE 20 MEQ
40 PACKET (EA) ORAL ONCE
Refills: 0 | Status: COMPLETED | OUTPATIENT
Start: 2019-07-09 | End: 2019-07-09

## 2019-07-09 RX ORDER — DEXAMETHASONE 0.5 MG/5ML
20 ELIXIR ORAL DAILY
Refills: 0 | Status: COMPLETED | OUTPATIENT
Start: 2019-07-09 | End: 2019-07-11

## 2019-07-09 RX ORDER — DEXTROSE 50 % IN WATER 50 %
15 SYRINGE (ML) INTRAVENOUS ONCE
Refills: 0 | Status: DISCONTINUED | OUTPATIENT
Start: 2019-07-09 | End: 2019-07-15

## 2019-07-09 RX ORDER — FERROUS SULFATE 325(65) MG
325 TABLET ORAL THREE TIMES A DAY
Refills: 0 | Status: DISCONTINUED | OUTPATIENT
Start: 2019-07-09 | End: 2019-07-15

## 2019-07-09 RX ORDER — SENNA PLUS 8.6 MG/1
2 TABLET ORAL AT BEDTIME
Refills: 0 | Status: DISCONTINUED | OUTPATIENT
Start: 2019-07-09 | End: 2019-07-15

## 2019-07-09 RX ORDER — INSULIN LISPRO 100/ML
VIAL (ML) SUBCUTANEOUS
Refills: 0 | Status: DISCONTINUED | OUTPATIENT
Start: 2019-07-09 | End: 2019-07-15

## 2019-07-09 RX ORDER — FOSAPREPITANT DIMEGLUMINE 150 MG/5ML
150 INJECTION, POWDER, LYOPHILIZED, FOR SOLUTION INTRAVENOUS ONCE
Refills: 0 | Status: COMPLETED | OUTPATIENT
Start: 2019-07-09 | End: 2019-07-09

## 2019-07-09 RX ORDER — CISPLATIN 1 MG/ML
135 INJECTION, SOLUTION INTRAVENOUS ONCE
Refills: 0 | Status: COMPLETED | OUTPATIENT
Start: 2019-07-09 | End: 2019-07-10

## 2019-07-09 RX ORDER — ONDANSETRON 8 MG/1
4 TABLET, FILM COATED ORAL ONCE
Refills: 0 | Status: COMPLETED | OUTPATIENT
Start: 2019-07-09 | End: 2019-07-09

## 2019-07-09 RX ORDER — CHLORHEXIDINE GLUCONATE 213 G/1000ML
1 SOLUTION TOPICAL
Refills: 0 | Status: DISCONTINUED | OUTPATIENT
Start: 2019-07-10 | End: 2019-07-15

## 2019-07-09 RX ORDER — ENOXAPARIN SODIUM 100 MG/ML
40 INJECTION SUBCUTANEOUS EVERY 24 HOURS
Refills: 0 | Status: DISCONTINUED | OUTPATIENT
Start: 2019-07-09 | End: 2019-07-15

## 2019-07-09 RX ORDER — DEXTROSE 50 % IN WATER 50 %
25 SYRINGE (ML) INTRAVENOUS ONCE
Refills: 0 | Status: DISCONTINUED | OUTPATIENT
Start: 2019-07-09 | End: 2019-07-15

## 2019-07-09 RX ORDER — GLUCAGON INJECTION, SOLUTION 0.5 MG/.1ML
1 INJECTION, SOLUTION SUBCUTANEOUS ONCE
Refills: 0 | Status: DISCONTINUED | OUTPATIENT
Start: 2019-07-09 | End: 2019-07-15

## 2019-07-09 RX ORDER — FLUOROURACIL 50 MG/ML
1840 INJECTION, SOLUTION INTRAVENOUS DAILY
Refills: 0 | Status: COMPLETED | OUTPATIENT
Start: 2019-07-09 | End: 2019-07-13

## 2019-07-09 RX ORDER — DOCUSATE SODIUM 100 MG
100 CAPSULE ORAL THREE TIMES A DAY
Refills: 0 | Status: DISCONTINUED | OUTPATIENT
Start: 2019-07-09 | End: 2019-07-15

## 2019-07-09 RX ORDER — ONDANSETRON 8 MG/1
16 TABLET, FILM COATED ORAL ONCE
Refills: 0 | Status: COMPLETED | OUTPATIENT
Start: 2019-07-09 | End: 2019-07-09

## 2019-07-09 RX ORDER — IBUPROFEN 200 MG
200 TABLET ORAL ONCE
Refills: 0 | Status: COMPLETED | OUTPATIENT
Start: 2019-07-09 | End: 2019-07-09

## 2019-07-09 RX ORDER — SODIUM CHLORIDE 9 MG/ML
1000 INJECTION, SOLUTION INTRAVENOUS
Refills: 0 | Status: DISCONTINUED | OUTPATIENT
Start: 2019-07-09 | End: 2019-07-15

## 2019-07-09 RX ORDER — DOCETAXEL 20 MG/ML
135 INJECTION, SOLUTION, CONCENTRATE INTRAVENOUS ONCE
Refills: 0 | Status: COMPLETED | OUTPATIENT
Start: 2019-07-09 | End: 2019-07-10

## 2019-07-09 RX ADMIN — SODIUM CHLORIDE 500 MILLILITER(S): 9 INJECTION INTRAMUSCULAR; INTRAVENOUS; SUBCUTANEOUS at 18:17

## 2019-07-09 RX ADMIN — SODIUM CHLORIDE 75 MILLILITER(S): 9 INJECTION INTRAMUSCULAR; INTRAVENOUS; SUBCUTANEOUS at 18:17

## 2019-07-09 RX ADMIN — ENOXAPARIN SODIUM 40 MILLIGRAM(S): 100 INJECTION SUBCUTANEOUS at 18:15

## 2019-07-09 RX ADMIN — Medication 220 MILLIGRAM(S): at 21:47

## 2019-07-09 RX ADMIN — SENNA PLUS 2 TABLET(S): 8.6 TABLET ORAL at 22:39

## 2019-07-09 RX ADMIN — Medication 325 MILLIGRAM(S): at 11:27

## 2019-07-09 RX ADMIN — FOSAPREPITANT DIMEGLUMINE 310 MILLIGRAM(S): 150 INJECTION, POWDER, LYOPHILIZED, FOR SOLUTION INTRAVENOUS at 22:53

## 2019-07-09 RX ADMIN — Medication 100 MILLIGRAM(S): at 22:39

## 2019-07-09 RX ADMIN — ONDANSETRON 4 MILLIGRAM(S): 8 TABLET, FILM COATED ORAL at 11:27

## 2019-07-09 RX ADMIN — ONDANSETRON 232 MILLIGRAM(S): 8 TABLET, FILM COATED ORAL at 22:19

## 2019-07-09 RX ADMIN — Medication 40 MILLIEQUIVALENT(S): at 18:16

## 2019-07-09 RX ADMIN — Medication 325 MILLIGRAM(S): at 22:39

## 2019-07-09 RX ADMIN — Medication 100 GRAM(S): at 03:30

## 2019-07-09 NOTE — PROGRESS NOTE ADULT - PROBLEM SELECTOR PLAN 2
Patient has iron-deficiency anemia.  - H/H is 7.3/23.7  - 325mg ferrous sulfate TID  - bowel regimen: senna colace

## 2019-07-09 NOTE — PROGRESS NOTE ADULT - SUBJECTIVE AND OBJECTIVE BOX
Heme/Onc Progress Note (Dr. Meeks )  Discussed with Dr. Meeks and plan reviewed with the primary team.    The patient was seen and examined.      38 yo woman who was admitted yesterday for induction chemotherapy for a large left neck squamous cell carcinoma. A neck CT scan on 11/10/16 revealed a 3.1x2.9x5.0 cm multiloculated rim-enhancing fluid collection within the left level IIb soft tissues just deep to the sternocleidomastoid muscle. DDx included abscess, suppurative adenitis or infected second brachial cleft cyst. The patient was treated with antibiotics and some white material was drained from the lesion. The lesion, according to the patient came back in . She again took antibiotic therapy. A CT scan was done but no additional biopsy was obtained. The patient saw Dr. Weller in early . A biopsy was obtained on 18.   This revealed a squamous cell carcinoma, HPV related. The patient was seen by Dr. Alvarado and Dr. Londono. She was referred to me but did not make an appointment. She finally saw Constantino Lazcano on  and received her 1st cycle of induction DCF. She had a remarkable response in primary left neck mass. She notes having bilateral leg swelling and stomatitis at home.    She was admitted yesterday for 2nd cycle of induction DCF.     ROS is otherwise negative.      Medications:  MEDICATIONS  (STANDING):  docusate sodium 100 milliGRAM(s) Oral three times a day  enoxaparin Injectable 40 milliGRAM(s) SubCutaneous every 24 hours  ferrous    sulfate 325 milliGRAM(s) Oral three times a day  potassium chloride    Tablet ER 40 milliEquivalent(s) Oral once  senna 2 Tablet(s) Oral at bedtime  sodium chloride 0.9%. 1000 milliLiter(s) (75 mL/Hr) IV Continuous <Continuous>    MEDICATIONS  (PRN):    enoxaparin Injectable 40 milliGRAM(s) SubCutaneous every 24 hours          PHYSICAL EXAM:    T(F): 98.1 (19 @ 05:27), Max: 98.1 (19 @ 05:27)  HR: 74 (19 @ 05:27) (74 - 88)  BP: 121/64 (19 @ 05:27) (119/77 - 127/85)  RR: 18 (19 @ 05:27) (18 - 24)  SpO2: 100% (19 @ 05:27) (100% - 100%)  Wt(kg): --    Daily     Daily Weight in k (2019 05:27)    Gen: well developed, well nourished, comfortable  HEENT: normocephalic/atraumatic, no conjunctival pallor, no scleral icterus, no oral thrush/mucosal bleeding/mucositis  Neck: supple, no masses, no JVD  Breasts: deferred  Back: nontender  Cardiovascular: RR, nl S1S2, no murmurs/rubs/gallops  Respiratory: clear air entry b/l  Gastrointestinal: BS+, soft, NT/ND, no masses, no splenomegaly, no hepatomegaly, no evidence for ascites  Genitourinary: deferred  Rectal: deferred  Extremities: no clubbing/cyanosis, no edema, no calf tenderness  Vascular:  DP/PT 2+ b/l  Neurological: CN 2-12 grossly intact, no focal deficits  Skin: no rash on visible skin  Lymph Nodes:  no cervical/supraclavicular LAD, no axillary/groin LAD  Musculoskeletal:  full ROM  Psychiatric:  mood stable        Labs:                          7.3    4.26  )-----------( 356      ( 2019 07:27 )             23.7     CBC Full  -  ( 2019 07:27 )  WBC Count : 4.26 K/uL  RBC Count : 2.58 M/uL  Hemoglobin : 7.3 g/dL  Hematocrit : 23.7 %  Platelet Count - Automated : 356 K/uL  Mean Cell Volume : 91.9 fl  Mean Cell Hemoglobin : 28.3 pg  Mean Cell Hemoglobin Concentration : 30.8 gm/dL  Auto Neutrophil # : 2.08 K/uL  Auto Lymphocyte # : 1.64 K/uL  Auto Monocyte # : 0.42 K/uL  Auto Eosinophil # : 0.06 K/uL  Auto Basophil # : 0.03 K/uL  Auto Neutrophil % : 48.8 %  Auto Lymphocyte % : 38.5 %  Auto Monocyte % : 9.9 %  Auto Eosinophil % : 1.4 %  Auto Basophil % : 0.7 %            140  |  107  |  8   ----------------------------<  94  3.6   |  25  |  0.71    Ca    8.5      2019 07:27  Phos  3.4     07-09  Mg     3.0     07-09    TPro  5.3<L>  /  Alb  3.3  /  TBili  <0.2  /  DBili  x   /  AST  18  /  ALT  12  /  AlkPhos  42  07-          Other Labs:    Cultures:    Pathology:    Imaging Studies: Heme/Onc Progress Note (Dr. Meeks )  Discussed with Dr. Meeks and plan reviewed with the primary team.    The patient was seen and examined.      38 yo woman who was admitted yesterday for induction chemotherapy for a large left neck squamous cell carcinoma. A neck CT scan on 11/10/16 revealed a 3.1x2.9x5.0 cm multiloculated rim-enhancing fluid collection within the left level IIb soft tissues just deep to the sternocleidomastoid muscle. DDx included abscess, suppurative adenitis or infected second brachial cleft cyst. The patient was treated with antibiotics and some white material was drained from the lesion. The lesion, according to the patient came back in . She again took antibiotic therapy. A CT scan was done but no additional biopsy was obtained. The patient saw Dr. Weller in early . A biopsy was obtained on 18.   This revealed a squamous cell carcinoma, HPV related. The patient was seen by Dr. Alvarado and Dr. Londono. She was referred to me but did not make an appointment. She finally saw Constantino Lazcano on  and received her 1st cycle of induction DCF. She had a remarkable response in primary left neck mass. She notes having bilateral leg swelling and stomatitis at home.    She was admitted yesterday for 2nd cycle of induction DCF.     ROS is otherwise negative.      Medications:  MEDICATIONS  (STANDING):  docusate sodium 100 milliGRAM(s) Oral three times a day  enoxaparin Injectable 40 milliGRAM(s) SubCutaneous every 24 hours  ferrous    sulfate 325 milliGRAM(s) Oral three times a day  potassium chloride    Tablet ER 40 milliEquivalent(s) Oral once  senna 2 Tablet(s) Oral at bedtime  sodium chloride 0.9%. 1000 milliLiter(s) (75 mL/Hr) IV Continuous <Continuous>    MEDICATIONS  (PRN):    enoxaparin Injectable 40 milliGRAM(s) SubCutaneous every 24 hours          PHYSICAL EXAM:    T(F): 98.1 (19 @ 05:27), Max: 98.1 (19 @ 05:27)  HR: 74 (19 @ 05:27) (74 - 88)  BP: 121/64 (19 @ 05:27) (119/77 - 127/85)  RR: 18 (19 @ 05:27) (18 - 24)  SpO2: 100% (19 @ 05:27) (100% - 100%)  Wt(kg): --    Daily     Daily Weight in k (2019 05:27)    Gen: well developed, well nourished, comfortable  HEENT: normocephalic/atraumatic, no conjunctival pallor, no scleral icterus, no oral thrush/mucosal bleeding/mucositis  Neck: supple, left neck mass flat (significantly decreased) with no evidence of bleeding or purulent drainage  Cardiovascular: RR, nl S1S2, no murmurs/rubs/gallops  Respiratory: clear air entry b/l  Gastrointestinal: BS+, soft, NT/ND, no masses, no splenomegaly, no hepatomegaly, no evidence for ascites  Extremities: no clubbing/cyanosis, no edema, no calf tenderness  Vascular:  DP/PT 2+ b/l  Neurological: CN 2-12 grossly intact, no focal deficits  Skin: no rash on visible skin      Labs:                          7.3    4.26  )-----------( 356      ( 2019 07:27 )             23.7     CBC Full  -  ( 2019 07:27 )  WBC Count : 4.26 K/uL  RBC Count : 2.58 M/uL  Hemoglobin : 7.3 g/dL  Hematocrit : 23.7 %  Platelet Count - Automated : 356 K/uL  Mean Cell Volume : 91.9 fl  Mean Cell Hemoglobin : 28.3 pg  Mean Cell Hemoglobin Concentration : 30.8 gm/dL  Auto Neutrophil # : 2.08 K/uL  Auto Lymphocyte # : 1.64 K/uL  Auto Monocyte # : 0.42 K/uL  Auto Eosinophil # : 0.06 K/uL  Auto Basophil # : 0.03 K/uL  Auto Neutrophil % : 48.8 %  Auto Lymphocyte % : 38.5 %  Auto Monocyte % : 9.9 %  Auto Eosinophil % : 1.4 %  Auto Basophil % : 0.7 %            140  |  107  |  8   ----------------------------<  94  3.6   |  25  |  0.71    Ca    8.5      2019 07:27  Phos  3.4     07-09  Mg     3.0     07-09    TPro  5.3<L>  /  Alb  3.3  /  TBili  <0.2  /  DBili  x   /  AST  18  /  ALT  12  /  AlkPhos  42  -          Other Labs:    Cultures:    Pathology:    Imaging Studies:

## 2019-07-09 NOTE — PROCEDURE NOTE - NSPROCDETAILS_GEN_ALL_CORE
sterile technique, catheter placed/ultrasound guidance/supine position/location identified, draped/prepped, sterile technique used/sterile dressing applied/ultrasound assessment

## 2019-07-09 NOTE — PROGRESS NOTE ADULT - PROBLEM SELECTOR PLAN 1
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 was diagnosed with HPV-associated SCC of L neck (stage [id number]) in 12/2018  - Prior imaging showed encasement of ICA and extension of mass into skin, parotid and submandibular glands w extensive LAD.  - Pt. chemotherapy followed by Dr. Forrest inpatient. Pt. also known to Dr. Alvarado and Dr. Londono  - DCF (docetaxel, cisplatin, 5-FU) first treatment in June 2019  - started NS 75cc/hr  - monitor electrolytes  - PICC already placed

## 2019-07-09 NOTE — PROGRESS NOTE ADULT - SUBJECTIVE AND OBJECTIVE BOX
The patient is readmitted for a second course of induction systemic chemotherapy. The left neck tumor mass has decreased in size considerably. She has had no further bleeding from the neck lesion. She feels well and has been eating. She had leg edema after discharge but this has been improving. She did not take any iron for the first week after discharge. IV hydration is now in progress.    CHEMOTHERAPY REGIMEN:        Day:          1                Diet:  Protocol:   docetaxel, cisplatin, infusional 5-FU                                 IVF:      MEDICATIONS  (STANDING):  sodium chloride 0.9%. 1000 milliLiter(s) (75 mL/Hr) IV Continuous <Continuous>    MEDICATIONS  (PRN):      Allergies    No Known Allergies    Intolerances        DVT Prophylaxis: [ ] YES [x ] NO      Antibiotics: [ ] YES [x ] NO    Pain Scale (1-10):       Location:    Vital Signs Last 24 Hrs  T(C): 36.7 (09 Jul 2019 05:27), Max: 36.7 (08 Jul 2019 19:25)  T(F): 98.1 (09 Jul 2019 05:27), Max: 98.1 (09 Jul 2019 05:27)  HR: 74 (09 Jul 2019 05:27) (74 - 88)  BP: 121/64 (09 Jul 2019 05:27) (119/77 - 127/85)  BP(mean): --  RR: 18 (09 Jul 2019 05:27) (18 - 24)  SpO2: 100% (09 Jul 2019 05:27) (100% - 100%)    Drug Dosing Weight  Height (cm): 165.1 (17 Jun 2019 18:02)  Weight (kg): 73.7 (17 Jun 2019 18:02)  BMI (kg/m2): 27 (17 Jun 2019 18:02)  BSA (m2): 1.81 (17 Jun 2019 18:02)    PHYSICAL EXAM:      Constitutional: feeling better in general.  Eyes: conjunctival pallor.  ENMT: buccal mucosa without lesions. Oropharynx clear.  Neck: the left neck lesion is now flat and continues to decrease in size. The open area looks clean and noninfected. There is no evidence of bleeding.  Breasts: not examined.  Back: no SPT.  Respiratory: chest clear.  Cardiovascular: S1<S2 at apex. RSR.  Gastrointestinal: soft, nontender, active bowel sounds. No palp liver, spleen or masses.  Genitourinary: voiding without difficulty.  Extremities: no leg edema.  Neurological: no gross focal deficits.  Skin: warm and dry. The skin around the tumor mass site remains thick and dark in color.  Lymph Nodes: not palp well with neck dressing in place.  Musculoskeletal: full ROM.  Psychiatric: affect normal.        URINARY CATHETER: [ ] YES [x ] NO     LABS:  CBC Full  -  ( 08 Jul 2019 20:41 )  WBC Count : 5.45 K/uL  RBC Count : 2.71 M/uL  Hemoglobin : 7.5 g/dL  Hematocrit : 25.4 %  Platelet Count - Automated : 368 K/uL  Mean Cell Volume : 93.7 fl  Mean Cell Hemoglobin : 27.7 pg  Mean Cell Hemoglobin Concentration : 29.5 gm/dL  Auto Neutrophil # : 3.44 K/uL  Auto Lymphocyte # : 1.37 K/uL  Auto Monocyte # : 0.53 K/uL  Auto Eosinophil # : 0.02 K/uL  Auto Basophil # : 0.03 K/uL  Auto Neutrophil % : 63.1 %  Auto Lymphocyte % : 25.1 %  Auto Monocyte % : 9.7 %  Auto Eosinophil % : 0.4 %  Auto Basophil % : 0.6 %    07-08    141  |  107  |  11  ----------------------------<  86  3.8   |  23  |  0.79    Ca    8.8      08 Jul 2019 20:41  Phos  4.4     07-08  Mg     1.4     07-08    TPro  5.7<L>  /  Alb  3.7  /  TBili  <0.2  /  DBili  x   /  AST  21  /  ALT  15  /  AlkPhos  54  07-08          CULTURES:    RADIOLOGY & ADDITIONAL STUDIES:

## 2019-07-09 NOTE — PROGRESS NOTE ADULT - ASSESSMENT
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 She tolerated 1st cycle fairly well. She complained of stomatitis and bilateral leg swelling at home after discharge a few weeks back. She has had a significant response in her primary tumor after completion of her 1st cycle.     #Head and Neck Cancer   Remarkable response in primary mass; will proceed with 2nd cycle of induction chemotherapy  -PICC line to be placed today via IR  -CTM CBC, CMP, magnesium and phos for duration of chemotherapy  -Height and weight to be entered by nursing staff, will proceed with cycle 2 of induction chemotherapy with Docetaxel 75 mg/m2 over 1 hour, Cisplatin 75/m2 over 24 hours, and infusional 5-FU 1000 mg/m2 over 96 hours 39 year old F with history of depression and squamous cell carcinoma of the left neck (unclear primary), HPV related being admitted for 2nd cycle of induction chemotherapy. She tolerated 1st cycle fairly well. She complained of stomatitis and bilateral leg swelling at home after discharge a few weeks back. She has had a significant response in her primary tumor after completion of her 1st cycle.     #Head and Neck Cancer   Remarkable response in primary mass; will proceed with 2nd cycle of induction chemotherapy  -PICC line to be placed today via IR  -CTM CBC, CMP, magnesium and phos for duration of chemotherapy  -Height and weight to be entered by nursing staff, will proceed with cycle 2 of induction chemotherapy with Docetaxel 75 mg/m2 over 1 hour, Cisplatin 75/m2 over 24 hours, and infusional 5-FU 1000 mg/m2 over 96 hours  -Pre-hydration with NS at 500 cc/hr x 3 hours for a total of 1.5 L(already written on chemo order form)  -Neulasta to be given 24 hours after completion of chemotherapy    #Anemia  Hb at 7.3 g/dl  -Recommend sttarting patient on ferrous sulfate 325 mg PO once a day  -Transfuse prbc to keep Hb > 7 g/dl or if symptomatic    Case d/w Dr. Meeks

## 2019-07-09 NOTE — CONSULT NOTE ADULT - SUBJECTIVE AND OBJECTIVE BOX
Vascular Access Service Consult Note    39yFemaleHEALTH ISSUES - PROBLEM Dx:  Schizoaffective disorder, unspecified type: Schizoaffective disorder, unspecified type  Lower extremity edema: Lower extremity edema  Edema extremities: Edema extremities  Transition of care performed with sharing of clinical summary: Transition of care performed with sharing of clinical summary  Prophylactic measure: Prophylactic measure  Nutrition, metabolism, and development symptoms: Nutrition, metabolism, and development symptoms  Anemia: Anemia  Depression: Depression  Squamous cell carcinoma of neck: Squamous cell carcinoma of neck             Diagnosis: scc neck    Indications for Vascular Access (Check all that apply)  [  ]  Antibiotic Therapy       Antibiotic Prescribed:              [  ]  IV Hydration  [  ]  Total Parenteral Nutrition  [ x ]  Chemotherapy  [  ]  Difficult Venous Access  [  ]  CVP monitoring  [  ]  Medications with high potential for tissue necrosis on extravasation  [  ]  Other    Screening (Check all that apply)  Previous Radiation to chest  [  ] Yes      [ x ]  No  Breast Cancer                          [  ] Left     [  ]  Right    [ x ]  No  Lymph Node Dissection         [  ] Left     [  ]  Right    [ x ]  No  Pacemaker or ICD                   [  ] Left     [  ]  Right    [ x ]  No  Upper Extremity DVT             [  ] Left     [  ]  Right    [  x]  No  Chronic Kidney Disease         [  ]  Yes     [ x ]  No  Hemodialysis                           [  ]  Yes     [ x ]  No  AV Fistula/ Graft                     [  ]  Left    [  ]  Right    [ x ]  No  Temp>101F in past 24 H       [  ]  Yes     [ x ]  No  H/O PICC/Midline                   [ x ]  Yes     [  ]  No    Lab data:                        7.3    4.26  )-----------( 356      ( 09 Jul 2019 07:27 )             23.7     07-09    140  |  107  |  8   ----------------------------<  94  3.6   |  25  |  0.71    Ca    8.5      09 Jul 2019 07:27  Phos  3.4     07-09  Mg     3.0     07-09    TPro  5.3<L>  /  Alb  3.3  /  TBili  <0.2  /  DBili  x   /  AST  18  /  ALT  12  /  AlkPhos  42  07-09              I have reviewed the chart, interviewed and examined the patient and determined that this patient:  [ x ] Is a candidate for a PICC line  [  ] Is a candidate for a Midline  [  ] Is not a candidate for vascular access device (reason)    Lumens:    [  ] Single  [x  ] Double

## 2019-07-09 NOTE — PROGRESS NOTE ADULT - PROBLEM SELECTOR PLAN 3
Patient reports onset after initial chemotherapy treatment. Currently mild, and resolves with elevation.  - VSS, denies SOB, lungs clear bilaterally  - f/u ECHO  - f/u EKG

## 2019-07-09 NOTE — DISCHARGE NOTE PROVIDER - CARE PROVIDER_API CALL
Luther Meeks)  Hematology; Internal Medicine; Medical Oncology  [address] [place] [address]  Phone: [phone]  Fax: [phone]  Follow Up Time: Luther Meeks)  Hematology; Internal Medicine; Medical Oncology  [address] [place] [address]  Phone: [phone]  Fax: [phone]  Follow Up Time: 1 week

## 2019-07-09 NOTE — PROGRESS NOTE ADULT - ATTENDING COMMENTS
Will continue induction chemotherapy as above. Please order po iron therapy for the patient. Would also begin heparin or enoxaparin for blood clot prevention. Monitor blood counts and chems. Replace electrolytes as required. For PICC line placement to facilitate the administration of chemotherapy.

## 2019-07-09 NOTE — PROGRESS NOTE ADULT - SUBJECTIVE AND OBJECTIVE BOX
OVERNIGHT EVENTS: NAOE    INTERVAL EVENTS: TRANG    VITAL SIGNS:  Vital Signs Last 24 Hrs  T(C): 36.7 (09 Jul 2019 05:27), Max: 36.7 (08 Jul 2019 19:25)  T(F): 98.1 (09 Jul 2019 05:27), Max: 98.1 (09 Jul 2019 05:27)  HR: 74 (09 Jul 2019 05:27) (74 - 88)  BP: 121/64 (09 Jul 2019 05:27) (119/77 - 127/85)  BP(mean): --  RR: 18 (09 Jul 2019 05:27) (18 - 24)  SpO2: 100% (09 Jul 2019 05:27) (100% - 100%)    PHYSICAL EXAM:    General: in NAD, lying in bed  HEENT: normocephalic, atraumatic; PERRL, anicteric sclera; MMM  Neck: supple, normal range of motion, no JVD, flat, ulcerated lesion on L, L posterior cervical lymphadenopathy  Cardiovascular: +S1/S2, RRR, no M/G/R  Respiratory: clear to auscultation B/L; no wheezing, no rales, no rhonchi  Gastrointestinal: soft, NT/ND; +BSx4, no organomegaly  Extremities: WWP; no edema, clubbing or cyanosis  Vascular: 2+ radial, DP/PT pulses B/L  Neurological: AAOx3; no focal deficits    MEDICATIONS:  MEDICATIONS  (STANDING):  docusate sodium 100 milliGRAM(s) Oral three times a day  enoxaparin Injectable 40 milliGRAM(s) SubCutaneous every 24 hours  ferrous    sulfate 325 milliGRAM(s) Oral three times a day  potassium chloride    Tablet ER 40 milliEquivalent(s) Oral once  senna 2 Tablet(s) Oral at bedtime  sodium chloride 0.9%. 1000 milliLiter(s) (75 mL/Hr) IV Continuous <Continuous>    MEDICATIONS  (PRN):      ALLERGIES:  Allergies    No Known Allergies    Intolerances        LABS:                        7.3    4.26  )-----------( 356      ( 09 Jul 2019 07:27 )             23.7     07-09    140  |  107  |  8   ----------------------------<  94  3.6   |  25  |  0.71    Ca    8.5      09 Jul 2019 07:27  Phos  3.4     07-09  Mg     3.0     07-09    TPro  5.3<L>  /  Alb  3.3  /  TBili  <0.2  /  DBili  x   /  AST  18  /  ALT  12  /  AlkPhos  42  07-09        CAPILLARY BLOOD GLUCOSE          RADIOLOGY & ADDITIONAL TESTS: Reviewed. OVERNIGHT EVENTS: NAOE    INTERVAL EVENTS: TRANG. Pt. endorses no acute complaints, says that lower leg swelling has improved since she's been lying in bed overnight.    VITAL SIGNS:  Vital Signs Last 24 Hrs  T(C): 36.7 (09 Jul 2019 05:27), Max: 36.7 (08 Jul 2019 19:25)  T(F): 98.1 (09 Jul 2019 05:27), Max: 98.1 (09 Jul 2019 05:27)  HR: 74 (09 Jul 2019 05:27) (74 - 88)  BP: 121/64 (09 Jul 2019 05:27) (119/77 - 127/85)  BP(mean): --  RR: 18 (09 Jul 2019 05:27) (18 - 24)  SpO2: 100% (09 Jul 2019 05:27) (100% - 100%)    PHYSICAL EXAM:    General: in NAD, lying in bed  HEENT: normocephalic, atraumatic; PERRL, anicteric sclera; MMM, evidence of hair loss  Neck: supple, normal range of motion, no JVD, flat, ulcerated lesion on L,  posterior cervical lymphadenopathy  Cardiovascular: +S1/S2, RRR, no M/G/R  Respiratory: clear to auscultation B/L; no wheezing, no rales, no rhonchi  Gastrointestinal: soft, NT/ND; +BSx4  Extremities: WWP; b/l mild LE edema  Vascular: 2+  DP/PT pulses B/L  Neurological: AAOx3; no focal deficits, cranial nerves grossly intact    MEDICATIONS:  MEDICATIONS  (STANDING):  docusate sodium 100 milliGRAM(s) Oral three times a day  enoxaparin Injectable 40 milliGRAM(s) SubCutaneous every 24 hours  ferrous    sulfate 325 milliGRAM(s) Oral three times a day  potassium chloride    Tablet ER 40 milliEquivalent(s) Oral once  senna 2 Tablet(s) Oral at bedtime  sodium chloride 0.9%. 1000 milliLiter(s) (75 mL/Hr) IV Continuous <Continuous>    MEDICATIONS  (PRN):      ALLERGIES:  Allergies    No Known Allergies    Intolerances        LABS:                        7.3    4.26  )-----------( 356      ( 09 Jul 2019 07:27 )             23.7     07-09    140  |  107  |  8   ----------------------------<  94  3.6   |  25  |  0.71    Ca    8.5      09 Jul 2019 07:27  Phos  3.4     07-09  Mg     3.0     07-09    TPro  5.3<L>  /  Alb  3.3  /  TBili  <0.2  /  DBili  x   /  AST  18  /  ALT  12  /  AlkPhos  42  07-09        CAPILLARY BLOOD GLUCOSE          RADIOLOGY & ADDITIONAL TESTS: Reviewed. OVERNIGHT EVENTS: NAOE    INTERVAL EVENTS: TRANG. Pt. endorses no acute complaints, says that lower leg swelling has improved since she's been lying in bed overnight. denies calf pain or thigh pain.     VITAL SIGNS:  Vital Signs Last 24 Hrs  T(C): 36.7 (09 Jul 2019 05:27), Max: 36.7 (08 Jul 2019 19:25)  T(F): 98.1 (09 Jul 2019 05:27), Max: 98.1 (09 Jul 2019 05:27)  HR: 74 (09 Jul 2019 05:27) (74 - 88)  BP: 121/64 (09 Jul 2019 05:27) (119/77 - 127/85)  BP(mean): --  RR: 18 (09 Jul 2019 05:27) (18 - 24)  SpO2: 100% (09 Jul 2019 05:27) (100% - 100%)    PHYSICAL EXAM:    General: in NAD, lying in bed  HEENT: normocephalic, atraumatic; PERRL, anicteric sclera; MMM, evidence of hair loss  Neck: supple, normal range of motion, no JVD, flat, ulcerated violaceous lesion on L,  posterior cervical lymphadenopathy  Cardiovascular: +S1/S2, RRR, no M/G/R  Respiratory: clear to auscultation B/L; no wheezing, no rales, no rhonchi  Gastrointestinal: soft, NT/ND; +BSx4  Extremities: WWP; b/l 1+  LE edema  Vascular: 2+  DP/PT pulses B/L  Neurological: AAOx3; no focal deficits, cranial nerves grossly intact, 5/5 str all 4 ext    MEDICATIONS:  MEDICATIONS  (STANDING):  docusate sodium 100 milliGRAM(s) Oral three times a day  enoxaparin Injectable 40 milliGRAM(s) SubCutaneous every 24 hours  ferrous    sulfate 325 milliGRAM(s) Oral three times a day  potassium chloride    Tablet ER 40 milliEquivalent(s) Oral once  senna 2 Tablet(s) Oral at bedtime  sodium chloride 0.9%. 1000 milliLiter(s) (75 mL/Hr) IV Continuous <Continuous>    MEDICATIONS  (PRN):      ALLERGIES:  Allergies    No Known Allergies    Intolerances        LABS:                        7.3    4.26  )-----------( 356      ( 09 Jul 2019 07:27 )             23.7     07-09    140  |  107  |  8   ----------------------------<  94  3.6   |  25  |  0.71    Ca    8.5      09 Jul 2019 07:27  Phos  3.4     07-09  Mg     3.0     07-09    TPro  5.3<L>  /  Alb  3.3  /  TBili  <0.2  /  DBili  x   /  AST  18  /  ALT  12  /  AlkPhos  42  07-09        CAPILLARY BLOOD GLUCOSE          RADIOLOGY & ADDITIONAL TESTS: Reviewed.

## 2019-07-09 NOTE — DISCHARGE NOTE PROVIDER - NSDCCPCAREPLAN_GEN_ALL_CORE_FT
PRINCIPAL DISCHARGE DIAGNOSIS  Diagnosis: Squamous cell carcinoma of neck  Assessment and Plan of Treatment: You were diagnosed with squamous cell carcinoma of your left neck. You were in the hospital to receive your second round of chemotherapy under the management of Dr. Meeks. Please follow-up with him after you complete your induction chemotherapy ______ days/weeks after discharge.  (f/u w Dr. Meeks as to when he wants follow up) PRINCIPAL DISCHARGE DIAGNOSIS  Diagnosis: Squamous cell carcinoma of neck  Assessment and Plan of Treatment: You were diagnosed with squamous cell carcinoma of your left neck. You were in the hospital to receive your second round of chemotherapy under the management of Dr. Meeks. Please follow-up with him after you complete your induction chemotherapy 3 days after discharge.  (f/u w Dr. Meeks as to when he wants follow up)      SECONDARY DISCHARGE DIAGNOSES  Diagnosis: Neutropenia  Assessment and Plan of Treatment: As a side effect of the chemotherapy, you became neutropenic (or had a lowering of your white blood cell count). This was treated with the neulasta shot 24 hours after your last chemotherapy. Please follow up with a CBC with Dr. Meeks as an outpatient to confirm that your white blood cell counts recovered. PRINCIPAL DISCHARGE DIAGNOSIS  Diagnosis: Squamous cell carcinoma of neck  Assessment and Plan of Treatment: You were diagnosed with squamous cell carcinoma of your left neck. You were in the hospital to receive your second round of chemotherapy under the management of Dr. Meeks. Please follow-up with him after you complete your induction chemotherapy 1 week after discharge.      SECONDARY DISCHARGE DIAGNOSES  Diagnosis: Neutropenia  Assessment and Plan of Treatment: As a side effect of the chemotherapy, you became neutropenic (or had a lowering of your white blood cell count). This was treated with the neulasta shot 24 hours after your last chemotherapy. Please follow up with a CBC with Dr. Meeks as an outpatient to confirm that your white blood cell counts recovered.

## 2019-07-09 NOTE — DISCHARGE NOTE PROVIDER - HOSPITAL COURSE
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 First dose was in June 2019 with good tumor response. 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 First dose was in June 2019 with good tumor response.        In the hospital, a PICC line was placed, and the patient was pre-treated with IVF, decadron and fosaprepitant prior to induction onset. Patient has been stable, afebrile and without nausea (x2d****** delete this part if she suddenly gets nauseous!!!!) throughout the duration of the chemotherapy.         Follow-up based on Dr. 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 First dose was in June 2019 with good tumor response.        In the hospital, a PICC line was placed, and the patient was pre-treated with IVF, decadron and fosaprepitant prior to induction onset. Patient has been stable, afebrile and without nausea throughout the duration of the chemotherapy.         Patient was medically optimized, stable and ready for discharge. Plan of care and return precautions were discussed with the patient who verbally stated understanding. On the day of discharge, the patient was seen and examined. Symptoms improved. Vital signs are stable. Labs and imaging reviewed. Patient is medically optimized and hemodynamically stable. Return precautions discussed, medication teach back done, and importance of physician follow up emphasized. Patient to follow up based on Dr. Meeks's recommendations. The patient verbalized understanding. 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 First dose was in June 2019 with good tumor response. In the hospital, a PICC line was placed, and the patient was pre-treated with IVF, decadron and fosaprepitant prior to induction onset. Patient has been stable, afebrile and without nausea throughout the duration of the chemotherapy.         Patient was medically optimized, stable and ready for discharge. Plan of care and return precautions were discussed with the patient who verbally stated understanding. On the day of discharge, the patient was seen and examined. Symptoms improved. Vital signs are stable. Labs and imaging reviewed. Patient is medically optimized and hemodynamically stable. Return precautions discussed, medication teach back done, and importance of physician follow up emphasized. Patient to follow up based on Dr. Meeks's recommendations. The patient verbalized understanding. 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 First dose was in June 2019 with good tumor response. In the hospital, a PICC line was placed, and the patient was pre-treated with IVF, decadron and fosaprepitant prior to induction onset. Patient has been stable, afebrile and without nausea throughout the duration of the chemotherapy. 24 hours after last chemotherapy session, patient was given neulasta to replenish her blood counts.         1. SCC of the neck, s/p chemotherapy    Patient admitted for second round of DCF induction chemotherapy. She completed the course without complication and her nausea was controlled with a regimen of IV hydration, fosprepitant, and Zofran.         2. Neutropenia    Patient  became neutropenic the last week of her induction chemotherapy. She was afebrile and there was no indications of any infection or infective nidus during this period. She received her neulasta shot 24 hours after her induction completed to help recover her counts. She is schedule to follow up outpatient for CBC and monitoring of her white blood cell count.         Patient was medically optimized, stable and ready for discharge. Plan of care and return precautions were discussed with the patient who verbally stated understanding. On the day of discharge, the patient was seen and examined. Symptoms improved. Vital signs are stable. Labs and imaging reviewed. Patient is medically optimized and hemodynamically stable. Return precautions discussed, medication teach back done, and importance of physician follow up emphasized. Patient to follow up based on Dr. Meeks's recommendations. The patient verbalized understanding.

## 2019-07-09 NOTE — PROGRESS NOTE ADULT - PROBLEM SELECTOR PLAN 2
This is related to her long standing cancer and now recent chemotherapy. Would order po iron therapy for her.

## 2019-07-09 NOTE — PROGRESS NOTE ADULT - ASSESSMENT
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 39F w HPV-associated SCC of L neck ([id number]) treated with DCF (docetaxel, cisplatin, 5-FU, 1st dose 6/2019) and iron-deficiency anemia, now admitted for 2nd round of induction chemotherapy

## 2019-07-10 LAB
24R-OH-CALCIDIOL SERPL-MCNC: 21.5 NG/ML — LOW (ref 30–80)
ALBUMIN SERPL ELPH-MCNC: 3.4 G/DL — SIGNIFICANT CHANGE UP (ref 3.3–5)
ALP SERPL-CCNC: 51 U/L — SIGNIFICANT CHANGE UP (ref 40–120)
ALT FLD-CCNC: 15 U/L — SIGNIFICANT CHANGE UP (ref 10–45)
ANION GAP SERPL CALC-SCNC: 9 MMOL/L — SIGNIFICANT CHANGE UP (ref 5–17)
AST SERPL-CCNC: 19 U/L — SIGNIFICANT CHANGE UP (ref 10–40)
BASOPHILS # BLD AUTO: 0.01 K/UL — SIGNIFICANT CHANGE UP (ref 0–0.2)
BASOPHILS NFR BLD AUTO: 0.4 % — SIGNIFICANT CHANGE UP (ref 0–2)
BILIRUB SERPL-MCNC: <0.2 MG/DL — SIGNIFICANT CHANGE UP (ref 0.2–1.2)
BUN SERPL-MCNC: 11 MG/DL — SIGNIFICANT CHANGE UP (ref 7–23)
CALCIUM SERPL-MCNC: 8.4 MG/DL — SIGNIFICANT CHANGE UP (ref 8.4–10.5)
CHLORIDE SERPL-SCNC: 109 MMOL/L — HIGH (ref 96–108)
CO2 SERPL-SCNC: 22 MMOL/L — SIGNIFICANT CHANGE UP (ref 22–31)
CREAT SERPL-MCNC: 0.68 MG/DL — SIGNIFICANT CHANGE UP (ref 0.5–1.3)
EOSINOPHIL # BLD AUTO: 0 K/UL — SIGNIFICANT CHANGE UP (ref 0–0.5)
EOSINOPHIL NFR BLD AUTO: 0 % — SIGNIFICANT CHANGE UP (ref 0–6)
FOLATE SERPL-MCNC: 13.6 NG/ML — SIGNIFICANT CHANGE UP
GLUCOSE BLDC GLUCOMTR-MCNC: 117 MG/DL — HIGH (ref 70–99)
GLUCOSE BLDC GLUCOMTR-MCNC: 123 MG/DL — HIGH (ref 70–99)
GLUCOSE BLDC GLUCOMTR-MCNC: 167 MG/DL — HIGH (ref 70–99)
GLUCOSE BLDC GLUCOMTR-MCNC: 175 MG/DL — HIGH (ref 70–99)
GLUCOSE SERPL-MCNC: 215 MG/DL — HIGH (ref 70–99)
HCT VFR BLD CALC: 27.7 % — LOW (ref 34.5–45)
HGB BLD-MCNC: 8.3 G/DL — LOW (ref 11.5–15.5)
IMM GRANULOCYTES NFR BLD AUTO: 1.1 % — SIGNIFICANT CHANGE UP (ref 0–1.5)
LYMPHOCYTES # BLD AUTO: 0.25 K/UL — LOW (ref 1–3.3)
LYMPHOCYTES # BLD AUTO: 8.9 % — LOW (ref 13–44)
MAGNESIUM SERPL-MCNC: 1.7 MG/DL — SIGNIFICANT CHANGE UP (ref 1.6–2.6)
MCHC RBC-ENTMCNC: 27.9 PG — SIGNIFICANT CHANGE UP (ref 27–34)
MCHC RBC-ENTMCNC: 30 GM/DL — LOW (ref 32–36)
MCV RBC AUTO: 93 FL — SIGNIFICANT CHANGE UP (ref 80–100)
MONOCYTES # BLD AUTO: 0.03 K/UL — SIGNIFICANT CHANGE UP (ref 0–0.9)
MONOCYTES NFR BLD AUTO: 1.1 % — LOW (ref 2–14)
NEUTROPHILS # BLD AUTO: 2.49 K/UL — SIGNIFICANT CHANGE UP (ref 1.8–7.4)
NEUTROPHILS NFR BLD AUTO: 88.5 % — HIGH (ref 43–77)
NRBC # BLD: 0 /100 WBCS — SIGNIFICANT CHANGE UP (ref 0–0)
PHOSPHATE SERPL-MCNC: 1.2 MG/DL — LOW (ref 2.5–4.5)
PLATELET # BLD AUTO: 367 K/UL — SIGNIFICANT CHANGE UP (ref 150–400)
POTASSIUM SERPL-MCNC: 4.1 MMOL/L — SIGNIFICANT CHANGE UP (ref 3.5–5.3)
POTASSIUM SERPL-SCNC: 4.1 MMOL/L — SIGNIFICANT CHANGE UP (ref 3.5–5.3)
PROT SERPL-MCNC: 5.9 G/DL — LOW (ref 6–8.3)
RBC # BLD: 2.98 M/UL — LOW (ref 3.8–5.2)
RBC # FLD: 15.2 % — HIGH (ref 10.3–14.5)
SODIUM SERPL-SCNC: 140 MMOL/L — SIGNIFICANT CHANGE UP (ref 135–145)
URATE SERPL-MCNC: 2.3 MG/DL — LOW (ref 2.5–7)
VIT B12 SERPL-MCNC: >2000 PG/ML — HIGH (ref 232–1245)
WBC # BLD: 2.69 K/UL — LOW (ref 3.8–10.5)
WBC # FLD AUTO: 2.69 K/UL — LOW (ref 3.8–10.5)

## 2019-07-10 PROCEDURE — 99233 SBSQ HOSP IP/OBS HIGH 50: CPT

## 2019-07-10 RX ORDER — ONDANSETRON 8 MG/1
4 TABLET, FILM COATED ORAL ONCE
Refills: 0 | Status: DISCONTINUED | OUTPATIENT
Start: 2019-07-10 | End: 2019-07-10

## 2019-07-10 RX ORDER — SODIUM,POTASSIUM PHOSPHATES 278-250MG
1 POWDER IN PACKET (EA) ORAL
Refills: 0 | Status: COMPLETED | OUTPATIENT
Start: 2019-07-10 | End: 2019-07-10

## 2019-07-10 RX ORDER — MAGNESIUM SULFATE 500 MG/ML
2 VIAL (ML) INJECTION EVERY 4 HOURS
Refills: 0 | Status: COMPLETED | OUTPATIENT
Start: 2019-07-10 | End: 2019-07-10

## 2019-07-10 RX ORDER — ONDANSETRON 8 MG/1
4 TABLET, FILM COATED ORAL ONCE
Refills: 0 | Status: COMPLETED | OUTPATIENT
Start: 2019-07-10 | End: 2019-07-10

## 2019-07-10 RX ADMIN — CISPLATIN 47.29 MILLIGRAM(S): 1 INJECTION, SOLUTION INTRAVENOUS at 01:37

## 2019-07-10 RX ADMIN — FLUOROURACIL 43.2 MILLIGRAM(S): 50 INJECTION, SOLUTION INTRAVENOUS at 01:37

## 2019-07-10 RX ADMIN — Medication 325 MILLIGRAM(S): at 06:21

## 2019-07-10 RX ADMIN — Medication 50 GRAM(S): at 10:31

## 2019-07-10 RX ADMIN — DOCETAXEL 256.75 MILLIGRAM(S): 20 INJECTION, SOLUTION, CONCENTRATE INTRAVENOUS at 00:04

## 2019-07-10 RX ADMIN — Medication 1 PACKET(S): at 11:46

## 2019-07-10 RX ADMIN — CHLORHEXIDINE GLUCONATE 1 APPLICATION(S): 213 SOLUTION TOPICAL at 06:22

## 2019-07-10 RX ADMIN — SODIUM CHLORIDE 75 MILLILITER(S): 9 INJECTION INTRAMUSCULAR; INTRAVENOUS; SUBCUTANEOUS at 10:28

## 2019-07-10 RX ADMIN — ENOXAPARIN SODIUM 40 MILLIGRAM(S): 100 INJECTION SUBCUTANEOUS at 18:11

## 2019-07-10 RX ADMIN — ONDANSETRON 4 MILLIGRAM(S): 8 TABLET, FILM COATED ORAL at 19:52

## 2019-07-10 RX ADMIN — Medication 325 MILLIGRAM(S): at 15:47

## 2019-07-10 RX ADMIN — Medication 1 PACKET(S): at 10:28

## 2019-07-10 RX ADMIN — Medication 2: at 08:54

## 2019-07-10 RX ADMIN — Medication 100 MILLIGRAM(S): at 15:48

## 2019-07-10 RX ADMIN — Medication 1 PACKET(S): at 18:11

## 2019-07-10 RX ADMIN — POLYETHYLENE GLYCOL 3350 17 GRAM(S): 17 POWDER, FOR SOLUTION ORAL at 11:46

## 2019-07-10 RX ADMIN — Medication 100 MILLIGRAM(S): at 06:21

## 2019-07-10 RX ADMIN — Medication 2: at 12:58

## 2019-07-10 RX ADMIN — Medication 50 GRAM(S): at 15:48

## 2019-07-10 NOTE — DIETITIAN INITIAL EVALUATION ADULT. - ENERGY NEEDS
ABW used for calculations as pt between % of IBW.   ABW 72.8kg, IBW 61kg, 118% IBW, ht 67", BMI 25.1   Nutrient needs based on [facility] standards of care for maintenance in adults, adjusted per onc guidelines

## 2019-07-10 NOTE — PROGRESS NOTE ADULT - ASSESSMENT
39F with HPV-associated SCC of the neck admitted for her second round of DCF induction chemotherapy, currently stable and tolerating the chemotherapy.

## 2019-07-10 NOTE — PROGRESS NOTE ADULT - SUBJECTIVE AND OBJECTIVE BOX
The patient had a headache yesterday and some vomiting. She felt it was a migraine headache. This passed. She feels okay this am. Chemotherapy is now running.    CHEMOTHERAPY REGIMEN:        Day:           2               Diet:  Protocol:       DCF                             IVF:      MEDICATIONS  (STANDING):  chlorhexidine 2% Cloths 1 Application(s) Topical <User Schedule>  dexamethasone  IVPB 20 milliGRAM(s) IV Intermittent daily  dextrose 5%. 1000 milliLiter(s) (50 mL/Hr) IV Continuous <Continuous>  dextrose 50% Injectable 25 Gram(s) IV Push once  dextrose 50% Injectable 25 Gram(s) IV Push once  docusate sodium 100 milliGRAM(s) Oral three times a day  enoxaparin Injectable 40 milliGRAM(s) SubCutaneous every 24 hours  ferrous    sulfate 325 milliGRAM(s) Oral three times a day  fluorouracil IVPB (eMAR) 1840 milliGRAM(s) IV Intermittent daily  insulin lispro (HumaLOG) corrective regimen sliding scale   SubCutaneous four times a day before meals  polyethylene glycol 3350 17 Gram(s) Oral daily  senna 2 Tablet(s) Oral at bedtime  sodium chloride 0.9%. 1000 milliLiter(s) (75 mL/Hr) IV Continuous <Continuous>    MEDICATIONS  (PRN):  dextrose 40% Gel 15 Gram(s) Oral once PRN Blood Glucose LESS THAN 70 milliGRAM(s)/deciliter  glucagon  Injectable 1 milliGRAM(s) IntraMuscular once PRN Glucose LESS THAN 70 milligrams/deciliter  sodium chloride 0.9% lock flush 10 milliLiter(s) IV Push every 1 hour PRN Pre/post blood products, medications, blood draw, and to maintain line patency      Allergies    No Known Allergies    Intolerances        DVT Prophylaxis: [x ] YES [ ] NO      Antibiotics: [ ] YES [x ] NO    Pain Scale (1-10):       Location:    Vital Signs Last 24 Hrs  T(C): 36.7 (10 Jul 2019 05:12), Max: 36.7 (10 Jul 2019 05:12)  T(F): 98 (10 Jul 2019 05:12), Max: 98 (10 Jul 2019 05:12)  HR: 63 (10 Jul 2019 05:12) (60 - 66)  BP: 129/82 (10 Jul 2019 05:12) (111/69 - 135/85)  BP(mean): --  RR: 16 (10 Jul 2019 05:12) (16 - 20)  SpO2: 97% (10 Jul 2019 05:12) (97% - 99%)    Drug Dosing Weight  Height (cm): 170.2 (09 Jul 2019 07:25)  Weight (kg): 72.83 (09 Jul 2019 09:12)  BMI (kg/m2): 25.1 (09 Jul 2019 09:12)  BSA (m2): 1.84 (09 Jul 2019 09:12)    PHYSICAL EXAM:      Constitutional: tolerating chemotherapy well thus far.  Eyes: conjunctival pallor.  ENMT: buccal mucosa without lesions.  Neck: dressing in place over the left neck tumor site.    Back: no SPT.  Respiratory: chest clear.  Cardiovascular: S1>S2 at a[ex. RSR.  Gastrointestinal: soft, nontender, active bowel sounds.    Genitourinary: voiding without difficulty.  Extremities: no leg edema.  Vascular: radial pulses equal bilaterally.  Neurological: no gross focal deficits.  Skin: warm and dry.  Lymph Nodes: none palp.  Musculoskeletal: full ROM.  Psychiatric: affect normal.        URINARY CATHETER: [ ] YES [x ] NO     LABS:  CBC Full  -  ( 10 Jul 2019 05:58 )  WBC Count : 2.69 K/uL  RBC Count : 2.98 M/uL  Hemoglobin : 8.3 g/dL  Hematocrit : 27.7 %  Platelet Count - Automated : 367 K/uL  Mean Cell Volume : 93.0 fl  Mean Cell Hemoglobin : 27.9 pg  Mean Cell Hemoglobin Concentration : 30.0 gm/dL  Auto Neutrophil # : x  Auto Lymphocyte # : x  Auto Monocyte # : x  Auto Eosinophil # : x  Auto Basophil # : x  Auto Neutrophil % : x  Auto Lymphocyte % : x  Auto Monocyte % : x  Auto Eosinophil % : x  Auto Basophil % : x    07-09    140  |  107  |  8   ----------------------------<  94  3.6   |  25  |  0.71    Ca    8.5      09 Jul 2019 07:27  Phos  3.4     07-09  Mg     3.0     07-09    TPro  5.3<L>  /  Alb  3.3  /  TBili  <0.2  /  DBili  x   /  AST  18  /  ALT  12  /  AlkPhos  42  07-09          CULTURES:    RADIOLOGY & ADDITIONAL STUDIES: The patient had a headache yesterday and some vomiting. She felt it was a migraine headache. This passed. She feels okay this am. Chemotherapy is now running.    CHEMOTHERAPY REGIMEN:        Day:           2               Diet:  Protocol:       DCF                             IVF:      MEDICATIONS  (STANDING):  chlorhexidine 2% Cloths 1 Application(s) Topical <User Schedule>  dexamethasone  IVPB 20 milliGRAM(s) IV Intermittent daily  dextrose 5%. 1000 milliLiter(s) (50 mL/Hr) IV Continuous <Continuous>  dextrose 50% Injectable 25 Gram(s) IV Push once  dextrose 50% Injectable 25 Gram(s) IV Push once  docusate sodium 100 milliGRAM(s) Oral three times a day  enoxaparin Injectable 40 milliGRAM(s) SubCutaneous every 24 hours  ferrous    sulfate 325 milliGRAM(s) Oral three times a day  fluorouracil IVPB (eMAR) 1840 milliGRAM(s) IV Intermittent daily  insulin lispro (HumaLOG) corrective regimen sliding scale   SubCutaneous four times a day before meals  polyethylene glycol 3350 17 Gram(s) Oral daily  senna 2 Tablet(s) Oral at bedtime  sodium chloride 0.9%. 1000 milliLiter(s) (75 mL/Hr) IV Continuous <Continuous>    MEDICATIONS  (PRN):  dextrose 40% Gel 15 Gram(s) Oral once PRN Blood Glucose LESS THAN 70 milliGRAM(s)/deciliter  glucagon  Injectable 1 milliGRAM(s) IntraMuscular once PRN Glucose LESS THAN 70 milligrams/deciliter  sodium chloride 0.9% lock flush 10 milliLiter(s) IV Push every 1 hour PRN Pre/post blood products, medications, blood draw, and to maintain line patency      Allergies    No Known Allergies    Intolerances        DVT Prophylaxis: [x ] YES [ ] NO      Antibiotics: [ ] YES [x ] NO    Pain Scale (1-10):       Location:    Vital Signs Last 24 Hrs  T(C): 36.7 (10 Jul 2019 05:12), Max: 36.7 (10 Jul 2019 05:12)  T(F): 98 (10 Jul 2019 05:12), Max: 98 (10 Jul 2019 05:12)  HR: 63 (10 Jul 2019 05:12) (60 - 66)  BP: 129/82 (10 Jul 2019 05:12) (111/69 - 135/85)  BP(mean): --  RR: 16 (10 Jul 2019 05:12) (16 - 20)  SpO2: 97% (10 Jul 2019 05:12) (97% - 99%)    Drug Dosing Weight  Height (cm): 170.2 (09 Jul 2019 07:25)  Weight (kg): 72.83 (09 Jul 2019 09:12)  BMI (kg/m2): 25.1 (09 Jul 2019 09:12)  BSA (m2): 1.84 (09 Jul 2019 09:12)    PHYSICAL EXAM:      Constitutional: tolerating chemotherapy well thus far.  Eyes: conjunctival pallor.  ENMT: buccal mucosa without lesions.  Neck: dressing in place over the left neck tumor site.    Back: no SPT.  Respiratory: chest clear.  Cardiovascular: S1>S2 at apex. RSR.  Gastrointestinal: soft, nontender, active bowel sounds.    Genitourinary: voiding without difficulty.  Extremities: no leg edema.  Vascular: radial pulses equal bilaterally.  Neurological: no gross focal deficits.  Skin: warm and dry.  Lymph Nodes: none palp.  Musculoskeletal: full ROM.  Psychiatric: affect normal.        URINARY CATHETER: [ ] YES [x ] NO     LABS:  CBC Full  -  ( 10 Jul 2019 05:58 )  WBC Count : 2.69 K/uL  RBC Count : 2.98 M/uL  Hemoglobin : 8.3 g/dL  Hematocrit : 27.7 %  Platelet Count - Automated : 367 K/uL  Mean Cell Volume : 93.0 fl  Mean Cell Hemoglobin : 27.9 pg  Mean Cell Hemoglobin Concentration : 30.0 gm/dL  Auto Neutrophil # : x  Auto Lymphocyte # : x  Auto Monocyte # : x  Auto Eosinophil # : x  Auto Basophil # : x  Auto Neutrophil % : x  Auto Lymphocyte % : x  Auto Monocyte % : x  Auto Eosinophil % : x  Auto Basophil % : x    07-09    140  |  107  |  8   ----------------------------<  94  3.6   |  25  |  0.71    Ca    8.5      09 Jul 2019 07:27  Phos  3.4     07-09  Mg     3.0     07-09    TPro  5.3<L>  /  Alb  3.3  /  TBili  <0.2  /  DBili  x   /  AST  18  /  ALT  12  /  AlkPhos  42  07-09          CULTURES:    RADIOLOGY & ADDITIONAL STUDIES:

## 2019-07-10 NOTE — PROGRESS NOTE ADULT - PROBLEM SELECTOR PLAN 3
Patient reports onset after initial chemotherapy treatment.   - Not currently an active issue today.  - VSS, denies SOB, lungs clear bilaterally  - EKG showed normal sinus rhythm  - ECHO also normal: EF 69%, no valvular abnormalities  - f/u ECHO  - f/u EKG

## 2019-07-10 NOTE — DIETITIAN INITIAL EVALUATION ADULT. - PROBLEM SELECTOR PLAN 3
Patient with b/l LE edema, mostly at the end of the day or with prolonged sitting. Edema resolves with leg elevation. Likely dependent edema vs venous insufficiency. Will however r/o cardiomyopathy/ CHF as patient on Cisplatin for chemotherapy  - f/u ECHO  - monitor EKG

## 2019-07-10 NOTE — DIETITIAN INITIAL EVALUATION ADULT. - PROBLEM SELECTOR PLAN 7
1) PCP Contacted on Admission: (N) --> Name & Phone #: Dr. Meeks [facility] [phone]  2) Date of Contact with PCP:  3) PCP Contacted at Discharge: (Y/N)  4) Summary of Handoff Given to PCP:   5) Post-Discharge Appointment Date and Location:

## 2019-07-10 NOTE — PROGRESS NOTE ADULT - ASSESSMENT
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 She tolerated 1st cycle fairly well. She complained of stomatitis and bilateral leg swelling at home after discharge a few weeks back. She has had a significant response in her primary tumor after completion of her 1st cycle.     #Head and Neck Cancer   Remarkable response in primary mass; will proceed with 2nd cycle of induction chemotherapy  -C2 day 1-2 today, tolerating chemotherapy well thus far  -Please run NS at 75 cc/hr continuously while on chemotherapy   -CTM CBC, CMP, magnesium and phos for duration of chemotherapy. Phosphate levels low, please replete if not already done   -Neulasta to be given 24 hours after completion of chemotherapy    #Anemia  Hb at 7.3 g/dl, up to 8.3 g/dL today.   -c/w ferrous sulfate, monitor for constipation   -Transfuse prbc to keep Hb > 7 g/dl or if symptomatic    Case d/w Dr. Meeks

## 2019-07-10 NOTE — DIETITIAN INITIAL EVALUATION ADULT. - PROBLEM SELECTOR PLAN 2
Patient with hx of normocytic anemia (Fe studies on last admission with [facility], also found to have low B12 s/p Vit B12 injection), also has a hx of bleeding vessel s/p ligation at [place] (exact history unclear)  - Hgb on June 28: 7.7, now presenting with Hgb 7.5. No s/s of bleeding  - f/u CBC  - maintain active T&S  - transfuse for Hgb < 7

## 2019-07-10 NOTE — DIETITIAN INITIAL EVALUATION ADULT. - PROBLEM SELECTOR PLAN 4
Not on treatment, hasn't seen [facility]. She refused psych eval on last admission in June 2019  - pt to f/u with psych as outpatient    #Schizoaffective disorder  - management as above  - no acute intervention

## 2019-07-10 NOTE — PROGRESS NOTE ADULT - SUBJECTIVE AND OBJECTIVE BOX
OVERNIGHT EVENTS: NAOE    INTERVAL EVENTS: Pt. endorses feeling better. Says that nausea and headache have resolved. Denies any warmth, itching or redness around lesion. Denies leg swelling.      VITAL SIGNS:  Vital Signs Last 24 Hrs  T(C): 36.7 (10 Jul 2019 05:12), Max: 36.7 (10 Jul 2019 05:12)  T(F): 98 (10 Jul 2019 05:12), Max: 98 (10 Jul 2019 05:12)  HR: 63 (10 Jul 2019 05:12) (60 - 66)  BP: 129/82 (10 Jul 2019 05:12) (111/69 - 135/85)  BP(mean): --  RR: 16 (10 Jul 2019 05:12) (16 - 20)  SpO2: 97% (10 Jul 2019 05:12) (97% - 99%)    PHYSICAL EXAM:    General: in NAD, sitting up in bed  HEENT: normocephalic, atraumatic; PERRLA, anicteric sclera  Neck: supple, flat, ulcerated lesion on L side of neck, surrounding area nonerythematous, nonprurulent, bandage c/d/i, L posterior chain cervical lymphadenopathy  Cardiovascular: +S1/S2, RRR, no M/G/R  Respiratory: clear to auscultation B/L; no wheezing, no rales, no rhonchi  Gastrointestinal: soft, NT/ND; +BSx4, no organomegaly  Extremities: WWP; no edema, clubbing or cyanosis  Vascular: 2+ radial, DP/PT pulses B/L  Neurological: AAOx3; no focal deficits, cranial nerves grossly intact    MEDICATIONS:  MEDICATIONS  (STANDING):  chlorhexidine 2% Cloths 1 Application(s) Topical <User Schedule>  dexamethasone  IVPB 20 milliGRAM(s) IV Intermittent daily  dextrose 5%. 1000 milliLiter(s) (50 mL/Hr) IV Continuous <Continuous>  dextrose 50% Injectable 25 Gram(s) IV Push once  dextrose 50% Injectable 25 Gram(s) IV Push once  docusate sodium 100 milliGRAM(s) Oral three times a day  enoxaparin Injectable 40 milliGRAM(s) SubCutaneous every 24 hours  ferrous    sulfate 325 milliGRAM(s) Oral three times a day  fluorouracil IVPB (eMAR) 1840 milliGRAM(s) IV Intermittent daily  insulin lispro (HumaLOG) corrective regimen sliding scale   SubCutaneous four times a day before meals  magnesium sulfate  IVPB 2 Gram(s) IV Intermittent every 4 hours  polyethylene glycol 3350 17 Gram(s) Oral daily  potassium phosphate / sodium phosphate powder 1 Packet(s) Oral three times a day with meals  senna 2 Tablet(s) Oral at bedtime  sodium chloride 0.9%. 1000 milliLiter(s) (75 mL/Hr) IV Continuous <Continuous>    MEDICATIONS  (PRN):  dextrose 40% Gel 15 Gram(s) Oral once PRN Blood Glucose LESS THAN 70 milliGRAM(s)/deciliter  glucagon  Injectable 1 milliGRAM(s) IntraMuscular once PRN Glucose LESS THAN 70 milligrams/deciliter  sodium chloride 0.9% lock flush 10 milliLiter(s) IV Push every 1 hour PRN Pre/post blood products, medications, blood draw, and to maintain line patency      ALLERGIES:  Allergies    No Known Allergies    Intolerances        LABS:                        8.3    2.69  )-----------( 367      ( 10 Jul 2019 05:58 )             27.7     07-10    140  |  109<H>  |  11  ----------------------------<  215<H>  4.1   |  22  |  0.68    Ca    8.4      10 Jul 2019 05:59  Phos  1.2     07-10  Mg     1.7     07-10    TPro  5.9<L>  /  Alb  3.4  /  TBili  <0.2  /  DBili  x   /  AST  19  /  ALT  15  /  AlkPhos  51  07-10        CAPILLARY BLOOD GLUCOSE      POCT Blood Glucose.: 175 mg/dL (10 Jul 2019 07:57)      RADIOLOGY & ADDITIONAL TESTS: Reviewed.

## 2019-07-10 NOTE — PROGRESS NOTE ADULT - SUBJECTIVE AND OBJECTIVE BOX
Heme/Onc Progress Note (Dr. Meeks )  Discussed with Dr. Meeks and plan reviewed with the primary team.    The patient was seen and examined.      Interval hx:   No acute events overnight. She had nausea and headache yesterday which resolved. Docetaxel completed, cis and 5-fu running.     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 A neck CT scan on 11/10/16 revealed a 3.1x2.9x5.0 cm multiloculated rim-enhancing fluid collection within the left level IIb soft tissues just deep to the sternocleidomastoid muscle. DDx included abscess, suppurative adenitis or infected second brachial cleft cyst. The patient was treated with antibiotics and some white material was drained from the lesion. The lesion, according to the patient came back in 2017. She again took antibiotic therapy. A CT scan was done but no additional biopsy was obtained. The patient saw Dr. Weller in early 12/18. A biopsy was obtained on 12/6/18.   This revealed a squamous cell carcinoma, HPV related. The patient was seen by Dr. Alvarado and Dr. Londono. She was referred to me but did not make an appointment. She finally saw Constantino Lazcano on 5/29 and received her 1st cycle of induction DCF. She had a remarkable response in primary left neck mass. She notes having bilateral leg swelling and stomatitis at home.    She was admitted yesterday for 2nd cycle of induction DCF.     ROS is otherwise negative.      Medications:  MEDICATIONS  (STANDING):  chlorhexidine 2% Cloths 1 Application(s) Topical <User Schedule>  dexamethasone  IVPB 20 milliGRAM(s) IV Intermittent daily  dextrose 5%. 1000 milliLiter(s) (50 mL/Hr) IV Continuous <Continuous>  dextrose 50% Injectable 25 Gram(s) IV Push once  dextrose 50% Injectable 25 Gram(s) IV Push once  docusate sodium 100 milliGRAM(s) Oral three times a day  enoxaparin Injectable 40 milliGRAM(s) SubCutaneous every 24 hours  ferrous    sulfate 325 milliGRAM(s) Oral three times a day  fluorouracil IVPB (eMAR) 1840 milliGRAM(s) IV Intermittent daily  insulin lispro (HumaLOG) corrective regimen sliding scale   SubCutaneous four times a day before meals  polyethylene glycol 3350 17 Gram(s) Oral daily  senna 2 Tablet(s) Oral at bedtime  sodium chloride 0.9%. 1000 milliLiter(s) (75 mL/Hr) IV Continuous <Continuous>    MEDICATIONS  (PRN):  dextrose 40% Gel 15 Gram(s) Oral once PRN Blood Glucose LESS THAN 70 milliGRAM(s)/deciliter  glucagon  Injectable 1 milliGRAM(s) IntraMuscular once PRN Glucose LESS THAN 70 milligrams/deciliter  sodium chloride 0.9% lock flush 10 milliLiter(s) IV Push every 1 hour PRN Pre/post blood products, medications, blood draw, and to maintain line patency          PHYSICAL EXAM:    1T(C): 36.8 (07-10-19 @ 11:44), Max: 36.8 (07-10-19 @ 11:44)  T(F): 98.2 (07-10-19 @ 11:44), Max: 98.2 (07-10-19 @ 11:44)  HR: 67 (07-10-19 @ 11:44) (60 - 67)  BP: 137/73 (07-10-19 @ 11:44) (111/69 - 137/73)  ABP: --  ABP(mean): --  RR: 16 (07-10-19 @ 11:44) (16 - 16)  SpO2: 98% (07-10-19 @ 11:44) (97% - 99%)    Gen: well developed, well nourished, comfortable  HEENT: normocephalic/atraumatic, no conjunctival pallor, no scleral icterus, no oral thrush/mucosal bleeding/mucositis  Neck: supple, left neck mass flat (significantly decreased) with no evidence of bleeding or purulent drainage  Cardiovascular: RR, nl S1S2, no murmurs/rubs/gallops  Respiratory: clear air entry b/l  Gastrointestinal: BS+, soft, NT/ND, no masses, no splenomegaly, no hepatomegaly, no evidence for ascites  Extremities: no clubbing/cyanosis, no edema, no calf tenderness                        8.3    2.69  )-----------( 367      ( 10 Jul 2019 05:58 )             27.7         CBC Full  -  ( 10 Jul 2019 05:58 )  WBC Count : 2.69 K/uL  RBC Count : 2.98 M/uL  Hemoglobin : 8.3 g/dL  Hematocrit : 27.7 %  Platelet Count - Automated : 367 K/uL  Mean Cell Volume : 93.0 fl  Mean Cell Hemoglobin : 27.9 pg  Mean Cell Hemoglobin Concentration : 30.0 gm/dL  Auto Neutrophil # : 2.49 K/uL  Auto Lymphocyte # : 0.25 K/uL  Auto Monocyte # : 0.03 K/uL  Auto Eosinophil # : 0.00 K/uL  Auto Basophil # : 0.01 K/uL  Auto Neutrophil % : 88.5 %  Auto Lymphocyte % : 8.9 %  Auto Monocyte % : 1.1 %  Auto Eosinophil % : 0.0 %  Auto Basophil % : 0.4 %        07-10    140  |  109<H>  |  11  ----------------------------<  215<H>  4.1   |  22  |  0.68    Ca    8.4      10 Jul 2019 05:59  Phos  1.2     07-10  Mg     1.7     07-10    TPro  5.9<L>  /  Alb  3.4  /  TBili  <0.2  /  DBili  x   /  AST  19  /  ALT  15  /  AlkPhos  51  07-10        Vascular:  DP/PT 2+ b/l  Neurological: CN 2-12 grossly intact, no focal deficits  Skin: no rash on visible skin      Labs:        Other Labs:    Cultures:    Pathology:    Imaging Studies:

## 2019-07-10 NOTE — DIETITIAN INITIAL EVALUATION ADULT. - OTHER INFO
39F with hx of schizoaffective d/o, HPV-associated SCC of the neck admitted for her second round of DCF induction chemotherapy, currently stable and tolerating the chemotherapy. S/p PICC placement 7/9. Pt reports appetite is good ~75% of breakfast consumed, reports no further issues swallowing, at home takes Boost when she is more active as she feels she is unable to meet needs otherwise. Since chemo initiation reports 20# wt loss but later gaining back, now wt has been stable at 72kg. Pt concerned about thyroid issues with tumor removal + taste alterations. Discussed side effects of treamtment with pt and tips to best meet needs. Did not want ONS while admitted as she feels she is able to meet needs via "whole food" sources. Reports she dislikes fish, mainly consumes chicken. Continues to be managed/ monitored on chemo at this time, will follow per protocol.

## 2019-07-10 NOTE — DIETITIAN INITIAL EVALUATION ADULT. - ADD RECOMMEND
1. Encourage intake through day, honor food preferences 2. Manage pain prn 3. Monitor and replete lytes prn 4. Trend wts

## 2019-07-10 NOTE — PROGRESS NOTE ADULT - PROBLEM SELECTOR PLAN 1
Pt. 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  - Pt. chemotherapy followed by Dr. Meeks inpatient. Pt. also known to Dr. Alvarado and Dr. Londono  - DCF (docetaxel, cisplatin, 5-FU) first treatment in June 2019  - started NS 75cc/hr  - monitor electrolytes  - PICC placed on 7/9/2019  - chemotherapy initiated per Hem/Onc specifications  - currently tolerating well, denies side effects  - will follow up Vit D levels, per Dr. Meeks

## 2019-07-10 NOTE — PROGRESS NOTE ADULT - PROBLEM SELECTOR PLAN 2
Patient has iron-deficiency anemia.  - H/H today is 8.3/27.7  - 325mg ferrous sulfate TID  - bowel regimen: senna, colace, miralax  - ordered Vit B12/Folate levels

## 2019-07-11 LAB
ALBUMIN SERPL ELPH-MCNC: 3.2 G/DL — LOW (ref 3.3–5)
ALP SERPL-CCNC: 41 U/L — SIGNIFICANT CHANGE UP (ref 40–120)
ALT FLD-CCNC: 14 U/L — SIGNIFICANT CHANGE UP (ref 10–45)
ANION GAP SERPL CALC-SCNC: 8 MMOL/L — SIGNIFICANT CHANGE UP (ref 5–17)
AST SERPL-CCNC: 21 U/L — SIGNIFICANT CHANGE UP (ref 10–40)
BASOPHILS # BLD AUTO: 0.02 K/UL — SIGNIFICANT CHANGE UP (ref 0–0.2)
BASOPHILS NFR BLD AUTO: 0.5 % — SIGNIFICANT CHANGE UP (ref 0–2)
BILIRUB SERPL-MCNC: <0.2 MG/DL — SIGNIFICANT CHANGE UP (ref 0.2–1.2)
BUN SERPL-MCNC: 8 MG/DL — SIGNIFICANT CHANGE UP (ref 7–23)
CALCIUM SERPL-MCNC: 7.7 MG/DL — LOW (ref 8.4–10.5)
CHLORIDE SERPL-SCNC: 111 MMOL/L — HIGH (ref 96–108)
CO2 SERPL-SCNC: 22 MMOL/L — SIGNIFICANT CHANGE UP (ref 22–31)
CREAT SERPL-MCNC: 0.67 MG/DL — SIGNIFICANT CHANGE UP (ref 0.5–1.3)
EOSINOPHIL # BLD AUTO: 0.03 K/UL — SIGNIFICANT CHANGE UP (ref 0–0.5)
EOSINOPHIL NFR BLD AUTO: 0.7 % — SIGNIFICANT CHANGE UP (ref 0–6)
GLUCOSE BLDC GLUCOMTR-MCNC: 111 MG/DL — HIGH (ref 70–99)
GLUCOSE BLDC GLUCOMTR-MCNC: 128 MG/DL — HIGH (ref 70–99)
GLUCOSE BLDC GLUCOMTR-MCNC: 144 MG/DL — HIGH (ref 70–99)
GLUCOSE BLDC GLUCOMTR-MCNC: 147 MG/DL — HIGH (ref 70–99)
GLUCOSE SERPL-MCNC: 127 MG/DL — HIGH (ref 70–99)
HCT VFR BLD CALC: 25.2 % — LOW (ref 34.5–45)
HGB BLD-MCNC: 7.8 G/DL — LOW (ref 11.5–15.5)
IMM GRANULOCYTES NFR BLD AUTO: 1.2 % — SIGNIFICANT CHANGE UP (ref 0–1.5)
LYMPHOCYTES # BLD AUTO: 0.25 K/UL — LOW (ref 1–3.3)
LYMPHOCYTES # BLD AUTO: 6 % — LOW (ref 13–44)
MAGNESIUM SERPL-MCNC: 2 MG/DL — SIGNIFICANT CHANGE UP (ref 1.6–2.6)
MCHC RBC-ENTMCNC: 28.3 PG — SIGNIFICANT CHANGE UP (ref 27–34)
MCHC RBC-ENTMCNC: 31 GM/DL — LOW (ref 32–36)
MCV RBC AUTO: 91.3 FL — SIGNIFICANT CHANGE UP (ref 80–100)
MONOCYTES # BLD AUTO: 0.2 K/UL — SIGNIFICANT CHANGE UP (ref 0–0.9)
MONOCYTES NFR BLD AUTO: 4.8 % — SIGNIFICANT CHANGE UP (ref 2–14)
NEUTROPHILS # BLD AUTO: 3.65 K/UL — SIGNIFICANT CHANGE UP (ref 1.8–7.4)
NEUTROPHILS NFR BLD AUTO: 86.8 % — HIGH (ref 43–77)
NRBC # BLD: 0 /100 WBCS — SIGNIFICANT CHANGE UP (ref 0–0)
PHOSPHATE SERPL-MCNC: 2.3 MG/DL — LOW (ref 2.5–4.5)
PLATELET # BLD AUTO: 288 K/UL — SIGNIFICANT CHANGE UP (ref 150–400)
POTASSIUM SERPL-MCNC: 4 MMOL/L — SIGNIFICANT CHANGE UP (ref 3.5–5.3)
POTASSIUM SERPL-SCNC: 4 MMOL/L — SIGNIFICANT CHANGE UP (ref 3.5–5.3)
PROT SERPL-MCNC: 5.3 G/DL — LOW (ref 6–8.3)
RBC # BLD: 2.76 M/UL — LOW (ref 3.8–5.2)
RBC # FLD: 15.3 % — HIGH (ref 10.3–14.5)
SODIUM SERPL-SCNC: 141 MMOL/L — SIGNIFICANT CHANGE UP (ref 135–145)
URATE SERPL-MCNC: 2.5 MG/DL — SIGNIFICANT CHANGE UP (ref 2.5–7)
WBC # BLD: 4.2 K/UL — SIGNIFICANT CHANGE UP (ref 3.8–10.5)
WBC # FLD AUTO: 4.2 K/UL — SIGNIFICANT CHANGE UP (ref 3.8–10.5)

## 2019-07-11 PROCEDURE — 99233 SBSQ HOSP IP/OBS HIGH 50: CPT | Mod: GC

## 2019-07-11 RX ORDER — ONDANSETRON 8 MG/1
4 TABLET, FILM COATED ORAL ONCE
Refills: 0 | Status: DISCONTINUED | OUTPATIENT
Start: 2019-07-11 | End: 2019-07-11

## 2019-07-11 RX ORDER — CHOLECALCIFEROL (VITAMIN D3) 125 MCG
1000 CAPSULE ORAL DAILY
Refills: 0 | Status: DISCONTINUED | OUTPATIENT
Start: 2019-07-11 | End: 2019-07-15

## 2019-07-11 RX ADMIN — Medication 100 MILLIGRAM(S): at 14:06

## 2019-07-11 RX ADMIN — POLYETHYLENE GLYCOL 3350 17 GRAM(S): 17 POWDER, FOR SOLUTION ORAL at 12:32

## 2019-07-11 RX ADMIN — Medication 325 MILLIGRAM(S): at 14:06

## 2019-07-11 RX ADMIN — Medication 325 MILLIGRAM(S): at 21:47

## 2019-07-11 RX ADMIN — Medication 220 MILLIGRAM(S): at 03:20

## 2019-07-11 RX ADMIN — ENOXAPARIN SODIUM 40 MILLIGRAM(S): 100 INJECTION SUBCUTANEOUS at 18:18

## 2019-07-11 RX ADMIN — Medication 100 MILLIGRAM(S): at 21:47

## 2019-07-11 RX ADMIN — FLUOROURACIL 43.2 MILLIGRAM(S): 50 INJECTION, SOLUTION INTRAVENOUS at 03:26

## 2019-07-11 RX ADMIN — Medication 100 MILLIGRAM(S): at 07:13

## 2019-07-11 RX ADMIN — SODIUM CHLORIDE 75 MILLILITER(S): 9 INJECTION INTRAMUSCULAR; INTRAVENOUS; SUBCUTANEOUS at 03:33

## 2019-07-11 RX ADMIN — CHLORHEXIDINE GLUCONATE 1 APPLICATION(S): 213 SOLUTION TOPICAL at 07:13

## 2019-07-11 RX ADMIN — Medication 325 MILLIGRAM(S): at 07:14

## 2019-07-11 RX ADMIN — SENNA PLUS 2 TABLET(S): 8.6 TABLET ORAL at 21:47

## 2019-07-11 NOTE — PROGRESS NOTE ADULT - SUBJECTIVE AND OBJECTIVE BOX
OVERNIGHT EVENTS: NAOE    INTERVAL EVENTS: Patient seen and examined at bedside. Complains of nausea, but reports that it is well controlled with current regimen. Complains of discomfort when eating, particularly with solid foods. Denies any chest pain, shortness of breath, or extremity tingling.     PHYSICAL EXAM:    General: in NAD, sitting up in bed  HEENT: normocephalic, atraumatic; PERRLA, anicteric sclera  Neck: supple, flat, open ulcerated lesion on L side of neck, surrounding area nonerythematous, L posterior chain cervical lymphadenopathy  Cardiovascular: +S1/S2, RRR, no M/G/R  Respiratory: clear to auscultation B/L; no wheezing, no rales, no rhonchi. Reduced inspiratory effort   Gastrointestinal: soft, NT/ND; +BSx4, no organomegaly  Extremities: WWP; no edema, clubbing or cyanosis  Vascular: 2+ radial, DP/PT pulses B/L  Neurological: AAOx3; no focal deficits, cranial nerves grossly intact    Vital Signs Last 12 Hrs  T(F): 98 (07-11-19 @ 06:44), Max: 98.1 (07-10-19 @ 21:07)  HR: 56 (07-11-19 @ 06:44) (56 - 71)  BP: 147/70 (07-11-19 @ 06:44) (123/74 - 147/70)  BP(mean): --  RR: 16 (07-11-19 @ 06:44) (16 - 16)  SpO2: 96% (07-11-19 @ 06:44) (96% - 97%)  I&O's Summary    10 Jul 2019 07:01  -  11 Jul 2019 07:00  --------------------------------------------------------  IN: 1182 mL / OUT: 1650 mL / NET: -468 mL        LABS:                        7.8    4.20  )-----------( 288      ( 11 Jul 2019 06:10 )             25.2     07-11    141  |  111<H>  |  8   ----------------------------<  127<H>  4.0   |  22  |  0.67    Ca    7.7<L>      11 Jul 2019 06:10  Phos  2.3     07-11  Mg     2.0     07-11    TPro  5.3<L>  /  Alb  3.2<L>  /  TBili  <0.2  /  DBili  x   /  AST  21  /  ALT  14  /  AlkPhos  41  07-11          RADIOLOGY & ADDITIONAL TESTS:    MEDICATIONS  (STANDING):  chlorhexidine 2% Cloths 1 Application(s) Topical <User Schedule>  dextrose 5%. 1000 milliLiter(s) (50 mL/Hr) IV Continuous <Continuous>  dextrose 50% Injectable 25 Gram(s) IV Push once  dextrose 50% Injectable 25 Gram(s) IV Push once  docusate sodium 100 milliGRAM(s) Oral three times a day  enoxaparin Injectable 40 milliGRAM(s) SubCutaneous every 24 hours  ferrous    sulfate 325 milliGRAM(s) Oral three times a day  fluorouracil IVPB (eMAR) 1840 milliGRAM(s) IV Intermittent daily  insulin lispro (HumaLOG) corrective regimen sliding scale   SubCutaneous four times a day before meals  ondansetron Injectable 4 milliGRAM(s) IV Push once  ondansetron Injectable 4 milliGRAM(s) IV Push once  polyethylene glycol 3350 17 Gram(s) Oral daily  senna 2 Tablet(s) Oral at bedtime  sodium chloride 0.9%. 1000 milliLiter(s) (75 mL/Hr) IV Continuous <Continuous>    MEDICATIONS  (PRN):  dextrose 40% Gel 15 Gram(s) Oral once PRN Blood Glucose LESS THAN 70 milliGRAM(s)/deciliter  glucagon  Injectable 1 milliGRAM(s) IntraMuscular once PRN Glucose LESS THAN 70 milligrams/deciliter  sodium chloride 0.9% lock flush 10 milliLiter(s) IV Push every 1 hour PRN Pre/post blood products, medications, blood draw, and to maintain line patency

## 2019-07-11 NOTE — PROGRESS NOTE ADULT - ATTENDING COMMENTS
Doing well so far. Continue IV NS at 75 cc/hr around the clock. No need for daily uric acid level. Vit-D level is a bit low. The patient can take Vit-D at home daily. Continue to monitor blood counts and chems and replace as necessary. The patient is encouraged to be up and OOB and to ambulate in the halls.

## 2019-07-11 NOTE — PROGRESS NOTE ADULT - SUBJECTIVE AND OBJECTIVE BOX
The patient is resting comfortably this am. She offers no complaints.    CHEMOTHERAPY REGIMEN:        Day:          3                Diet:  Protocol:    DCF                                IVF:      MEDICATIONS  (STANDING):  chlorhexidine 2% Cloths 1 Application(s) Topical <User Schedule>  dextrose 5%. 1000 milliLiter(s) (50 mL/Hr) IV Continuous <Continuous>  dextrose 50% Injectable 25 Gram(s) IV Push once  dextrose 50% Injectable 25 Gram(s) IV Push once  docusate sodium 100 milliGRAM(s) Oral three times a day  enoxaparin Injectable 40 milliGRAM(s) SubCutaneous every 24 hours  ferrous    sulfate 325 milliGRAM(s) Oral three times a day  fluorouracil IVPB (eMAR) 1840 milliGRAM(s) IV Intermittent daily  insulin lispro (HumaLOG) corrective regimen sliding scale   SubCutaneous four times a day before meals  ondansetron Injectable 4 milliGRAM(s) IV Push once  ondansetron Injectable 4 milliGRAM(s) IV Push once  polyethylene glycol 3350 17 Gram(s) Oral daily  senna 2 Tablet(s) Oral at bedtime  sodium chloride 0.9%. 1000 milliLiter(s) (75 mL/Hr) IV Continuous <Continuous>    MEDICATIONS  (PRN):  dextrose 40% Gel 15 Gram(s) Oral once PRN Blood Glucose LESS THAN 70 milliGRAM(s)/deciliter  glucagon  Injectable 1 milliGRAM(s) IntraMuscular once PRN Glucose LESS THAN 70 milligrams/deciliter  sodium chloride 0.9% lock flush 10 milliLiter(s) IV Push every 1 hour PRN Pre/post blood products, medications, blood draw, and to maintain line patency      Allergies    No Known Allergies    Intolerances        DVT Prophylaxis: [x ] YES [ ] NO      Antibiotics: [ ] YES [x ] NO    Pain Scale (1-10):       Location:    Vital Signs Last 24 Hrs  T(C): 36.7 (10 Jul 2019 21:07), Max: 36.8 (10 Jul 2019 11:44)  T(F): 98.1 (10 Jul 2019 21:07), Max: 98.2 (10 Jul 2019 11:44)  HR: 71 (10 Jul 2019 21:07) (67 - 71)  BP: 123/74 (10 Jul 2019 21:07) (123/74 - 137/73)  BP(mean): --  RR: 16 (10 Jul 2019 21:07) (16 - 16)  SpO2: 97% (10 Jul 2019 21:07) (97% - 98%)    Drug Dosing Weight  Height (cm): 170.2 (09 Jul 2019 07:25)  Weight (kg): 72.83 (09 Jul 2019 09:12)  BMI (kg/m2): 25.1 (09 Jul 2019 09:12)  BSA (m2): 1.84 (09 Jul 2019 09:12)    PHYSICAL EXAM:      Constitutional: tired, tolerating on going chemotherapy well thus far.  Eyes: conjunctival pallor.  ENMT: buccal mucosa without lesions.  Neck: the area posterior to the left neck lesion is hard and firm on palp.  Breasts: not examined.  Respiratory: clear.  Cardiovascular: S1>S2 at apex. RSR.  Gastrointestinal: soft, nontender, active bowel sounds.  Genitourinary: voiding without difficulty.  Extremities: no leg edema.  Neurological: no gross focal deficits.  Skin: warm and dry.  Lymph Nodes: none palp.  Musculoskeletal: full ROM.  Psychiatric: affect normal        URINARY CATHETER: [ ] YES [x ] NO     LABS:  CBC Full  -  ( 11 Jul 2019 06:10 )  WBC Count : 4.20 K/uL  RBC Count : 2.76 M/uL  Hemoglobin : 7.8 g/dL  Hematocrit : 25.2 %  Platelet Count - Automated : 288 K/uL  Mean Cell Volume : 91.3 fl  Mean Cell Hemoglobin : 28.3 pg  Mean Cell Hemoglobin Concentration : 31.0 gm/dL  Auto Neutrophil # : 3.65 K/uL  Auto Lymphocyte # : 0.25 K/uL  Auto Monocyte # : 0.20 K/uL  Auto Eosinophil # : 0.03 K/uL  Auto Basophil # : 0.02 K/uL  Auto Neutrophil % : 86.8 %  Auto Lymphocyte % : 6.0 %  Auto Monocyte % : 4.8 %  Auto Eosinophil % : 0.7 %  Auto Basophil % : 0.5 %    07-10    140  |  109<H>  |  11  ----------------------------<  215<H>  4.1   |  22  |  0.68    Ca    8.4      10 Jul 2019 05:59  Phos  1.2     07-10  Mg     1.7     07-10    TPro  5.9<L>  /  Alb  3.4  /  TBili  <0.2  /  DBili  x   /  AST  19  /  ALT  15  /  AlkPhos  51  07-10          CULTURES:    RADIOLOGY & ADDITIONAL STUDIES:

## 2019-07-11 NOTE — PROGRESS NOTE ADULT - ASSESSMENT
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 She tolerated 1st cycle fairly well. She complained of stomatitis and bilateral leg swelling at home after discharge a few weeks back. She has had a significant response in her primary tumor after completion of her 1st cycle.     #Head and Neck Cancer   Remarkable response in primary mass; will proceed with 2nd cycle of induction chemotherapy  -C2 day 2-3 today, tolerating chemotherapy well thus far  -Please run NS at 75 cc/hr continuously while on chemotherapy   -CTM CBC, CMP, magnesium and phos for duration of chemotherapy  -Neulasta to be given 24 hours after completion of chemotherapy  -VItamin D levels low, recommend starting on supplementation   #Anemia  Hemoglobin slightly trending down but stable and she is asymptomatic  -CTM CBC  -c/w ferrous sulfate, monitor for constipation   -Transfuse prbc to keep Hb > 7 g/dl or if symptomatic    Case d/w Dr. Meeks

## 2019-07-11 NOTE — PROGRESS NOTE ADULT - SUBJECTIVE AND OBJECTIVE BOX
Heme/Onc Progress Note (Dr. Meeks )  Discussed with Dr. Meeks and plan reviewed with the primary team.    The patient was seen and examined.      Interval hx:   No acute events overnight    Initial HPI:  40 yo woman who was admitted yesterday for induction chemotherapy for a large left neck squamous cell carcinoma. A neck CT scan on 11/10/16 revealed a 3.1x2.9x5.0 cm multiloculated rim-enhancing fluid collection within the left level IIb soft tissues just deep to the sternocleidomastoid muscle. DDx included abscess, suppurative adenitis or infected second brachial cleft cyst. The patient was treated with antibiotics and some white material was drained from the lesion. The lesion, according to the patient came back in 2017. She again took antibiotic therapy. A CT scan was done but no additional biopsy was obtained. The patient saw Dr. Weller in early 12/18. A biopsy was obtained on 12/6/18.   This revealed a squamous cell carcinoma, HPV related. The patient was seen by Dr. Alvarado and Dr. Londono. She was referred to me but did not make an appointment. She finally saw Constantino Lazcano on 5/29 and received her 1st cycle of induction DCF. She had a remarkable response in primary left neck mass. She notes having bilateral leg swelling and stomatitis at home.    She was admitted yesterday for 2nd cycle of induction DCF.     ROS is otherwise negative.      Medications:  MEDICATIONS  (STANDING):  chlorhexidine 2% Cloths 1 Application(s) Topical <User Schedule>  dextrose 5%. 1000 milliLiter(s) (50 mL/Hr) IV Continuous <Continuous>  dextrose 50% Injectable 25 Gram(s) IV Push once  dextrose 50% Injectable 25 Gram(s) IV Push once  docusate sodium 100 milliGRAM(s) Oral three times a day  enoxaparin Injectable 40 milliGRAM(s) SubCutaneous every 24 hours  ferrous    sulfate 325 milliGRAM(s) Oral three times a day  fluorouracil IVPB (eMAR) 1840 milliGRAM(s) IV Intermittent daily  insulin lispro (HumaLOG) corrective regimen sliding scale   SubCutaneous four times a day before meals  polyethylene glycol 3350 17 Gram(s) Oral daily  senna 2 Tablet(s) Oral at bedtime  sodium chloride 0.9%. 1000 milliLiter(s) (75 mL/Hr) IV Continuous <Continuous>    MEDICATIONS  (PRN):  dextrose 40% Gel 15 Gram(s) Oral once PRN Blood Glucose LESS THAN 70 milliGRAM(s)/deciliter  glucagon  Injectable 1 milliGRAM(s) IntraMuscular once PRN Glucose LESS THAN 70 milligrams/deciliter  sodium chloride 0.9% lock flush 10 milliLiter(s) IV Push every 1 hour PRN Pre/post blood products, medications, blood draw, and to maintain line patency          PHYSICAL EXAM:    T(C): 36.7 (07-11-19 @ 06:44), Max: 36.7 (07-10-19 @ 21:07)  T(F): 98 (07-11-19 @ 06:44), Max: 98.1 (07-10-19 @ 21:07)  HR: 63 (07-11-19 @ 11:02) (56 - 71)  BP: 128/80 (07-11-19 @ 11:02) (123/74 - 147/70)  ABP: --  ABP(mean): --  RR: 16 (07-11-19 @ 11:02) (16 - 16)  SpO2: 95% (07-11-19 @ 11:02) (95% - 97%)      Gen: well developed, well nourished, comfortable  HEENT: normocephalic/atraumatic, no conjunctival pallor, no scleral icterus, no oral thrush/mucosal bleeding/mucositis  Neck: supple, left neck mass flat (significantly decreased) with no evidence of bleeding or purulent drainage  Cardiovascular: RR, nl S1S2, no murmurs/rubs/gallops  Respiratory: clear air entry b/l  Gastrointestinal: BS+, soft, NT/ND, no masses, no splenomegaly, no hepatomegaly, no evidence for ascites  Extremities: no clubbing/cyanosis, no edema, no calf tenderness      LABS:                                      7.8    4.20  )-----------( 288      ( 11 Jul 2019 06:10 )             25.2         CBC Full  -  ( 11 Jul 2019 06:10 )  WBC Count : 4.20 K/uL  RBC Count : 2.76 M/uL  Hemoglobin : 7.8 g/dL  Hematocrit : 25.2 %  Platelet Count - Automated : 288 K/uL  Mean Cell Volume : 91.3 fl  Mean Cell Hemoglobin : 28.3 pg  Mean Cell Hemoglobin Concentration : 31.0 gm/dL  Auto Neutrophil # : 3.65 K/uL  Auto Lymphocyte # : 0.25 K/uL  Auto Monocyte # : 0.20 K/uL  Auto Eosinophil # : 0.03 K/uL  Auto Basophil # : 0.02 K/uL  Auto Neutrophil % : 86.8 %  Auto Lymphocyte % : 6.0 %  Auto Monocyte % : 4.8 %  Auto Eosinophil % : 0.7 %  Auto Basophil % : 0.5 %        07-11    141  |  111<H>  |  8   ----------------------------<  127<H>  4.0   |  22  |  0.67    Ca    7.7<L>      11 Jul 2019 06:10  Phos  2.3     07-11  Mg     2.0     07-11    TPro  5.3<L>  /  Alb  3.2<L>  /  TBili  <0.2  /  DBili  x   /  AST  21  /  ALT  14  /  AlkPhos  41  07-11          Labs:        Other Labs:    Cultures:    Pathology:    Imaging Studies:

## 2019-07-11 NOTE — PROGRESS NOTE ADULT - ASSESSMENT
39F with HPV-associated SCC of the neck admitted for her second round of DCF induction chemotherapy, currently stable and tolerating the chemotherapy without complaints.

## 2019-07-11 NOTE — PROGRESS NOTE ADULT - PROBLEM SELECTOR PLAN 2
Patient has iron-deficiency anemia.  - H/H today is 7.8/25.2  - 325mg ferrous sulfate TID  - bowel regimen: senna, colace, miralax  - vit b12 >2000  - folate levels wnl (13.6)

## 2019-07-11 NOTE — PROGRESS NOTE ADULT - PROBLEM SELECTOR PLAN 1
Pt. was diagnosed with HPV-associated SCC of L neck (stage [id number]) in 12/2018  - Prior imaging showed encasement of ICA and extension of mass into skin, parotid and submandibular glands w extensive LAD.  - Pt. chemotherapy followed by Dr. Meeks inpatient. Pt. also known to Dr. Alvarado and Dr. Londono  - DCF (docetaxel, cisplatin, 5-FU) first treatment in June 2019  - started NS 75cc/hr  - monitor electrolytes  - PICC placed on 7/9/2019  - chemotherapy initiated per Hem/Onc specifications  - currently tolerating well, denies side effects  - will follow up Vit D levels, per Dr. Meeks Pt. was diagnosed with HPV-associated SCC of L neck (stage [id number]) in 12/2018  - Prior imaging showed encasement of ICA and extension of mass into skin, parotid and submandibular glands w extensive LAD.  - Pt. chemotherapy followed by Dr. Meeks inpatient. Pt. also known to Dr. Alvarado and Dr. Londono  - DCF (docetaxel, cisplatin, 5-FU) first treatment in June 2019  - started NS 75cc/hr  - monitor electrolytes  - PICC placed on 7/9/2019  - chemotherapy initiated per Hem/Onc specifications  - currently tolerating well, denies side effects  - Vit D levels low (21.5)

## 2019-07-12 LAB
ALBUMIN SERPL ELPH-MCNC: 2.9 G/DL — LOW (ref 3.3–5)
ALP SERPL-CCNC: 33 U/L — LOW (ref 40–120)
ALT FLD-CCNC: 19 U/L — SIGNIFICANT CHANGE UP (ref 10–45)
ANION GAP SERPL CALC-SCNC: 6 MMOL/L — SIGNIFICANT CHANGE UP (ref 5–17)
AST SERPL-CCNC: 26 U/L — SIGNIFICANT CHANGE UP (ref 10–40)
BASOPHILS # BLD AUTO: 0.02 K/UL — SIGNIFICANT CHANGE UP (ref 0–0.2)
BASOPHILS NFR BLD AUTO: 0.8 % — SIGNIFICANT CHANGE UP (ref 0–2)
BILIRUB SERPL-MCNC: 0.2 MG/DL — SIGNIFICANT CHANGE UP (ref 0.2–1.2)
BUN SERPL-MCNC: 10 MG/DL — SIGNIFICANT CHANGE UP (ref 7–23)
CALCIUM SERPL-MCNC: 7.4 MG/DL — LOW (ref 8.4–10.5)
CHLORIDE SERPL-SCNC: 108 MMOL/L — SIGNIFICANT CHANGE UP (ref 96–108)
CO2 SERPL-SCNC: 23 MMOL/L — SIGNIFICANT CHANGE UP (ref 22–31)
CREAT SERPL-MCNC: 0.64 MG/DL — SIGNIFICANT CHANGE UP (ref 0.5–1.3)
EOSINOPHIL # BLD AUTO: 0.08 K/UL — SIGNIFICANT CHANGE UP (ref 0–0.5)
EOSINOPHIL NFR BLD AUTO: 3.3 % — SIGNIFICANT CHANGE UP (ref 0–6)
GLUCOSE BLDC GLUCOMTR-MCNC: 103 MG/DL — HIGH (ref 70–99)
GLUCOSE BLDC GLUCOMTR-MCNC: 107 MG/DL — HIGH (ref 70–99)
GLUCOSE BLDC GLUCOMTR-MCNC: 111 MG/DL — HIGH (ref 70–99)
GLUCOSE BLDC GLUCOMTR-MCNC: 97 MG/DL — SIGNIFICANT CHANGE UP (ref 70–99)
GLUCOSE SERPL-MCNC: 103 MG/DL — HIGH (ref 70–99)
HCT VFR BLD CALC: 23.1 % — LOW (ref 34.5–45)
HGB BLD-MCNC: 7.2 G/DL — LOW (ref 11.5–15.5)
IMM GRANULOCYTES NFR BLD AUTO: 0.4 % — SIGNIFICANT CHANGE UP (ref 0–1.5)
LYMPHOCYTES # BLD AUTO: 0.65 K/UL — LOW (ref 1–3.3)
LYMPHOCYTES # BLD AUTO: 27 % — SIGNIFICANT CHANGE UP (ref 13–44)
MAGNESIUM SERPL-MCNC: 1.6 MG/DL — SIGNIFICANT CHANGE UP (ref 1.6–2.6)
MCHC RBC-ENTMCNC: 28.6 PG — SIGNIFICANT CHANGE UP (ref 27–34)
MCHC RBC-ENTMCNC: 31.2 GM/DL — LOW (ref 32–36)
MCV RBC AUTO: 91.7 FL — SIGNIFICANT CHANGE UP (ref 80–100)
MONOCYTES # BLD AUTO: 0.13 K/UL — SIGNIFICANT CHANGE UP (ref 0–0.9)
MONOCYTES NFR BLD AUTO: 5.4 % — SIGNIFICANT CHANGE UP (ref 2–14)
NEUTROPHILS # BLD AUTO: 1.52 K/UL — LOW (ref 1.8–7.4)
NEUTROPHILS NFR BLD AUTO: 63.1 % — SIGNIFICANT CHANGE UP (ref 43–77)
NRBC # BLD: 0 /100 WBCS — SIGNIFICANT CHANGE UP (ref 0–0)
PHOSPHATE SERPL-MCNC: 2.7 MG/DL — SIGNIFICANT CHANGE UP (ref 2.5–4.5)
PLATELET # BLD AUTO: 233 K/UL — SIGNIFICANT CHANGE UP (ref 150–400)
POTASSIUM SERPL-MCNC: 3.6 MMOL/L — SIGNIFICANT CHANGE UP (ref 3.5–5.3)
POTASSIUM SERPL-SCNC: 3.6 MMOL/L — SIGNIFICANT CHANGE UP (ref 3.5–5.3)
PROT SERPL-MCNC: 4.9 G/DL — LOW (ref 6–8.3)
RBC # BLD: 2.52 M/UL — LOW (ref 3.8–5.2)
RBC # FLD: 15.1 % — HIGH (ref 10.3–14.5)
SODIUM SERPL-SCNC: 137 MMOL/L — SIGNIFICANT CHANGE UP (ref 135–145)
WBC # BLD: 2.53 K/UL — LOW (ref 3.8–10.5)
WBC # FLD AUTO: 2.53 K/UL — LOW (ref 3.8–10.5)

## 2019-07-12 PROCEDURE — 99233 SBSQ HOSP IP/OBS HIGH 50: CPT | Mod: GC

## 2019-07-12 RX ORDER — MAGNESIUM SULFATE 500 MG/ML
2 VIAL (ML) INJECTION ONCE
Refills: 0 | Status: COMPLETED | OUTPATIENT
Start: 2019-07-12 | End: 2019-07-12

## 2019-07-12 RX ORDER — POTASSIUM CHLORIDE 20 MEQ
40 PACKET (EA) ORAL ONCE
Refills: 0 | Status: COMPLETED | OUTPATIENT
Start: 2019-07-12 | End: 2019-07-12

## 2019-07-12 RX ORDER — ONDANSETRON 8 MG/1
4 TABLET, FILM COATED ORAL ONCE
Refills: 0 | Status: COMPLETED | OUTPATIENT
Start: 2019-07-12 | End: 2019-07-12

## 2019-07-12 RX ADMIN — ONDANSETRON 4 MILLIGRAM(S): 8 TABLET, FILM COATED ORAL at 00:42

## 2019-07-12 RX ADMIN — CHLORHEXIDINE GLUCONATE 1 APPLICATION(S): 213 SOLUTION TOPICAL at 06:52

## 2019-07-12 RX ADMIN — Medication 100 MILLIGRAM(S): at 11:20

## 2019-07-12 RX ADMIN — Medication 1000 UNIT(S): at 11:20

## 2019-07-12 RX ADMIN — SENNA PLUS 2 TABLET(S): 8.6 TABLET ORAL at 21:57

## 2019-07-12 RX ADMIN — Medication 325 MILLIGRAM(S): at 21:57

## 2019-07-12 RX ADMIN — Medication 325 MILLIGRAM(S): at 06:53

## 2019-07-12 RX ADMIN — Medication 40 MILLIEQUIVALENT(S): at 11:20

## 2019-07-12 RX ADMIN — Medication 325 MILLIGRAM(S): at 11:20

## 2019-07-12 RX ADMIN — FLUOROURACIL 43.2 MILLIGRAM(S): 50 INJECTION, SOLUTION INTRAVENOUS at 03:33

## 2019-07-12 RX ADMIN — Medication 100 MILLIGRAM(S): at 21:57

## 2019-07-12 RX ADMIN — Medication 50 GRAM(S): at 09:06

## 2019-07-12 RX ADMIN — Medication 100 MILLIGRAM(S): at 06:52

## 2019-07-12 RX ADMIN — ENOXAPARIN SODIUM 40 MILLIGRAM(S): 100 INJECTION SUBCUTANEOUS at 18:48

## 2019-07-12 NOTE — PROGRESS NOTE ADULT - SUBJECTIVE AND OBJECTIVE BOX
The patient continues to tolerate ongoing chemotherapy well. She is fatigued. She is having no mouth soreness as yet.    CHEMOTHERAPY REGIMEN:        Day:           4               Diet:  Protocol:      DCF                              IVF:      MEDICATIONS  (STANDING):  chlorhexidine 2% Cloths 1 Application(s) Topical <User Schedule>  cholecalciferol 1000 Unit(s) Oral daily  dextrose 5%. 1000 milliLiter(s) (50 mL/Hr) IV Continuous <Continuous>  dextrose 50% Injectable 25 Gram(s) IV Push once  dextrose 50% Injectable 25 Gram(s) IV Push once  docusate sodium 100 milliGRAM(s) Oral three times a day  enoxaparin Injectable 40 milliGRAM(s) SubCutaneous every 24 hours  ferrous    sulfate 325 milliGRAM(s) Oral three times a day  fluorouracil IVPB (eMAR) 1840 milliGRAM(s) IV Intermittent daily  insulin lispro (HumaLOG) corrective regimen sliding scale   SubCutaneous four times a day before meals  polyethylene glycol 3350 17 Gram(s) Oral daily  senna 2 Tablet(s) Oral at bedtime  sodium chloride 0.9%. 1000 milliLiter(s) (75 mL/Hr) IV Continuous <Continuous>    MEDICATIONS  (PRN):  dextrose 40% Gel 15 Gram(s) Oral once PRN Blood Glucose LESS THAN 70 milliGRAM(s)/deciliter  glucagon  Injectable 1 milliGRAM(s) IntraMuscular once PRN Glucose LESS THAN 70 milligrams/deciliter  sodium chloride 0.9% lock flush 10 milliLiter(s) IV Push every 1 hour PRN Pre/post blood products, medications, blood draw, and to maintain line patency      Allergies    No Known Allergies    Intolerances        DVT Prophylaxis: [x ] YES [ ] NO      Antibiotics: [ ] YES [x ] NO    Pain Scale (1-10):       Location:    Vital Signs Last 24 Hrs  T(C): 36.9 (12 Jul 2019 05:52), Max: 36.9 (12 Jul 2019 05:52)  T(F): 98.5 (12 Jul 2019 05:52), Max: 98.5 (12 Jul 2019 05:52)  HR: 98 (12 Jul 2019 05:52) (56 - 98)  BP: 122/72 (12 Jul 2019 05:52) (122/72 - 147/70)  BP(mean): --  RR: 16 (12 Jul 2019 05:52) (16 - 16)  SpO2: 98% (12 Jul 2019 05:52) (95% - 98%)    Drug Dosing Weight  Height (cm): 170.2 (09 Jul 2019 07:25)  Weight (kg): 72.83 (09 Jul 2019 09:12)  BMI (kg/m2): 25.1 (09 Jul 2019 09:12)  BSA (m2): 1.84 (09 Jul 2019 09:12)    PHYSICAL EXAM:      Constitutional: fatigued.  Eyes: conjunctival pallor.  ENMT: no evidence of chemotherapy induced mucostomatitis.  Neck: dressing remains in place.  Back: no SPT.  Respiratory: chest clear.  Cardiovascular: S2>S2 at apex. RSR.  Gastrointestinal: soft, nontender, active bowel sounds.  Genitourinary: voiding without difficulty.  Extremities: no leg edema.  Neurological: no gross focal deficits.  Skin: warm and dry.  Lymph Nodes: none palp.  Musculoskeletal: full ROM.  Psychiatric: affect normal.        URINARY CATHETER: [ ] YES [x] NO     LABS:  CBC Full  -  ( 12 Jul 2019 06:19 )  WBC Count : 2.53 K/uL  RBC Count : 2.52 M/uL  Hemoglobin : 7.2 g/dL  Hematocrit : 23.1 %  Platelet Count - Automated : 233 K/uL  Mean Cell Volume : 91.7 fl  Mean Cell Hemoglobin : 28.6 pg  Mean Cell Hemoglobin Concentration : 31.2 gm/dL  Auto Neutrophil # : x  Auto Lymphocyte # : x  Auto Monocyte # : x  Auto Eosinophil # : x  Auto Basophil # : x  Auto Neutrophil % : x  Auto Lymphocyte % : x  Auto Monocyte % : x  Auto Eosinophil % : x  Auto Basophil % : x    07-11    141  |  111<H>  |  8   ----------------------------<  127<H>  4.0   |  22  |  0.67    Ca    7.7<L>      11 Jul 2019 06:10  Phos  2.3     07-11  Mg     2.0     07-11    TPro  5.3<L>  /  Alb  3.2<L>  /  TBili  <0.2  /  DBili  x   /  AST  21  /  ALT  14  /  AlkPhos  41  07-11          CULTURES:    RADIOLOGY & ADDITIONAL STUDIES:

## 2019-07-12 NOTE — PROGRESS NOTE ADULT - PROBLEM SELECTOR PLAN 3
Patient reports onset after initial chemotherapy treatment.   - Not currently an active issue today.  - VSS, denies SOB, lungs clear bilaterally  - EKG showed normal sinus rhythm  - ECHO also normal: EF 69%, no valvular abnormalities  - f/u ECHO  - f/u EKG Patient reports onset after initial chemotherapy treatment.   - Not currently an active issue today.  - VSS, denies SOB, lungs clear bilaterally  - EKG showed normal sinus rhythm  - ECHO also normal: EF 69%, no valvular abnormalities

## 2019-07-12 NOTE — PROGRESS NOTE ADULT - PROBLEM SELECTOR PLAN 1
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  - Pt. chemotherapy followed by Dr. Meeks inpatient. Pt. also known to Dr. Alvarado and Dr. Londono  - DCF (docetaxel, cisplatin, 5-FU) first treatment in June 2019  - started NS 75cc/hr  - monitor electrolytes  - PICC placed on 7/9/2019  - chemotherapy initiated per Hem/Onc specifications  - currently tolerating well, denies side effects  - Vit D levels low (21.5) Pt. was diagnosed with HPV-associated SCC of L neck (stage [id number]) in 12/2018  - Prior imaging showed encasement of ICA and extension of mass into skin, parotid and submandibular glands w extensive LAD.  - Pt. chemotherapy followed by Dr. Meeks inpatient. Pt. also known to Dr. Alvarado and Dr. Londono  - DCF (docetaxel, cisplatin, 5-FU) first treatment in June 2019  - started NS 75cc/hr  - monitor electrolytes  - PICC placed on 7/9/2019  - chemotherapy initiated per Hem/Onc specifications  - currently tolerating well, denies side effects  - Vit D levels low (21.5), starting on 1000U supplementation daily

## 2019-07-12 NOTE — PROGRESS NOTE ADULT - PROBLEM SELECTOR PLAN 2
She continues to take iron therapy daily. The ongoing chemotherapy is also a contributing factor here.

## 2019-07-12 NOTE — PROGRESS NOTE ADULT - PROBLEM SELECTOR PLAN 2
Patient has iron-deficiency anemia.  - H/H today is 7.8/25.2  - 325mg ferrous sulfate TID  - bowel regimen: senna, colace, miralax  - vit b12 >2000  - folate levels wnl (13.6) Patient has iron-deficiency anemia.  - H/H today is 7.2/23.1  - 325mg ferrous sulfate TID  - bowel regimen: senna, colace, miralax  - vit b12 >2000  - folate levels wnl (13.6)  - will continue to monitor

## 2019-07-12 NOTE — PROGRESS NOTE ADULT - ATTENDING COMMENTS
This patient is on her third bag of infusional 5-FU. She has one more to go after the current bag is finished. Continue to monitor blood counts and chems. Supplement electrolytes as required. The patient is encouraged to ambulate in the halls. The patient will receive neulasta 24 hours after completion of the last 5-FU infusion.

## 2019-07-12 NOTE — PROGRESS NOTE ADULT - SUBJECTIVE AND OBJECTIVE BOX
OVERNIGHT EVENTS: Episode of nausea that was relieved with 4 mg zofran IV push.     INTERVAL EVENTS: Patient seen and examined at bedside. Complains of a feeling of food being "stuck in her stomach". Denies any chest pain, shortness of breath, or extremity tingling. Complains of reduced energy today compared to previous days and reduced taste sense.     PHYSICAL EXAM:    General: in NAD, sitting up in bed  HEENT: normocephalic, atraumatic; PERRLA, anicteric sclera  Neck: supple, flat, ulcerated lesion on L side of neck covered with bandage - clean, dry and intact, surrounding area nonerythematous, L posterior chain cervical lymphadenopathy  Cardiovascular: +S1/S2, RRR, no M/G/R  Respiratory: clear to auscultation B/L; no wheezing, no rales, no rhonchi. Reduced inspiratory effort   Gastrointestinal: soft, NT/ND; +BSx4, no organomegaly  Extremities: WWP; no edema, clubbing or cyanosis  Vascular: 2+ radial, DP/PT pulses B/L  Neurological: AAOx3; no focal deficits, cranial nerves grossly intact      VITAL SIGNS:  Vital Signs Last 24 Hrs  T(C): 36.9 (12 Jul 2019 05:52), Max: 36.9 (12 Jul 2019 05:52)  T(F): 98.5 (12 Jul 2019 05:52), Max: 98.5 (12 Jul 2019 05:52)  HR: 98 (12 Jul 2019 05:52) (63 - 98)  BP: 122/72 (12 Jul 2019 05:52) (122/72 - 128/82)  BP(mean): --  RR: 16 (12 Jul 2019 05:52) (16 - 16)  SpO2: 98% (12 Jul 2019 05:52) (95% - 98%)      MEDICATIONS:  MEDICATIONS  (STANDING):  chlorhexidine 2% Cloths 1 Application(s) Topical <User Schedule>  cholecalciferol 1000 Unit(s) Oral daily  dextrose 5%. 1000 milliLiter(s) (50 mL/Hr) IV Continuous <Continuous>  dextrose 50% Injectable 25 Gram(s) IV Push once  dextrose 50% Injectable 25 Gram(s) IV Push once  docusate sodium 100 milliGRAM(s) Oral three times a day  enoxaparin Injectable 40 milliGRAM(s) SubCutaneous every 24 hours  ferrous    sulfate 325 milliGRAM(s) Oral three times a day  fluorouracil IVPB (eMAR) 1840 milliGRAM(s) IV Intermittent daily  insulin lispro (HumaLOG) corrective regimen sliding scale   SubCutaneous four times a day before meals  polyethylene glycol 3350 17 Gram(s) Oral daily  senna 2 Tablet(s) Oral at bedtime  sodium chloride 0.9%. 1000 milliLiter(s) (75 mL/Hr) IV Continuous <Continuous>    MEDICATIONS  (PRN):  dextrose 40% Gel 15 Gram(s) Oral once PRN Blood Glucose LESS THAN 70 milliGRAM(s)/deciliter  glucagon  Injectable 1 milliGRAM(s) IntraMuscular once PRN Glucose LESS THAN 70 milligrams/deciliter  sodium chloride 0.9% lock flush 10 milliLiter(s) IV Push every 1 hour PRN Pre/post blood products, medications, blood draw, and to maintain line patency      ALLERGIES:  Allergies    No Known Allergies    Intolerances        LABS:                        7.2    2.53  )-----------( 233      ( 12 Jul 2019 06:19 )             23.1     07-12    137  |  108  |  10  ----------------------------<  103<H>  3.6   |  23  |  0.64    Ca    7.4<L>      12 Jul 2019 06:19  Phos  2.7     07-12  Mg     1.6     07-12    TPro  4.9<L>  /  Alb  2.9<L>  /  TBili  0.2  /  DBili  x   /  AST  26  /  ALT  19  /  AlkPhos  33<L>  07-12        CAPILLARY BLOOD GLUCOSE      POCT Blood Glucose.: 111 mg/dL (11 Jul 2019 21:41)      RADIOLOGY & ADDITIONAL TESTS: Reviewed.

## 2019-07-13 DIAGNOSIS — D70.9 NEUTROPENIA, UNSPECIFIED: ICD-10-CM

## 2019-07-13 LAB
ANION GAP SERPL CALC-SCNC: 10 MMOL/L — SIGNIFICANT CHANGE UP (ref 5–17)
BASOPHILS # BLD AUTO: 0 K/UL — SIGNIFICANT CHANGE UP (ref 0–0.2)
BASOPHILS NFR BLD AUTO: 0 % — SIGNIFICANT CHANGE UP (ref 0–2)
BUN SERPL-MCNC: 10 MG/DL — SIGNIFICANT CHANGE UP (ref 7–23)
CALCIUM SERPL-MCNC: 8.2 MG/DL — LOW (ref 8.4–10.5)
CHLORIDE SERPL-SCNC: 104 MMOL/L — SIGNIFICANT CHANGE UP (ref 96–108)
CO2 SERPL-SCNC: 25 MMOL/L — SIGNIFICANT CHANGE UP (ref 22–31)
CREAT SERPL-MCNC: 0.69 MG/DL — SIGNIFICANT CHANGE UP (ref 0.5–1.3)
EOSINOPHIL # BLD AUTO: 0.08 K/UL — SIGNIFICANT CHANGE UP (ref 0–0.5)
EOSINOPHIL NFR BLD AUTO: 4.1 % — SIGNIFICANT CHANGE UP (ref 0–6)
GLUCOSE BLDC GLUCOMTR-MCNC: 100 MG/DL — HIGH (ref 70–99)
GLUCOSE BLDC GLUCOMTR-MCNC: 101 MG/DL — HIGH (ref 70–99)
GLUCOSE BLDC GLUCOMTR-MCNC: 126 MG/DL — HIGH (ref 70–99)
GLUCOSE BLDC GLUCOMTR-MCNC: 158 MG/DL — HIGH (ref 70–99)
GLUCOSE SERPL-MCNC: 97 MG/DL — SIGNIFICANT CHANGE UP (ref 70–99)
HCT VFR BLD CALC: 26.4 % — LOW (ref 34.5–45)
HGB BLD-MCNC: 8.2 G/DL — LOW (ref 11.5–15.5)
LYMPHOCYTES # BLD AUTO: 0.8 K/UL — LOW (ref 1–3.3)
LYMPHOCYTES # BLD AUTO: 40.4 % — SIGNIFICANT CHANGE UP (ref 13–44)
MAGNESIUM SERPL-MCNC: 1.6 MG/DL — SIGNIFICANT CHANGE UP (ref 1.6–2.6)
MCHC RBC-ENTMCNC: 27.7 PG — SIGNIFICANT CHANGE UP (ref 27–34)
MCHC RBC-ENTMCNC: 31.1 GM/DL — LOW (ref 32–36)
MCV RBC AUTO: 89.2 FL — SIGNIFICANT CHANGE UP (ref 80–100)
MONOCYTES # BLD AUTO: 0 K/UL — SIGNIFICANT CHANGE UP (ref 0–0.9)
MONOCYTES NFR BLD AUTO: 0 % — LOW (ref 2–14)
NEUTROPHILS # BLD AUTO: 1.06 K/UL — LOW (ref 1.8–7.4)
NEUTROPHILS NFR BLD AUTO: 53.5 % — SIGNIFICANT CHANGE UP (ref 43–77)
NRBC # BLD: SIGNIFICANT CHANGE UP /100 WBCS (ref 0–0)
PHOSPHATE SERPL-MCNC: 3.3 MG/DL — SIGNIFICANT CHANGE UP (ref 2.5–4.5)
PLATELET # BLD AUTO: 251 K/UL — SIGNIFICANT CHANGE UP (ref 150–400)
POTASSIUM SERPL-MCNC: 3.7 MMOL/L — SIGNIFICANT CHANGE UP (ref 3.5–5.3)
POTASSIUM SERPL-SCNC: 3.7 MMOL/L — SIGNIFICANT CHANGE UP (ref 3.5–5.3)
RBC # BLD: 2.96 M/UL — LOW (ref 3.8–5.2)
RBC # FLD: 14.7 % — HIGH (ref 10.3–14.5)
SODIUM SERPL-SCNC: 139 MMOL/L — SIGNIFICANT CHANGE UP (ref 135–145)
WBC # BLD: 1.99 K/UL — LOW (ref 3.8–10.5)
WBC # FLD AUTO: 1.99 K/UL — LOW (ref 3.8–10.5)

## 2019-07-13 PROCEDURE — 99233 SBSQ HOSP IP/OBS HIGH 50: CPT

## 2019-07-13 RX ORDER — POTASSIUM CHLORIDE 20 MEQ
40 PACKET (EA) ORAL ONCE
Refills: 0 | Status: COMPLETED | OUTPATIENT
Start: 2019-07-13 | End: 2019-07-13

## 2019-07-13 RX ORDER — PEGFILGRASTIM-CBQV 6 MG/.6ML
6 INJECTION, SOLUTION SUBCUTANEOUS ONCE
Refills: 0 | Status: DISCONTINUED | OUTPATIENT
Start: 2019-07-14 | End: 2019-07-14

## 2019-07-13 RX ORDER — MAGNESIUM SULFATE 500 MG/ML
2 VIAL (ML) INJECTION ONCE
Refills: 0 | Status: COMPLETED | OUTPATIENT
Start: 2019-07-13 | End: 2019-07-13

## 2019-07-13 RX ADMIN — Medication 325 MILLIGRAM(S): at 07:18

## 2019-07-13 RX ADMIN — Medication 1000 UNIT(S): at 12:02

## 2019-07-13 RX ADMIN — ENOXAPARIN SODIUM 40 MILLIGRAM(S): 100 INJECTION SUBCUTANEOUS at 18:00

## 2019-07-13 RX ADMIN — Medication 100 MILLIGRAM(S): at 07:19

## 2019-07-13 RX ADMIN — Medication 40 MILLIEQUIVALENT(S): at 07:21

## 2019-07-13 RX ADMIN — Medication 325 MILLIGRAM(S): at 12:02

## 2019-07-13 RX ADMIN — CHLORHEXIDINE GLUCONATE 1 APPLICATION(S): 213 SOLUTION TOPICAL at 07:18

## 2019-07-13 RX ADMIN — SODIUM CHLORIDE 75 MILLILITER(S): 9 INJECTION INTRAMUSCULAR; INTRAVENOUS; SUBCUTANEOUS at 18:00

## 2019-07-13 RX ADMIN — FLUOROURACIL 43.2 MILLIGRAM(S): 50 INJECTION, SOLUTION INTRAVENOUS at 03:19

## 2019-07-13 RX ADMIN — Medication 2: at 13:02

## 2019-07-13 RX ADMIN — SODIUM CHLORIDE 75 MILLILITER(S): 9 INJECTION INTRAMUSCULAR; INTRAVENOUS; SUBCUTANEOUS at 07:25

## 2019-07-13 RX ADMIN — Medication 50 GRAM(S): at 07:22

## 2019-07-13 RX ADMIN — Medication 325 MILLIGRAM(S): at 23:13

## 2019-07-13 NOTE — PROGRESS NOTE ADULT - PROBLEM SELECTOR PLAN 2
- Patient with ANC of <1500 (1060 on 7/12)  - will continue to monitor with daily CBCs  - Will take neutropenic precautions   - Currently afebrile with stable hemodynamics  - will avoid rectal temps - Patient with ANC of <1500 (1060 on 7/12)  - will continue to monitor with daily CBCs  - Will take neutropenic precautions (handwashing in/out of room, gloves when examining patient, patient should wear mask when ambulating halls)  - Currently afebrile with stable hemodynamics  - will avoid rectal temps

## 2019-07-13 NOTE — PROGRESS NOTE ADULT - PROBLEM SELECTOR PLAN 1
Pt. was diagnosed with HPV-associated SCC of L neck (stage [id number]) in 12/2018  - Prior imaging showed encasement of ICA and extension of mass into skin, parotid and submandibular glands w extensive LAD.  - Pt. chemotherapy followed by Dr. Meeks inpatient. Pt. also known to Dr. Alvarado and Dr. Londono  - DCF (docetaxel, cisplatin, 5-FU) first treatment in June 2019  - NS 75cc/hr  - monitor electrolytes  - PICC placed on 7/9/2019  - chemotherapy initiated per Hem/Onc specifications, finished last infusion 7/12  - currently tolerating well, denies side effects  - Vit D levels low (21.5), starting on 1000U supplementation daily Pt. was diagnosed with HPV-associated SCC of L neck (stage [id number]) in 12/2018  - Prior imaging showed encasement of ICA and extension of mass into skin, parotid and submandibular glands w extensive LAD.  - Pt. chemotherapy followed by Dr. Meeks inpatient. Pt. also known to Dr. Alvarado and Dr. Londono  - DCF (docetaxel, cisplatin, 5-FU) first treatment in June 2019  - NS 75cc/hr  - monitor electrolytes  - PICC placed on 7/9/2019  - chemotherapy initiated per Hem/Onc specifications, last infusion started 7/13 3AM, to run for 24 hours  - Neulasta 24 hours after last infusion   - currently tolerating well, denies side effects  - Vit D levels low (21.5), starting on 1000U supplementation daily

## 2019-07-13 NOTE — PROGRESS NOTE ADULT - SUBJECTIVE AND OBJECTIVE BOX
OVERNIGHT EVENTS: NAEO    INTERVAL EVENTS: Patient seen and examined at bedside. Complains of chills, but denies fevers, n/v. Fatigue unchanged from prior day.     PHYSICAL EXAM:    General: in NAD, sitting up in bed  HEENT: normocephalic, atraumatic; PERRLA, anicteric sclera  Neck: supple, flat, ulcerated lesion on L side of neck covered with bandage - clean, dry and intact, surrounding area nonerythematous, L posterior chain cervical lymphadenopathy  Cardiovascular: +S1/S2, RRR, no M/G/R  Respiratory: clear to auscultation B/L; no wheezing, no rales, no rhonchi. Reduced inspiratory effort   Gastrointestinal: soft, NT/ND; +BSx4, no organomegaly  Extremities: WWP; no edema, clubbing or cyanosis  Vascular: 2+ radial, DP/PT pulses B/L  Neurological: AAOx3; no focal deficits, cranial nerves grossly intact    VITAL SIGNS:  Vital Signs Last 24 Hrs  T(C): 36.9 (13 Jul 2019 05:20), Max: 36.9 (12 Jul 2019 11:21)  T(F): 98.4 (13 Jul 2019 05:20), Max: 98.4 (12 Jul 2019 11:21)  HR: 82 (13 Jul 2019 05:20) (74 - 82)  BP: 143/88 (13 Jul 2019 05:20) (114/70 - 143/88)  BP(mean): --  RR: 16 (13 Jul 2019 05:20) (16 - 16)  SpO2: 98% (13 Jul 2019 05:20) (98% - 98%)    MEDICATIONS:  MEDICATIONS  (STANDING):  chlorhexidine 2% Cloths 1 Application(s) Topical <User Schedule>  cholecalciferol 1000 Unit(s) Oral daily  dextrose 5%. 1000 milliLiter(s) (50 mL/Hr) IV Continuous <Continuous>  dextrose 50% Injectable 25 Gram(s) IV Push once  dextrose 50% Injectable 25 Gram(s) IV Push once  docusate sodium 100 milliGRAM(s) Oral three times a day  enoxaparin Injectable 40 milliGRAM(s) SubCutaneous every 24 hours  ferrous    sulfate 325 milliGRAM(s) Oral three times a day  insulin lispro (HumaLOG) corrective regimen sliding scale   SubCutaneous four times a day before meals  polyethylene glycol 3350 17 Gram(s) Oral daily  senna 2 Tablet(s) Oral at bedtime  sodium chloride 0.9%. 1000 milliLiter(s) (75 mL/Hr) IV Continuous <Continuous>    MEDICATIONS  (PRN):  dextrose 40% Gel 15 Gram(s) Oral once PRN Blood Glucose LESS THAN 70 milliGRAM(s)/deciliter  glucagon  Injectable 1 milliGRAM(s) IntraMuscular once PRN Glucose LESS THAN 70 milligrams/deciliter  sodium chloride 0.9% lock flush 10 milliLiter(s) IV Push every 1 hour PRN Pre/post blood products, medications, blood draw, and to maintain line patency      ALLERGIES:  Allergies    No Known Allergies    Intolerances        LABS:                        8.2    1.99  )-----------( 251      ( 13 Jul 2019 05:56 )             26.4     07-12    137  |  108  |  10  ----------------------------<  103<H>  3.6   |  23  |  0.64    Ca    7.4<L>      12 Jul 2019 06:19  Phos  2.7     07-12  Mg     1.6     07-12    TPro  4.9<L>  /  Alb  2.9<L>  /  TBili  0.2  /  DBili  x   /  AST  26  /  ALT  19  /  AlkPhos  33<L>  07-12        CAPILLARY BLOOD GLUCOSE      POCT Blood Glucose.: 97 mg/dL (12 Jul 2019 21:37)      RADIOLOGY & ADDITIONAL TESTS: Reviewed.

## 2019-07-13 NOTE — PROGRESS NOTE ADULT - SUBJECTIVE AND OBJECTIVE BOX
The patient continues to tolerate ongoing chemotherapy without difficulty. No mouth pain. Not eating much. States that she ambulated a bit in the halls yesterday.       CHEMOTHERAPY REGIMEN:        Day:         5                 Diet:  Protocol:   docetaxel. cisplatin, 5-FU                                 IVF:      MEDICATIONS  (STANDING):  chlorhexidine 2% Cloths 1 Application(s) Topical <User Schedule>  cholecalciferol 1000 Unit(s) Oral daily  dextrose 5%. 1000 milliLiter(s) (50 mL/Hr) IV Continuous <Continuous>  dextrose 50% Injectable 25 Gram(s) IV Push once  dextrose 50% Injectable 25 Gram(s) IV Push once  docusate sodium 100 milliGRAM(s) Oral three times a day  enoxaparin Injectable 40 milliGRAM(s) SubCutaneous every 24 hours  ferrous    sulfate 325 milliGRAM(s) Oral three times a day  insulin lispro (HumaLOG) corrective regimen sliding scale   SubCutaneous four times a day before meals  polyethylene glycol 3350 17 Gram(s) Oral daily  senna 2 Tablet(s) Oral at bedtime  sodium chloride 0.9%. 1000 milliLiter(s) (75 mL/Hr) IV Continuous <Continuous>    MEDICATIONS  (PRN):  dextrose 40% Gel 15 Gram(s) Oral once PRN Blood Glucose LESS THAN 70 milliGRAM(s)/deciliter  glucagon  Injectable 1 milliGRAM(s) IntraMuscular once PRN Glucose LESS THAN 70 milligrams/deciliter  sodium chloride 0.9% lock flush 10 milliLiter(s) IV Push every 1 hour PRN Pre/post blood products, medications, blood draw, and to maintain line patency      Allergies    No Known Allergies    Intolerances        DVT Prophylaxis: [x ] YES [ ] NO      Antibiotics: [ ] YES [x ] NO    Pain Scale (1-10):       Location:    Vital Signs Last 24 Hrs  T(C): 36.9 (13 Jul 2019 05:20), Max: 36.9 (12 Jul 2019 11:21)  T(F): 98.4 (13 Jul 2019 05:20), Max: 98.4 (12 Jul 2019 11:21)  HR: 82 (13 Jul 2019 05:20) (74 - 82)  BP: 143/88 (13 Jul 2019 05:20) (114/70 - 143/88)  BP(mean): --  RR: 16 (13 Jul 2019 05:20) (16 - 16)  SpO2: 98% (13 Jul 2019 05:20) (98% - 98%)    Drug Dosing Weight  Height (cm): 170.2 (09 Jul 2019 07:25)  Weight (kg): 72.83 (09 Jul 2019 09:12)  BMI (kg/m2): 25.1 (09 Jul 2019 09:12)  BSA (m2): 1.84 (09 Jul 2019 09:12)    PHYSICAL EXAM:      Constitutional: no complaints related to ongoing chemotherapy.  Eyes: conjuctival pallor.  ENMT: buccal mucosa without evidence of chemotherapy related mucostomatitis.  Neck: left neck wound remains decreased in size.  Respiratory: chest clear.  Cardiovascular: S1>S2 at apex. RSR.  Gastrointestinal: soft, nontender, active bowel sounds.  Genitourinary: voiding without difficulty.  Extremities: no leg edema.  Neurological: no gross focal deficits.  Skin: warm and dry.  Lymph Nodes: the area posterior to the left jaw remains firm and palp.  Musculoskeletal: full ROM.  Psychiatric: affect normal.        URINARY CATHETER: [ ] YES [x ] NO     LABS:  CBC Full  -  ( 13 Jul 2019 05:56 )  WBC Count : 1.99 K/uL  RBC Count : 2.96 M/uL  Hemoglobin : 8.2 g/dL  Hematocrit : 26.4 %  Platelet Count - Automated : 251 K/uL  Mean Cell Volume : 89.2 fl  Mean Cell Hemoglobin : 27.7 pg  Mean Cell Hemoglobin Concentration : 31.1 gm/dL  Auto Neutrophil # : 1.06 K/uL  Auto Lymphocyte # : 0.80 K/uL  Auto Monocyte # : 0.00 K/uL  Auto Eosinophil # : 0.08 K/uL  Auto Basophil # : 0.00 K/uL  Auto Neutrophil % : 53.5 %  Auto Lymphocyte % : 40.4 %  Auto Monocyte % : 0.0 %  Auto Eosinophil % : 4.1 %  Auto Basophil % : 0.0 %    07-13    139  |  104  |  10  ----------------------------<  97  3.7   |  25  |  0.69    Ca    8.2<L>      13 Jul 2019 05:56  Phos  3.3     07-13  Mg     1.6     07-13    TPro  4.9<L>  /  Alb  2.9<L>  /  TBili  0.2  /  DBili  x   /  AST  26  /  ALT  19  /  AlkPhos  33<L>  07-12          CULTURES:    RADIOLOGY & ADDITIONAL STUDIES:

## 2019-07-13 NOTE — PROGRESS NOTE ADULT - ATTENDING COMMENTS
Case discussed with Dr. De La Paz Case discussed with Dr. Martinez yesterday. The patient is encouraged to eat and to ambulate in the halls. For neulasta injection 24 hours after the current bag of 5-FU is completed.

## 2019-07-13 NOTE — PROGRESS NOTE ADULT - SUBJECTIVE AND OBJECTIVE BOX
Heme/Onc Progress Note (Dr. Meeks )  Discussed with Dr. Meeks and plan reviewed with the primary team.    The patient was seen and examined.      Interval hx:   No acute events overnight, Pt tolerating chemo thus far. Mild fatigue.     4th bag of 5-FU hung up around 3:30AM     Initial HPI:  40 yo woman who was admitted yesterday for induction chemotherapy for a large left neck squamous cell carcinoma. A neck CT scan on 11/10/16 revealed a 3.1x2.9x5.0 cm multiloculated rim-enhancing fluid collection within the left level IIb soft tissues just deep to the sternocleidomastoid muscle. DDx included abscess, suppurative adenitis or infected second brachial cleft cyst. The patient was treated with antibiotics and some white material was drained from the lesion. The lesion, according to the patient came back in 2017. She again took antibiotic therapy. A CT scan was done but no additional biopsy was obtained. The patient saw Dr. Weller in early 12/18. A biopsy was obtained on 12/6/18.   This revealed a squamous cell carcinoma, HPV related. The patient was seen by Dr. Alvarado and Dr. Londono. She was referred to me but did not make an appointment. She finally saw Constantino Lazcano on 5/29 and received her 1st cycle of induction DCF. She had a remarkable response in primary left neck mass. She notes having bilateral leg swelling and stomatitis at home.    She was admitted yesterday for 2nd cycle of induction DCF.     ROS is otherwise negative.      Medications:  MEDICATIONS  (STANDING):  chlorhexidine 2% Cloths 1 Application(s) Topical <User Schedule>  cholecalciferol 1000 Unit(s) Oral daily  dextrose 5%. 1000 milliLiter(s) (50 mL/Hr) IV Continuous <Continuous>  dextrose 50% Injectable 25 Gram(s) IV Push once  dextrose 50% Injectable 25 Gram(s) IV Push once  docusate sodium 100 milliGRAM(s) Oral three times a day  enoxaparin Injectable 40 milliGRAM(s) SubCutaneous every 24 hours  ferrous    sulfate 325 milliGRAM(s) Oral three times a day  insulin lispro (HumaLOG) corrective regimen sliding scale   SubCutaneous four times a day before meals  polyethylene glycol 3350 17 Gram(s) Oral daily  senna 2 Tablet(s) Oral at bedtime  sodium chloride 0.9%. 1000 milliLiter(s) (75 mL/Hr) IV Continuous <Continuous>    MEDICATIONS  (PRN):  dextrose 40% Gel 15 Gram(s) Oral once PRN Blood Glucose LESS THAN 70 milliGRAM(s)/deciliter  glucagon  Injectable 1 milliGRAM(s) IntraMuscular once PRN Glucose LESS THAN 70 milligrams/deciliter  sodium chloride 0.9% lock flush 10 milliLiter(s) IV Push every 1 hour PRN Pre/post blood products, medications, blood draw, and to maintain line patency          PHYSICAL EXAM:  T(C): 36.7 (07-13-19 @ 12:01), Max: 36.9 (07-13-19 @ 05:20)  T(F): 98.1 (07-13-19 @ 12:01), Max: 98.4 (07-13-19 @ 05:20)  HR: 71 (07-13-19 @ 12:01) (71 - 82)  BP: 114/74 (07-13-19 @ 12:01) (114/74 - 143/88)  ABP: --  ABP(mean): --  RR: 16 (07-13-19 @ 12:01) (16 - 16)  SpO2: 98% (07-13-19 @ 12:01) (98% - 98%)      Gen: well developed, well nourished, comfortable  HEENT: normocephalic/atraumatic, no conjunctival pallor, no scleral icterus, no oral thrush/mucosal bleeding/mucositis  Neck: supple, left neck mass flat (significantly decreased) with no evidence of bleeding or purulent drainage  Cardiovascular: RR, nl S1S2, no murmurs/rubs/gallops  Respiratory: clear air entry b/l  Gastrointestinal: BS+, soft, NT/ND, no masses, no splenomegaly, no hepatomegaly, no evidence for ascites  Extremities: no clubbing/cyanosis, no edema, no calf tenderness      LABS:                                                 8.2    1.99  )-----------( 251      ( 13 Jul 2019 05:56 )             26.4         CBC Full  -  ( 13 Jul 2019 05:56 )  WBC Count : 1.99 K/uL  RBC Count : 2.96 M/uL  Hemoglobin : 8.2 g/dL  Hematocrit : 26.4 %  Platelet Count - Automated : 251 K/uL  Mean Cell Volume : 89.2 fl  Mean Cell Hemoglobin : 27.7 pg  Mean Cell Hemoglobin Concentration : 31.1 gm/dL  Auto Neutrophil # : 1.06 K/uL  Auto Lymphocyte # : 0.80 K/uL  Auto Monocyte # : 0.00 K/uL  Auto Eosinophil # : 0.08 K/uL  Auto Basophil # : 0.00 K/uL  Auto Neutrophil % : 53.5 %  Auto Lymphocyte % : 40.4 %  Auto Monocyte % : 0.0 %  Auto Eosinophil % : 4.1 %  Auto Basophil % : 0.0 %        07-13    139  |  104  |  10  ----------------------------<  97  3.7   |  25  |  0.69    Ca    8.2<L>      13 Jul 2019 05:56  Phos  3.3     07-13  Mg     1.6     07-13    TPro  4.9<L>  /  Alb  2.9<L>  /  TBili  0.2  /  DBili  x   /  AST  26  /  ALT  19  /  AlkPhos  33<L>  07-12                Labs:        Other Labs:    Cultures:    Pathology:    Imaging Studies:

## 2019-07-13 NOTE — PROGRESS NOTE ADULT - ASSESSMENT
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 She tolerated 1st cycle fairly well. She complained of stomatitis and bilateral leg swelling at home after discharge a few weeks back. She has had a significant response in her primary tumor after completion of her 1st cycle.     #Head and Neck Cancer   Remarkable response in primary mass; will proceed with 2nd cycle of induction chemotherapy  -C2 day 4 today  -Please run NS at 75 cc/hr continuously while on chemotherapy   -CTM CBC, CMP, magnesium and phos for duration of chemotherapy  -Neulasta ordered to be given at 7AM tomorrow (Chemo to complete around 4AM tomorrow)   -VItamin D levels low, recommend starting on supplementation     #Anemia  Hemoglobin slightly trending down but stable and she is asymptomatic  -CTM CBC  -c/w ferrous sulfate, monitor for constipation   -Transfuse prbc to keep Hb > 7 g/dl or if symptomatic    #Neutropenia  Secondary to chemotherapy  -CTM cbc with diff  -Neulasta to be given 24 hours after 5-FU is completed     Case d/w Dr. Meeks

## 2019-07-13 NOTE — PROGRESS NOTE ADULT - ASSESSMENT
Tolerating chemotherapy okay. The patient is encouraged to try to eat more given her low protein and albumin levels.

## 2019-07-13 NOTE — PROGRESS NOTE ADULT - PROBLEM SELECTOR PLAN 3
Patient has iron-deficiency anemia.  - H/H today is 8.2/26.4  - 325mg ferrous sulfate TID  - bowel regimen: senna, colace, miralax  - vit b12 >2000  - folate levels wnl (13.6)  - will continue to monitor

## 2019-07-13 NOTE — PROGRESS NOTE ADULT - PROBLEM SELECTOR PLAN 2
This is due to chronic illness related to her cancer and ongoing systemic chemotherapy. Continue po iron therapy.

## 2019-07-14 LAB
ANION GAP SERPL CALC-SCNC: 11 MMOL/L — SIGNIFICANT CHANGE UP (ref 5–17)
ANISOCYTOSIS BLD QL: SLIGHT — SIGNIFICANT CHANGE UP
BASOPHILS # BLD AUTO: 0 K/UL — SIGNIFICANT CHANGE UP (ref 0–0.2)
BASOPHILS NFR BLD AUTO: 0 % — SIGNIFICANT CHANGE UP (ref 0–2)
BASOPHILS NFR BLD AUTO: 1 % — SIGNIFICANT CHANGE UP (ref 0–2)
BUN SERPL-MCNC: 11 MG/DL — SIGNIFICANT CHANGE UP (ref 7–23)
CALCIUM SERPL-MCNC: 8.2 MG/DL — LOW (ref 8.4–10.5)
CHLORIDE SERPL-SCNC: 103 MMOL/L — SIGNIFICANT CHANGE UP (ref 96–108)
CO2 SERPL-SCNC: 22 MMOL/L — SIGNIFICANT CHANGE UP (ref 22–31)
CREAT SERPL-MCNC: 0.57 MG/DL — SIGNIFICANT CHANGE UP (ref 0.5–1.3)
EOSINOPHIL # BLD AUTO: 0.13 K/UL — SIGNIFICANT CHANGE UP (ref 0–0.5)
EOSINOPHIL NFR BLD AUTO: 2 % — SIGNIFICANT CHANGE UP (ref 0–6)
EOSINOPHIL NFR BLD AUTO: 8.3 % — HIGH (ref 0–6)
GLUCOSE BLDC GLUCOMTR-MCNC: 109 MG/DL — HIGH (ref 70–99)
GLUCOSE BLDC GLUCOMTR-MCNC: 109 MG/DL — HIGH (ref 70–99)
GLUCOSE BLDC GLUCOMTR-MCNC: 119 MG/DL — HIGH (ref 70–99)
GLUCOSE SERPL-MCNC: 111 MG/DL — HIGH (ref 70–99)
HCT VFR BLD CALC: 26.1 % — LOW (ref 34.5–45)
HGB BLD-MCNC: 8.2 G/DL — LOW (ref 11.5–15.5)
LYMPHOCYTES # BLD AUTO: 0.72 K/UL — LOW (ref 1–3.3)
LYMPHOCYTES # BLD AUTO: 44.5 % — HIGH (ref 13–44)
LYMPHOCYTES # BLD AUTO: 55 % — HIGH (ref 13–44)
MACROCYTES BLD QL: SLIGHT — SIGNIFICANT CHANGE UP
MAGNESIUM SERPL-MCNC: 1.5 MG/DL — LOW (ref 1.6–2.6)
MANUAL DIF COMMENT BLD-IMP: SIGNIFICANT CHANGE UP
MANUAL DIF COMMENT BLD-IMP: SIGNIFICANT CHANGE UP
MANUAL SMEAR VERIFICATION: SIGNIFICANT CHANGE UP
MCHC RBC-ENTMCNC: 27.8 PG — SIGNIFICANT CHANGE UP (ref 27–34)
MCHC RBC-ENTMCNC: 31.4 GM/DL — LOW (ref 32–36)
MCV RBC AUTO: 88.5 FL — SIGNIFICANT CHANGE UP (ref 80–100)
MICROCYTES BLD QL: SLIGHT — SIGNIFICANT CHANGE UP
MONOCYTES # BLD AUTO: 0 K/UL — SIGNIFICANT CHANGE UP (ref 0–0.9)
MONOCYTES NFR BLD AUTO: 0 % — LOW (ref 2–14)
NEUTROPHILS # BLD AUTO: 0.76 K/UL — LOW (ref 1.8–7.4)
NEUTROPHILS NFR BLD AUTO: 42 % — LOW (ref 43–77)
NEUTROPHILS NFR BLD AUTO: 47.2 % — SIGNIFICANT CHANGE UP (ref 43–77)
OVALOCYTES BLD QL SMEAR: SLIGHT — SIGNIFICANT CHANGE UP
PHOSPHATE SERPL-MCNC: 3.5 MG/DL — SIGNIFICANT CHANGE UP (ref 2.5–4.5)
PLAT MORPH BLD: NORMAL — SIGNIFICANT CHANGE UP
PLATELET # BLD AUTO: 220 K/UL — SIGNIFICANT CHANGE UP (ref 150–400)
POIKILOCYTOSIS BLD QL AUTO: SLIGHT — SIGNIFICANT CHANGE UP
POTASSIUM SERPL-MCNC: 3.8 MMOL/L — SIGNIFICANT CHANGE UP (ref 3.5–5.3)
POTASSIUM SERPL-SCNC: 3.8 MMOL/L — SIGNIFICANT CHANGE UP (ref 3.5–5.3)
RBC # BLD: 2.95 M/UL — LOW (ref 3.8–5.2)
RBC # FLD: 14.7 % — HIGH (ref 10.3–14.5)
RBC BLD AUTO: ABNORMAL
SODIUM SERPL-SCNC: 136 MMOL/L — SIGNIFICANT CHANGE UP (ref 135–145)
WBC # BLD: 1.62 K/UL — LOW (ref 3.8–10.5)
WBC # FLD AUTO: 1.62 K/UL — LOW (ref 3.8–10.5)

## 2019-07-14 PROCEDURE — 99233 SBSQ HOSP IP/OBS HIGH 50: CPT | Mod: GC

## 2019-07-14 RX ORDER — PEGFILGRASTIM-CBQV 6 MG/.6ML
6 INJECTION, SOLUTION SUBCUTANEOUS ONCE
Refills: 0 | Status: COMPLETED | OUTPATIENT
Start: 2019-07-15 | End: 2019-07-15

## 2019-07-14 RX ADMIN — ENOXAPARIN SODIUM 40 MILLIGRAM(S): 100 INJECTION SUBCUTANEOUS at 18:13

## 2019-07-14 RX ADMIN — Medication 1000 UNIT(S): at 11:38

## 2019-07-14 RX ADMIN — Medication 325 MILLIGRAM(S): at 22:45

## 2019-07-14 RX ADMIN — CHLORHEXIDINE GLUCONATE 1 APPLICATION(S): 213 SOLUTION TOPICAL at 11:38

## 2019-07-14 RX ADMIN — Medication 325 MILLIGRAM(S): at 11:38

## 2019-07-14 RX ADMIN — SODIUM CHLORIDE 75 MILLILITER(S): 9 INJECTION INTRAMUSCULAR; INTRAVENOUS; SUBCUTANEOUS at 04:48

## 2019-07-14 RX ADMIN — SODIUM CHLORIDE 75 MILLILITER(S): 9 INJECTION INTRAMUSCULAR; INTRAVENOUS; SUBCUTANEOUS at 18:13

## 2019-07-14 NOTE — PROGRESS NOTE ADULT - PROBLEM SELECTOR PLAN 2
This is related to her chemotherapy. She will have an injection of neulasta 24 hours after the completion of the 5-FU infusion.

## 2019-07-14 NOTE — PROGRESS NOTE ADULT - PROBLEM SELECTOR PLAN 1
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  - Pt. chemotherapy followed by Dr. Meeks inpatient. Pt. also known to Dr. Alvarado and Dr. Londono  - DCF (docetaxel, cisplatin, 5-FU) first treatment in June 2019  - NS 75cc/hr  - monitor electrolytes  - PICC placed on 7/9/2019  - chemotherapy initiated per Hem/Onc specifications, last infusion started 7/13 3AM, to run for 24 hours  - Neulasta 24 hours after last infusion   - currently tolerating well, denies side effects  - Vit D levels low (21.5), starting on 1000U supplementation daily

## 2019-07-14 NOTE — PROGRESS NOTE ADULT - SUBJECTIVE AND OBJECTIVE BOX
The second course of induction chemotherapy was completed earlier this am. The patient reports feeling well. No complaints.     CHEMOTHERAPY REGIMEN:        Day:                          Diet:  Protocol:  DCF course #2 completed this am.                                  IVF:      MEDICATIONS  (STANDING):  chlorhexidine 2% Cloths 1 Application(s) Topical <User Schedule>  cholecalciferol 1000 Unit(s) Oral daily  dextrose 5%. 1000 milliLiter(s) (50 mL/Hr) IV Continuous <Continuous>  dextrose 50% Injectable 25 Gram(s) IV Push once  dextrose 50% Injectable 25 Gram(s) IV Push once  docusate sodium 100 milliGRAM(s) Oral three times a day  enoxaparin Injectable 40 milliGRAM(s) SubCutaneous every 24 hours  ferrous    sulfate 325 milliGRAM(s) Oral three times a day  insulin lispro (HumaLOG) corrective regimen sliding scale   SubCutaneous four times a day before meals  pegfilgrastim Injectable 6 milliGRAM(s) SubCutaneous once  polyethylene glycol 3350 17 Gram(s) Oral daily  senna 2 Tablet(s) Oral at bedtime  sodium chloride 0.9%. 1000 milliLiter(s) (75 mL/Hr) IV Continuous <Continuous>    MEDICATIONS  (PRN):  dextrose 40% Gel 15 Gram(s) Oral once PRN Blood Glucose LESS THAN 70 milliGRAM(s)/deciliter  glucagon  Injectable 1 milliGRAM(s) IntraMuscular once PRN Glucose LESS THAN 70 milligrams/deciliter  sodium chloride 0.9% lock flush 10 milliLiter(s) IV Push every 1 hour PRN Pre/post blood products, medications, blood draw, and to maintain line patency      Allergies    No Known Allergies    Intolerances        DVT Prophylaxis: x[ ] YES [ ] NO      Antibiotics: [ ] YES [x ] NO    Pain Scale (1-10):       Location:    Vital Signs Last 24 Hrs  T(C): 36.8 (14 Jul 2019 06:25), Max: 36.8 (13 Jul 2019 23:14)  T(F): 98.2 (14 Jul 2019 06:25), Max: 98.2 (13 Jul 2019 23:14)  HR: 64 (14 Jul 2019 06:25) (64 - 102)  BP: 115/79 (14 Jul 2019 06:25) (109/79 - 115/79)  BP(mean): --  RR: 18 (14 Jul 2019 06:25) (16 - 19)  SpO2: 96% (14 Jul 2019 06:25) (96% - 98%)    Drug Dosing Weight  Height (cm): 170.2 (09 Jul 2019 07:25)  Weight (kg): 72.83 (09 Jul 2019 09:12)  BMI (kg/m2): 25.1 (09 Jul 2019 09:12)  BSA (m2): 1.84 (09 Jul 2019 09:12)    PHYSICAL EXAM:      Constitutional: feeling okay.  Eyes: conjunctival pallor.  ENMT: buccal mucosa without evidence of mucostomatitis.   Neck: left neck wound remains flat and about the same size.  Back: no SPT.  Respiratory: chest clear.  Cardiovascular: S1>S2 at apex. RSR.  Gastrointestinal: soft, nontender, active bowel sounds.  Genitourinary: voiding without difficulty.  Extremities: no leg edema.  Neurological: no gross focal deficits.  Skin: warm and dry.  Lymph Nodes: discrete nodes not apparent. Posterior left neck area is firm an palp.  Musculoskeletal: full ROM.  Psychiatric: affect normal.        URINARY CATHETER: [ ] YES [x ] NO     LABS:  CBC Full  -  ( 14 Jul 2019 05:50 )  WBC Count : 1.62 K/uL  RBC Count : 2.95 M/uL  Hemoglobin : 8.2 g/dL  Hematocrit : 26.1 %  Platelet Count - Automated : 220 K/uL  Mean Cell Volume : 88.5 fl  Mean Cell Hemoglobin : 27.8 pg  Mean Cell Hemoglobin Concentration : 31.4 gm/dL  Auto Neutrophil # : x  Auto Lymphocyte # : x  Auto Monocyte # : x  Auto Eosinophil # : x  Auto Basophil # : x  Auto Neutrophil % : x  Auto Lymphocyte % : x  Auto Monocyte % : x  Auto Eosinophil % : x  Auto Basophil % : x    07-14    136  |  103  |  11  ----------------------------<  111<H>  3.8   |  22  |  0.57    Ca    8.2<L>      14 Jul 2019 05:50  Phos  3.5     07-14  Mg     1.5     07-14            CULTURES:    RADIOLOGY & ADDITIONAL STUDIES:

## 2019-07-14 NOTE — PROGRESS NOTE ADULT - ATTENDING COMMENTS
The patient is doing well s/p the completion of this course of chemotherapy. She has leukoneutropenia related to her chemotherapy. Monitor temp. If she develops a fever, obtain blood and urine cultures and begin  IV antibiotic therapy. A dose of neulasta is planned to be given 24-hrs after the completion of the 5-FU infusion. This was completed a few hours ago.

## 2019-07-14 NOTE — PROGRESS NOTE ADULT - ASSESSMENT
The patient has tolerated the second course of induction chemotherapy well thus for. Her WBC is low. She remains afebrile. Renal function and electrolytes are in good order. The patient has tolerated the second course of induction chemotherapy well thus far. Her WBC is low. She remains afebrile. Renal function and electrolytes are in good order.

## 2019-07-14 NOTE — PROGRESS NOTE ADULT - SUBJECTIVE AND OBJECTIVE BOX
INTERVAL HPI/OVERNIGHT EVENTS:  Chemotherapy concluded this morning. Patient to receive Neulasta tomorrow, 24 hours after infusion completed.   No complaints this morning.     On further ROS, patient did not have complaint of: Headache, Blurred Vision, Cough, Dyspnea, Palpitations, Abdominal Pain, N/V, Weakness, Change in Sensation.     VITAL SIGNS:  T(F): 97.2 (07-14-19 @ 11:48)  HR: 101 (07-14-19 @ 11:48)  BP: 112/67 (07-14-19 @ 11:48)  RR: 18 (07-14-19 @ 11:48)  SpO2: 100% (07-14-19 @ 11:48)    Input & Output:  07-13-19 @ 07:01  -  07-14-19 @ 07:00  --------------------------------------------------------  IN: 1288.8 mL / OUT: 1000 mL / NET: 288.8 mL    PHYSICAL EXAM:  Constitutional: Patient seated comfortably in bed, of appropriate color, nutrition, and hydration.   HEENT: PERRLA, Normal Range of eye movement with no complaint of diplopia, Normal Hearing  Neck: No LAD, No JVD  Back: Normal spine flexure, No CVA tenderness  Respiratory: Normal air entry, Lungs clear to auscultation b/l w/o wheeze or crepitations.   Cardiovascular: S1 and S2 present - no additional abnormal sounds or murmurs. Normal rhythm and rate of pulse.   Gastrointestinal: BS+, soft, NT/ND  Extremities: No peripheral edema  Vascular: 2+ peripheral pulses  Neurological: A/O x 3, CN V-XII grossly intact.  Psychiatric: Normal mood, normal affect  Musculoskeletal: 5/5 strength b/l upper and lower extremities  Skin: No rashes    MEDICATIONS  (STANDING):  chlorhexidine 2% Cloths 1 Application(s) Topical <User Schedule>  cholecalciferol 1000 Unit(s) Oral daily  dextrose 5%. 1000 milliLiter(s) (50 mL/Hr) IV Continuous <Continuous>  dextrose 50% Injectable 25 Gram(s) IV Push once  dextrose 50% Injectable 25 Gram(s) IV Push once  docusate sodium 100 milliGRAM(s) Oral three times a day  enoxaparin Injectable 40 milliGRAM(s) SubCutaneous every 24 hours  ferrous    sulfate 325 milliGRAM(s) Oral three times a day  insulin lispro (HumaLOG) corrective regimen sliding scale   SubCutaneous four times a day before meals  polyethylene glycol 3350 17 Gram(s) Oral daily  senna 2 Tablet(s) Oral at bedtime  sodium chloride 0.9%. 1000 milliLiter(s) (75 mL/Hr) IV Continuous <Continuous>    MEDICATIONS  (PRN):  dextrose 40% Gel 15 Gram(s) Oral once PRN Blood Glucose LESS THAN 70 milliGRAM(s)/deciliter  glucagon  Injectable 1 milliGRAM(s) IntraMuscular once PRN Glucose LESS THAN 70 milligrams/deciliter  sodium chloride 0.9% lock flush 10 milliLiter(s) IV Push every 1 hour PRN Pre/post blood products, medications, blood draw, and to maintain line patency      Allergies  No Known Allergies  Intolerances    LABS:                        8.2    1.62  )-----------( 220      ( 14 Jul 2019 05:50 )             26.1     07-14    136  |  103  |  11  ----------------------------<  111<H>  3.8   |  22  |  0.57    Ca    8.2<L>      14 Jul 2019 05:50  Phos  3.5     07-14  Mg     1.5     07-14

## 2019-07-14 NOTE — PROGRESS NOTE ADULT - PROBLEM SELECTOR PLAN 2
- Patient with ANC of <1500 (761 on 7/14)  - will continue to monitor with daily CBCs  - Will take neutropenic precautions (handwashing in/out of room, gloves when examining patient, patient should wear mask when ambulating halls)  - Currently afebrile with stable hemodynamics  - will avoid rectal temps

## 2019-07-14 NOTE — PROGRESS NOTE ADULT - PROBLEM SELECTOR PLAN 1
Induction chemotherapy course #2 was completed a few hours ago. The patient has tolerated this well so far.

## 2019-07-15 ENCOUNTER — TRANSCRIPTION ENCOUNTER (OUTPATIENT)
Age: 39
End: 2019-07-15

## 2019-07-15 VITALS
RESPIRATION RATE: 18 BRPM | OXYGEN SATURATION: 100 % | HEART RATE: 90 BPM | TEMPERATURE: 98 F | SYSTOLIC BLOOD PRESSURE: 121 MMHG | DIASTOLIC BLOOD PRESSURE: 81 MMHG

## 2019-07-15 LAB
ANION GAP SERPL CALC-SCNC: 9 MMOL/L — SIGNIFICANT CHANGE UP (ref 5–17)
BASOPHILS # BLD AUTO: 0.03 K/UL — SIGNIFICANT CHANGE UP (ref 0–0.2)
BASOPHILS NFR BLD AUTO: 0.9 % — SIGNIFICANT CHANGE UP (ref 0–2)
BUN SERPL-MCNC: 13 MG/DL — SIGNIFICANT CHANGE UP (ref 7–23)
CALCIUM SERPL-MCNC: 8.9 MG/DL — SIGNIFICANT CHANGE UP (ref 8.4–10.5)
CHLORIDE SERPL-SCNC: 104 MMOL/L — SIGNIFICANT CHANGE UP (ref 96–108)
CO2 SERPL-SCNC: 23 MMOL/L — SIGNIFICANT CHANGE UP (ref 22–31)
CREAT SERPL-MCNC: 0.62 MG/DL — SIGNIFICANT CHANGE UP (ref 0.5–1.3)
EOSINOPHIL # BLD AUTO: 0 K/UL — SIGNIFICANT CHANGE UP (ref 0–0.5)
EOSINOPHIL NFR BLD AUTO: 0 % — SIGNIFICANT CHANGE UP (ref 0–6)
GLUCOSE BLDC GLUCOMTR-MCNC: 103 MG/DL — HIGH (ref 70–99)
GLUCOSE BLDC GLUCOMTR-MCNC: 116 MG/DL — HIGH (ref 70–99)
GLUCOSE SERPL-MCNC: 103 MG/DL — HIGH (ref 70–99)
HCT VFR BLD CALC: 26.6 % — LOW (ref 34.5–45)
HGB BLD-MCNC: 8.4 G/DL — LOW (ref 11.5–15.5)
LYMPHOCYTES # BLD AUTO: 1.26 K/UL — SIGNIFICANT CHANGE UP (ref 1–3.3)
LYMPHOCYTES # BLD AUTO: 42.6 % — SIGNIFICANT CHANGE UP (ref 13–44)
MAGNESIUM SERPL-MCNC: 1.3 MG/DL — LOW (ref 1.6–2.6)
MCHC RBC-ENTMCNC: 28 PG — SIGNIFICANT CHANGE UP (ref 27–34)
MCHC RBC-ENTMCNC: 31.6 GM/DL — LOW (ref 32–36)
MCV RBC AUTO: 88.7 FL — SIGNIFICANT CHANGE UP (ref 80–100)
MONOCYTES # BLD AUTO: 0.06 K/UL — SIGNIFICANT CHANGE UP (ref 0–0.9)
MONOCYTES NFR BLD AUTO: 1.9 % — LOW (ref 2–14)
NEUTROPHILS # BLD AUTO: 1.59 K/UL — LOW (ref 1.8–7.4)
NEUTROPHILS NFR BLD AUTO: 48.1 % — SIGNIFICANT CHANGE UP (ref 43–77)
PLATELET # BLD AUTO: 202 K/UL — SIGNIFICANT CHANGE UP (ref 150–400)
POTASSIUM SERPL-MCNC: 4 MMOL/L — SIGNIFICANT CHANGE UP (ref 3.5–5.3)
POTASSIUM SERPL-SCNC: 4 MMOL/L — SIGNIFICANT CHANGE UP (ref 3.5–5.3)
RBC # BLD: 3 M/UL — LOW (ref 3.8–5.2)
RBC # FLD: 14.6 % — HIGH (ref 10.3–14.5)
SODIUM SERPL-SCNC: 136 MMOL/L — SIGNIFICANT CHANGE UP (ref 135–145)
WBC # BLD: 2.96 K/UL — LOW (ref 3.8–10.5)
WBC # FLD AUTO: 2.96 K/UL — LOW (ref 3.8–10.5)

## 2019-07-15 PROCEDURE — 99239 HOSP IP/OBS DSCHRG MGMT >30: CPT

## 2019-07-15 RX ORDER — MAGNESIUM SULFATE 500 MG/ML
4 VIAL (ML) INJECTION ONCE
Refills: 0 | Status: COMPLETED | OUTPATIENT
Start: 2019-07-15 | End: 2019-07-15

## 2019-07-15 RX ORDER — CHOLECALCIFEROL (VITAMIN D3) 125 MCG
1000 CAPSULE ORAL
Qty: 0 | Refills: 0 | DISCHARGE
Start: 2019-07-15

## 2019-07-15 RX ADMIN — Medication 100 GRAM(S): at 09:12

## 2019-07-15 RX ADMIN — Medication 1000 UNIT(S): at 12:13

## 2019-07-15 RX ADMIN — PEGFILGRASTIM-CBQV 6 MILLIGRAM(S): 6 INJECTION, SOLUTION SUBCUTANEOUS at 04:25

## 2019-07-15 RX ADMIN — Medication 325 MILLIGRAM(S): at 06:39

## 2019-07-15 NOTE — PROGRESS NOTE ADULT - PROBLEM SELECTOR PLAN 5
F: NS @75cc/hr  E: replete PRN  N: Mechanical soft w thin liquids
F: NS @75cc/hr  E: replete PRN  N: Mechanical soft w thin liquids
Patient has recorded history of schizoaffective disorder, but is currently not on treatment. She is not open to seeing inpatient psychiatry at the moment, and endorses that while she has anxiety, she believes it is currently under control.
F: NS @75cc/hr  E: replete PRN  N: Mechanical soft w thin liquids
Patient has recorded history of schizoaffective disorder, but is currently not on treatment. She is not open to seeing inpatient psychiatry at the moment, and endorses that while she has anxiety, she believes it is currently under control.
Patient has recorded history of schizoaffective disorder, but is currently not on treatment. She is not open to seeing inpatient psychiatry at the moment, and endorses that while she has anxiety, she believes it is currently under control.
F: NS @75cc/hr  E: replete PRN  N: Mechanical soft w thin liquids

## 2019-07-15 NOTE — PROGRESS NOTE ADULT - PROBLEM SELECTOR PROBLEM 2
Anemia
Lower extremity edema
Neutropenia
Neutropenia
Anemia
Neutropenia
Neutropenia
Anemia
Neutropenia
Patient baseline mental status

## 2019-07-15 NOTE — PROGRESS NOTE ADULT - SUBJECTIVE AND OBJECTIVE BOX
The patient is resting comfortably this am. She was upset last jin and tried to leave the hospital. Her brother eventually called her and her anxiety was relieved. She is very calm and reasonable this am. No complaints. She received the neulasta injection as planned.    CHEMOTHERAPY REGIMEN:        Day:                          Diet:  Protocol:                                    IVF:      MEDICATIONS  (STANDING):  chlorhexidine 2% Cloths 1 Application(s) Topical <User Schedule>  cholecalciferol 1000 Unit(s) Oral daily  dextrose 5%. 1000 milliLiter(s) (50 mL/Hr) IV Continuous <Continuous>  dextrose 50% Injectable 25 Gram(s) IV Push once  dextrose 50% Injectable 25 Gram(s) IV Push once  docusate sodium 100 milliGRAM(s) Oral three times a day  enoxaparin Injectable 40 milliGRAM(s) SubCutaneous every 24 hours  ferrous    sulfate 325 milliGRAM(s) Oral three times a day  insulin lispro (HumaLOG) corrective regimen sliding scale   SubCutaneous four times a day before meals  polyethylene glycol 3350 17 Gram(s) Oral daily  senna 2 Tablet(s) Oral at bedtime  sodium chloride 0.9%. 1000 milliLiter(s) (75 mL/Hr) IV Continuous <Continuous>    MEDICATIONS  (PRN):  dextrose 40% Gel 15 Gram(s) Oral once PRN Blood Glucose LESS THAN 70 milliGRAM(s)/deciliter  glucagon  Injectable 1 milliGRAM(s) IntraMuscular once PRN Glucose LESS THAN 70 milligrams/deciliter  sodium chloride 0.9% lock flush 10 milliLiter(s) IV Push every 1 hour PRN Pre/post blood products, medications, blood draw, and to maintain line patency      Allergies    No Known Allergies    Intolerances        DVT Prophylaxis: [x ] YES [ ] NO      Antibiotics: [ ] YES [x ] NO    Pain Scale (1-10):       Location:    Vital Signs Last 24 Hrs  T(C): 36.6 (15 Jul 2019 05:00), Max: 36.7 (14 Jul 2019 21:00)  T(F): 97.8 (15 Jul 2019 05:00), Max: 98.1 (14 Jul 2019 21:00)  HR: 90 (15 Jul 2019 05:00) (90 - 101)  BP: 121/81 (15 Jul 2019 05:00) (112/67 - 121/81)  BP(mean): --  RR: 18 (15 Jul 2019 05:00) (18 - 18)  SpO2: 100% (15 Jul 2019 05:00) (100% - 100%)    Drug Dosing Weight  Height (cm): 170.2 (09 Jul 2019 07:25)  Weight (kg): 72.83 (09 Jul 2019 09:12)  BMI (kg/m2): 25.1 (09 Jul 2019 09:12)  BSA (m2): 1.84 (09 Jul 2019 09:12)    PHYSICAL EXAM:      Constitutional: no complaints.  Eyes: conjunctival pallor.  ENMT: buccal mucosa without evidence of mucostomatitis.  Neck: left neck wound looks clean.  Respiratory: chest clear.  Cardiovascular: S1>S2 at apex. RSR.  Gastrointestinal: soft, nontender, active bowel sounds.  Genitourinary: voiding without difficulty.  Extremities: no leg edema.  Neurological: no gross focal deficits.  Skin: warm and dry.  Lymph Nodes: the area behind the neck wound remains firm on palp.  Musculoskeletal: full ROM.  Psychiatric: affect normal, remains calm.        URINARY CATHETER: [ ] YES [x ] NO     LABS:  CBC Full  -  ( 14 Jul 2019 08:27 )  WBC Count : x  RBC Count : x  Hemoglobin : x  Hematocrit : x  Platelet Count - Automated : x  Mean Cell Volume : x  Mean Cell Hemoglobin : x  Mean Cell Hemoglobin Concentration : x  Auto Neutrophil # : x  Auto Lymphocyte # : x  Auto Monocyte # : x  Auto Eosinophil # : x  Auto Basophil # : x  Auto Neutrophil % : 42.0 %  Auto Lymphocyte % : 55.0 %  Auto Monocyte % : x  Auto Eosinophil % : 2.0 %  Auto Basophil % : 1.0 %    07-14    136  |  103  |  11  ----------------------------<  111<H>  3.8   |  22  |  0.57    Ca    8.2<L>      14 Jul 2019 05:50  Phos  3.5     07-14  Mg     1.5     07-14            CULTURES:    RADIOLOGY & ADDITIONAL STUDIES:

## 2019-07-15 NOTE — PROGRESS NOTE ADULT - REASON FOR ADMISSION
induction chemotherapy

## 2019-07-15 NOTE — PROGRESS NOTE ADULT - PROVIDER SPECIALTY LIST ADULT
Heme/Onc
Internal Medicine
Heme/Onc
Internal Medicine

## 2019-07-15 NOTE — PROGRESS NOTE ADULT - PROBLEM SELECTOR PROBLEM 3
Lower extremity edema
Anemia
Edema extremities
Lower extremity edema
Lower extremity edema
Anemia
Anemia
Lower extremity edema
Neutropenia
Lower extremity edema

## 2019-07-15 NOTE — PROGRESS NOTE ADULT - PROBLEM SELECTOR PLAN 1
The patient has now completed two courses of induction chemotherapy. The patient received a dose of neulasta earlier this am. Her WBC is improved today.

## 2019-07-15 NOTE — PROGRESS NOTE ADULT - PROBLEM SELECTOR PLAN 1
Pt. was diagnosed with HPV-associated SCC of L neck (stage [id number]) in 12/2018  - Prior imaging showed encasement of ICA and extension of mass into skin, parotid and submandibular glands w extensive LAD.  - Pt. chemotherapy followed by Dr. Meeks inpatient. Pt. also known to Dr. Alvarado and Dr. Londono  - DCF (docetaxel, cisplatin, 5-FU) first treatment in June 2019  - NS 75cc/hr  - monitor electrolytes  - PICC placed on 7/9/2019  - chemotherapy initiated per Hem/Onc specifications, last infusion finished 7/14 3 AM  - Neulasta administered 7/15 3 AM   - currently tolerating well, denies side effects  - Vit D levels low (21.5), starting on 1000U supplementation daily Pt. was diagnosed with HPV-associated SCC of L neck (stage [id number]) in 12/2018  - Prior imaging showed encasement of ICA and extension of mass into skin, parotid and submandibular glands w extensive LAD.  - Pt. chemotherapy followed by Dr. Meeks inpatient. Pt. also known to Dr. Alvarado and Dr. Londono  - DCF (docetaxel, cisplatin, 5-FU) first treatment in June 2019  - NS 75cc/hr  - monitor electrolytes  - PICC placed on 7/9/2019  - chemotherapy initiated per Hem/Onc specifications, last infusion finished 7/14 3 AM  - Neulasta administered 7/15 3 AM   - currently tolerating well, denies side effects  - Vit D levels low (21.5), c/w 1000U supplementation daily

## 2019-07-15 NOTE — PROGRESS NOTE ADULT - PROBLEM SELECTOR PROBLEM 1
Squamous cell carcinoma of neck

## 2019-07-15 NOTE — PROGRESS NOTE ADULT - PROBLEM SELECTOR PLAN 6
-Lovenox 40mg q24hrs
-Lovenox 40mg q24hrs
F: NS @75cc/hr  E: replete PRN  N: Mechanical soft w thin liquids
-Lovenox 40mg q24hrs
F: NS @75cc/hr  E: replete PRN  N: Mechanical soft w thin liquids
F: NS @75cc/hr  E: replete PRN  N: Mechanical soft w thin liquids
-Lovenox 40mg q24hrs

## 2019-07-15 NOTE — PROGRESS NOTE ADULT - PROBLEM SELECTOR PLAN 7
-Lovenox 40mg q24hrs
1) PCP Contacted on Admission: Dr. Luther Meeks [phone], aware and following inpatient
1) PCP Contacted on Admission: Dr. Luther Meeks [phone], aware and following inpatient
-Lovenox 40mg q24hrs
-Lovenox 40mg q24hrs
1) PCP Contacted on Admission: Dr. Luther Meeks [phone], aware and following inpatient
1) PCP Contacted on Admission: Dr. Luther Meeks [phone], aware and following inpatient

## 2019-07-15 NOTE — DISCHARGE NOTE NURSING/CASE MANAGEMENT/SOCIAL WORK - NSDCDPATPORTLINK_GEN_ALL_CORE
You can access the [facility] Portal, offered by [facility], by registering with the following website: [email or web address]

## 2019-07-15 NOTE — PROGRESS NOTE ADULT - SUBJECTIVE AND OBJECTIVE BOX
OVERNIGHT EVENTS: NAEO    INTERVAL EVENTS: Patient seen and examined at bedside. Continues to complain of chills, but denies fevers, n/v.  Reports improved appetite from prior days. No mucosal pain or erosions, but reports feeling that throat is "itchy" at times.     PHYSICAL EXAM:    General: in NAD, sitting up in bed  HEENT: normocephalic, atraumatic; PERRLA, anicteric sclera  Neck: supple, flat, ulcerated lesion on L side of neck covered with bandage - clean, dry and intact, surrounding area nonerythematous, L posterior chain cervical lymphadenopathy  Cardiovascular: +S1/S2, tachycardic but regular rhythm, no M/G/R  Respiratory: clear to auscultation B/L; no wheezing, no rales, no rhonchi   Gastrointestinal: soft, NT/ND; +BSx4, no organomegaly  Extremities: WWP; no edema, clubbing or cyanosis  Vascular: 2+ radial, DP/PT pulses B/L  Neurological: AAOx3; no focal deficits, cranial nerves grossly intact    VITAL SIGNS:  Vital Signs Last 24 Hrs  T(C): 36.9 (13 Jul 2019 05:20), Max: 36.9 (12 Jul 2019 11:21)  T(F): 98.4 (13 Jul 2019 05:20), Max: 98.4 (12 Jul 2019 11:21)  HR: 82 (13 Jul 2019 05:20) (74 - 82)  BP: 143/88 (13 Jul 2019 05:20) (114/70 - 143/88)  BP(mean): --  RR: 16 (13 Jul 2019 05:20) (16 - 16)  SpO2: 98% (13 Jul 2019 05:20) (98% - 98%)    MEDICATIONS:  MEDICATIONS  (STANDING):  chlorhexidine 2% Cloths 1 Application(s) Topical <User Schedule>  cholecalciferol 1000 Unit(s) Oral daily  dextrose 5%. 1000 milliLiter(s) (50 mL/Hr) IV Continuous <Continuous>  dextrose 50% Injectable 25 Gram(s) IV Push once  dextrose 50% Injectable 25 Gram(s) IV Push once  docusate sodium 100 milliGRAM(s) Oral three times a day  enoxaparin Injectable 40 milliGRAM(s) SubCutaneous every 24 hours  ferrous    sulfate 325 milliGRAM(s) Oral three times a day  insulin lispro (HumaLOG) corrective regimen sliding scale   SubCutaneous four times a day before meals  polyethylene glycol 3350 17 Gram(s) Oral daily  senna 2 Tablet(s) Oral at bedtime  sodium chloride 0.9%. 1000 milliLiter(s) (75 mL/Hr) IV Continuous <Continuous>    MEDICATIONS  (PRN):  dextrose 40% Gel 15 Gram(s) Oral once PRN Blood Glucose LESS THAN 70 milliGRAM(s)/deciliter  glucagon  Injectable 1 milliGRAM(s) IntraMuscular once PRN Glucose LESS THAN 70 milligrams/deciliter  sodium chloride 0.9% lock flush 10 milliLiter(s) IV Push every 1 hour PRN Pre/post blood products, medications, blood draw, and to maintain line patency      ALLERGIES:  Allergies    No Known Allergies    Intolerances        LABS:                        8.2    1.99  )-----------( 251      ( 13 Jul 2019 05:56 )             26.4     07-12    137  |  108  |  10  ----------------------------<  103<H>  3.6   |  23  |  0.64    Ca    7.4<L>      12 Jul 2019 06:19  Phos  2.7     07-12  Mg     1.6     07-12    TPro  4.9<L>  /  Alb  2.9<L>  /  TBili  0.2  /  DBili  x   /  AST  26  /  ALT  19  /  AlkPhos  33<L>  07-12        CAPILLARY BLOOD GLUCOSE      POCT Blood Glucose.: 97 mg/dL (12 Jul 2019 21:37)      RADIOLOGY & ADDITIONAL TESTS: Reviewed. OVERNIGHT EVENTS: NAEO    INTERVAL EVENTS: Patient seen and examined at bedside. Continues to complain of chills, but denies fevers, n/v.  Reports improved appetite from prior days. No mucosal pain or erosions, but reports feeling that throat is "itchy" at times.     PHYSICAL EXAM:    General: in NAD, sitting up in bed  HEENT: normocephalic, atraumatic; PERRLA, anicteric sclera  Neck: supple, flat, ulcerated lesion on L side of neck covered with bandage - clean, dry and intact, surrounding area nonerythematous, L posterior chain cervical lymphadenopathy  Cardiovascular: +S1/S2, tachycardic but regular rhythm, no M/G/R  Respiratory: clear to auscultation B/L; no wheezing, no rales, no rhonchi   Gastrointestinal: soft, NT/ND; +BSx4, no organomegaly  Extremities: WWP; no edema, clubbing or cyanosis  Vascular: 2+ radial, DP/PT pulses B/L  Neurological: AAOx3; no focal deficits, cranial nerves grossly intact    VITAL SIGNS:  Vital Signs Last 24 Hrs  T(C): 36.6 (15 Jul 2019 05:00), Max: 36.7 (14 Jul 2019 21:00)  T(F): 97.8 (15 Jul 2019 05:00), Max: 98.1 (14 Jul 2019 21:00)  HR: 90 (15 Jul 2019 05:00) (90 - 101)  BP: 121/81 (15 Jul 2019 05:00) (112/67 - 121/81)  BP(mean): --  RR: 18 (15 Jul 2019 05:00) (18 - 18)  SpO2: 100% (15 Jul 2019 05:00) (100% - 100%)    MEDICATIONS:  MEDICATIONS  (STANDING):  chlorhexidine 2% Cloths 1 Application(s) Topical <User Schedule>  cholecalciferol 1000 Unit(s) Oral daily  dextrose 5%. 1000 milliLiter(s) (50 mL/Hr) IV Continuous <Continuous>  dextrose 50% Injectable 25 Gram(s) IV Push once  dextrose 50% Injectable 25 Gram(s) IV Push once  docusate sodium 100 milliGRAM(s) Oral three times a day  enoxaparin Injectable 40 milliGRAM(s) SubCutaneous every 24 hours  ferrous    sulfate 325 milliGRAM(s) Oral three times a day  insulin lispro (HumaLOG) corrective regimen sliding scale   SubCutaneous four times a day before meals  magnesium sulfate  IVPB 4 Gram(s) IV Intermittent once  polyethylene glycol 3350 17 Gram(s) Oral daily  senna 2 Tablet(s) Oral at bedtime  sodium chloride 0.9%. 1000 milliLiter(s) (75 mL/Hr) IV Continuous <Continuous>    MEDICATIONS  (PRN):  dextrose 40% Gel 15 Gram(s) Oral once PRN Blood Glucose LESS THAN 70 milliGRAM(s)/deciliter  glucagon  Injectable 1 milliGRAM(s) IntraMuscular once PRN Glucose LESS THAN 70 milligrams/deciliter  sodium chloride 0.9% lock flush 10 milliLiter(s) IV Push every 1 hour PRN Pre/post blood products, medications, blood draw, and to maintain line patency      ALLERGIES:  Allergies    No Known Allergies    Intolerances        LABS:                        8.4    2.96  )-----------( 202      ( 15 Jul 2019 07:49 )             26.6     07-15    136  |  104  |  13  ----------------------------<  103<H>  4.0   |  23  |  0.62    Ca    8.9      15 Jul 2019 07:49  Phos  3.5     07-14  Mg     1.3     07-15          CAPILLARY BLOOD GLUCOSE      POCT Blood Glucose.: 103 mg/dL (15 Jul 2019 08:12)      RADIOLOGY & ADDITIONAL TESTS: Reviewed.

## 2019-07-15 NOTE — PROGRESS NOTE ADULT - ASSESSMENT
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

## 2019-07-15 NOTE — PROGRESS NOTE ADULT - ATTENDING COMMENTS
The patient has tolerated this course of chemotherapy well. The left neck tumor area remains markedly decreased in size. She is advised to watch for high fever, shaking chills and other signs of infection. I have asked her to make an appointment to see me in the office next Monday.

## 2019-07-15 NOTE — PROGRESS NOTE ADULT - PROBLEM SELECTOR PLAN 2
- Patient with ANC of <1500 (1060 on 7/12)  - will continue to monitor with daily CBCs  - Will take neutropenic precautions (handwashing in/out of room, gloves when examining patient, patient should wear mask when ambulating halls)  - Currently afebrile with stable hemodynamics  - will avoid rectal temps - Resolving, Patient with ANC of <1500 (1060 on 7/12)  - will continue to monitor with daily CBCs  - Will take neutropenic precautions (handwashing in/out of room, gloves when examining patient, patient should wear mask when ambulating halls)  - Currently afebrile with stable hemodynamics  - will avoid rectal temps

## 2019-07-15 NOTE — PROGRESS NOTE ADULT - ASSESSMENT
The patient is calm this am and reports no complaints after her second course of induction chemotherapy.

## 2019-07-15 NOTE — PROGRESS NOTE ADULT - PROBLEM SELECTOR PLAN 8
1) PCP Contacted on Admission: Dr. Luther Meeks [phone], aware and following inpatient
1) PCP Contacted on Admission: Dr. Luther Meeks [phone], aware and following inpatient
1) PCP Contacted on Admission: Dr. Luther Meeks [phone], aware and following inpatient

## 2019-07-18 ENCOUNTER — EMERGENCY (EMERGENCY)
Facility: HOSPITAL | Age: 39
LOS: 1 days | Discharge: HOME | End: 2019-07-18
Attending: EMERGENCY MEDICINE | Admitting: EMERGENCY MEDICINE
Payer: MEDICARE

## 2019-07-18 VITALS
OXYGEN SATURATION: 100 % | TEMPERATURE: 99 F | RESPIRATION RATE: 17 BRPM | SYSTOLIC BLOOD PRESSURE: 123 MMHG | HEART RATE: 87 BPM | DIASTOLIC BLOOD PRESSURE: 82 MMHG

## 2019-07-18 VITALS
SYSTOLIC BLOOD PRESSURE: 118 MMHG | OXYGEN SATURATION: 97 % | HEART RATE: 112 BPM | DIASTOLIC BLOOD PRESSURE: 85 MMHG | WEIGHT: 146.39 LBS | TEMPERATURE: 100 F | RESPIRATION RATE: 16 BRPM

## 2019-07-18 DIAGNOSIS — R30.0 DYSURIA: ICD-10-CM

## 2019-07-18 DIAGNOSIS — R60.0 LOCALIZED EDEMA: ICD-10-CM

## 2019-07-18 DIAGNOSIS — R23.8 OTHER SKIN CHANGES: ICD-10-CM

## 2019-07-18 DIAGNOSIS — D70.9 NEUTROPENIA, UNSPECIFIED: ICD-10-CM

## 2019-07-18 DIAGNOSIS — Z51.11 ENCOUNTER FOR ANTINEOPLASTIC CHEMOTHERAPY: ICD-10-CM

## 2019-07-18 DIAGNOSIS — D50.9 IRON DEFICIENCY ANEMIA, UNSPECIFIED: ICD-10-CM

## 2019-07-18 DIAGNOSIS — Z80.8 FAMILY HISTORY OF MALIGNANT NEOPLASM OF OTHER ORGANS OR SYSTEMS: ICD-10-CM

## 2019-07-18 DIAGNOSIS — C76.0 MALIGNANT NEOPLASM OF HEAD, FACE AND NECK: ICD-10-CM

## 2019-07-18 DIAGNOSIS — F25.9 SCHIZOAFFECTIVE DISORDER, UNSPECIFIED: ICD-10-CM

## 2019-07-18 DIAGNOSIS — F32.9 MAJOR DEPRESSIVE DISORDER, SINGLE EPISODE, UNSPECIFIED: ICD-10-CM

## 2019-07-18 LAB
ALBUMIN SERPL ELPH-MCNC: 3.5 G/DL — SIGNIFICANT CHANGE UP (ref 3.4–5)
ALP SERPL-CCNC: 51 U/L — SIGNIFICANT CHANGE UP (ref 40–120)
ALT FLD-CCNC: 22 U/L — SIGNIFICANT CHANGE UP (ref 12–42)
ANION GAP SERPL CALC-SCNC: 10 MMOL/L — SIGNIFICANT CHANGE UP (ref 9–16)
APPEARANCE UR: CLEAR — SIGNIFICANT CHANGE UP
APTT BLD: 26.6 SEC — LOW (ref 27.5–36.3)
AST SERPL-CCNC: 20 U/L — SIGNIFICANT CHANGE UP (ref 15–37)
BASOPHILS NFR BLD AUTO: 2 % — SIGNIFICANT CHANGE UP (ref 0–2)
BILIRUB SERPL-MCNC: 0.3 MG/DL — SIGNIFICANT CHANGE UP (ref 0.2–1.2)
BILIRUB UR-MCNC: ABNORMAL
BUN SERPL-MCNC: 14 MG/DL — SIGNIFICANT CHANGE UP (ref 7–23)
CALCIUM SERPL-MCNC: 9.1 MG/DL — SIGNIFICANT CHANGE UP (ref 8.5–10.5)
CHLORIDE SERPL-SCNC: 104 MMOL/L — SIGNIFICANT CHANGE UP (ref 96–108)
CO2 SERPL-SCNC: 24 MMOL/L — SIGNIFICANT CHANGE UP (ref 22–31)
COLOR SPEC: YELLOW — SIGNIFICANT CHANGE UP
CREAT SERPL-MCNC: 1 MG/DL — SIGNIFICANT CHANGE UP (ref 0.5–1.3)
DIFF PNL FLD: NEGATIVE — SIGNIFICANT CHANGE UP
GLUCOSE SERPL-MCNC: 99 MG/DL — SIGNIFICANT CHANGE UP (ref 70–99)
GLUCOSE UR QL: NEGATIVE — SIGNIFICANT CHANGE UP
HCT VFR BLD CALC: 25.1 % — LOW (ref 34.5–45)
HGB BLD-MCNC: 8.3 G/DL — LOW (ref 11.5–15.5)
INR BLD: 1.2 — HIGH (ref 0.88–1.16)
KETONES UR-MCNC: NEGATIVE — SIGNIFICANT CHANGE UP
LACTATE SERPL-SCNC: 1.2 MMOL/L — SIGNIFICANT CHANGE UP (ref 0.4–2)
LEUKOCYTE ESTERASE UR-ACNC: ABNORMAL
LYMPHOCYTES # BLD AUTO: 51 % — HIGH (ref 13–44)
MCHC RBC-ENTMCNC: 28.3 PG — SIGNIFICANT CHANGE UP (ref 27–34)
MCHC RBC-ENTMCNC: 33.1 G/DL — SIGNIFICANT CHANGE UP (ref 32–36)
MCV RBC AUTO: 85.7 FL — SIGNIFICANT CHANGE UP (ref 80–100)
MONOCYTES NFR BLD AUTO: 18 % — HIGH (ref 2–14)
NEUTROPHILS NFR BLD AUTO: 21 % — LOW (ref 43–77)
NITRITE UR-MCNC: NEGATIVE — SIGNIFICANT CHANGE UP
PH UR: 5.5 — SIGNIFICANT CHANGE UP (ref 5–8)
PLATELET # BLD AUTO: 152 K/UL — SIGNIFICANT CHANGE UP (ref 150–400)
POTASSIUM SERPL-MCNC: 3.9 MMOL/L — SIGNIFICANT CHANGE UP (ref 3.5–5.3)
POTASSIUM SERPL-SCNC: 3.9 MMOL/L — SIGNIFICANT CHANGE UP (ref 3.5–5.3)
PROT SERPL-MCNC: 6.7 G/DL — SIGNIFICANT CHANGE UP (ref 6.4–8.2)
PROT UR-MCNC: 30 MG/DL
PROTHROM AB SERPL-ACNC: 13.4 SEC — HIGH (ref 10–12.9)
RBC # BLD: 2.93 M/UL — LOW (ref 3.8–5.2)
RBC # FLD: 14.9 % — HIGH (ref 10.3–14.5)
SODIUM SERPL-SCNC: 138 MMOL/L — SIGNIFICANT CHANGE UP (ref 132–145)
SP GR SPEC: >=1.03 — SIGNIFICANT CHANGE UP (ref 1–1.03)
UROBILINOGEN FLD QL: 0.2 E.U./DL — SIGNIFICANT CHANGE UP
WBC # BLD: 3.3 K/UL — LOW (ref 3.8–10.5)
WBC # FLD AUTO: 3.3 K/UL — LOW (ref 3.8–10.5)

## 2019-07-18 PROCEDURE — 93010 ELECTROCARDIOGRAM REPORT: CPT

## 2019-07-18 PROCEDURE — 99284 EMERGENCY DEPT VISIT MOD MDM: CPT

## 2019-07-18 RX ORDER — ACETAMINOPHEN 500 MG
500 TABLET ORAL ONCE
Refills: 0 | Status: COMPLETED | OUTPATIENT
Start: 2019-07-18 | End: 2019-07-18

## 2019-07-18 RX ORDER — SODIUM CHLORIDE 9 MG/ML
2100 INJECTION INTRAMUSCULAR; INTRAVENOUS; SUBCUTANEOUS ONCE
Refills: 0 | Status: DISCONTINUED | OUTPATIENT
Start: 2019-07-18 | End: 2019-07-18

## 2019-07-18 RX ORDER — ACETAMINOPHEN 500 MG
650 TABLET ORAL ONCE
Refills: 0 | Status: DISCONTINUED | OUTPATIENT
Start: 2019-07-18 | End: 2019-07-18

## 2019-07-18 RX ORDER — SODIUM CHLORIDE 9 MG/ML
1000 INJECTION INTRAMUSCULAR; INTRAVENOUS; SUBCUTANEOUS ONCE
Refills: 0 | Status: COMPLETED | OUTPATIENT
Start: 2019-07-18 | End: 2019-07-18

## 2019-07-18 RX ADMIN — SODIUM CHLORIDE 1000 MILLILITER(S): 9 INJECTION INTRAMUSCULAR; INTRAVENOUS; SUBCUTANEOUS at 17:13

## 2019-07-18 RX ADMIN — Medication 500 MILLIGRAM(S): at 17:29

## 2019-07-18 NOTE — ED PROVIDER NOTE - PROGRESS NOTE DETAILS
Labs wnl- meghan for day 3 post Neulasta. Case discussed with Dr. Teran who looked over all labs. Patient doing well. UA just shows Ketone (she has had dec PO 2/2 mucositis). Now using magic mouthwash and has some to go. Dr. Teran will inform Dr. Meeks her onc MSD that she was here. Patient seems very clinically stable.

## 2019-07-18 NOTE — ED PROVIDER NOTE - NSFOLLOWUPINSTRUCTIONS_ED_ALL_ED_FT
Please follow up Dr. Meeks by phone tomorrow.    Dr. Teran his colleague is aware that you were here and will let him know. Your labs looks stable and where they should be at this stage of treatment.     Stay hydrated and gets in some calories. You are in a starvation state.    Please return to the ER for any worsening symptoms: fever, increased pain, chills, weakness.

## 2019-07-18 NOTE — ED PROVIDER NOTE - OBJECTIVE STATEMENT
38 y/o female with PMHx of squamous cell carcinoma of the neck (s/p first chemotherapy treatement in June, seen in ER at the end of June for oral blisters following radiation therapy, was discharged after oral potassium repletion and magic mouthwash, was admitted to [facility] for her first round of induction chemotherapy with DCF, second dose of chemotherapy last week, had PICC line placed on June 9th and was discharged from hospital on June 15th, also treated with Neulasta on July 15th), depression, and schizoaffective disorder (not on any meds for this) presents to ED c/o urinary symptoms. Patient reports burning urination for the past few days. States that this morning she was able to urinate without pain but began experiencing pain around the urethra after she stopped urinating. Also reports generalized weakness and painful blisters on the lips and tongue, with bruise to the left arm, similar to her symptoms after the last round of chemotherapy. Reports feeling "clammy" but denies any fever, chills, CP, SOB, cough, abd pain, nausea, vomiting, diarrhea, or HA. 40 y/o female with PMHx of squamous cell carcinoma of the neck (s/p first chemotherapy treatement in June, seen in ER at the end of June for oral blisters following radiation therapy, was discharged after oral potassium repletion and magic mouthwash, was admitted to [facility] for her first round of induction chemotherapy with DCF, second dose of chemotherapy last week, had PICC line placed on June 9th and was discharged from hospital on June 15th, also treated with Neulasta on July 15th), depression, and schizoaffective disorder (not on any meds for this) presents to ED c/o urinary symptoms. Patient reports burning urination for the past few days. States that this morning she was able to urinate without pain but began experiencing pain around the urethra after she stopped urinating. Also reports generalized weakness and painful blisters on the lips and tongue, with bruise to the left arm, similar to her symptoms after the last round of chemotherapy. Reports feeling "clammy" but denies any fever, chills, CP, SOB, cough, abd pain, nausea, vomiting, diarrhea, or HA. Patient took what she thinks was half a dose of liquid Tylenol approximately 1 hour PTA.

## 2019-07-18 NOTE — ED ADULT TRIAGE NOTE - CHIEF COMPLAINT QUOTE
Patient currently on chemotherapy, present to ED for burning upon urination  , and mouth sore. Patient also reports feeling chills

## 2019-07-18 NOTE — ED PROVIDER NOTE - CLINICAL SUMMARY MEDICAL DECISION MAKING FREE TEXT BOX
Patient presents with dysuria, seems to be external by description. Will check labs with UA, given recent chemotherapy and known low WBC count. Symptoms do not appear consistent with sepsis at this time.

## 2019-07-18 NOTE — ED PROVIDER NOTE - ENMT, MLM
Airway patent. Scattered oral ulcerations. Throat has no vesicles, no oropharyngeal exudates and uvula is midline.

## 2019-07-18 NOTE — ED ADULT NURSE NOTE - OBJECTIVE STATEMENT
38 yo F c.o dysuria x 1 day. Pt states "I had chemo and left the hospital on Sunday and yesterday I started having pain when I pee. Today I still have it but it burns after I pee too. I also had a headache before I came in so I took 2 swigs of liquid Tylenol before I came in". Pt c.o HA of 5/10 at this time. Pt denies fever, chills, cp, sob at this time.

## 2019-07-20 LAB
CULTURE RESULTS: SIGNIFICANT CHANGE UP
SPECIMEN SOURCE: SIGNIFICANT CHANGE UP

## 2019-07-31 ENCOUNTER — INPATIENT (INPATIENT)
Facility: HOSPITAL | Age: 39
LOS: 6 days | Discharge: HOME | DRG: 847 | End: 2019-08-07
Attending: SPECIALIST | Admitting: SPECIALIST
Payer: MEDICARE

## 2019-08-01 VITALS
HEIGHT: 65 IN | WEIGHT: 149.91 LBS | TEMPERATURE: 98 F | OXYGEN SATURATION: 100 % | RESPIRATION RATE: 16 BRPM | HEART RATE: 76 BPM | DIASTOLIC BLOOD PRESSURE: 86 MMHG | SYSTOLIC BLOOD PRESSURE: 133 MMHG

## 2019-08-01 DIAGNOSIS — C44.42 SQUAMOUS CELL CARCINOMA OF SKIN OF SCALP AND NECK: ICD-10-CM

## 2019-08-01 LAB
ALBUMIN SERPL ELPH-MCNC: 3.6 G/DL — SIGNIFICANT CHANGE UP (ref 3.3–5)
ALP SERPL-CCNC: 47 U/L — SIGNIFICANT CHANGE UP (ref 40–120)
ALT FLD-CCNC: 15 U/L — SIGNIFICANT CHANGE UP (ref 10–45)
ANION GAP SERPL CALC-SCNC: 8 MMOL/L — SIGNIFICANT CHANGE UP (ref 5–17)
AST SERPL-CCNC: 22 U/L — SIGNIFICANT CHANGE UP (ref 10–40)
BASOPHILS # BLD AUTO: 0.05 K/UL — SIGNIFICANT CHANGE UP (ref 0–0.2)
BASOPHILS NFR BLD AUTO: 1.2 % — SIGNIFICANT CHANGE UP (ref 0–2)
BILIRUB SERPL-MCNC: 0.2 MG/DL — SIGNIFICANT CHANGE UP (ref 0.2–1.2)
BUN SERPL-MCNC: 11 MG/DL — SIGNIFICANT CHANGE UP (ref 7–23)
CALCIUM SERPL-MCNC: 8.6 MG/DL — SIGNIFICANT CHANGE UP (ref 8.4–10.5)
CHLORIDE SERPL-SCNC: 112 MMOL/L — HIGH (ref 96–108)
CO2 SERPL-SCNC: 25 MMOL/L — SIGNIFICANT CHANGE UP (ref 22–31)
CREAT SERPL-MCNC: 0.72 MG/DL — SIGNIFICANT CHANGE UP (ref 0.5–1.3)
EOSINOPHIL # BLD AUTO: 0.1 K/UL — SIGNIFICANT CHANGE UP (ref 0–0.5)
EOSINOPHIL NFR BLD AUTO: 2.5 % — SIGNIFICANT CHANGE UP (ref 0–6)
GLUCOSE SERPL-MCNC: 98 MG/DL — SIGNIFICANT CHANGE UP (ref 70–99)
HCT VFR BLD CALC: 26.7 % — LOW (ref 34.5–45)
HGB BLD-MCNC: 8 G/DL — LOW (ref 11.5–15.5)
IMM GRANULOCYTES NFR BLD AUTO: 0.5 % — SIGNIFICANT CHANGE UP (ref 0–1.5)
INR BLD: 0.98 — SIGNIFICANT CHANGE UP (ref 0.88–1.16)
LYMPHOCYTES # BLD AUTO: 1.74 K/UL — SIGNIFICANT CHANGE UP (ref 1–3.3)
LYMPHOCYTES # BLD AUTO: 42.9 % — SIGNIFICANT CHANGE UP (ref 13–44)
MAGNESIUM SERPL-MCNC: 1.6 MG/DL — SIGNIFICANT CHANGE UP (ref 1.6–2.6)
MCHC RBC-ENTMCNC: 27.7 PG — SIGNIFICANT CHANGE UP (ref 27–34)
MCHC RBC-ENTMCNC: 30 GM/DL — LOW (ref 32–36)
MCV RBC AUTO: 92.4 FL — SIGNIFICANT CHANGE UP (ref 80–100)
MONOCYTES # BLD AUTO: 0.56 K/UL — SIGNIFICANT CHANGE UP (ref 0–0.9)
MONOCYTES NFR BLD AUTO: 13.8 % — SIGNIFICANT CHANGE UP (ref 2–14)
NEUTROPHILS # BLD AUTO: 1.59 K/UL — LOW (ref 1.8–7.4)
NEUTROPHILS NFR BLD AUTO: 39.1 % — LOW (ref 43–77)
NRBC # BLD: 0 /100 WBCS — SIGNIFICANT CHANGE UP (ref 0–0)
PHOSPHATE SERPL-MCNC: 3.8 MG/DL — SIGNIFICANT CHANGE UP (ref 2.5–4.5)
PLATELET # BLD AUTO: 262 K/UL — SIGNIFICANT CHANGE UP (ref 150–400)
POTASSIUM SERPL-MCNC: 3.8 MMOL/L — SIGNIFICANT CHANGE UP (ref 3.5–5.3)
POTASSIUM SERPL-SCNC: 3.8 MMOL/L — SIGNIFICANT CHANGE UP (ref 3.5–5.3)
PROT SERPL-MCNC: 5.4 G/DL — LOW (ref 6–8.3)
PROTHROM AB SERPL-ACNC: 11.1 SEC — SIGNIFICANT CHANGE UP (ref 10–12.9)
RBC # BLD: 2.89 M/UL — LOW (ref 3.8–5.2)
RBC # FLD: 16.9 % — HIGH (ref 10.3–14.5)
SODIUM SERPL-SCNC: 145 MMOL/L — SIGNIFICANT CHANGE UP (ref 135–145)
WBC # BLD: 4.06 K/UL — SIGNIFICANT CHANGE UP (ref 3.8–10.5)
WBC # FLD AUTO: 4.06 K/UL — SIGNIFICANT CHANGE UP (ref 3.8–10.5)

## 2019-08-01 PROCEDURE — 76937 US GUIDE VASCULAR ACCESS: CPT | Mod: 26,59

## 2019-08-01 PROCEDURE — 93010 ELECTROCARDIOGRAM REPORT: CPT

## 2019-08-01 PROCEDURE — 36569 INSJ PICC 5 YR+ W/O IMAGING: CPT

## 2019-08-01 PROCEDURE — 99223 1ST HOSP IP/OBS HIGH 75: CPT

## 2019-08-01 RX ORDER — SENNA PLUS 8.6 MG/1
2 TABLET ORAL AT BEDTIME
Refills: 0 | Status: DISCONTINUED | OUTPATIENT
Start: 2019-08-01 | End: 2019-08-07

## 2019-08-01 RX ORDER — FLUOROURACIL 50 MG/ML
1750 INJECTION, SOLUTION INTRAVENOUS ONCE
Refills: 0 | Status: COMPLETED | OUTPATIENT
Start: 2019-08-01 | End: 2019-08-01

## 2019-08-01 RX ORDER — CHLORHEXIDINE GLUCONATE 213 G/1000ML
1 SOLUTION TOPICAL
Refills: 0 | Status: DISCONTINUED | OUTPATIENT
Start: 2019-08-01 | End: 2019-08-07

## 2019-08-01 RX ORDER — FERROUS SULFATE 325(65) MG
325 TABLET ORAL EVERY 8 HOURS
Refills: 0 | Status: DISCONTINUED | OUTPATIENT
Start: 2019-08-01 | End: 2019-08-07

## 2019-08-01 RX ORDER — SODIUM CHLORIDE 9 MG/ML
10 INJECTION INTRAMUSCULAR; INTRAVENOUS; SUBCUTANEOUS
Refills: 0 | Status: DISCONTINUED | OUTPATIENT
Start: 2019-08-01 | End: 2019-08-07

## 2019-08-01 RX ORDER — DOCUSATE SODIUM 100 MG
100 CAPSULE ORAL EVERY 24 HOURS
Refills: 0 | Status: DISCONTINUED | OUTPATIENT
Start: 2019-08-01 | End: 2019-08-07

## 2019-08-01 RX ORDER — ENOXAPARIN SODIUM 100 MG/ML
40 INJECTION SUBCUTANEOUS EVERY 24 HOURS
Refills: 0 | Status: DISCONTINUED | OUTPATIENT
Start: 2019-08-01 | End: 2019-08-07

## 2019-08-01 RX ORDER — POLYETHYLENE GLYCOL 3350 17 G/17G
17 POWDER, FOR SOLUTION ORAL EVERY 24 HOURS
Refills: 0 | Status: DISCONTINUED | OUTPATIENT
Start: 2019-08-01 | End: 2019-08-07

## 2019-08-01 RX ORDER — POTASSIUM CHLORIDE 20 MEQ
20 PACKET (EA) ORAL ONCE
Refills: 0 | Status: COMPLETED | OUTPATIENT
Start: 2019-08-01 | End: 2019-08-01

## 2019-08-01 RX ORDER — DOCETAXEL 20 MG/ML
130 INJECTION, SOLUTION, CONCENTRATE INTRAVENOUS ONCE
Refills: 0 | Status: COMPLETED | OUTPATIENT
Start: 2019-08-01 | End: 2019-08-01

## 2019-08-01 RX ORDER — CHOLECALCIFEROL (VITAMIN D3) 125 MCG
1000 CAPSULE ORAL EVERY 24 HOURS
Refills: 0 | Status: DISCONTINUED | OUTPATIENT
Start: 2019-08-01 | End: 2019-08-07

## 2019-08-01 RX ORDER — CISPLATIN 1 MG/ML
130 INJECTION, SOLUTION INTRAVENOUS ONCE
Refills: 0 | Status: COMPLETED | OUTPATIENT
Start: 2019-08-01 | End: 2019-08-01

## 2019-08-01 RX ORDER — FOSAPREPITANT DIMEGLUMINE 150 MG/5ML
150 INJECTION, POWDER, LYOPHILIZED, FOR SOLUTION INTRAVENOUS ONCE
Refills: 0 | Status: COMPLETED | OUTPATIENT
Start: 2019-08-01 | End: 2019-08-01

## 2019-08-01 RX ORDER — SODIUM CHLORIDE 9 MG/ML
1500 INJECTION INTRAMUSCULAR; INTRAVENOUS; SUBCUTANEOUS ONCE
Refills: 0 | Status: COMPLETED | OUTPATIENT
Start: 2019-08-01 | End: 2019-08-01

## 2019-08-01 RX ORDER — DEXAMETHASONE 0.5 MG/5ML
20 ELIXIR ORAL DAILY
Refills: 0 | Status: COMPLETED | OUTPATIENT
Start: 2019-08-01 | End: 2019-08-02

## 2019-08-01 RX ORDER — ONDANSETRON 8 MG/1
16 TABLET, FILM COATED ORAL ONCE
Refills: 0 | Status: COMPLETED | OUTPATIENT
Start: 2019-08-01 | End: 2019-08-01

## 2019-08-01 RX ORDER — FLUOROURACIL 50 MG/ML
1750 INJECTION, SOLUTION INTRAVENOUS ONCE
Refills: 0 | Status: DISCONTINUED | OUTPATIENT
Start: 2019-08-01 | End: 2019-08-01

## 2019-08-01 RX ORDER — MAGNESIUM SULFATE 500 MG/ML
2 VIAL (ML) INJECTION ONCE
Refills: 0 | Status: COMPLETED | OUTPATIENT
Start: 2019-08-01 | End: 2019-08-01

## 2019-08-01 RX ORDER — SODIUM CHLORIDE 9 MG/ML
1000 INJECTION INTRAMUSCULAR; INTRAVENOUS; SUBCUTANEOUS
Refills: 0 | Status: DISCONTINUED | OUTPATIENT
Start: 2019-08-01 | End: 2019-08-06

## 2019-08-01 RX ADMIN — DOCETAXEL 256.5 MILLIGRAM(S): 20 INJECTION, SOLUTION, CONCENTRATE INTRAVENOUS at 16:45

## 2019-08-01 RX ADMIN — FOSAPREPITANT DIMEGLUMINE 310 MILLIGRAM(S): 150 INJECTION, POWDER, LYOPHILIZED, FOR SOLUTION INTRAVENOUS at 15:54

## 2019-08-01 RX ADMIN — Medication 325 MILLIGRAM(S): at 16:50

## 2019-08-01 RX ADMIN — Medication 325 MILLIGRAM(S): at 23:01

## 2019-08-01 RX ADMIN — Medication 1000 UNIT(S): at 09:55

## 2019-08-01 RX ADMIN — Medication 325 MILLIGRAM(S): at 09:56

## 2019-08-01 RX ADMIN — ENOXAPARIN SODIUM 40 MILLIGRAM(S): 100 INJECTION SUBCUTANEOUS at 09:56

## 2019-08-01 RX ADMIN — FLUOROURACIL 43.13 MILLIGRAM(S): 50 INJECTION, SOLUTION INTRAVENOUS at 18:06

## 2019-08-01 RX ADMIN — SODIUM CHLORIDE 75 MILLILITER(S): 9 INJECTION INTRAMUSCULAR; INTRAVENOUS; SUBCUTANEOUS at 01:28

## 2019-08-01 RX ADMIN — SODIUM CHLORIDE 500 MILLILITER(S): 9 INJECTION INTRAMUSCULAR; INTRAVENOUS; SUBCUTANEOUS at 13:37

## 2019-08-01 RX ADMIN — Medication 50 GRAM(S): at 09:56

## 2019-08-01 RX ADMIN — CISPLATIN 47.08 MILLIGRAM(S): 1 INJECTION, SOLUTION INTRAVENOUS at 18:01

## 2019-08-01 RX ADMIN — Medication 220 MILLIGRAM(S): at 15:36

## 2019-08-01 RX ADMIN — Medication 20 MILLIEQUIVALENT(S): at 09:55

## 2019-08-01 RX ADMIN — SENNA PLUS 2 TABLET(S): 8.6 TABLET ORAL at 21:26

## 2019-08-01 RX ADMIN — POLYETHYLENE GLYCOL 3350 17 GRAM(S): 17 POWDER, FOR SOLUTION ORAL at 09:56

## 2019-08-01 RX ADMIN — ONDANSETRON 232 MILLIGRAM(S): 8 TABLET, FILM COATED ORAL at 16:24

## 2019-08-01 RX ADMIN — Medication 100 MILLIGRAM(S): at 09:55

## 2019-08-01 NOTE — H&P ADULT - PROBLEM SELECTOR PLAN 1
Dx HPV-associated SCC of L neck (stage [id number]) in 12/2018. Finished 2 cycles of DCF (docetaxel, cisplatin, 5-FU). Currently afebrile.    - CTA neeck 6/20: left neck SCC, encasement of ICA and extension of mass into skin, parotid and submandibular glands w extensive LAD  -Follows with Dr. Londono and Dr. Meeks as outpatient   -c/w NS 75cc/hr  -Pt will need to have PICC placed in AM for 3rd cycle

## 2019-08-01 NOTE — H&P ADULT - PROBLEM SELECTOR PLAN 6
Fluids: NS @ 75cc/hr   Electrolytes: replete as necessary, K>4, Mg>2  Nutrition: DASH TLC  Bowel Regimen: senna, miralax, colace   DVT ppx:lovenox  GI ppx:none  Code: Full  Disposition: [facility] Fluids: NS @ 75cc/hr   Electrolytes: replete as necessary, K>4, Mg>2  Nutrition: DASH TLC  Bowel Regimen: senna, colace   DVT ppx:lovenox  GI ppx:none  Code: Full  Disposition: [facility]

## 2019-08-01 NOTE — H&P ADULT - PROBLEM SELECTOR PLAN 7
1) PCP Contacted on Admission: (Y/N) --> Name & Phone #: Dr. Meeks   2) Date of Contact with PCP:  3) PCP Contacted at Discharge: (Y/N)  4) Summary of Handoff Given to PCP:   5) Post-Discharge Appointment Date and Location: 1) PCP Contacted on Admission: (Y/N) --> Name & Phone #: Dr. Meeks   2) Date of Contact with PCP: 7/31  3) PCP Contacted at Discharge: (Y/N)  4) Summary of Handoff Given to PCP:   5) Post-Discharge Appointment Date and Location:

## 2019-08-01 NOTE — PROGRESS NOTE ADULT - SUBJECTIVE AND OBJECTIVE BOX
Heme/Onc Progress Note (Dr. Meeks )  Discussed with Dr. Meeks and plan reviewed with the primary team.    The patient was seen and examined.      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 A neck CT scan on 11/10/16 revealed a 3.1x2.9x5.0 cm multiloculated rim-enhancing fluid collection within the left level IIb soft tissues just deep to the sternocleidomastoid muscle. DDx included abscess, suppurative adenitis or infected second brachial cleft cyst. The patient was treated with antibiotics and some white material was drained from the lesion. The lesion, according to the patient came back in 2017. She again took antibiotic therapy. A CT scan was done but no additional biopsy was obtained. The patient saw Dr. Weller in early 12/18. A biopsy was obtained on 12/6/18.   This revealed a squamous cell carcinoma, HPV related. The patient was seen by Dr. Alvarado and Dr. Londono. She was referred to me but did not make an appointment. She finally saw Constantino Lazcano on 5/29 and received her 1st cycle of induction DCF. She had a remarkable response in primary left neck mass. She notes having bilateral leg swelling and stomatitis at home.    She is admitted for 3rd cycle of induction DCF. No acute complaints. She denies nausea/vomiting, abdominal pain, constipation or diarrhea. No fatigue. Appetite is good.    ROS is otherwise negative.        Medications:  MEDICATIONS  (STANDING):  cholecalciferol 1000 Unit(s) Oral every 24 hours  CISplatin IVPB (eMAR) 130 milliGRAM(s) IV Intermittent once  dexamethasone  IVPB 20 milliGRAM(s) IV Intermittent daily  DOCEtaxel IVPB (eMAR) 130 milliGRAM(s) IV Intermittent once  docusate sodium 100 milliGRAM(s) Oral every 24 hours  enoxaparin Injectable 40 milliGRAM(s) SubCutaneous every 24 hours  ferrous    sulfate 325 milliGRAM(s) Oral every 8 hours  fluorouracil IVPB (eMAR) 1750 milliGRAM(s) IV Intermittent once  fosaprepitant IVPB 150 milliGRAM(s) IV Intermittent once  ondansetron  IVPB 16 milliGRAM(s) IV Intermittent once  polyethylene glycol 3350 17 Gram(s) Oral every 24 hours  senna 2 Tablet(s) Oral at bedtime  sodium chloride 0.9% Bolus 1500 milliLiter(s) IV Bolus once  sodium chloride 0.9%. 1000 milliLiter(s) (75 mL/Hr) IV Continuous <Continuous>    MEDICATIONS  (PRN):          PHYSICAL EXAM:    T(C): 36.6 (08-01-19 @ 05:27), Max: 36.6 (08-01-19 @ 00:32)  T(F): 97.9 (08-01-19 @ 05:27), Max: 97.9 (08-01-19 @ 05:27)  HR: 73 (08-01-19 @ 05:27) (73 - 76)  BP: 116/78 (08-01-19 @ 05:27) (116/78 - 133/86)  ABP: --  ABP(mean): --  RR: 16 (08-01-19 @ 05:27) (16 - 16)  SpO2: 100% (08-01-19 @ 05:27) (100% - 100%)      Gen: well developed, well nourished, comfortable  HEENT: normocephalic/atraumatic, no conjunctival pallor, no scleral icterus, no oral thrush/mucosal bleeding/mucositis  Neck: supple, neck mass is flat  Cardiovascular: RR, nl S1S2, no murmurs/rubs/gallops  Respiratory: clear air entry b/l  Gastrointestinal: BS+, soft, NT/ND,   Extremities: no clubbing/cyanosis, no edema, no calf tenderness  Vascular:  DP/PT 2+ b/l      Labs:                          8.0    4.06  )-----------( 262      ( 01 Aug 2019 05:44 )             26.7     CBC Full  -  ( 01 Aug 2019 05:44 )  WBC Count : 4.06 K/uL  RBC Count : 2.89 M/uL  Hemoglobin : 8.0 g/dL  Hematocrit : 26.7 %  Platelet Count - Automated : 262 K/uL  Mean Cell Volume : 92.4 fl  Mean Cell Hemoglobin : 27.7 pg  Mean Cell Hemoglobin Concentration : 30.0 gm/dL  Auto Neutrophil # : 1.59 K/uL  Auto Lymphocyte # : 1.74 K/uL  Auto Monocyte # : 0.56 K/uL  Auto Eosinophil # : 0.10 K/uL  Auto Basophil # : 0.05 K/uL  Auto Neutrophil % : 39.1 %  Auto Lymphocyte % : 42.9 %  Auto Monocyte % : 13.8 %  Auto Eosinophil % : 2.5 %  Auto Basophil % : 1.2 %    PT/INR - ( 01 Aug 2019 05:44 )   PT: 11.1 sec;   INR: 0.98              08-01    145  |  112<H>  |  11  ----------------------------<  98  3.8   |  25  |  0.72    Ca    8.6      01 Aug 2019 05:44  Phos  3.8     08-01  Mg     1.6     08-01    TPro  5.4<L>  /  Alb  3.6  /  TBili  0.2  /  DBili  x   /  AST  22  /  ALT  15  /  AlkPhos  47  08-01          Other Labs:    Cultures:    Pathology:    Imaging Studies:

## 2019-08-01 NOTE — BEHAVIORAL HEALTH ASSESSMENT NOTE - OTHER PAST PSYCHIATRIC HISTORY (INCLUDE DETAILS REGARDING ONSET, COURSE OF ILLNESS, INPATIENT/OUTPATIENT TREATMENT)
As per HPI.  Pt acknowledges past hx of psychiatric d/o, prior psychotropic medication, as well as prior inpt psychiatric admits. She is currently not taking any psychotropic medication and does not want to discuss details of her past rx/tx at present time.

## 2019-08-01 NOTE — CONSULT NOTE ADULT - SUBJECTIVE AND OBJECTIVE BOX
Vascular Access Service Consult Note    39yFemaleHEALTH ISSUES - PROBLEM Dx:  Squamous cell carcinoma of neck: Squamous cell carcinoma of neck             Diagnosis: SCC of neck    Indications for Vascular Access (Check all that apply)  [  ]  Antibiotic Therapy       Antibiotic Prescribed:                                                                             Expected Duration of Therapy:               [  ]  IV Hydration  [  ]  Total Parenteral Nutrition  [X  ]  Chemotherapy  [  ]  Difficult Venous Access  [  ]  CVP monitoring  [  ]  Medications with high potential for tissue necrosis on extravasation  [  ]  Other    Screening (Check all that apply)  Previous Radiation to chest  [  ] Yes      [X  ]  No  Breast Cancer                          [  ] Left     [  ]  Right    [ X ]  No  Lymph Node Dissection         [  ] Left     [  ]  Right    [ X ]  No  Pacemaker or ICD                   [  ] Left     [  ]  Right    [ X ]  No  Upper Extremity DVT             [  ] Left     [  ]  Right    [ X ]  No  Chronic Kidney Disease         [  ]  Yes     [X  ]  No  Hemodialysis                           [  ]  Yes     [ X ]  No  AV Fistula/ Graft                     [  ]  Left    [  ]  Right    [ X ]  No  Temp>101F in past 24 H       [  ]  Yes     [ X ]  No  H/O PICC/Midline                   [  X]  Yes     [  ]  No    Lab data:                        8.0    4.06  )-----------( 262      ( 01 Aug 2019 05:44 )             26.7     08-01    145  |  112<H>  |  11  ----------------------------<  98  3.8   |  25  |  0.72    Ca    8.6      01 Aug 2019 05:44  Phos  3.8     08-01  Mg     1.6     08-01    TPro  5.4<L>  /  Alb  3.6  /  TBili  0.2  /  DBili  x   /  AST  22  /  ALT  15  /  AlkPhos  47  08-01    PT/INR - ( 01 Aug 2019 05:44 )   PT: 11.1 sec;   INR: 0.98                    I have reviewed the chart, interviewed and examined the patient and determined that this patient:  [ X ] Is a candidate for a PICC line  [  ] Is a candidate for a Midline  [  ] Is not a candidate for vascular access device (reason)    Lumens:    [  ] Single  [ X ] Double

## 2019-08-01 NOTE — BEHAVIORAL HEALTH ASSESSMENT NOTE - HPI (INCLUDE ILLNESS QUALITY, SEVERITY, DURATION, TIMING, CONTEXT, MODIFYING FACTORS, ASSOCIATED SIGNS AND SYMPTOMS)
HPI from admit:  "38yo F PMHx schizoaffective disorder SCC Left neck being admitted for 3rd cycle of chemotherapy. Of note, she was recently admitted on 7/8/2019 for her second round of induction chemotherapy with DCF (docetaxel, cisplatin and 5-FU). She follows with Dr. Forrest for oncology. She ws initially hesitant to be admitted fro her 3rd chemo cycle due to multiple issues at home but decided that her health was most important. She reports that her lower extremity is still present but unchanged from previous visit. Denies any fevers, chills, night sweats, dysuria, difficulty swallowing, chest pain ,palpitations. In ED: 99.9 oral Hr 112* 118/85 SpO2: 97% " (01 Aug 2019 02:45)    Psychiatry consult requested for above hx, high scores for depression and anxiety on screening exams, as well as pt's current willingness to be seen by Psychiatry.    Pt open and receptive to interview.    She acknowledges past hx of psychiatric d/o, prior psychotropic medication, as well as prior inpt psychiatric admits. She is currently not taking any psychotropic medication and does not want to discuss details of her past rx/tx at present time.    Per team (and in discussion with pt now), pt is compliant with medical treatment, trusts the medical team.

## 2019-08-01 NOTE — H&P ADULT - PROBLEM SELECTOR PLAN 3
Patient has known iron-deficiency anemia.  Known Iron deficiency anemia  -c/w home 325mg ferrous sulfate TID  -bowel regimen while on iron: senna, colace, miralax Patient has known iron-deficiency anemia.  Known Iron deficiency anemia  -c/w home 325mg ferrous sulfate TID  -bowel regimen while on iron: senna,, miralax    #Low vitamin D  -c/w vit D 1000units daily
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
## 2019-08-01 NOTE — PROCEDURE NOTE - NSPOSTCAREGUIDE_GEN_A_CORE
Verbal/written post procedure instructions were given to patient/caregiver/Care for catheter as per unit/ICU protocols/Keep the cast/splint/dressing clean and dry

## 2019-08-01 NOTE — H&P ADULT - PROBLEM SELECTOR PLAN 4
Patient reports onset after initial chemotherapy treatment. Denies any chest pain, dyspnea on exertion.   - 7/9: ECHO also normal: EF 69%, no other structural abnormalities Patient reports onset after initial chemotherapy treatment. Denies any chest pain, dyspnea on exertion  - 7/9: ECHO also normal: EF 69%, no other structural abnormalities  - Leg elevation
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
## 2019-08-01 NOTE — BEHAVIORAL HEALTH ASSESSMENT NOTE - NSBHCHARTREVIEWLAB_PSY_A_CORE FT
8.0    4.06  )-----------( 262      ( 01 Aug 2019 05:44 )             26.7       08-01    145  |  112<H>  |  11  ----------------------------<  98  3.8   |  25  |  0.72    Ca    8.6      01 Aug 2019 05:44  Phos  3.8     08-01  Mg     1.6     08-01    TPro  5.4<L>  /  Alb  3.6  /  TBili  0.2  /  DBili  x   /  AST  22  /  ALT  15  /  AlkPhos  47  08-01

## 2019-08-01 NOTE — BEHAVIORAL HEALTH ASSESSMENT NOTE - CASE SUMMARY
39-year-old female with hx of schizoaffective disorder, prior inpt admits and psychotropic medications, now being treated for SCC. Pt compliant with oncology rx/tx. She evidences some paranoia and possible illogical thought process regarding an old BF whom she believes is hacking her computer. Nonetheless, she does not show any ss/sx of acute psychiatric pathology warranting emergent rx/tx.

## 2019-08-01 NOTE — H&P ADULT - HISTORY OF PRESENT ILLNESS
38yo F PMHx schizoaffective disorder SCC Left neck being admitted for 3rd cycle of chemotherapy. Of note, she was recently admitted on 7/8/2019 for her second round of induction chemotherapy with DCF (docetaxel, cisplatin and 5-FU). She follows with Dr. Forrest for oncology. She ws initially hesitant to be admitted fro her 3rd chemo cycle due to multiple issues at home but decided that her health was most important. She reports that her lower extremity is still present but unchanged from previous visit. Denies any fevers, chills, night sweats, dysuria, difficulty swallowing, chest pain ,palpitations. In ED: 99.9 oral Hr 112* 118/85 SpO2: 97%

## 2019-08-01 NOTE — H&P ADULT - NSICDXFAMILYHX_GEN_ALL_CORE_FT
FAMILY HISTORY:  FH: myocardial infarction, father  FHx: cancer, maternal grandmother and aunt  No family history of cardiovascular disease, mother

## 2019-08-01 NOTE — BEHAVIORAL HEALTH ASSESSMENT NOTE - NSBHADMITCOUNSEL_PSY_A_CORE
client/family/caregiver education/risks and benefits of treatment options/instructions for management, treatment and follow up/risk factor reduction/diagnostic results/impressions and/or recommended studies/prognosis/importance of adherence to chosen treatment

## 2019-08-01 NOTE — PATIENT PROFILE ADULT - NSPROEDALEARNPREF_GEN_A_NUR
skill demonstration/computer/internet/group instruction/verbal instruction/pictorial/video/written material/audio/individual instruction

## 2019-08-01 NOTE — PROGRESS NOTE ADULT - ATTENDING COMMENTS
The patient is anemic due to the presence of her tumor and subsequent treatment with combination chemotherapy. Continue iron therapy and encourage the patient to eat.

## 2019-08-01 NOTE — H&P ADULT - PROBLEM SELECTOR PLAN 2
- Patient with ANC of <1500 (761 on 7/14)  - neutropenic precautions (handwashing in/out of room, gloves when examining patient, patient should wear mask when ambulating halls)  NO RECTAL TEMPERATURES

## 2019-08-01 NOTE — BEHAVIORAL HEALTH ASSESSMENT NOTE - NSBHCHARTREVIEWVS_PSY_A_CORE FT
ICU Vital Signs Last 24 Hrs  T(C): 36.4 (01 Aug 2019 12:39), Max: 36.6 (01 Aug 2019 00:32)  T(F): 97.6 (01 Aug 2019 12:39), Max: 97.9 (01 Aug 2019 05:27)  HR: 58 (01 Aug 2019 12:39) (58 - 76)  BP: 113/74 (01 Aug 2019 12:39) (113/74 - 133/86)  BP(mean): --  ABP: --  ABP(mean): --  RR: 16 (01 Aug 2019 12:39) (16 - 16)  SpO2: 99% (01 Aug 2019 12:39) (99% - 100%)

## 2019-08-01 NOTE — PROGRESS NOTE ADULT - SUBJECTIVE AND OBJECTIVE BOX
The patient is a 38 yo female known to me because of a massive erosive left neck mass due to an HPV positive squamous cell carcinoma who is admitted for ongoing induction chemotherapy. Her tumor has responded very well to chemotherapy thus far. The mass has resolved and the area of the lesion continues to heal. The patient offers no specific complaints this am but she is very sleepy as she got to the hospital late last night.    CHEMOTHERAPY REGIMEN:        Day:         1                 Diet:  Protocol: docetaxel, cisplatin and infusional 5-fu.                                 IVF:      MEDICATIONS  (STANDING):  cholecalciferol 1000 Unit(s) Oral every 24 hours  docusate sodium 100 milliGRAM(s) Oral every 24 hours  enoxaparin Injectable 40 milliGRAM(s) SubCutaneous every 24 hours  ferrous    sulfate 325 milliGRAM(s) Oral every 8 hours  senna 2 Tablet(s) Oral at bedtime  sodium chloride 0.9%. 1000 milliLiter(s) (75 mL/Hr) IV Continuous <Continuous>    MEDICATIONS  (PRN):      Allergies    No Known Allergies    Intolerances        DVT Prophylaxis: [x ] YES [ ] NO      Antibiotics: [ ] YES [x ] NO    Pain Scale (1-10):       Location:    Vital Signs Last 24 Hrs  T(C): 36.6 (01 Aug 2019 05:27), Max: 36.6 (01 Aug 2019 00:32)  T(F): 97.9 (01 Aug 2019 05:27), Max: 97.9 (01 Aug 2019 05:27)  HR: 73 (01 Aug 2019 05:27) (73 - 76)  BP: 116/78 (01 Aug 2019 05:27) (116/78 - 133/86)  BP(mean): --  RR: 16 (01 Aug 2019 05:27) (16 - 16)  SpO2: 100% (01 Aug 2019 05:27) (100% - 100%)    Drug Dosing Weight  Height (cm): 165.1 (01 Aug 2019 00:32)  Weight (kg): 68 (01 Aug 2019 00:32)  BMI (kg/m2): 24.9 (01 Aug 2019 00:32)  BSA (m2): 1.75 (01 Aug 2019 00:32)    PHYSICAL EXAM:      Constitutional: tired.  Eyes: conjunctival pallor.  ENMT: buccal mucosa without lesions.  Neck: left neck wound continues to heal. It is now no longer open. Firmness around the edges is just that. I can appreciate no lymph nodes in the area.  Breasts: not examined.  Respiratory: chest clear.  Cardiovascular: S1>S2 at apex. RSR.  Gastrointestinal: soft, nontender, active bowel sounds.    Genitourinary: voiding without difficulty.  Extremities: no leg edema.  Neurological: no gross focal deficits.  Skin: warm and dry.  Lymph Nodes: none palp.  Musculoskeletal: full ROM.  Psychiatric: affect normal.        URINARY CATHETER: [ ] YES [x ] NO     LABS:  CBC Full  -  ( 01 Aug 2019 05:44 )  WBC Count : 4.06 K/uL  RBC Count : 2.89 M/uL  Hemoglobin : 8.0 g/dL  Hematocrit : 26.7 %  Platelet Count - Automated : 262 K/uL  Mean Cell Volume : 92.4 fl  Mean Cell Hemoglobin : 27.7 pg  Mean Cell Hemoglobin Concentration : 30.0 gm/dL  Auto Neutrophil # : 1.59 K/uL  Auto Lymphocyte # : 1.74 K/uL  Auto Monocyte # : 0.56 K/uL  Auto Eosinophil # : 0.10 K/uL  Auto Basophil # : 0.05 K/uL  Auto Neutrophil % : 39.1 %  Auto Lymphocyte % : 42.9 %  Auto Monocyte % : 13.8 %  Auto Eosinophil % : 2.5 %  Auto Basophil % : 1.2 %    08-01    145  |  112<H>  |  11  ----------------------------<  98  3.8   |  25  |  0.72    Ca    8.6      01 Aug 2019 05:44  Phos  3.8     08-01  Mg     1.6     08-01    TPro  5.4<L>  /  Alb  3.6  /  TBili  0.2  /  DBili  x   /  AST  22  /  ALT  15  /  AlkPhos  47  08-01    PT/INR - ( 01 Aug 2019 05:44 )   PT: 11.1 sec;   INR: 0.98                CULTURES:    RADIOLOGY & ADDITIONAL STUDIES:

## 2019-08-01 NOTE — BEHAVIORAL HEALTH ASSESSMENT NOTE - NSBHREFERDETAILS_PSY_A_CORE_FT
39-year-old female with hx of schizoaffective disorder, now being treated for squamous cell CA. Has depression and anxiety.

## 2019-08-02 DIAGNOSIS — F25.9 SCHIZOAFFECTIVE DISORDER, UNSPECIFIED: ICD-10-CM

## 2019-08-02 LAB
ALBUMIN SERPL ELPH-MCNC: 3.3 G/DL — SIGNIFICANT CHANGE UP (ref 3.3–5)
ALP SERPL-CCNC: 43 U/L — SIGNIFICANT CHANGE UP (ref 40–120)
ALT FLD-CCNC: 15 U/L — SIGNIFICANT CHANGE UP (ref 10–45)
ANION GAP SERPL CALC-SCNC: 9 MMOL/L — SIGNIFICANT CHANGE UP (ref 5–17)
AST SERPL-CCNC: 19 U/L — SIGNIFICANT CHANGE UP (ref 10–40)
BASOPHILS # BLD AUTO: 0 K/UL — SIGNIFICANT CHANGE UP (ref 0–0.2)
BASOPHILS NFR BLD AUTO: 0 % — SIGNIFICANT CHANGE UP (ref 0–2)
BILIRUB SERPL-MCNC: 0.2 MG/DL — SIGNIFICANT CHANGE UP (ref 0.2–1.2)
BUN SERPL-MCNC: 9 MG/DL — SIGNIFICANT CHANGE UP (ref 7–23)
CALCIUM SERPL-MCNC: 8 MG/DL — LOW (ref 8.4–10.5)
CHLORIDE SERPL-SCNC: 112 MMOL/L — HIGH (ref 96–108)
CO2 SERPL-SCNC: 21 MMOL/L — LOW (ref 22–31)
CREAT SERPL-MCNC: 0.59 MG/DL — SIGNIFICANT CHANGE UP (ref 0.5–1.3)
EOSINOPHIL # BLD AUTO: 0 K/UL — SIGNIFICANT CHANGE UP (ref 0–0.5)
EOSINOPHIL NFR BLD AUTO: 0 % — SIGNIFICANT CHANGE UP (ref 0–6)
GLUCOSE SERPL-MCNC: 142 MG/DL — HIGH (ref 70–99)
HCT VFR BLD CALC: 28.7 % — LOW (ref 34.5–45)
HGB BLD-MCNC: 8.9 G/DL — LOW (ref 11.5–15.5)
LYMPHOCYTES # BLD AUTO: 0.18 K/UL — LOW (ref 1–3.3)
LYMPHOCYTES # BLD AUTO: 6 % — LOW (ref 13–44)
MAGNESIUM SERPL-MCNC: 1.7 MG/DL — SIGNIFICANT CHANGE UP (ref 1.6–2.6)
MCHC RBC-ENTMCNC: 28.1 PG — SIGNIFICANT CHANGE UP (ref 27–34)
MCHC RBC-ENTMCNC: 31 GM/DL — LOW (ref 32–36)
MCV RBC AUTO: 90.5 FL — SIGNIFICANT CHANGE UP (ref 80–100)
MONOCYTES # BLD AUTO: 0.08 K/UL — SIGNIFICANT CHANGE UP (ref 0–0.9)
MONOCYTES NFR BLD AUTO: 2.6 % — SIGNIFICANT CHANGE UP (ref 2–14)
NEUTROPHILS # BLD AUTO: 2.8 K/UL — SIGNIFICANT CHANGE UP (ref 1.8–7.4)
NEUTROPHILS NFR BLD AUTO: 91.4 % — HIGH (ref 43–77)
PHOSPHATE SERPL-MCNC: 3.2 MG/DL — SIGNIFICANT CHANGE UP (ref 2.5–4.5)
PLATELET # BLD AUTO: 238 K/UL — SIGNIFICANT CHANGE UP (ref 150–400)
POTASSIUM SERPL-MCNC: 3.9 MMOL/L — SIGNIFICANT CHANGE UP (ref 3.5–5.3)
POTASSIUM SERPL-SCNC: 3.9 MMOL/L — SIGNIFICANT CHANGE UP (ref 3.5–5.3)
PROT SERPL-MCNC: 5.4 G/DL — LOW (ref 6–8.3)
RBC # BLD: 3.17 M/UL — LOW (ref 3.8–5.2)
RBC # FLD: 16.2 % — HIGH (ref 10.3–14.5)
SODIUM SERPL-SCNC: 142 MMOL/L — SIGNIFICANT CHANGE UP (ref 135–145)
WBC # BLD: 3.06 K/UL — LOW (ref 3.8–10.5)
WBC # FLD AUTO: 3.06 K/UL — LOW (ref 3.8–10.5)

## 2019-08-02 PROCEDURE — 99233 SBSQ HOSP IP/OBS HIGH 50: CPT

## 2019-08-02 RX ORDER — MAGNESIUM SULFATE 500 MG/ML
2 VIAL (ML) INJECTION ONCE
Refills: 0 | Status: COMPLETED | OUTPATIENT
Start: 2019-08-02 | End: 2019-08-02

## 2019-08-02 RX ORDER — FLUOROURACIL 50 MG/ML
1750 INJECTION, SOLUTION INTRAVENOUS DAILY
Refills: 0 | Status: COMPLETED | OUTPATIENT
Start: 2019-08-02 | End: 2019-08-05

## 2019-08-02 RX ORDER — ACETAMINOPHEN 500 MG
650 TABLET ORAL ONCE
Refills: 0 | Status: COMPLETED | OUTPATIENT
Start: 2019-08-02 | End: 2019-08-02

## 2019-08-02 RX ADMIN — SENNA PLUS 2 TABLET(S): 8.6 TABLET ORAL at 22:42

## 2019-08-02 RX ADMIN — POLYETHYLENE GLYCOL 3350 17 GRAM(S): 17 POWDER, FOR SOLUTION ORAL at 09:32

## 2019-08-02 RX ADMIN — FLUOROURACIL 43.13 MILLIGRAM(S): 50 INJECTION, SOLUTION INTRAVENOUS at 23:31

## 2019-08-02 RX ADMIN — Medication 650 MILLIGRAM(S): at 05:18

## 2019-08-02 RX ADMIN — Medication 1000 UNIT(S): at 09:32

## 2019-08-02 RX ADMIN — CHLORHEXIDINE GLUCONATE 1 APPLICATION(S): 213 SOLUTION TOPICAL at 06:33

## 2019-08-02 RX ADMIN — SODIUM CHLORIDE 75 MILLILITER(S): 9 INJECTION INTRAMUSCULAR; INTRAVENOUS; SUBCUTANEOUS at 03:22

## 2019-08-02 RX ADMIN — Medication 325 MILLIGRAM(S): at 17:27

## 2019-08-02 RX ADMIN — Medication 220 MILLIGRAM(S): at 22:44

## 2019-08-02 RX ADMIN — Medication 650 MILLIGRAM(S): at 05:50

## 2019-08-02 RX ADMIN — Medication 50 GRAM(S): at 22:41

## 2019-08-02 RX ADMIN — ENOXAPARIN SODIUM 40 MILLIGRAM(S): 100 INJECTION SUBCUTANEOUS at 09:32

## 2019-08-02 RX ADMIN — Medication 325 MILLIGRAM(S): at 09:32

## 2019-08-02 RX ADMIN — Medication 100 MILLIGRAM(S): at 09:32

## 2019-08-02 NOTE — PROGRESS NOTE ADULT - SUBJECTIVE AND OBJECTIVE BOX
The patient is more awake and alert this am. Chemotherapy is in progress and is being tolerated well thus far. The patient offers no complaints.    CHEMOTHERAPY REGIMEN:        Day:         2                 Diet:  Protocol:      DCF                              IVF:      MEDICATIONS  (STANDING):  chlorhexidine 2% Cloths 1 Application(s) Topical <User Schedule>  cholecalciferol 1000 Unit(s) Oral every 24 hours  dexamethasone  IVPB 20 milliGRAM(s) IV Intermittent daily  docusate sodium 100 milliGRAM(s) Oral every 24 hours  enoxaparin Injectable 40 milliGRAM(s) SubCutaneous every 24 hours  ferrous    sulfate 325 milliGRAM(s) Oral every 8 hours  polyethylene glycol 3350 17 Gram(s) Oral every 24 hours  senna 2 Tablet(s) Oral at bedtime  sodium chloride 0.9%. 1000 milliLiter(s) (75 mL/Hr) IV Continuous <Continuous>    MEDICATIONS  (PRN):  sodium chloride 0.9% lock flush 10 milliLiter(s) IV Push every 1 hour PRN Pre/post blood products, medications, blood draw, and to maintain line patency      Allergies    No Known Allergies    Intolerances        DVT Prophylaxis: [x ] YES [ ] NO      Antibiotics: [ ] YES [x ] NO    Pain Scale (1-10):       Location:    Vital Signs Last 24 Hrs  T(C): 36.6 (02 Aug 2019 05:18), Max: 36.7 (01 Aug 2019 18:26)  T(F): 97.9 (02 Aug 2019 05:18), Max: 98.1 (01 Aug 2019 18:26)  HR: 65 (02 Aug 2019 05:18) (58 - 65)  BP: 135/82 (02 Aug 2019 05:18) (113/74 - 135/86)  BP(mean): --  RR: 18 (02 Aug 2019 05:18) (16 - 18)  SpO2: 98% (02 Aug 2019 05:18) (97% - 99%)    Drug Dosing Weight  Height (cm): 165.1 (01 Aug 2019 00:32)  Weight (kg): 68 (01 Aug 2019 00:32)  BMI (kg/m2): 24.9 (01 Aug 2019 00:32)  BSA (m2): 1.75 (01 Aug 2019 00:32)    PHYSICAL EXAM:      Constitutional: in good spirits.  Eyes: conjunctival pallor.  ENMT: buccal mucosa without lesions.  Neck: left neck wound healed.  Back: no SPT.  Respiratory: chest clear.  Cardiovascular: S1>S2 at apex. RSR.  Gastrointestinal: soft, nontender, active bowel sounds.  Genitourinary: voiding without difficulty.  Extremities: no leg edema.  Neurological: no gross focal deficits.  Skin: warm and dry.  Lymph Nodes: none palp.  Musculoskeletal: full ROM.  Psychiatric: affect normal.        URINARY CATHETER: [ ] YES [x ] NO     LABS:  CBC Full  -  ( 01 Aug 2019 05:44 )  WBC Count : 4.06 K/uL  RBC Count : 2.89 M/uL  Hemoglobin : 8.0 g/dL  Hematocrit : 26.7 %  Platelet Count - Automated : 262 K/uL  Mean Cell Volume : 92.4 fl  Mean Cell Hemoglobin : 27.7 pg  Mean Cell Hemoglobin Concentration : 30.0 gm/dL  Auto Neutrophil # : 1.59 K/uL  Auto Lymphocyte # : 1.74 K/uL  Auto Monocyte # : 0.56 K/uL  Auto Eosinophil # : 0.10 K/uL  Auto Basophil # : 0.05 K/uL  Auto Neutrophil % : 39.1 %  Auto Lymphocyte % : 42.9 %  Auto Monocyte % : 13.8 %  Auto Eosinophil % : 2.5 %  Auto Basophil % : 1.2 %    08-01    145  |  112<H>  |  11  ----------------------------<  98  3.8   |  25  |  0.72    Ca    8.6      01 Aug 2019 05:44  Phos  3.8     08-01  Mg     1.6     08-01    TPro  5.4<L>  /  Alb  3.6  /  TBili  0.2  /  DBili  x   /  AST  22  /  ALT  15  /  AlkPhos  47  08-01    PT/INR - ( 01 Aug 2019 05:44 )   PT: 11.1 sec;   INR: 0.98                CULTURES:    RADIOLOGY & ADDITIONAL STUDIES:

## 2019-08-02 NOTE — PROGRESS NOTE ADULT - SUBJECTIVE AND OBJECTIVE BOX
Heme/Onc Progress Note (Dr. Meeks )  Discussed with Dr. Meeks and plan reviewed with the primary team.    The patient was seen and examined.      Interval Hx:  No acute events overnight. She initially refused PICC line however eventually agreed to it. She was evaluated by psych yesterday, no need for inpatient psychiatric admission or contraindications to discharge. She denies nausea/vomiting. Tolerated Docetaxel and Cisplatin, 5-FU infusing.     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 A neck CT scan on 11/10/16 revealed a 3.1x2.9x5.0 cm multiloculated rim-enhancing fluid collection within the left level IIb soft tissues just deep to the sternocleidomastoid muscle. DDx included abscess, suppurative adenitis or infected second brachial cleft cyst. The patient was treated with antibiotics and some white material was drained from the lesion. The lesion, according to the patient came back in 2017. She again took antibiotic therapy. A CT scan was done but no additional biopsy was obtained. The patient saw Dr. Weller in early 12/18. A biopsy was obtained on 12/6/18.   This revealed a squamous cell carcinoma, HPV related. The patient was seen by Dr. Alvarado and Dr. Londono. She was referred to me but did not make an appointment. She finally saw Constantino Lazcano on 5/29 and received her 1st cycle of induction DCF. She had a remarkable response in primary left neck mass. She notes having bilateral leg swelling and stomatitis at home.    She is admitted for 3rd cycle of induction DCF. No acute complaints. She denies nausea/vomiting, abdominal pain, constipation or diarrhea. No fatigue. Appetite is good.    ROS is otherwise negative.        Medications:  MEDICATIONS  (STANDING):  chlorhexidine 2% Cloths 1 Application(s) Topical <User Schedule>  cholecalciferol 1000 Unit(s) Oral every 24 hours  dexamethasone  IVPB 20 milliGRAM(s) IV Intermittent daily  docusate sodium 100 milliGRAM(s) Oral every 24 hours  enoxaparin Injectable 40 milliGRAM(s) SubCutaneous every 24 hours  ferrous    sulfate 325 milliGRAM(s) Oral every 8 hours  polyethylene glycol 3350 17 Gram(s) Oral every 24 hours  senna 2 Tablet(s) Oral at bedtime  sodium chloride 0.9%. 1000 milliLiter(s) (75 mL/Hr) IV Continuous <Continuous>    MEDICATIONS  (PRN):  sodium chloride 0.9% lock flush 10 milliLiter(s) IV Push every 1 hour PRN Pre/post blood products, medications, blood draw, and to maintain line patency        PHYSICAL EXAM:    T(C): 36.6 (08-02-19 @ 05:18), Max: 36.7 (08-01-19 @ 18:26)  T(F): 97.9 (08-02-19 @ 05:18), Max: 98.1 (08-01-19 @ 18:26)  HR: 65 (08-02-19 @ 05:18) (58 - 65)  BP: 135/82 (08-02-19 @ 05:18) (113/74 - 135/86)  ABP: --  ABP(mean): --  RR: 18 (08-02-19 @ 05:18) (16 - 18)  SpO2: 98% (08-02-19 @ 05:18) (97% - 99%)      Gen: well developed, well nourished, comfortable  HEENT: normocephalic/atraumatic, no conjunctival pallor, no scleral icterus, no oral thrush/mucosal bleeding/mucositis  Neck: supple, neck mass is flat  Cardiovascular: RR, nl S1S2, no murmurs/rubs/gallops  Respiratory: clear air entry b/l  Gastrointestinal: BS+, soft, NT/ND,   Extremities: no clubbing/cyanosis, no edema, no calf tenderness  Vascular:  DP/PT 2+ b/l      Labs:                          8.9    3.06  )-----------( 238      ( 02 Aug 2019 09:03 )             28.7         CBC Full  -  ( 02 Aug 2019 09:03 )  WBC Count : 3.06 K/uL  RBC Count : 3.17 M/uL  Hemoglobin : 8.9 g/dL  Hematocrit : 28.7 %  Platelet Count - Automated : 238 K/uL  Mean Cell Volume : 90.5 fl  Mean Cell Hemoglobin : 28.1 pg  Mean Cell Hemoglobin Concentration : 31.0 gm/dL  Auto Neutrophil # : 2.80 K/uL  Auto Lymphocyte # : 0.18 K/uL  Auto Monocyte # : 0.08 K/uL  Auto Eosinophil # : 0.00 K/uL  Auto Basophil # : 0.00 K/uL  Auto Neutrophil % : 91.4 %  Auto Lymphocyte % : 6.0 %  Auto Monocyte % : 2.6 %  Auto Eosinophil % : 0.0 %  Auto Basophil % : 0.0 %        08-02    142  |  112<H>  |  9   ----------------------------<  142<H>  3.9   |  21<L>  |  0.59    Ca    8.0<L>      02 Aug 2019 09:03  Phos  3.2     08-02  Mg     1.7     08-02    TPro  5.4<L>  /  Alb  3.3  /  TBili  0.2  /  DBili  x   /  AST  19  /  ALT  15  /  AlkPhos  43  08-02        PT/INR - ( 01 Aug 2019 05:44 )   PT: 11.1 sec;   INR: 0.98                      Other Labs:    Cultures:    Pathology:    Imaging Studies:

## 2019-08-02 NOTE — PROGRESS NOTE BEHAVIORAL HEALTH - NSBHCHARTREVIEWLAB_PSY_A_CORE FT
8.9    3.06  )-----------( 238      ( 02 Aug 2019 09:03 )             28.7     08-02    142  |  112<H>  |  9   ----------------------------<  142<H>  3.9   |  21<L>  |  0.59    Ca    8.0<L>      02 Aug 2019 09:03  Phos  3.2     08-02  Mg     1.7     08-02    TPro  5.4<L>  /  Alb  3.3  /  TBili  0.2  /  DBili  x   /  AST  19  /  ALT  15  /  AlkPhos  43  08-02

## 2019-08-02 NOTE — PROGRESS NOTE BEHAVIORAL HEALTH - OTHER
Not tested In bed Fast, but can be interrupted "Worried that I'll get depressed." Stable Some paranoia RE people in her life (but not [facility] caregivers).

## 2019-08-02 NOTE — PROGRESS NOTE ADULT - SUBJECTIVE AND OBJECTIVE BOX
Interval / Overnight:  Ms. Foy reports she has been tolerating the chemotherapy well since it was started yesterday evening. The PICC was successfully placed prior.  She had reported issues at home and was afraid she might have to leave AMA, but after discussion this morning she believes she will be able to stay for the entire treatment.  She denies nausea, vomiting, fever, shortness of breath, and chest pain.  She does endorse a sensation of "food not being burned off" but is unable to describe it further.    VITAL SIGNS:  T(C): 36.6 (08-02-19 @ 05:18), Max: 36.7 (08-01-19 @ 18:26)  T(F): 97.9 (08-02-19 @ 05:18), Max: 98.1 (08-01-19 @ 18:26)  HR: 65 (08-02-19 @ 05:18) (58 - 65)  BP: 135/82 (08-02-19 @ 05:18) (113/74 - 135/86)  BP(mean): --  RR: 18 (08-02-19 @ 05:18) (16 - 18)  SpO2: 98% (08-02-19 @ 05:18) (97% - 99%)  Wt(kg): --    PHYSICAL EXAM:    Constitutional: WDWN resting comfortably in bed; NAD  Head: NC/AT  Eyes: PERRL, anicteric sclera  ENT: no nasal discharge; uvula midline, no oropharyngeal erythema or exudates  Neck: supple; left neck wound well coapted, no erythema or exudate  Respiratory: CTA B/L; no W/R/R, no retractions  Cardiac: +S1/S2; RRR; no M/R/G; PMI non-displaced  Gastrointestinal: abdomen soft, NT/ND; no rebound or guarding; +BSx4  Back: spine midline, no bony tenderness or step-offs  Extremities: WWP, no clubbing or cyanosis; no peripheral edema  Musculoskeletal: NROM x4; no joint swelling, tenderness or erythema  Vascular: 2+ radial, femoral, DP/PT pulses B/L  Dermatologic: skin warm, dry and intact; no rashes, wounds, or scars  Lymphatic: no submandibular or cervical LAD  Neurologic: AAOx3; CNII-XII grossly intact; no focal deficits  Psychiatric: disorganized thinking, possible delusions and paranoia      MEDICATIONS:  MEDICATIONS  (STANDING):  chlorhexidine 2% Cloths 1 Application(s) Topical <User Schedule>  cholecalciferol 1000 Unit(s) Oral every 24 hours  dexamethasone  IVPB 20 milliGRAM(s) IV Intermittent daily  docusate sodium 100 milliGRAM(s) Oral every 24 hours  enoxaparin Injectable 40 milliGRAM(s) SubCutaneous every 24 hours  ferrous    sulfate 325 milliGRAM(s) Oral every 8 hours  polyethylene glycol 3350 17 Gram(s) Oral every 24 hours  senna 2 Tablet(s) Oral at bedtime  sodium chloride 0.9%. 1000 milliLiter(s) (75 mL/Hr) IV Continuous <Continuous>    MEDICATIONS  (PRN):  sodium chloride 0.9% lock flush 10 milliLiter(s) IV Push every 1 hour PRN Pre/post blood products, medications, blood draw, and to maintain line patency      ALLERGIES:  Allergies    No Known Allergies    Intolerances        LABS:                        8.0    4.06  )-----------( 262      ( 01 Aug 2019 05:44 )             26.7     08-01    145  |  112<H>  |  11  ----------------------------<  98  3.8   |  25  |  0.72    Ca    8.6      01 Aug 2019 05:44  Phos  3.8     08-01  Mg     1.6     08-01    TPro  5.4<L>  /  Alb  3.6  /  TBili  0.2  /  DBili  x   /  AST  22  /  ALT  15  /  AlkPhos  47  08-01    PT/INR - ( 01 Aug 2019 05:44 )   PT: 11.1 sec;   INR: 0.98              CAPILLARY BLOOD GLUCOSE          RADIOLOGY & ADDITIONAL TESTS: Reviewed.

## 2019-08-02 NOTE — PROGRESS NOTE ADULT - PROBLEM SELECTOR PLAN 1
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 Finished 2 cycles of DCF (docetaxel, cisplatin, 5-FU). CTA neeck 6/20: left neck SCC, encasement of ICA and extension of mass into skin, parotid and submandibular glands w extensive LAD  -c/w NS 75cc/hr throughout hospitalization  -PICC placed yesterday evening  -Started 3rd round of chemotherapy: Docetaxel 75 mg/m2 over 1 hour, Cisplatin 75/m2 over 24 hours, and infusional 5-FU 1000 mg/m2 over 96 hours  -Neulasta to be given 24 hours after completion of chemotherapy; patient has ANC of 1600 at this time. negative

## 2019-08-02 NOTE — PROGRESS NOTE BEHAVIORAL HEALTH - NSBHFUPINTERVALHXFT_PSY_A_CORE
Pt seen; chart reviewed and discussed with team.  Pt observed resting in bed. Receptive to Psychiatry Consult visit.  Discussed concerns about what is happening to her belongings/reputation, etc. while she is in the hospital, though readily accepts that she needs to focus on her current medical treatment and inpt hospitalization.  Pt remains somewhat guarded about her psychiatric hx, alludes to prior treatment, but does not want to provide too many details. She is somewhat worried regarding risk of depression due to chemotherapy.  No evidence of suicidal ideation/intent/plan. Expresses ongoing paranoia regarding issues relating to her family and ex-BF, though denies feeling unsafe in the hospital or paranoid RE any [facility] caregivers.

## 2019-08-02 NOTE — PROGRESS NOTE ADULT - ATTENDING COMMENTS
Chemotherapy is running. Monitor blood counts and chems. Replace electrolytes as is necessary. The patent was evaluated by [facility] yesterday. No intervention  required at this time. Chemotherapy is running. Monitor blood counts and chems. Replace electrolytes as is necessary. The patent was evaluated by Psych yesterday. No intervention required at this time.

## 2019-08-02 NOTE — PROGRESS NOTE BEHAVIORAL HEALTH - NSBHCHARTREVIEWVS_PSY_A_CORE FT
ICU Vital Signs Last 24 Hrs  T(C): 36.7 (02 Aug 2019 12:08), Max: 36.7 (02 Aug 2019 12:08)  T(F): 98 (02 Aug 2019 12:08), Max: 98 (02 Aug 2019 12:08)  HR: 63 (02 Aug 2019 12:08) (60 - 65)  BP: 126/86 (02 Aug 2019 12:08) (126/86 - 135/86)  BP(mean): --  ABP: --  ABP(mean): --  RR: 18 (02 Aug 2019 12:08) (18 - 18)  SpO2: 99% (02 Aug 2019 12:08) (97% - 99%)

## 2019-08-02 NOTE — PROGRESS NOTE BEHAVIORAL HEALTH - NSBHADMITCOUNSEL_PSY_A_CORE
instructions for management, treatment and follow up/risk factor reduction/client/family/caregiver education/diagnostic results/impressions and/or recommended studies/prognosis/risks and benefits of treatment options/importance of adherence to chosen treatment

## 2019-08-02 NOTE — PROGRESS NOTE BEHAVIORAL HEALTH - SUMMARY
No 39-year-old female with hx of schizoaffective disorder, prior rx/tx and inpt admits, now being treated for SCC of neck.  Pt in good behavioral control, with no evidence of suicidal/homicidal ideation/intent/plan, no auditory or visual hallucinations, some paranoia (non-bizarre) regarding people and events in her life, but not regarding [facility] caregivers.  Plan to continue supportive psychotherapy, observe for need for psychotropic rx (no acute needs at present time) and possible outpt follow up.

## 2019-08-03 LAB
ALBUMIN SERPL ELPH-MCNC: 3.7 G/DL — SIGNIFICANT CHANGE UP (ref 3.3–5)
ALP SERPL-CCNC: 44 U/L — SIGNIFICANT CHANGE UP (ref 40–120)
ALT FLD-CCNC: 15 U/L — SIGNIFICANT CHANGE UP (ref 10–45)
ANION GAP SERPL CALC-SCNC: 10 MMOL/L — SIGNIFICANT CHANGE UP (ref 5–17)
AST SERPL-CCNC: 19 U/L — SIGNIFICANT CHANGE UP (ref 10–40)
BASOPHILS # BLD AUTO: 0 K/UL — SIGNIFICANT CHANGE UP (ref 0–0.2)
BASOPHILS NFR BLD AUTO: 0 % — SIGNIFICANT CHANGE UP (ref 0–2)
BILIRUB SERPL-MCNC: 0.2 MG/DL — SIGNIFICANT CHANGE UP (ref 0.2–1.2)
BUN SERPL-MCNC: 9 MG/DL — SIGNIFICANT CHANGE UP (ref 7–23)
CALCIUM SERPL-MCNC: 8.1 MG/DL — LOW (ref 8.4–10.5)
CHLORIDE SERPL-SCNC: 106 MMOL/L — SIGNIFICANT CHANGE UP (ref 96–108)
CO2 SERPL-SCNC: 22 MMOL/L — SIGNIFICANT CHANGE UP (ref 22–31)
CREAT SERPL-MCNC: 0.59 MG/DL — SIGNIFICANT CHANGE UP (ref 0.5–1.3)
EOSINOPHIL # BLD AUTO: 0 K/UL — SIGNIFICANT CHANGE UP (ref 0–0.5)
EOSINOPHIL NFR BLD AUTO: 0 % — SIGNIFICANT CHANGE UP (ref 0–6)
GLUCOSE SERPL-MCNC: 141 MG/DL — HIGH (ref 70–99)
HCT VFR BLD CALC: 29.8 % — LOW (ref 34.5–45)
HGB BLD-MCNC: 9.4 G/DL — LOW (ref 11.5–15.5)
IMM GRANULOCYTES NFR BLD AUTO: 0.6 % — SIGNIFICANT CHANGE UP (ref 0–1.5)
LYMPHOCYTES # BLD AUTO: 0.18 K/UL — LOW (ref 1–3.3)
LYMPHOCYTES # BLD AUTO: 3.9 % — LOW (ref 13–44)
MAGNESIUM SERPL-MCNC: 2.2 MG/DL — SIGNIFICANT CHANGE UP (ref 1.6–2.6)
MCHC RBC-ENTMCNC: 28.5 PG — SIGNIFICANT CHANGE UP (ref 27–34)
MCHC RBC-ENTMCNC: 31.5 GM/DL — LOW (ref 32–36)
MCV RBC AUTO: 90.3 FL — SIGNIFICANT CHANGE UP (ref 80–100)
MONOCYTES # BLD AUTO: 0.07 K/UL — SIGNIFICANT CHANGE UP (ref 0–0.9)
MONOCYTES NFR BLD AUTO: 1.5 % — LOW (ref 2–14)
NEUTROPHILS # BLD AUTO: 4.37 K/UL — SIGNIFICANT CHANGE UP (ref 1.8–7.4)
NEUTROPHILS NFR BLD AUTO: 94 % — HIGH (ref 43–77)
NRBC # BLD: 0 /100 WBCS — SIGNIFICANT CHANGE UP (ref 0–0)
PHOSPHATE SERPL-MCNC: 3 MG/DL — SIGNIFICANT CHANGE UP (ref 2.5–4.5)
PLATELET # BLD AUTO: 256 K/UL — SIGNIFICANT CHANGE UP (ref 150–400)
POTASSIUM SERPL-MCNC: 4.1 MMOL/L — SIGNIFICANT CHANGE UP (ref 3.5–5.3)
POTASSIUM SERPL-SCNC: 4.1 MMOL/L — SIGNIFICANT CHANGE UP (ref 3.5–5.3)
PROT SERPL-MCNC: 5.8 G/DL — LOW (ref 6–8.3)
RBC # BLD: 3.3 M/UL — LOW (ref 3.8–5.2)
RBC # FLD: 16.5 % — HIGH (ref 10.3–14.5)
SODIUM SERPL-SCNC: 138 MMOL/L — SIGNIFICANT CHANGE UP (ref 135–145)
WBC # BLD: 4.65 K/UL — SIGNIFICANT CHANGE UP (ref 3.8–10.5)
WBC # FLD AUTO: 4.65 K/UL — SIGNIFICANT CHANGE UP (ref 3.8–10.5)

## 2019-08-03 RX ORDER — DIPHENHYDRAMINE HYDROCHLORIDE AND LIDOCAINE HYDROCHLORIDE AND ALUMINUM HYDROXIDE AND MAGNESIUM HYDRO
10 KIT EVERY 4 HOURS
Refills: 0 | Status: DISCONTINUED | OUTPATIENT
Start: 2019-08-03 | End: 2019-08-07

## 2019-08-03 RX ADMIN — ENOXAPARIN SODIUM 40 MILLIGRAM(S): 100 INJECTION SUBCUTANEOUS at 10:31

## 2019-08-03 RX ADMIN — Medication 1000 UNIT(S): at 10:31

## 2019-08-03 RX ADMIN — CHLORHEXIDINE GLUCONATE 1 APPLICATION(S): 213 SOLUTION TOPICAL at 06:43

## 2019-08-03 RX ADMIN — Medication 325 MILLIGRAM(S): at 00:16

## 2019-08-03 RX ADMIN — Medication 325 MILLIGRAM(S): at 10:31

## 2019-08-03 RX ADMIN — Medication 325 MILLIGRAM(S): at 18:00

## 2019-08-03 RX ADMIN — Medication 100 MILLIGRAM(S): at 10:31

## 2019-08-03 RX ADMIN — SODIUM CHLORIDE 75 MILLILITER(S): 9 INJECTION INTRAMUSCULAR; INTRAVENOUS; SUBCUTANEOUS at 03:17

## 2019-08-03 RX ADMIN — POLYETHYLENE GLYCOL 3350 17 GRAM(S): 17 POWDER, FOR SOLUTION ORAL at 10:31

## 2019-08-03 NOTE — PROGRESS NOTE ADULT - PROBLEM SELECTOR PLAN 1
Dx: HPV-associated SCC of L neck (stage [id number]) in 12/2018. Finished 2 cycles of DCF (docetaxel, cisplatin, 5-FU). CTA neeck 6/20: left neck SCC, encasement of ICA and extension of mass into skin, parotid and submandibular glands w extensive LAD  -c/w NS 75cc/hr throughout hospitalization  -Started 3rd round of chemotherapy: Docetaxel 75 mg/m2 over 1 hour, Cisplatin 75/m2 over 24 hours, and infusional 5-FU 1000 mg/m2 over 96 hours  -Neulasta to be given 24 hours after completion of chemotherapy; patient has ANC of 1600 at this time.  -Magic Mouthwash 10 mL q4h prn

## 2019-08-03 NOTE — PROGRESS NOTE ADULT - SUBJECTIVE AND OBJECTIVE BOX
The patient is resting comfortably this am. Tolerating ongoing chemotherapy well thus far.    CHEMOTHERAPY REGIMEN:        Day:         3                 Diet:  Protocol:       DCF induction chemotherapy, course 3.                            IVF:      MEDICATIONS  (STANDING):  chlorhexidine 2% Cloths 1 Application(s) Topical <User Schedule>  cholecalciferol 1000 Unit(s) Oral every 24 hours  docusate sodium 100 milliGRAM(s) Oral every 24 hours  enoxaparin Injectable 40 milliGRAM(s) SubCutaneous every 24 hours  ferrous    sulfate 325 milliGRAM(s) Oral every 8 hours  fluorouracil IVPB (eMAR) 1750 milliGRAM(s) IV Intermittent daily  polyethylene glycol 3350 17 Gram(s) Oral every 24 hours  senna 2 Tablet(s) Oral at bedtime  sodium chloride 0.9%. 1000 milliLiter(s) (75 mL/Hr) IV Continuous <Continuous>    MEDICATIONS  (PRN):  sodium chloride 0.9% lock flush 10 milliLiter(s) IV Push every 1 hour PRN Pre/post blood products, medications, blood draw, and to maintain line patency      Allergies    No Known Allergies    Intolerances        DVT Prophylaxis: [x ] YES [ ] NO      Antibiotics: [ ] YES [x ] NO    Pain Scale (1-10):       Location:    Vital Signs Last 24 Hrs  T(C): 36.6 (03 Aug 2019 05:35), Max: 36.7 (02 Aug 2019 12:08)  T(F): 97.8 (03 Aug 2019 05:35), Max: 98.1 (02 Aug 2019 22:40)  HR: 60 (03 Aug 2019 05:35) (60 - 68)  BP: 122/77 (03 Aug 2019 05:35) (122/77 - 133/87)  BP(mean): --  RR: 17 (03 Aug 2019 05:35) (16 - 18)  SpO2: 97% (03 Aug 2019 05:35) (96% - 99%)    Drug Dosing Weight  Height (cm): 165.1 (01 Aug 2019 00:32)  Weight (kg): 68 (01 Aug 2019 00:32)  BMI (kg/m2): 24.9 (01 Aug 2019 00:32)  BSA (m2): 1.75 (01 Aug 2019 00:32)    PHYSICAL EXAM:      Constitutional: fatigued.  Eyes: conjunctiva pink.  ENMT: buccal mucosa without evidence of mucostomatitis.  Neck: left neck wound remains closed.  Respiratory: chest clear.  Cardiovascular: S1>S2 at apex. RSR.  Gastrointestinal: soft, nontender, active bowel sounds.  Genitourinary: voiding without difficulty.  Extremities: no leg edema.  Neurological: no gross focal deficits.  Skin: warm and dry.  Lymph Nodes: none palp.  Musculoskeletal: full ROM.  Psychiatric: affect normal.        URINARY CATHETER: [ ] YES [x ] NO     LABS:  CBC Full  -  ( 03 Aug 2019 06:00 )  WBC Count : 4.65 K/uL  RBC Count : 3.30 M/uL  Hemoglobin : 9.4 g/dL  Hematocrit : 29.8 %  Platelet Count - Automated : 256 K/uL  Mean Cell Volume : 90.3 fl  Mean Cell Hemoglobin : 28.5 pg  Mean Cell Hemoglobin Concentration : 31.5 gm/dL  Auto Neutrophil # : 4.37 K/uL  Auto Lymphocyte # : 0.18 K/uL  Auto Monocyte # : 0.07 K/uL  Auto Eosinophil # : 0.00 K/uL  Auto Basophil # : 0.00 K/uL  Auto Neutrophil % : 94.0 %  Auto Lymphocyte % : 3.9 %  Auto Monocyte % : 1.5 %  Auto Eosinophil % : 0.0 %  Auto Basophil % : 0.0 %    08-03    138  |  106  |  9   ----------------------------<  141<H>  4.1   |  22  |  0.59    Ca    8.1<L>      03 Aug 2019 06:00  Phos  3.0     08-03  Mg     2.2     08-03    TPro  5.8<L>  /  Alb  3.7  /  TBili  0.2  /  DBili  x   /  AST  19  /  ALT  15  /  AlkPhos  44  08-03          CULTURES:    RADIOLOGY & ADDITIONAL STUDIES:

## 2019-08-03 NOTE — PROGRESS NOTE ADULT - SUBJECTIVE AND OBJECTIVE BOX
Interval / Overnight:  No acute events overnight. Ms. Foy appears to be tolerating the chemotherapy well. She acknowledges the importance of completing her chemotherapy and plans to stay until it has been completed.  She endorses occasional abdominal pain, but admits to using the bathroom regularly.  She denies HA, CP, SOB, N, V, F.    VITAL SIGNS:  Vital Signs Last 24 Hrs  T(C): 36.6 (03 Aug 2019 05:35), Max: 36.7 (02 Aug 2019 12:08)  T(F): 97.8 (03 Aug 2019 05:35), Max: 98.1 (02 Aug 2019 22:40)  HR: 60 (03 Aug 2019 05:35) (60 - 68)  BP: 122/77 (03 Aug 2019 05:35) (122/77 - 133/87)  BP(mean): --  RR: 17 (03 Aug 2019 05:35) (16 - 18)  SpO2: 97% (03 Aug 2019 05:35) (96% - 99%)    PHYSICAL EXAM:    Constitutional: WDWN resting comfortably in bed; NAD  Head: NC/AT  Eyes: PERRL, anicteric sclera  ENT: no nasal discharge; uvula midline, no oropharyngeal erythema or exudates  Neck: supple; left neck wound well coapted, no erythema or exudate  Respiratory: CTA B/L; no W/R/R, no retractions  Cardiac: +S1/S2; RRR; no M/R/G; PMI non-displaced  Gastrointestinal: abdomen soft, NT/ND; no rebound or guarding; +BSx4  Back: spine midline, no bony tenderness or step-offs  Extremities: WWP, no clubbing or cyanosis; no peripheral edema  Musculoskeletal: NROM x4; no joint swelling, tenderness or erythema  Vascular: 2+ radial, femoral, DP/PT pulses B/L  Dermatologic: skin warm, dry and intact; no rashes, wounds, or scars  Lymphatic: no submandibular or cervical LAD  Neurologic: AAOx3; CNII-XII grossly intact; no focal deficits  Psychiatric: disorganized thinking, possible delusions and paranoia    MEDICATIONS:  MEDICATIONS  (STANDING):  chlorhexidine 2% Cloths 1 Application(s) Topical <User Schedule>  cholecalciferol 1000 Unit(s) Oral every 24 hours  docusate sodium 100 milliGRAM(s) Oral every 24 hours  enoxaparin Injectable 40 milliGRAM(s) SubCutaneous every 24 hours  ferrous    sulfate 325 milliGRAM(s) Oral every 8 hours  fluorouracil IVPB (eMAR) 1750 milliGRAM(s) IV Intermittent daily  polyethylene glycol 3350 17 Gram(s) Oral every 24 hours  senna 2 Tablet(s) Oral at bedtime  sodium chloride 0.9%. 1000 milliLiter(s) (75 mL/Hr) IV Continuous <Continuous>    MEDICATIONS  (PRN):  FIRST- Mouthwash  BLM 10 milliLiter(s) Swish and Spit every 4 hours PRN Mouth Care  sodium chloride 0.9% lock flush 10 milliLiter(s) IV Push every 1 hour PRN Pre/post blood products, medications, blood draw, and to maintain line patency      ALLERGIES:  Allergies    No Known Allergies    Intolerances        LABS:                        9.4    4.65  )-----------( 256      ( 03 Aug 2019 06:00 )             29.8     08-03    138  |  106  |  9   ----------------------------<  141<H>  4.1   |  22  |  0.59    Ca    8.1<L>      03 Aug 2019 06:00  Phos  3.0     08-03  Mg     2.2     08-03    TPro  5.8<L>  /  Alb  3.7  /  TBili  0.2  /  DBili  x   /  AST  19  /  ALT  15  /  AlkPhos  44  08-03        CAPILLARY BLOOD GLUCOSE          RADIOLOGY & ADDITIONAL TESTS: Reviewed.

## 2019-08-03 NOTE — PROGRESS NOTE ADULT - SUBJECTIVE AND OBJECTIVE BOX
Heme/Onc Progress Note (Dr. Meeks)      Interval Hx:  Pt seen and examined. No acute events overnight. No fevers noted. No bleeding or bruising including brbpr, melena or hematuria. Currently C3D2 of 5FU infusional s/p Docetaxel and Cisplatin.    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 A neck CT scan on 11/10/16 revealed a 3.1x2.9x5.0 cm multiloculated rim-enhancing fluid collection within the left level IIb soft tissues just deep to the sternocleidomastoid muscle. DDx included abscess, suppurative adenitis or infected second brachial cleft cyst. The patient was treated with antibiotics and some white material was drained from the lesion. The lesion, according to the patient came back in 2017. She again took antibiotic therapy. A CT scan was done but no additional biopsy was obtained. The patient saw Dr. Weller in early 12/18. A biopsy was obtained on 12/6/18.   This revealed a squamous cell carcinoma, HPV related. The patient was seen by Dr. Alvarado and Dr. Londono. She was referred to me but did not make an appointment. She finally saw Constantino Lazcano on 5/29 and received her 1st cycle of induction DCF. She had a remarkable response in primary left neck mass. She notes having bilateral leg swelling and stomatitis at home.    She is admitted for 3rd cycle of induction DCF. No acute complaints. She denies nausea/vomiting, abdominal pain, constipation or diarrhea. No fatigue. Appetite is good.    Allergies    No Known Allergies    Intolerances    Medications:  MEDICATIONS  (STANDING):  chlorhexidine 2% Cloths 1 Application(s) Topical <User Schedule>  cholecalciferol 1000 Unit(s) Oral every 24 hours  docusate sodium 100 milliGRAM(s) Oral every 24 hours  enoxaparin Injectable 40 milliGRAM(s) SubCutaneous every 24 hours  ferrous    sulfate 325 milliGRAM(s) Oral every 8 hours  fluorouracil IVPB (eMAR) 1750 milliGRAM(s) IV Intermittent daily  polyethylene glycol 3350 17 Gram(s) Oral every 24 hours  senna 2 Tablet(s) Oral at bedtime  sodium chloride 0.9%. 1000 milliLiter(s) (75 mL/Hr) IV Continuous <Continuous>    MEDICATIONS  (PRN):  FIRST- Mouthwash  BLM 10 milliLiter(s) Swish and Spit every 4 hours PRN Mouth Care  sodium chloride 0.9% lock flush 10 milliLiter(s) IV Push every 1 hour PRN Pre/post blood products, medications, blood draw, and to maintain line patency    enoxaparin Injectable 40 milliGRAM(s) SubCutaneous every 24 hours    fluorouracil IVPB (eMAR) 1750 milliGRAM(s) IV Intermittent daily        PHYSICAL EXAM:    T(F): 97.8 (08-03-19 @ 05:35), Max: 98.1 (08-02-19 @ 22:40)  HR: 60 (08-03-19 @ 05:35) (60 - 68)  BP: 122/77 (08-03-19 @ 05:35) (122/77 - 133/87)  RR: 17 (08-03-19 @ 05:35) (16 - 17)  SpO2: 97% (08-03-19 @ 05:35) (96% - 99%)  Wt(kg): --    Daily     Daily     Gen: well developed, well nourished, comfortable  HEENT: normocephalic/atraumatic, no conjunctival pallor, no scleral icterus, no oral thrush/mucosal bleeding/mucositis  Neck: supple, neck mass is flat  Cardiovascular: RR, nl S1S2, no murmurs/rubs/gallops  Respiratory: clear air entry b/l  Gastrointestinal: BS+, soft, NT/ND,   Extremities: no clubbing/cyanosis, no edema, no calf tenderness  Vascular:  DP/PT 2+ b/l    Labs                          9.4    4.65  )-----------( 256      ( 03 Aug 2019 06:00 )             29.8     CBC Full  -  ( 03 Aug 2019 06:00 )  WBC Count : 4.65 K/uL  RBC Count : 3.30 M/uL  Hemoglobin : 9.4 g/dL  Hematocrit : 29.8 %  Platelet Count - Automated : 256 K/uL  Mean Cell Volume : 90.3 fl  Mean Cell Hemoglobin : 28.5 pg  Mean Cell Hemoglobin Concentration : 31.5 gm/dL  Auto Neutrophil # : 4.37 K/uL  Auto Lymphocyte # : 0.18 K/uL  Auto Monocyte # : 0.07 K/uL  Auto Eosinophil # : 0.00 K/uL  Auto Basophil # : 0.00 K/uL  Auto Neutrophil % : 94.0 %  Auto Lymphocyte % : 3.9 %  Auto Monocyte % : 1.5 %  Auto Eosinophil % : 0.0 %  Auto Basophil % : 0.0 %        08-03    138  |  106  |  9   ----------------------------<  141<H>  4.1   |  22  |  0.59    Ca    8.1<L>      03 Aug 2019 06:00  Phos  3.0     08-03  Mg     2.2     08-03    TPro  5.8<L>  /  Alb  3.7  /  TBili  0.2  /  DBili  x   /  AST  19  /  ALT  15  /  AlkPhos  44  08-03

## 2019-08-04 LAB
ANION GAP SERPL CALC-SCNC: 9 MMOL/L — SIGNIFICANT CHANGE UP (ref 5–17)
BASOPHILS # BLD AUTO: 0 K/UL — SIGNIFICANT CHANGE UP (ref 0–0.2)
BASOPHILS NFR BLD AUTO: 0 % — SIGNIFICANT CHANGE UP (ref 0–2)
BLD GP AB SCN SERPL QL: NEGATIVE — SIGNIFICANT CHANGE UP
BUN SERPL-MCNC: 11 MG/DL — SIGNIFICANT CHANGE UP (ref 7–23)
CALCIUM SERPL-MCNC: 7.9 MG/DL — LOW (ref 8.4–10.5)
CHLORIDE SERPL-SCNC: 106 MMOL/L — SIGNIFICANT CHANGE UP (ref 96–108)
CO2 SERPL-SCNC: 24 MMOL/L — SIGNIFICANT CHANGE UP (ref 22–31)
CREAT SERPL-MCNC: 0.62 MG/DL — SIGNIFICANT CHANGE UP (ref 0.5–1.3)
EOSINOPHIL # BLD AUTO: 0.03 K/UL — SIGNIFICANT CHANGE UP (ref 0–0.5)
EOSINOPHIL NFR BLD AUTO: 0.8 % — SIGNIFICANT CHANGE UP (ref 0–6)
GLUCOSE SERPL-MCNC: 109 MG/DL — HIGH (ref 70–99)
HCT VFR BLD CALC: 28.9 % — LOW (ref 34.5–45)
HGB BLD-MCNC: 9 G/DL — LOW (ref 11.5–15.5)
IMM GRANULOCYTES NFR BLD AUTO: 0.5 % — SIGNIFICANT CHANGE UP (ref 0–1.5)
LYMPHOCYTES # BLD AUTO: 1.06 K/UL — SIGNIFICANT CHANGE UP (ref 1–3.3)
LYMPHOCYTES # BLD AUTO: 29.1 % — SIGNIFICANT CHANGE UP (ref 13–44)
MAGNESIUM SERPL-MCNC: 1.5 MG/DL — LOW (ref 1.6–2.6)
MCHC RBC-ENTMCNC: 28.4 PG — SIGNIFICANT CHANGE UP (ref 27–34)
MCHC RBC-ENTMCNC: 31.1 GM/DL — LOW (ref 32–36)
MCV RBC AUTO: 91.2 FL — SIGNIFICANT CHANGE UP (ref 80–100)
MONOCYTES # BLD AUTO: 0.17 K/UL — SIGNIFICANT CHANGE UP (ref 0–0.9)
MONOCYTES NFR BLD AUTO: 4.7 % — SIGNIFICANT CHANGE UP (ref 2–14)
NEUTROPHILS # BLD AUTO: 2.36 K/UL — SIGNIFICANT CHANGE UP (ref 1.8–7.4)
NEUTROPHILS NFR BLD AUTO: 64.9 % — SIGNIFICANT CHANGE UP (ref 43–77)
NRBC # BLD: 0 /100 WBCS — SIGNIFICANT CHANGE UP (ref 0–0)
PHOSPHATE SERPL-MCNC: 2.9 MG/DL — SIGNIFICANT CHANGE UP (ref 2.5–4.5)
PLATELET # BLD AUTO: 187 K/UL — SIGNIFICANT CHANGE UP (ref 150–400)
POTASSIUM SERPL-MCNC: 3.2 MMOL/L — LOW (ref 3.5–5.3)
POTASSIUM SERPL-SCNC: 3.2 MMOL/L — LOW (ref 3.5–5.3)
RBC # BLD: 3.17 M/UL — LOW (ref 3.8–5.2)
RBC # FLD: 16.2 % — HIGH (ref 10.3–14.5)
RH IG SCN BLD-IMP: POSITIVE — SIGNIFICANT CHANGE UP
SODIUM SERPL-SCNC: 139 MMOL/L — SIGNIFICANT CHANGE UP (ref 135–145)
WBC # BLD: 3.64 K/UL — LOW (ref 3.8–10.5)
WBC # FLD AUTO: 3.64 K/UL — LOW (ref 3.8–10.5)

## 2019-08-04 RX ORDER — POTASSIUM CHLORIDE 20 MEQ
40 PACKET (EA) ORAL EVERY 4 HOURS
Refills: 0 | Status: COMPLETED | OUTPATIENT
Start: 2019-08-04 | End: 2019-08-04

## 2019-08-04 RX ORDER — MAGNESIUM SULFATE 500 MG/ML
2 VIAL (ML) INJECTION ONCE
Refills: 0 | Status: COMPLETED | OUTPATIENT
Start: 2019-08-04 | End: 2019-08-04

## 2019-08-04 RX ORDER — ONDANSETRON 8 MG/1
4 TABLET, FILM COATED ORAL ONCE
Refills: 0 | Status: COMPLETED | OUTPATIENT
Start: 2019-08-04 | End: 2019-08-04

## 2019-08-04 RX ADMIN — Medication 325 MILLIGRAM(S): at 23:28

## 2019-08-04 RX ADMIN — Medication 50 GRAM(S): at 09:23

## 2019-08-04 RX ADMIN — Medication 200 MILLIGRAM(S): at 23:28

## 2019-08-04 RX ADMIN — Medication 325 MILLIGRAM(S): at 07:40

## 2019-08-04 RX ADMIN — ONDANSETRON 4 MILLIGRAM(S): 8 TABLET, FILM COATED ORAL at 02:11

## 2019-08-04 RX ADMIN — CHLORHEXIDINE GLUCONATE 1 APPLICATION(S): 213 SOLUTION TOPICAL at 06:28

## 2019-08-04 RX ADMIN — Medication 1000 UNIT(S): at 07:40

## 2019-08-04 RX ADMIN — POLYETHYLENE GLYCOL 3350 17 GRAM(S): 17 POWDER, FOR SOLUTION ORAL at 09:23

## 2019-08-04 RX ADMIN — Medication 200 MILLIGRAM(S): at 14:18

## 2019-08-04 RX ADMIN — Medication 100 MILLIGRAM(S): at 09:24

## 2019-08-04 RX ADMIN — SENNA PLUS 2 TABLET(S): 8.6 TABLET ORAL at 21:35

## 2019-08-04 RX ADMIN — Medication 40 MILLIEQUIVALENT(S): at 09:24

## 2019-08-04 RX ADMIN — Medication 325 MILLIGRAM(S): at 16:24

## 2019-08-04 RX ADMIN — ENOXAPARIN SODIUM 40 MILLIGRAM(S): 100 INJECTION SUBCUTANEOUS at 09:24

## 2019-08-04 RX ADMIN — FLUOROURACIL 43.13 MILLIGRAM(S): 50 INJECTION, SOLUTION INTRAVENOUS at 01:29

## 2019-08-04 NOTE — PROGRESS NOTE ADULT - PROBLEM SELECTOR PLAN 1
Last course of induction chemotherapy continues. The patient has one more bag of 5-FU after the current bag is finished.

## 2019-08-04 NOTE — PROGRESS NOTE ADULT - SUBJECTIVE AND OBJECTIVE BOX
Resting in bed comfortably this am. States that she has not been ambulating much this visit. Eating okay. No new complaints.     CHEMOTHERAPY REGIMEN:        Day:          4                Diet:  Protocol:  docetaxel. cisplatin. infusional 5-FU.                                  IVF:      MEDICATIONS  (STANDING):  chlorhexidine 2% Cloths 1 Application(s) Topical <User Schedule>  cholecalciferol 1000 Unit(s) Oral every 24 hours  docusate sodium 100 milliGRAM(s) Oral every 24 hours  enoxaparin Injectable 40 milliGRAM(s) SubCutaneous every 24 hours  ferrous    sulfate 325 milliGRAM(s) Oral every 8 hours  fluorouracil IVPB (eMAR) 1750 milliGRAM(s) IV Intermittent daily  polyethylene glycol 3350 17 Gram(s) Oral every 24 hours  senna 2 Tablet(s) Oral at bedtime  sodium chloride 0.9%. 1000 milliLiter(s) (75 mL/Hr) IV Continuous <Continuous>    MEDICATIONS  (PRN):  FIRST- Mouthwash  BLM 10 milliLiter(s) Swish and Spit every 4 hours PRN Mouth Care  sodium chloride 0.9% lock flush 10 milliLiter(s) IV Push every 1 hour PRN Pre/post blood products, medications, blood draw, and to maintain line patency      Allergies    No Known Allergies    Intolerances        DVT Prophylaxis: [x ] YES [ ] NO      Antibiotics: [ ] YES [x ] NO    Pain Scale (1-10):       Location:    Vital Signs Last 24 Hrs  T(C): 36.6 (04 Aug 2019 06:41), Max: 36.7 (03 Aug 2019 12:33)  T(F): 97.8 (04 Aug 2019 06:41), Max: 98 (03 Aug 2019 12:33)  HR: 62 (04 Aug 2019 06:41) (62 - 69)  BP: 135/91 (04 Aug 2019 06:41) (128/76 - 143/84)  BP(mean): --  RR: 17 (04 Aug 2019 06:41) (17 - 17)  SpO2: 100% (04 Aug 2019 06:41) (96% - 100%)    Drug Dosing Weight  Height (cm): 165.1 (01 Aug 2019 00:32)  Weight (kg): 68 (01 Aug 2019 00:32)  BMI (kg/m2): 24.9 (01 Aug 2019 00:32)  BSA (m2): 1.75 (01 Aug 2019 00:32)    PHYSICAL EXAM:      Constitutional: having some abdominal discomfort.  Eyes: conjunctival pallor.  ENMT: buccal mucosa without evidence of chemotherapy induced mucostomatitis.  Neck: tiny lymph nodes palp in the left posterior triangle area with the patient sitting up on the edge of the bed.  Breasts: not examined.  Back: no SPT.  Respiratory: chest clear.  Cardiovascular: S1>S2 at apex. RSR.  Gastrointestinal: soft, nontender, active bowel sounds.  Genitourinary: voiding without difficulty.  Extremities: no leg edema.  Neurological: no gross focal deficits.  Skin: warm and dry.  Lymph Nodes: left posterior neck nodes as noted above.  Musculoskeletal: full ROM.  Psychiatric: affect normal.        URINARY CATHETER: [ ] YES [x ] NO     LABS:  CBC Full  -  ( 04 Aug 2019 05:30 )  WBC Count : 3.64 K/uL  RBC Count : 3.17 M/uL  Hemoglobin : 9.0 g/dL  Hematocrit : 28.9 %  Platelet Count - Automated : 187 K/uL  Mean Cell Volume : 91.2 fl  Mean Cell Hemoglobin : 28.4 pg  Mean Cell Hemoglobin Concentration : 31.1 gm/dL  Auto Neutrophil # : 2.36 K/uL  Auto Lymphocyte # : 1.06 K/uL  Auto Monocyte # : 0.17 K/uL  Auto Eosinophil # : 0.03 K/uL  Auto Basophil # : 0.00 K/uL  Auto Neutrophil % : 64.9 %  Auto Lymphocyte % : 29.1 %  Auto Monocyte % : 4.7 %  Auto Eosinophil % : 0.8 %  Auto Basophil % : 0.0 %    08-04    139  |  106  |  11  ----------------------------<  109<H>  3.2<L>   |  24  |  0.62    Ca    7.9<L>      04 Aug 2019 05:30  Phos  2.9     08-04  Mg     1.5     08-04    TPro  5.8<L>  /  Alb  3.7  /  TBili  0.2  /  DBili  x   /  AST  19  /  ALT  15  /  AlkPhos  44  08-03          CULTURES:    RADIOLOGY & ADDITIONAL STUDIES:

## 2019-08-04 NOTE — PROGRESS NOTE ADULT - SUBJECTIVE AND OBJECTIVE BOX
Heme/Onc Progress Note (Dr. Meeks)    Interval Hx:  Pt seen and examined. Patient noted to have mild cough, otherwise remains mostly in bed. No fevers noted or infectious symptoms. No bleeding or bruising including brbpr, melena or hematuria. Currently C3, 3rd bag of 5FU infusional hung at 2 am. S/p Docetaxel and Cisplatin.    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 A neck CT scan on 11/10/16 revealed a 3.1x2.9x5.0 cm multiloculated rim-enhancing fluid collection within the left level IIb soft tissues just deep to the sternocleidomastoid muscle. DDx included abscess, suppurative adenitis or infected second brachial cleft cyst. The patient was treated with antibiotics and some white material was drained from the lesion. The lesion, according to the patient came back in 2017. She again took antibiotic therapy. A CT scan was done but no additional biopsy was obtained. The patient saw Dr. Weller in early 12/18. A biopsy was obtained on 12/6/18.   This revealed a squamous cell carcinoma, HPV related. The patient was seen by Dr. Alvarado and Dr. Londono. She was referred to me but did not make an appointment. She finally saw Constantino Lazcano on 5/29 and received her 1st cycle of induction DCF. She had a remarkable response in primary left neck mass. She notes having bilateral leg swelling and stomatitis at home.    She is admitted for 3rd cycle of induction DCF      Allergies    No Known Allergies    Intolerances        Medications:  MEDICATIONS  (STANDING):  chlorhexidine 2% Cloths 1 Application(s) Topical <User Schedule>  cholecalciferol 1000 Unit(s) Oral every 24 hours  docusate sodium 100 milliGRAM(s) Oral every 24 hours  enoxaparin Injectable 40 milliGRAM(s) SubCutaneous every 24 hours  ferrous    sulfate 325 milliGRAM(s) Oral every 8 hours  fluorouracil IVPB (eMAR) 1750 milliGRAM(s) IV Intermittent daily  polyethylene glycol 3350 17 Gram(s) Oral every 24 hours  senna 2 Tablet(s) Oral at bedtime  sodium chloride 0.9%. 1000 milliLiter(s) (75 mL/Hr) IV Continuous <Continuous>    MEDICATIONS  (PRN):  FIRST- Mouthwash  BLM 10 milliLiter(s) Swish and Spit every 4 hours PRN Mouth Care  guaiFENesin    Syrup 200 milliGRAM(s) Oral every 6 hours PRN Cough  sodium chloride 0.9% lock flush 10 milliLiter(s) IV Push every 1 hour PRN Pre/post blood products, medications, blood draw, and to maintain line patency    enoxaparin Injectable 40 milliGRAM(s) SubCutaneous every 24 hours    fluorouracil IVPB (eMAR) 1750 milliGRAM(s) IV Intermittent daily        PHYSICAL EXAM:    T(F): 98.1 (08-04-19 @ 09:55), Max: 98.1 (08-04-19 @ 09:55)  HR: 80 (08-04-19 @ 09:55) (62 - 80)  BP: 127/81 (08-04-19 @ 09:55) (127/81 - 143/84)  RR: 17 (08-04-19 @ 09:55) (17 - 17)  SpO2: 96% (08-04-19 @ 09:55) (96% - 100%)  Wt(kg): --    Daily     Daily     Gen: well developed, well nourished, comfortable  HEENT: normocephalic/atraumatic, no conjunctival pallor, no scleral icterus, no oral thrush/mucosal bleeding/mucositis  Neck: supple, neck mass is flat  Cardiovascular: RR, nl S1S2, no murmurs/rubs/gallops  Respiratory: clear air entry b/l  Gastrointestinal: BS+, soft, NT/ND,   Extremities: no clubbing/cyanosis, no edema, no calf tenderness  Vascular:  DP/PT 2+ b/l        Labs:                          9.0    3.64  )-----------( 187      ( 04 Aug 2019 05:30 )             28.9     CBC Full  -  ( 04 Aug 2019 05:30 )  WBC Count : 3.64 K/uL  RBC Count : 3.17 M/uL  Hemoglobin : 9.0 g/dL  Hematocrit : 28.9 %  Platelet Count - Automated : 187 K/uL  Mean Cell Volume : 91.2 fl  Mean Cell Hemoglobin : 28.4 pg  Mean Cell Hemoglobin Concentration : 31.1 gm/dL  Auto Neutrophil # : 2.36 K/uL  Auto Lymphocyte # : 1.06 K/uL  Auto Monocyte # : 0.17 K/uL  Auto Eosinophil # : 0.03 K/uL  Auto Basophil # : 0.00 K/uL  Auto Neutrophil % : 64.9 %  Auto Lymphocyte % : 29.1 %  Auto Monocyte % : 4.7 %  Auto Eosinophil % : 0.8 %  Auto Basophil % : 0.0 %        08-04    139  |  106  |  11  ----------------------------<  109<H>  3.2<L>   |  24  |  0.62    Ca    7.9<L>      04 Aug 2019 05:30  Phos  2.9     08-04  Mg     1.5     08-04    TPro  5.8<L>  /  Alb  3.7  /  TBili  0.2  /  DBili  x   /  AST  19  /  ALT  15  /  AlkPhos  44  08-03          Other Labs:    Cultures:    Pathology:    Imaging Studies:

## 2019-08-04 NOTE — PROGRESS NOTE ADULT - ATTENDING COMMENTS
Continue to monitor blood counts and chems. Replace electrolytes as required. The patient is considering using an Onpro neulasta device after the last bag of 5-FU is completed.

## 2019-08-05 LAB
ANION GAP SERPL CALC-SCNC: 9 MMOL/L — SIGNIFICANT CHANGE UP (ref 5–17)
BASOPHILS # BLD AUTO: 0.01 K/UL — SIGNIFICANT CHANGE UP (ref 0–0.2)
BASOPHILS NFR BLD AUTO: 0.4 % — SIGNIFICANT CHANGE UP (ref 0–2)
BUN SERPL-MCNC: 10 MG/DL — SIGNIFICANT CHANGE UP (ref 7–23)
CALCIUM SERPL-MCNC: 8.3 MG/DL — LOW (ref 8.4–10.5)
CHLORIDE SERPL-SCNC: 101 MMOL/L — SIGNIFICANT CHANGE UP (ref 96–108)
CO2 SERPL-SCNC: 27 MMOL/L — SIGNIFICANT CHANGE UP (ref 22–31)
CREAT SERPL-MCNC: 0.62 MG/DL — SIGNIFICANT CHANGE UP (ref 0.5–1.3)
EOSINOPHIL # BLD AUTO: 0.1 K/UL — SIGNIFICANT CHANGE UP (ref 0–0.5)
EOSINOPHIL NFR BLD AUTO: 4 % — SIGNIFICANT CHANGE UP (ref 0–6)
GLUCOSE SERPL-MCNC: 101 MG/DL — HIGH (ref 70–99)
HCT VFR BLD CALC: 29 % — LOW (ref 34.5–45)
HGB BLD-MCNC: 9.2 G/DL — LOW (ref 11.5–15.5)
IMM GRANULOCYTES NFR BLD AUTO: 0.4 % — SIGNIFICANT CHANGE UP (ref 0–1.5)
LYMPHOCYTES # BLD AUTO: 0.83 K/UL — LOW (ref 1–3.3)
LYMPHOCYTES # BLD AUTO: 33.6 % — SIGNIFICANT CHANGE UP (ref 13–44)
MAGNESIUM SERPL-MCNC: 1.4 MG/DL — LOW (ref 1.6–2.6)
MCHC RBC-ENTMCNC: 28 PG — SIGNIFICANT CHANGE UP (ref 27–34)
MCHC RBC-ENTMCNC: 31.7 GM/DL — LOW (ref 32–36)
MCV RBC AUTO: 88.4 FL — SIGNIFICANT CHANGE UP (ref 80–100)
MONOCYTES # BLD AUTO: 0.07 K/UL — SIGNIFICANT CHANGE UP (ref 0–0.9)
MONOCYTES NFR BLD AUTO: 2.8 % — SIGNIFICANT CHANGE UP (ref 2–14)
NEUTROPHILS # BLD AUTO: 1.45 K/UL — LOW (ref 1.8–7.4)
NEUTROPHILS NFR BLD AUTO: 58.8 % — SIGNIFICANT CHANGE UP (ref 43–77)
NRBC # BLD: 0 /100 WBCS — SIGNIFICANT CHANGE UP (ref 0–0)
PHOSPHATE SERPL-MCNC: 3.6 MG/DL — SIGNIFICANT CHANGE UP (ref 2.5–4.5)
PLATELET # BLD AUTO: 174 K/UL — SIGNIFICANT CHANGE UP (ref 150–400)
POTASSIUM SERPL-MCNC: 3.6 MMOL/L — SIGNIFICANT CHANGE UP (ref 3.5–5.3)
POTASSIUM SERPL-SCNC: 3.6 MMOL/L — SIGNIFICANT CHANGE UP (ref 3.5–5.3)
RAPID RVP RESULT: DETECTED
RBC # BLD: 3.28 M/UL — LOW (ref 3.8–5.2)
RBC # FLD: 15.8 % — HIGH (ref 10.3–14.5)
RV+EV RNA SPEC QL NAA+PROBE: DETECTED
SODIUM SERPL-SCNC: 137 MMOL/L — SIGNIFICANT CHANGE UP (ref 135–145)
WBC # BLD: 2.47 K/UL — LOW (ref 3.8–10.5)
WBC # FLD AUTO: 2.47 K/UL — LOW (ref 3.8–10.5)

## 2019-08-05 PROCEDURE — 71045 X-RAY EXAM CHEST 1 VIEW: CPT | Mod: 26

## 2019-08-05 RX ORDER — MAGNESIUM SULFATE 500 MG/ML
4 VIAL (ML) INJECTION ONCE
Refills: 0 | Status: COMPLETED | OUTPATIENT
Start: 2019-08-05 | End: 2019-08-05

## 2019-08-05 RX ORDER — BENZOCAINE AND MENTHOL 5; 1 G/100ML; G/100ML
1 LIQUID ORAL
Refills: 0 | Status: DISCONTINUED | OUTPATIENT
Start: 2019-08-05 | End: 2019-08-07

## 2019-08-05 RX ORDER — ONDANSETRON 8 MG/1
4 TABLET, FILM COATED ORAL ONCE
Refills: 0 | Status: COMPLETED | OUTPATIENT
Start: 2019-08-05 | End: 2019-08-05

## 2019-08-05 RX ORDER — POTASSIUM CHLORIDE 20 MEQ
40 PACKET (EA) ORAL ONCE
Refills: 0 | Status: COMPLETED | OUTPATIENT
Start: 2019-08-05 | End: 2019-08-05

## 2019-08-05 RX ADMIN — Medication 100 MILLIGRAM(S): at 10:09

## 2019-08-05 RX ADMIN — FLUOROURACIL 43.13 MILLIGRAM(S): 50 INJECTION, SOLUTION INTRAVENOUS at 02:43

## 2019-08-05 RX ADMIN — Medication 100 GRAM(S): at 17:50

## 2019-08-05 RX ADMIN — ENOXAPARIN SODIUM 40 MILLIGRAM(S): 100 INJECTION SUBCUTANEOUS at 10:09

## 2019-08-05 RX ADMIN — Medication 1000 UNIT(S): at 07:15

## 2019-08-05 RX ADMIN — Medication 200 MILLIGRAM(S): at 23:39

## 2019-08-05 RX ADMIN — Medication 325 MILLIGRAM(S): at 07:15

## 2019-08-05 RX ADMIN — Medication 40 MILLIEQUIVALENT(S): at 17:50

## 2019-08-05 RX ADMIN — POLYETHYLENE GLYCOL 3350 17 GRAM(S): 17 POWDER, FOR SOLUTION ORAL at 10:09

## 2019-08-05 RX ADMIN — BENZOCAINE AND MENTHOL 1 LOZENGE: 5; 1 LIQUID ORAL at 17:50

## 2019-08-05 RX ADMIN — Medication 325 MILLIGRAM(S): at 17:50

## 2019-08-05 RX ADMIN — CHLORHEXIDINE GLUCONATE 1 APPLICATION(S): 213 SOLUTION TOPICAL at 06:15

## 2019-08-05 RX ADMIN — ONDANSETRON 4 MILLIGRAM(S): 8 TABLET, FILM COATED ORAL at 03:30

## 2019-08-05 RX ADMIN — SENNA PLUS 2 TABLET(S): 8.6 TABLET ORAL at 22:00

## 2019-08-05 NOTE — PROGRESS NOTE ADULT - ATTENDING COMMENTS
This course of chemotherapy will not be completed until the wee hours of tomorrow morning. We will then observe her and give her the neulasta 24 hours after the 5-FU infusion is completed. Continue to monitor blood counts and chems and supplement as required. This course of chemotherapy will not be completed until the wee hours of tomorrow morning. We will then observe her and give her the neulasta 24 hours after the 5-FU infusion is completed. Continue to monitor blood counts and chems and supplement as required. Observe for signs of infection.

## 2019-08-05 NOTE — PROGRESS NOTE ADULT - SUBJECTIVE AND OBJECTIVE BOX
Heme/Onc Progress Note (Dr. Meeks)    Interval Hx:  Non-productive cough noted. Chills overnight however afebrile. ANC > 1000.     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 A neck CT scan on 11/10/16 revealed a 3.1x2.9x5.0 cm multiloculated rim-enhancing fluid collection within the left level IIb soft tissues just deep to the sternocleidomastoid muscle. DDx included abscess, suppurative adenitis or infected second brachial cleft cyst. The patient was treated with antibiotics and some white material was drained from the lesion. The lesion, according to the patient came back in 2017. She again took antibiotic therapy. A CT scan was done but no additional biopsy was obtained. The patient saw Dr. Weller in early 12/18. A biopsy was obtained on 12/6/18.   This revealed a squamous cell carcinoma, HPV related. The patient was seen by Dr. Alvarado and Dr. Londono. She was referred to me but did not make an appointment. She finally saw Constantino Lazcano on 5/29 and received her 1st cycle of induction DCF. She had a remarkable response in primary left neck mass. She notes having bilateral leg swelling and stomatitis at home.    She is admitted for 3rd cycle of induction DCF      Allergies    No Known Allergies    Intolerances        Medications:  MEDICATIONS  (STANDING):  benzocaine 15 mG/menthol 3.6 mG Lozenge 1 Lozenge Oral two times a day  chlorhexidine 2% Cloths 1 Application(s) Topical <User Schedule>  cholecalciferol 1000 Unit(s) Oral every 24 hours  docusate sodium 100 milliGRAM(s) Oral every 24 hours  enoxaparin Injectable 40 milliGRAM(s) SubCutaneous every 24 hours  ferrous    sulfate 325 milliGRAM(s) Oral every 8 hours  magnesium sulfate  IVPB 4 Gram(s) IV Intermittent once  polyethylene glycol 3350 17 Gram(s) Oral every 24 hours  potassium chloride    Tablet ER 40 milliEquivalent(s) Oral once  senna 2 Tablet(s) Oral at bedtime  sodium chloride 0.9%. 1000 milliLiter(s) (75 mL/Hr) IV Continuous <Continuous>    MEDICATIONS  (PRN):  FIRST- Mouthwash  BLM 10 milliLiter(s) Swish and Spit every 4 hours PRN Mouth Care  guaiFENesin    Syrup 200 milliGRAM(s) Oral every 6 hours PRN Cough  sodium chloride 0.9% lock flush 10 milliLiter(s) IV Push every 1 hour PRN Pre/post blood products, medications, blood draw, and to maintain line patency        PHYSICAL EXAM:    T(C): 37.1 (08-05-19 @ 05:54), Max: 37.1 (08-05-19 @ 05:54)  T(F): 98.7 (08-05-19 @ 05:54), Max: 98.7 (08-05-19 @ 05:54)  HR: 95 (08-05-19 @ 05:54) (65 - 95)  BP: 107/65 (08-05-19 @ 05:54) (107/65 - 130/84)  ABP: --  ABP(mean): --  RR: 17 (08-05-19 @ 05:54) (17 - 17)  SpO2: 99% (08-05-19 @ 05:54) (98% - 99%)      Gen: well developed, well nourished, comfortable  HEENT: normocephalic/atraumatic, no conjunctival pallor, no scleral icterus, no oral thrush/mucosal bleeding/mucositis  Neck: supple, neck mass is flat  Cardiovascular: RR, nl S1S2, no murmurs/rubs/gallops  Respiratory: clear air entry b/l  Gastrointestinal: BS+, soft, NT/ND,   Extremities: no clubbing/cyanosis, no edema, no calf tenderness  Vascular:  DP/PT 2+ b/l        Labs:                          9.2    2.47  )-----------( 174      ( 05 Aug 2019 06:54 )             29.0         CBC Full  -  ( 05 Aug 2019 06:54 )  WBC Count : 2.47 K/uL  RBC Count : 3.28 M/uL  Hemoglobin : 9.2 g/dL  Hematocrit : 29.0 %  Platelet Count - Automated : 174 K/uL  Mean Cell Volume : 88.4 fl  Mean Cell Hemoglobin : 28.0 pg  Mean Cell Hemoglobin Concentration : 31.7 gm/dL  Auto Neutrophil # : 1.45 K/uL  Auto Lymphocyte # : 0.83 K/uL  Auto Monocyte # : 0.07 K/uL  Auto Eosinophil # : 0.10 K/uL  Auto Basophil # : 0.01 K/uL  Auto Neutrophil % : 58.8 %  Auto Lymphocyte % : 33.6 %  Auto Monocyte % : 2.8 %  Auto Eosinophil % : 4.0 %  Auto Basophil % : 0.4 %        08-05    137  |  101  |  10  ----------------------------<  101<H>  3.6   |  27  |  0.62    Ca    8.3<L>      05 Aug 2019 06:54  Phos  3.6     08-05  Mg     1.4     08-05                         Other Labs:    Cultures:    Pathology:    Imaging Studies:

## 2019-08-05 NOTE — DIETITIAN INITIAL EVALUATION ADULT. - PROBLEM SELECTOR PLAN 3
Patient has known iron-deficiency anemia.  Known Iron deficiency anemia  -c/w home 325mg ferrous sulfate TID  -bowel regimen while on iron: senna,, miralax    #Low vitamin D  -c/w vit D 1000units daily

## 2019-08-05 NOTE — DIETITIAN INITIAL EVALUATION ADULT. - OTHER INFO
39F PMHx neutropenia, anemia, schizoaffective disorder, and depression with SCC of the left neck admitted for her 3rd cycle of chemotherapy and management of neutropenia. Pt reports mainly consuming liquids as she feels solid foods get "stuck in stomach" causing fullness. Reports mainly on liquid diet at home as after treatment always gets mouth sores leading to difficulty swallowing, at present able to tolerate ~50-75% breakfast but believes she is not meeting her needs. Recommend ONS, pt takes Boost at home. Known to nutrition services from prior admission, wt noted at 72-73kg, noted to be UBW, indicating -4kg wt loss x2 months, no s/sx of wasting observed. Denies n/v/d/c, or pain impacting intake, skin with lesion. Encouraged intake through day + Ensure Enlive TID (1050 kcal, 60g protein, 540mL free H2O) to further meet pt needs. Will follow per protocol.

## 2019-08-05 NOTE — DIETITIAN INITIAL EVALUATION ADULT. - PROBLEM SELECTOR PLAN 1
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 Finished 2 cycles of DCF (docetaxel, cisplatin, 5-FU). Currently afebrile.    - CTA neeck 6/20: left neck SCC, encasement of ICA and extension of mass into skin, parotid and submandibular glands w extensive LAD  -Follows with Dr. Londono and Dr. Meeks as outpatient   -c/w NS 75cc/hr  -Pt will need to have PICC placed in AM for 3rd cycle

## 2019-08-05 NOTE — PROGRESS NOTE ADULT - SUBJECTIVE AND OBJECTIVE BOX
The patient thinks that she may be coming down with a cold. She notes irritation in her throat and a cough at times. No mouth pain as yet. She is not running a temp.    CHEMOTHERAPY REGIMEN:        Day:         5                 Diet:  Protocol:     DCF                               IVF:      MEDICATIONS  (STANDING):  chlorhexidine 2% Cloths 1 Application(s) Topical <User Schedule>  cholecalciferol 1000 Unit(s) Oral every 24 hours  docusate sodium 100 milliGRAM(s) Oral every 24 hours  enoxaparin Injectable 40 milliGRAM(s) SubCutaneous every 24 hours  ferrous    sulfate 325 milliGRAM(s) Oral every 8 hours  polyethylene glycol 3350 17 Gram(s) Oral every 24 hours  senna 2 Tablet(s) Oral at bedtime  sodium chloride 0.9%. 1000 milliLiter(s) (75 mL/Hr) IV Continuous <Continuous>    MEDICATIONS  (PRN):  FIRST- Mouthwash  BLM 10 milliLiter(s) Swish and Spit every 4 hours PRN Mouth Care  guaiFENesin    Syrup 200 milliGRAM(s) Oral every 6 hours PRN Cough  sodium chloride 0.9% lock flush 10 milliLiter(s) IV Push every 1 hour PRN Pre/post blood products, medications, blood draw, and to maintain line patency      Allergies    No Known Allergies    Intolerances        DVT Prophylaxis: [x ] YES [ ] NO      Antibiotics: [ ] YES [x ] NO    Pain Scale (1-10):       Location:    Vital Signs Last 24 Hrs  T(C): 37.1 (05 Aug 2019 05:54), Max: 37.1 (05 Aug 2019 05:54)  T(F): 98.7 (05 Aug 2019 05:54), Max: 98.7 (05 Aug 2019 05:54)  HR: 95 (05 Aug 2019 05:54) (62 - 95)  BP: 107/65 (05 Aug 2019 05:54) (107/65 - 135/91)  BP(mean): --  RR: 17 (05 Aug 2019 05:54) (17 - 17)  SpO2: 99% (05 Aug 2019 05:54) (96% - 100%)    Drug Dosing Weight  Height (cm): 165.1 (01 Aug 2019 00:32)  Weight (kg): 68 (01 Aug 2019 00:32)  BMI (kg/m2): 24.9 (01 Aug 2019 00:32)  BSA (m2): 1.75 (01 Aug 2019 00:32)    PHYSICAL EXAM:      Constitutional: throat irritation and a dry cough at times.  Eyes: conjunctival pallor.  ENMT: buccal mucosa with evidence of mucostomatitis. Oropharynx is clear.  Neck: no masses palp. Posterior neck nodes as before.  Breasts: not examined.  Back: no SPT.  Respiratory: chest clear.  Cardiovascular: S1>S2 at apex. RSR.  Gastrointestinal: soft, nontender, active bowel sounds.  Genitourinary: voiding without difficulty.  Extremities: no leg edema.  Neurological: no gross focal deficits.  Skin: warm and dry.  Lymph Nodes: no other nodes palp.  Musculoskeletal: full ROM.  Psychiatric: affect normal.        URINARY CATHETER: [ ] YES [x ] NO     LABS:  CBC Full  -  ( 04 Aug 2019 05:30 )  WBC Count : 3.64 K/uL  RBC Count : 3.17 M/uL  Hemoglobin : 9.0 g/dL  Hematocrit : 28.9 %  Platelet Count - Automated : 187 K/uL  Mean Cell Volume : 91.2 fl  Mean Cell Hemoglobin : 28.4 pg  Mean Cell Hemoglobin Concentration : 31.1 gm/dL  Auto Neutrophil # : 2.36 K/uL  Auto Lymphocyte # : 1.06 K/uL  Auto Monocyte # : 0.17 K/uL  Auto Eosinophil # : 0.03 K/uL  Auto Basophil # : 0.00 K/uL  Auto Neutrophil % : 64.9 %  Auto Lymphocyte % : 29.1 %  Auto Monocyte % : 4.7 %  Auto Eosinophil % : 0.8 %  Auto Basophil % : 0.0 %    08-04    139  |  106  |  11  ----------------------------<  109<H>  3.2<L>   |  24  |  0.62    Ca    7.9<L>      04 Aug 2019 05:30  Phos  2.9     08-04  Mg     1.5     08-04            CULTURES:    RADIOLOGY & ADDITIONAL STUDIES:

## 2019-08-05 NOTE — DIETITIAN INITIAL EVALUATION ADULT. - MALNUTRITION
Per physical assessment: Muscle Wasting- Temporal [   ]  Clavicle/Pectoral [   ]  Shoulder/Deltoid [   ]  Scapula [   ]  Interosseous [   ]  Quadriceps [   ]  Gastrocnemius [   ]; Fat Wasting- Orbital [   ]  Buccal [   ]  Triceps [   ]  Rib [   ]--> Suspect moderate PCM in setting of chronic illness 2/2 to physical assessment, suspect meeting <75% EER >1 month, and -4kg, 5% wt loss x2 months; please see malnutrition chart note. moderate PCM

## 2019-08-05 NOTE — DIETITIAN INITIAL EVALUATION ADULT. - PROBLEM SELECTOR PLAN 4
Patient reports onset after initial chemotherapy treatment. Denies any chest pain, dyspnea on exertion  - 7/9: ECHO also normal: EF 69%, no other structural abnormalities  - Leg elevation

## 2019-08-05 NOTE — DIETITIAN INITIAL EVALUATION ADULT. - PROBLEM SELECTOR PLAN 5
States she has history of schizoaffective disorder but is not on any medications.   Reports she is asymptomatic

## 2019-08-05 NOTE — DIETITIAN INITIAL EVALUATION ADULT. - PROBLEM SELECTOR PLAN 7
1) PCP Contacted on Admission: (Y/N) --> Name & Phone #: Dr. Meeks   2) Date of Contact with PCP: 7/31  3) PCP Contacted at Discharge: (Y/N)  4) Summary of Handoff Given to PCP:   5) Post-Discharge Appointment Date and Location:

## 2019-08-05 NOTE — DIETITIAN INITIAL EVALUATION ADULT. - PROBLEM SELECTOR PLAN 6
Fluids: NS @ 75cc/hr   Electrolytes: replete as necessary, K>4, Mg>2  Nutrition: DASH TLC  Bowel Regimen: senna, colace   DVT ppx:lovenox  GI ppx:none  Code: Full  Disposition: [facility]

## 2019-08-05 NOTE — PROGRESS NOTE ADULT - SUBJECTIVE AND OBJECTIVE BOX
Interval / Overnight:  Ms. Foy reports a new dry cough since yesterday. She reports she started sneezing a day or two prior. She denies shortness of breath, CP, N, V, F.    VITAL SIGNS:  Vital Signs Last 24 Hrs  T(C): 37.1 (05 Aug 2019 05:54), Max: 37.1 (05 Aug 2019 05:54)  T(F): 98.7 (05 Aug 2019 05:54), Max: 98.7 (05 Aug 2019 05:54)  HR: 95 (05 Aug 2019 05:54) (65 - 95)  BP: 107/65 (05 Aug 2019 05:54) (107/65 - 130/84)  BP(mean): --  RR: 17 (05 Aug 2019 05:54) (17 - 17)  SpO2: 99% (05 Aug 2019 05:54) (96% - 99%)    PHYSICAL EXAM:    Constitutional: WDWN resting comfortably in bed; NAD  Head: NC/AT  Eyes: PERRL, anicteric sclera  ENT: no nasal discharge; uvula midline, no oropharyngeal erythema or exudates  Neck: supple; left neck wound well coapted, no erythema or exudate  Respiratory: CTA B/L; no W/R/R, no retractions  Cardiac: +S1/S2; RRR; no M/R/G; PMI non-displaced  Gastrointestinal: abdomen soft, NT/ND; no rebound or guarding; +BSx4  Extremities: WWP, no clubbing or cyanosis; no peripheral edema  Musculoskeletal: NROM x4; no joint swelling, tenderness or erythema  Vascular: 2+ radial, femoral, DP/PT pulses B/L  Neurologic: AAOx3; CNII-XII grossly intact; no focal deficits  Psychiatric: disorganized thinking, possible delusions and paranoia    MEDICATIONS:  MEDICATIONS  (STANDING):  chlorhexidine 2% Cloths 1 Application(s) Topical <User Schedule>  cholecalciferol 1000 Unit(s) Oral every 24 hours  docusate sodium 100 milliGRAM(s) Oral every 24 hours  enoxaparin Injectable 40 milliGRAM(s) SubCutaneous every 24 hours  ferrous    sulfate 325 milliGRAM(s) Oral every 8 hours  magnesium sulfate  IVPB 4 Gram(s) IV Intermittent once  polyethylene glycol 3350 17 Gram(s) Oral every 24 hours  potassium chloride    Tablet ER 40 milliEquivalent(s) Oral once  senna 2 Tablet(s) Oral at bedtime  sodium chloride 0.9%. 1000 milliLiter(s) (75 mL/Hr) IV Continuous <Continuous>    MEDICATIONS  (PRN):  FIRST- Mouthwash  BLM 10 milliLiter(s) Swish and Spit every 4 hours PRN Mouth Care  guaiFENesin    Syrup 200 milliGRAM(s) Oral every 6 hours PRN Cough  sodium chloride 0.9% lock flush 10 milliLiter(s) IV Push every 1 hour PRN Pre/post blood products, medications, blood draw, and to maintain line patency      ALLERGIES:  Allergies    No Known Allergies    Intolerances        LABS:                        9.2    2.47  )-----------( 174      ( 05 Aug 2019 06:54 )             29.0     08-05    137  |  101  |  10  ----------------------------<  101<H>  3.6   |  27  |  0.62    Ca    8.3<L>      05 Aug 2019 06:54  Phos  3.6     08-05  Mg     1.4     08-05          CAPILLARY BLOOD GLUCOSE          RADIOLOGY & ADDITIONAL TESTS: Reviewed.

## 2019-08-05 NOTE — DIETITIAN INITIAL EVALUATION ADULT. - ENERGY NEEDS
Ideal body weight used for calculations as pt >120% of IBW.   ABW 68kg, IBW 56kg, 121% IBW, ht 65", BMI 25   Nutrient needs based on [facility] standards of care for maintenance in adults, adjusted per onc guidelines

## 2019-08-05 NOTE — PROGRESS NOTE ADULT - PROBLEM SELECTOR PLAN 1
Dx: HPV-associated SCC of L neck (stage [id number]) in 12/2018. Finished 2 cycles of DCF (docetaxel, cisplatin, 5-FU). CTA neeck 6/20: left neck SCC, encasement of ICA and extension of mass into skin, parotid and submandibular glands w extensive LAD  -c/w NS 75cc/hr throughout hospitalization  -Started 3rd round of chemotherapy: Docetaxel 75 mg/m2 over 1 hour, Cisplatin 75/m2 over 24 hours, and infusional 5-FU 1000 mg/m2 over 96 hours  -Neulasta to be given 24 hours after completion of chemotherapy tonight; patient has ANC of 1450 at this time.  She will likely be req'd to stay for neulasta shot tomorrow.  -Magic Mouthwash 10 mL q4h prn

## 2019-08-05 NOTE — DIETITIAN INITIAL EVALUATION ADULT. - ADD RECOMMEND
1. Encourage intake through day 2. Manage pain prn 3. Monitor and replete lytes prn 4. Add Ensure Enlive TID (1050 kcal, 60g protein, 540mL free H2O)

## 2019-08-05 NOTE — PROGRESS NOTE ADULT - PROBLEM SELECTOR PLAN 1
To complete the third course of induction chemotherapy this visit. The last infusion of 5-FU went up at 3:00 am this morning. Once this is completed, neulasta will be given 24 hours later. as planned. To complete the third course of induction chemotherapy this visit. The last infusion of 5-FU went up at 3:00 am this morning. Once this is completed, neulasta will be given 24 hours later as planned.

## 2019-08-06 ENCOUNTER — TRANSCRIPTION ENCOUNTER (OUTPATIENT)
Age: 39
End: 2019-08-06

## 2019-08-06 LAB
ANION GAP SERPL CALC-SCNC: 11 MMOL/L — SIGNIFICANT CHANGE UP (ref 5–17)
BASOPHILS # BLD AUTO: 0 K/UL — SIGNIFICANT CHANGE UP (ref 0–0.2)
BASOPHILS NFR BLD AUTO: 0 % — SIGNIFICANT CHANGE UP (ref 0–2)
BUN SERPL-MCNC: 13 MG/DL — SIGNIFICANT CHANGE UP (ref 7–23)
CALCIUM SERPL-MCNC: 8.6 MG/DL — SIGNIFICANT CHANGE UP (ref 8.4–10.5)
CHLORIDE SERPL-SCNC: 101 MMOL/L — SIGNIFICANT CHANGE UP (ref 96–108)
CO2 SERPL-SCNC: 26 MMOL/L — SIGNIFICANT CHANGE UP (ref 22–31)
CREAT SERPL-MCNC: 0.61 MG/DL — SIGNIFICANT CHANGE UP (ref 0.5–1.3)
EOSINOPHIL # BLD AUTO: 0.06 K/UL — SIGNIFICANT CHANGE UP (ref 0–0.5)
EOSINOPHIL NFR BLD AUTO: 3.1 % — SIGNIFICANT CHANGE UP (ref 0–6)
GLUCOSE SERPL-MCNC: 112 MG/DL — HIGH (ref 70–99)
HCT VFR BLD CALC: 29.1 % — LOW (ref 34.5–45)
HGB BLD-MCNC: 9.2 G/DL — LOW (ref 11.5–15.5)
IMM GRANULOCYTES NFR BLD AUTO: 0.5 % — SIGNIFICANT CHANGE UP (ref 0–1.5)
LYMPHOCYTES # BLD AUTO: 0.76 K/UL — LOW (ref 1–3.3)
LYMPHOCYTES # BLD AUTO: 39 % — SIGNIFICANT CHANGE UP (ref 13–44)
MAGNESIUM SERPL-MCNC: 1.2 MG/DL — LOW (ref 1.6–2.6)
MCHC RBC-ENTMCNC: 27.8 PG — SIGNIFICANT CHANGE UP (ref 27–34)
MCHC RBC-ENTMCNC: 31.6 GM/DL — LOW (ref 32–36)
MCV RBC AUTO: 87.9 FL — SIGNIFICANT CHANGE UP (ref 80–100)
MONOCYTES # BLD AUTO: 0.09 K/UL — SIGNIFICANT CHANGE UP (ref 0–0.9)
MONOCYTES NFR BLD AUTO: 4.6 % — SIGNIFICANT CHANGE UP (ref 2–14)
NEUTROPHILS # BLD AUTO: 1.03 K/UL — LOW (ref 1.8–7.4)
NEUTROPHILS NFR BLD AUTO: 52.8 % — SIGNIFICANT CHANGE UP (ref 43–77)
NRBC # BLD: 0 /100 WBCS — SIGNIFICANT CHANGE UP (ref 0–0)
PHOSPHATE SERPL-MCNC: 3.2 MG/DL — SIGNIFICANT CHANGE UP (ref 2.5–4.5)
PLATELET # BLD AUTO: 167 K/UL — SIGNIFICANT CHANGE UP (ref 150–400)
POTASSIUM SERPL-MCNC: 3.3 MMOL/L — LOW (ref 3.5–5.3)
POTASSIUM SERPL-SCNC: 3.3 MMOL/L — LOW (ref 3.5–5.3)
RBC # BLD: 3.31 M/UL — LOW (ref 3.8–5.2)
RBC # FLD: 15.7 % — HIGH (ref 10.3–14.5)
SODIUM SERPL-SCNC: 138 MMOL/L — SIGNIFICANT CHANGE UP (ref 135–145)
WBC # BLD: 1.95 K/UL — LOW (ref 3.8–10.5)
WBC # FLD AUTO: 1.95 K/UL — LOW (ref 3.8–10.5)

## 2019-08-06 RX ORDER — MAGNESIUM SULFATE 500 MG/ML
4 VIAL (ML) INJECTION ONCE
Refills: 0 | Status: COMPLETED | OUTPATIENT
Start: 2019-08-06 | End: 2019-08-06

## 2019-08-06 RX ORDER — PEGFILGRASTIM-CBQV 6 MG/.6ML
6 INJECTION, SOLUTION SUBCUTANEOUS ONCE
Refills: 0 | Status: COMPLETED | OUTPATIENT
Start: 2019-08-07 | End: 2019-08-07

## 2019-08-06 RX ORDER — POTASSIUM CHLORIDE 20 MEQ
20 PACKET (EA) ORAL
Refills: 0 | Status: COMPLETED | OUTPATIENT
Start: 2019-08-06 | End: 2019-08-06

## 2019-08-06 RX ADMIN — Medication 100 MILLIGRAM(S): at 09:28

## 2019-08-06 RX ADMIN — POLYETHYLENE GLYCOL 3350 17 GRAM(S): 17 POWDER, FOR SOLUTION ORAL at 09:28

## 2019-08-06 RX ADMIN — SENNA PLUS 2 TABLET(S): 8.6 TABLET ORAL at 22:17

## 2019-08-06 RX ADMIN — BENZOCAINE AND MENTHOL 1 LOZENGE: 5; 1 LIQUID ORAL at 06:47

## 2019-08-06 RX ADMIN — Medication 100 GRAM(S): at 09:28

## 2019-08-06 RX ADMIN — Medication 1000 UNIT(S): at 08:06

## 2019-08-06 RX ADMIN — SODIUM CHLORIDE 75 MILLILITER(S): 9 INJECTION INTRAMUSCULAR; INTRAVENOUS; SUBCUTANEOUS at 08:07

## 2019-08-06 RX ADMIN — CHLORHEXIDINE GLUCONATE 1 APPLICATION(S): 213 SOLUTION TOPICAL at 06:47

## 2019-08-06 RX ADMIN — Medication 325 MILLIGRAM(S): at 00:00

## 2019-08-06 RX ADMIN — Medication 20 MILLIEQUIVALENT(S): at 09:28

## 2019-08-06 RX ADMIN — BENZOCAINE AND MENTHOL 1 LOZENGE: 5; 1 LIQUID ORAL at 17:49

## 2019-08-06 RX ADMIN — DIPHENHYDRAMINE HYDROCHLORIDE AND LIDOCAINE HYDROCHLORIDE AND ALUMINUM HYDROXIDE AND MAGNESIUM HYDRO 10 MILLILITER(S): KIT at 23:44

## 2019-08-06 RX ADMIN — Medication 325 MILLIGRAM(S): at 08:06

## 2019-08-06 RX ADMIN — Medication 325 MILLIGRAM(S): at 16:27

## 2019-08-06 RX ADMIN — ENOXAPARIN SODIUM 40 MILLIGRAM(S): 100 INJECTION SUBCUTANEOUS at 09:28

## 2019-08-06 NOTE — DISCHARGE NOTE PROVIDER - NSDCCPCAREPLAN_GEN_ALL_CORE_FT
PRINCIPAL DISCHARGE DIAGNOSIS  Diagnosis: Squamous cell carcinoma of neck  Assessment and Plan of Treatment: You received your third round of chemotherapy (cisplatin, docetaxel, 5-FU) during this admission.  You tolerated the procedure fairly well, but will notice lethargy in the coming days after receving this treatment.  It is important to ensure you are receiving adequate rest in the coming days especially since your immune system is not functioning as well as an average persons. Please follow-up with Dr. Meeks as planned outpatient.      SECONDARY DISCHARGE DIAGNOSES  Diagnosis: Neutropenia  Assessment and Plan of Treatment:     Diagnosis: Upper respiratory virus  Assessment and Plan of Treatment:     Diagnosis: Anemia  Assessment and Plan of Treatment: PRINCIPAL DISCHARGE DIAGNOSIS  Diagnosis: Squamous cell carcinoma of neck  Assessment and Plan of Treatment: You received your third round of chemotherapy (cisplatin, docetaxel, 5-FU) during this admission.  You tolerated the procedure fairly well, but will notice lethargy in the coming days after receving this treatment.  It is important to ensure you are receiving adequate rest in the coming days especially since your immune system is not functioning as well as an average persons. Please follow-up with Dr. Meeks as planned outpatient.      SECONDARY DISCHARGE DIAGNOSES  Diagnosis: Neutropenia  Assessment and Plan of Treatment: Neutropenia occurs when you have too few neutrophils, a type of white blood cells. While all white blood cells help your body fight infections, neutrophils are important for fighting certain infections, especially those caused by bacteria.  Your neutrophil count was low during your hospitalization and this can make you more susceptible to infections. Clean your hands frequently.  Try to avoid crowded places and contact with people who are sick.  Do not share food, drink cups, utensils or other personal items, such as toothbrushes.    Diagnosis: Upper respiratory virus  Assessment and Plan of Treatment: During your hospitalization you were noted to have a dry cough so a RVP test was completed. It showed that you have a virus present that is typically consistent with a common cold.  No antibiotics are necessary for this condition, but it is important you get adequate rest before you fully recover.  Follow-up with your PCP if this condition does not resolve or significantly worsens.    Diagnosis: Anemia  Assessment and Plan of Treatment: Anemia is a condition in which you don't have enough healthy red blood cells to carry adequate oxygen to the body's tissues. Having anemia may make you feel tired and weak.  Follow-up with your PCP and regularly take your iron supplement as indicated outpatient. PRINCIPAL DISCHARGE DIAGNOSIS  Diagnosis: Squamous cell carcinoma of neck  Assessment and Plan of Treatment: You received your third round of chemotherapy (cisplatin, docetaxel, 5-FU) during this admission.  You tolerated the procedure fairly well, but will notice lethargy in the coming days after receving this treatment.  It is important to ensure you are receiving adequate rest in the coming days especially since your immune system is not functioning as well as an average persons. Please follow-up with Dr. Meeks as planned outpatient.      SECONDARY DISCHARGE DIAGNOSES  Diagnosis: Neutropenia  Assessment and Plan of Treatment: Neutropenia occurs when you have too few neutrophils, a type of white blood cells. While all white blood cells help your body fight infections, neutrophils are important for fighting certain infections, especially those caused by bacteria.  Your neutrophil count was low during your hospitalization and this can make you more susceptible to infections. Neulasta was given to stimulate WBC production after chemotherapy. Take certain precautions due to your susceptibility to infection. Clean your hands frequently.  Try to avoid crowded places and contact with people who are sick.  Do not share food, drink cups, utensils or other personal items, such as toothbrushes.    Diagnosis: Upper respiratory virus  Assessment and Plan of Treatment: During your hospitalization you were noted to have a dry cough so a RVP test was completed. It showed that you have a virus present that is typically consistent with a common cold.  No antibiotics are necessary for this condition, but it is important you get adequate rest before you fully recover.  Follow-up with your PCP if this condition does not resolve or significantly worsens.    Diagnosis: Anemia  Assessment and Plan of Treatment: Anemia is a condition in which you don't have enough healthy red blood cells to carry adequate oxygen to the body's tissues. Having anemia may make you feel tired and weak.  Follow-up with your PCP and regularly take your iron supplement as indicated outpatient.

## 2019-08-06 NOTE — PROGRESS NOTE ADULT - PROBLEM SELECTOR PLAN 4
Patient reports onset after initial chemotherapy treatment. Denies any chest pain, dyspnea on exertion  - 7/9: ECHO also normal: EF 69%, no other structural abnormalities  - Leg elevation
Patient reports onset after initial chemotherapy treatment. Denies any chest pain, dyspnea on exertion  - 7/9: ECHO also normal: EF 69%, no other structural abnormalities  - Leg elevation
Patient reports onset after initial chemotherapy treatment. Denies any chest pain, dyspnea on exertion. No edema present on exam.  - 7/9: ECHO also normal: EF 69%, no other structural abnormalities  - Leg elevation
Patient reports onset after initial chemotherapy treatment. Denies any chest pain, dyspnea on exertion. No edema present on exam.  - 7/9: ECHO also normal: EF 69%, no other structural abnormalities  - Leg elevation

## 2019-08-06 NOTE — PROGRESS NOTE ADULT - SUBJECTIVE AND OBJECTIVE BOX
Interval / Overnight: No acute events overnight.  She finished her 3rd session of chemotherapy at 2-3AM last night.  She is awaiting her neulasta treatment tonight/early tomorrow morning. Denies N, V, F, CP, SOB. She still endorses a non-productive cough.     VITAL SIGNS:  Vital Signs Last 24 Hrs  T(C): 36.7 (06 Aug 2019 05:40), Max: 36.8 (05 Aug 2019 13:30)  T(F): 98 (06 Aug 2019 05:40), Max: 98.3 (05 Aug 2019 13:30)  HR: 78 (06 Aug 2019 05:40) (78 - 90)  BP: 114/76 (06 Aug 2019 05:40) (110/69 - 114/76)  BP(mean): --  RR: 16 (06 Aug 2019 05:40) (16 - 16)  SpO2: 97% (06 Aug 2019 05:40) (97% - 100%)    PHYSICAL EXAM:    General: in NAD, lying comfortably in bed  HEENT: normocephalic, atraumatic; PERRL, anicteric sclera; MMM  Neck: supple, no JVD, no thyromegaly, no lymphadenopathy  Cardiovascular: +S1/S2, RRR, no M/G/R  Respiratory: clear to auscultation B/L; no wheezing, no rales, no rhonchi  Gastrointestinal: soft, NT/ND; +BSx4, no organomegaly  Extremities: WWP; no edema, clubbing or cyanosis  Vascular: 2+ radial, DP/PT pulses B/L  Neurological: AAOx3; no focal deficits    MEDICATIONS:  MEDICATIONS  (STANDING):  benzocaine 15 mG/menthol 3.6 mG Lozenge 1 Lozenge Oral two times a day  chlorhexidine 2% Cloths 1 Application(s) Topical <User Schedule>  cholecalciferol 1000 Unit(s) Oral every 24 hours  docusate sodium 100 milliGRAM(s) Oral every 24 hours  enoxaparin Injectable 40 milliGRAM(s) SubCutaneous every 24 hours  ferrous    sulfate 325 milliGRAM(s) Oral every 8 hours  polyethylene glycol 3350 17 Gram(s) Oral every 24 hours  potassium chloride    Tablet ER 20 milliEquivalent(s) Oral every 2 hours  senna 2 Tablet(s) Oral at bedtime  sodium chloride 0.9%. 1000 milliLiter(s) (75 mL/Hr) IV Continuous <Continuous>    MEDICATIONS  (PRN):  FIRST- Mouthwash  BLM 10 milliLiter(s) Swish and Spit every 4 hours PRN Mouth Care  guaiFENesin    Syrup 200 milliGRAM(s) Oral every 6 hours PRN Cough  sodium chloride 0.9% lock flush 10 milliLiter(s) IV Push every 1 hour PRN Pre/post blood products, medications, blood draw, and to maintain line patency      ALLERGIES:  Allergies    No Known Allergies    Intolerances        LABS:                        9.2    1.95  )-----------( 167      ( 06 Aug 2019 07:06 )             29.1     08-06    138  |  101  |  13  ----------------------------<  112<H>  3.3<L>   |  26  |  0.61    Ca    8.6      06 Aug 2019 07:06  Phos  3.2     08-06  Mg     1.2     08-06          CAPILLARY BLOOD GLUCOSE          RADIOLOGY & ADDITIONAL TESTS: Reviewed. Interval / Overnight: No acute events overnight.  She finished her 3rd session of chemotherapy at 2-3AM last night.  She is awaiting her neulasta treatment tonight/early tomorrow morning. Denies N, V, F, CP, SOB. She still endorses a non-productive cough.     VITAL SIGNS:  Vital Signs Last 24 Hrs  T(C): 36.7 (06 Aug 2019 05:40), Max: 36.8 (05 Aug 2019 13:30)  T(F): 98 (06 Aug 2019 05:40), Max: 98.3 (05 Aug 2019 13:30)  HR: 78 (06 Aug 2019 05:40) (78 - 90)  BP: 114/76 (06 Aug 2019 05:40) (110/69 - 114/76)  BP(mean): --  RR: 16 (06 Aug 2019 05:40) (16 - 16)  SpO2: 97% (06 Aug 2019 05:40) (97% - 100%)    PHYSICAL EXAM:    Constitutional: WDWN resting comfortably in bed; NAD  Head: NC/AT  Eyes: PERRL, anicteric sclera  ENT: no nasal discharge; uvula midline, no oropharyngeal erythema or exudates  Neck: supple; left neck wound well coapted, no erythema or exudate  Respiratory: CTA B/L; no W/R/R, no retractions  Cardiac: +S1/S2; RRR; no M/R/G; PMI non-displaced  Gastrointestinal: abdomen soft, NT/ND; no rebound or guarding; +BSx4  Extremities: WWP, no clubbing or cyanosis; no peripheral edema  Musculoskeletal: NROM x4; no joint swelling, tenderness or erythema  Vascular: 2+ radial, femoral, DP/PT pulses B/L  Neurologic: AAOx3; CNII-XII grossly intact; no focal deficits  Psychiatric: disorganized thinking, possible delusions and paranoia    MEDICATIONS:  MEDICATIONS  (STANDING):  benzocaine 15 mG/menthol 3.6 mG Lozenge 1 Lozenge Oral two times a day  chlorhexidine 2% Cloths 1 Application(s) Topical <User Schedule>  cholecalciferol 1000 Unit(s) Oral every 24 hours  docusate sodium 100 milliGRAM(s) Oral every 24 hours  enoxaparin Injectable 40 milliGRAM(s) SubCutaneous every 24 hours  ferrous    sulfate 325 milliGRAM(s) Oral every 8 hours  polyethylene glycol 3350 17 Gram(s) Oral every 24 hours  potassium chloride    Tablet ER 20 milliEquivalent(s) Oral every 2 hours  senna 2 Tablet(s) Oral at bedtime  sodium chloride 0.9%. 1000 milliLiter(s) (75 mL/Hr) IV Continuous <Continuous>    MEDICATIONS  (PRN):  FIRST- Mouthwash  BLM 10 milliLiter(s) Swish and Spit every 4 hours PRN Mouth Care  guaiFENesin    Syrup 200 milliGRAM(s) Oral every 6 hours PRN Cough  sodium chloride 0.9% lock flush 10 milliLiter(s) IV Push every 1 hour PRN Pre/post blood products, medications, blood draw, and to maintain line patency      ALLERGIES:  Allergies    No Known Allergies    Intolerances        LABS:                        9.2    1.95  )-----------( 167      ( 06 Aug 2019 07:06 )             29.1     08-06    138  |  101  |  13  ----------------------------<  112<H>  3.3<L>   |  26  |  0.61    Ca    8.6      06 Aug 2019 07:06  Phos  3.2     08-06  Mg     1.2     08-06          CAPILLARY BLOOD GLUCOSE          RADIOLOGY & ADDITIONAL TESTS: Reviewed.

## 2019-08-06 NOTE — DISCHARGE NOTE PROVIDER - CARE PROVIDER_API CALL
Luther Meeks)  Hematology; Internal Medicine; Medical Oncology  [address] [place] [address]  Phone: [phone]  Fax: [phone]  Follow Up Time: 1 week

## 2019-08-06 NOTE — PROGRESS NOTE ADULT - PROBLEM SELECTOR PROBLEM 5
Schizoaffective disorder, unspecified type

## 2019-08-06 NOTE — PROGRESS NOTE ADULT - PROBLEM SELECTOR PLAN 1
Dx: HPV-associated SCC of L neck (stage [id number]) in 12/2018. Finished 2 cycles of DCF (docetaxel, cisplatin, 5-FU). CTA neeck 6/20: left neck SCC, encasement of ICA and extension of mass into skin, parotid and submandibular glands w extensive LAD  -c/w NS 75cc/hr throughout hospitalization  -Finished 3rd round of chemotherapy last night/early morning.  -Neulasta to be given 24 hours after completion of chemotherapy tonight/tomorrow early morning.  -Magic Mouthwash 10 mL q4h prn

## 2019-08-06 NOTE — PROGRESS NOTE ADULT - PROBLEM SELECTOR PLAN 1
The planned course of induction chemotherapy is now completed. For neulasta injection for bone marrow support early tomorrow am.

## 2019-08-06 NOTE — PROGRESS NOTE ADULT - ATTENDING COMMENTS
Continue IV hydration with NS. Monitor blood counts and chems and supplement as needed. Give neulasta injection tomorrow at 3:30 am. Encourage the patient to be up and OOB.

## 2019-08-06 NOTE — PROGRESS NOTE ADULT - PROBLEM SELECTOR PLAN 6
Fluids: NS @ 75cc/hr   Electrolytes: replete as necessary, K>4, Mg>2  Nutrition: Dysphagia; Mechanical Softs-Thin Liquids  Bowel Regimen: senna, colace   DVT ppx:lovenox  GI ppx:none  Code: Full  Disposition: [facility]
Fluids: NS @ 75cc/hr   Electrolytes: replete as necessary, K>4, Mg>2  Nutrition: Dysphagia; Mechanical Softs-Thin Liquids  Bowel Regimen: senna, colace   DVT ppx:lovenox  GI ppx:none  Code: Full  Disposition: [facility]
Fluids: NS @ 75cc/hr   Electrolytes: replete as necessary, K>4, Mg>2  Nutrition: Dysphagia; Mechanical Softs-Thin Liquids  Bowel Regimen: senna, colace   DVT ppx:lovenox  GI ppx:none  Code: Full  Disposition: [facility]
Fluids: NS @ 75cc/hr   Electrolytes: replete as necessary, K>4, Mg>2  Nutrition: Dysphagia; Mechanical Softs-Thin Liquids  Bowel Regimen: senna, colace   DVT ppx:lovenox  GI ppx:none  Code: Full  Disposition: [facility]

## 2019-08-06 NOTE — DISCHARGE NOTE PROVIDER - HOSPITAL COURSE
38yo F PMHx neutropenia, anemia, schizoaffective disorder, and depression with SCC of the left neck admitted for her 3rd cycle of chemotherapy.        Main Diagnoses    Squamous cell carcinoma of neck.      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 Finished 2 cycles of DCF (docetaxel, cisplatin, 5-FU). CTA neck 6/20: left neck SCC, encasement of ICA and extension of mass into skin, parotid and submandibular glands w extensive LAD. Received NS 75cc/hr throughout hospitalization. Finished 3rd round of chemotherapy (docetaxel, cisplatin, 5-FU).  Neulasta given 24 hours after completion of chemotherapy. Magic Mouthwash 10 mL q4h prn as a preventative for mouth ulcers.        Neutropenia.      Patient with ANC of <1500 on admission (761 on 7/14). ANC increased than declined down to ~1k over the course of the hospitalization after receiving. chemotherapy.  Neutropenic precautions were taken when numbers were decreased. Received neulasta 24 hours after completion of chemotherapy.        Upper Respiratory Infection    Endorsed cough of about 3 days duration. RVP + for entero/rhinovirus. Breath sounds clear b/l and CXR showed no acute findings.        Schizoaffective disorder, unspecified type.      States she has history of schizoaffective disorder but is not on any medications. Reports she is asymptomatic. Ms. Foy accepted a psychiatry consult and it was agreed that there was no need for inpatient psychiatric hospitalization.  She did not want to discuss any medications at this time.

## 2019-08-06 NOTE — PROGRESS NOTE ADULT - SUBJECTIVE AND OBJECTIVE BOX
Resting comfortably this am. The last bag of infusional 5-FU was completed at 3:30 am. The patient still has some irritation in her throat and an associated cough. A viral screen has revealed the presence of a Rhinovirus. The patient is fatigued and reports that she has not been eating much. No chemo related mouth pain as yet.    CHEMOTHERAPY REGIMEN:        Day:                          Diet:  Protocol:   DCF completed.                                 IVF:      MEDICATIONS  (STANDING):  benzocaine 15 mG/menthol 3.6 mG Lozenge 1 Lozenge Oral two times a day  chlorhexidine 2% Cloths 1 Application(s) Topical <User Schedule>  cholecalciferol 1000 Unit(s) Oral every 24 hours  docusate sodium 100 milliGRAM(s) Oral every 24 hours  enoxaparin Injectable 40 milliGRAM(s) SubCutaneous every 24 hours  ferrous    sulfate 325 milliGRAM(s) Oral every 8 hours  polyethylene glycol 3350 17 Gram(s) Oral every 24 hours  senna 2 Tablet(s) Oral at bedtime  sodium chloride 0.9%. 1000 milliLiter(s) (75 mL/Hr) IV Continuous <Continuous>    MEDICATIONS  (PRN):  FIRST- Mouthwash  BLM 10 milliLiter(s) Swish and Spit every 4 hours PRN Mouth Care  guaiFENesin    Syrup 200 milliGRAM(s) Oral every 6 hours PRN Cough  sodium chloride 0.9% lock flush 10 milliLiter(s) IV Push every 1 hour PRN Pre/post blood products, medications, blood draw, and to maintain line patency      Allergies    No Known Allergies    Intolerances        DVT Prophylaxis: [x ] YES [ ] NO      Antibiotics: [ ] YES [x ] NO    Pain Scale (1-10):       Location:    Vital Signs Last 24 Hrs  T(C): 36.7 (06 Aug 2019 05:40), Max: 36.8 (05 Aug 2019 13:30)  T(F): 98 (06 Aug 2019 05:40), Max: 98.3 (05 Aug 2019 13:30)  HR: 78 (06 Aug 2019 05:40) (78 - 90)  BP: 114/76 (06 Aug 2019 05:40) (110/69 - 114/76)  BP(mean): --  RR: 16 (06 Aug 2019 05:40) (16 - 16)  SpO2: 97% (06 Aug 2019 05:40) (97% - 100%)    Drug Dosing Weight  Height (cm): 165.1 (01 Aug 2019 00:32)  Weight (kg): 68 (01 Aug 2019 00:32)  BMI (kg/m2): 24.9 (01 Aug 2019 00:32)  BSA (m2): 1.75 (01 Aug 2019 00:32)    PHYSICAL EXAM:      Constitutional: no fever, fatigued.  Eyes: conjunctival pallor.  ENMT: buccal mucosa without evidence of mucostomatitis.  Neck: left neck wound remains closed.  Respiratory: chest clear.  Cardiovascular: S1>S2 at apex. RSR.  Gastrointestinal: soft, nontender, active bowel sounds.  Genitourinary: voiding without difficulty.  Extremities: no leg edema.  Neurological: no gross focal deficits.  Skin: warm and dry.  Lymph Nodes: none palp.  Musculoskeletal: full ROM.  Psychiatric: affect normal.        URINARY CATHETER: [ ] YES [x ] NO     LABS:  CBC Full  -  ( 05 Aug 2019 06:54 )  WBC Count : 2.47 K/uL  RBC Count : 3.28 M/uL  Hemoglobin : 9.2 g/dL  Hematocrit : 29.0 %  Platelet Count - Automated : 174 K/uL  Mean Cell Volume : 88.4 fl  Mean Cell Hemoglobin : 28.0 pg  Mean Cell Hemoglobin Concentration : 31.7 gm/dL  Auto Neutrophil # : 1.45 K/uL  Auto Lymphocyte # : 0.83 K/uL  Auto Monocyte # : 0.07 K/uL  Auto Eosinophil # : 0.10 K/uL  Auto Basophil # : 0.01 K/uL  Auto Neutrophil % : 58.8 %  Auto Lymphocyte % : 33.6 %  Auto Monocyte % : 2.8 %  Auto Eosinophil % : 4.0 %  Auto Basophil % : 0.4 %    08-05    137  |  101  |  10  ----------------------------<  101<H>  3.6   |  27  |  0.62    Ca    8.3<L>      05 Aug 2019 06:54  Phos  3.6     08-05  Mg     1.4     08-05            CULTURES:    RADIOLOGY & ADDITIONAL STUDIES:

## 2019-08-06 NOTE — PROGRESS NOTE ADULT - PROBLEM SELECTOR PLAN 5
States she has history of schizoaffective disorder but is not on any medications.   Reports she is asymptomatic  -Psychiatry consult appreciated: no need for inpatient psychiatric hospitalization
States she has history of schizoaffective disorder but is not on any medications.   Reports she is asymptomatic  -Psychiatry consult appreciated
States she has history of schizoaffective disorder but is not on any medications.   Reports she is asymptomatic  -Psychiatry consult appreciated: no need for inpatient psychiatric hospitalization
States she has history of schizoaffective disorder but is not on any medications.   Reports she is asymptomatic  -Psychiatry consult appreciated: no need for inpatient psychiatric hospitalization

## 2019-08-07 ENCOUNTER — TRANSCRIPTION ENCOUNTER (OUTPATIENT)
Age: 39
End: 2019-08-07

## 2019-08-07 VITALS
TEMPERATURE: 98 F | HEART RATE: 78 BPM | SYSTOLIC BLOOD PRESSURE: 112 MMHG | RESPIRATION RATE: 17 BRPM | DIASTOLIC BLOOD PRESSURE: 78 MMHG | OXYGEN SATURATION: 98 %

## 2019-08-07 DIAGNOSIS — D64.9 ANEMIA, UNSPECIFIED: ICD-10-CM

## 2019-08-07 DIAGNOSIS — D70.9 NEUTROPENIA, UNSPECIFIED: ICD-10-CM

## 2019-08-07 LAB
ANION GAP SERPL CALC-SCNC: 11 MMOL/L — SIGNIFICANT CHANGE UP (ref 5–17)
BASOPHILS # BLD AUTO: 0.04 K/UL — SIGNIFICANT CHANGE UP (ref 0–0.2)
BASOPHILS NFR BLD AUTO: 1.8 % — SIGNIFICANT CHANGE UP (ref 0–2)
BUN SERPL-MCNC: 17 MG/DL — SIGNIFICANT CHANGE UP (ref 7–23)
CALCIUM SERPL-MCNC: 9.1 MG/DL — SIGNIFICANT CHANGE UP (ref 8.4–10.5)
CHLORIDE SERPL-SCNC: 100 MMOL/L — SIGNIFICANT CHANGE UP (ref 96–108)
CO2 SERPL-SCNC: 25 MMOL/L — SIGNIFICANT CHANGE UP (ref 22–31)
CREAT SERPL-MCNC: 0.75 MG/DL — SIGNIFICANT CHANGE UP (ref 0.5–1.3)
EOSINOPHIL # BLD AUTO: 0 K/UL — SIGNIFICANT CHANGE UP (ref 0–0.5)
EOSINOPHIL NFR BLD AUTO: 0 % — SIGNIFICANT CHANGE UP (ref 0–6)
GLUCOSE SERPL-MCNC: 112 MG/DL — HIGH (ref 70–99)
HCT VFR BLD CALC: 29.5 % — LOW (ref 34.5–45)
HGB BLD-MCNC: 9.3 G/DL — LOW (ref 11.5–15.5)
LYMPHOCYTES # BLD AUTO: 1.36 K/UL — SIGNIFICANT CHANGE UP (ref 1–3.3)
LYMPHOCYTES # BLD AUTO: 54.6 % — HIGH (ref 13–44)
MAGNESIUM SERPL-MCNC: 1.5 MG/DL — LOW (ref 1.6–2.6)
MCHC RBC-ENTMCNC: 27.9 PG — SIGNIFICANT CHANGE UP (ref 27–34)
MCHC RBC-ENTMCNC: 31.5 GM/DL — LOW (ref 32–36)
MCV RBC AUTO: 88.6 FL — SIGNIFICANT CHANGE UP (ref 80–100)
MONOCYTES # BLD AUTO: 0.07 K/UL — SIGNIFICANT CHANGE UP (ref 0–0.9)
MONOCYTES NFR BLD AUTO: 2.7 % — SIGNIFICANT CHANGE UP (ref 2–14)
NEUTROPHILS # BLD AUTO: 0.95 K/UL — LOW (ref 1.8–7.4)
NEUTROPHILS NFR BLD AUTO: 37.3 % — LOW (ref 43–77)
PHOSPHATE SERPL-MCNC: 3.2 MG/DL — SIGNIFICANT CHANGE UP (ref 2.5–4.5)
PLATELET # BLD AUTO: 169 K/UL — SIGNIFICANT CHANGE UP (ref 150–400)
POTASSIUM SERPL-MCNC: 3.4 MMOL/L — LOW (ref 3.5–5.3)
POTASSIUM SERPL-SCNC: 3.4 MMOL/L — LOW (ref 3.5–5.3)
RBC # BLD: 3.33 M/UL — LOW (ref 3.8–5.2)
RBC # FLD: 15.5 % — HIGH (ref 10.3–14.5)
SODIUM SERPL-SCNC: 136 MMOL/L — SIGNIFICANT CHANGE UP (ref 135–145)
WBC # BLD: 2.49 K/UL — LOW (ref 3.8–10.5)
WBC # FLD AUTO: 2.49 K/UL — LOW (ref 3.8–10.5)

## 2019-08-07 RX ORDER — BENZOCAINE AND MENTHOL 5; 1 G/100ML; G/100ML
1 LIQUID ORAL
Refills: 0 | Status: DISCONTINUED | OUTPATIENT
Start: 2019-08-07 | End: 2019-08-07

## 2019-08-07 RX ORDER — POTASSIUM CHLORIDE 20 MEQ
60 PACKET (EA) ORAL ONCE
Refills: 0 | Status: COMPLETED | OUTPATIENT
Start: 2019-08-07 | End: 2019-08-07

## 2019-08-07 RX ORDER — MAGNESIUM SULFATE 500 MG/ML
4 VIAL (ML) INJECTION ONCE
Refills: 0 | Status: COMPLETED | OUTPATIENT
Start: 2019-08-07 | End: 2019-08-07

## 2019-08-07 RX ORDER — MAGNESIUM SULFATE 500 MG/ML
4 VIAL (ML) INJECTION ONCE
Refills: 0 | Status: DISCONTINUED | OUTPATIENT
Start: 2019-08-07 | End: 2019-08-07

## 2019-08-07 RX ADMIN — CHLORHEXIDINE GLUCONATE 1 APPLICATION(S): 213 SOLUTION TOPICAL at 06:47

## 2019-08-07 RX ADMIN — ENOXAPARIN SODIUM 40 MILLIGRAM(S): 100 INJECTION SUBCUTANEOUS at 09:13

## 2019-08-07 RX ADMIN — DIPHENHYDRAMINE HYDROCHLORIDE AND LIDOCAINE HYDROCHLORIDE AND ALUMINUM HYDROXIDE AND MAGNESIUM HYDRO 10 MILLILITER(S): KIT at 07:50

## 2019-08-07 RX ADMIN — Medication 325 MILLIGRAM(S): at 07:50

## 2019-08-07 RX ADMIN — Medication 100 MILLIGRAM(S): at 10:21

## 2019-08-07 RX ADMIN — Medication 1000 UNIT(S): at 07:50

## 2019-08-07 RX ADMIN — POLYETHYLENE GLYCOL 3350 17 GRAM(S): 17 POWDER, FOR SOLUTION ORAL at 09:13

## 2019-08-07 RX ADMIN — Medication 60 MILLIEQUIVALENT(S): at 09:13

## 2019-08-07 RX ADMIN — BENZOCAINE AND MENTHOL 1 LOZENGE: 5; 1 LIQUID ORAL at 01:15

## 2019-08-07 RX ADMIN — PEGFILGRASTIM-CBQV 6 MILLIGRAM(S): 6 INJECTION, SOLUTION SUBCUTANEOUS at 03:34

## 2019-08-07 RX ADMIN — Medication 325 MILLIGRAM(S): at 00:05

## 2019-08-07 RX ADMIN — Medication 100 GRAM(S): at 10:21

## 2019-08-07 NOTE — PROGRESS NOTE ADULT - PROBLEM SELECTOR PLAN 2
- Patient with ANC of <1500 (761 on 7/14)  - neutropenic precautions (handwashing in/out of room, gloves when examining patient, patient should wear mask when ambulating halls, NO RECTAL TEMPERATURES)  -Neulasta to be given 24 hours after completion of chemotherapy; patient has ANC of 1600 at this time.
Patient with ANC of <1500 on admission (761 on 7/14).  -ANC ~1k  -Neutropenic precautions since her numbers have decreased  -Neulasta to be given 24 hours after completion of chemotherapy    #Upper Respiratory Infection  Endorsed cough of about 3 days duration. RVP + for entero/rhinovirus. Breath sounds clear b/l and CXR shows no acute findings.  -Continue to monitor
On going Psych evaluation and input greatly appreciated.
Patient with ANC of <1500 on admission (761 on 7/14).  -ANC @1452  -Holding neutropenic precautions since her numbers have increased  -Neulasta to be given 24 hours after completion of chemotherapy
Psych evaluation yesterday greatly appreciated.
The patient is on no therapy for this condition at this time. She has been evaluated by Psych.
This is due to chemotherapy. Neulasta given earlier this am an planned.
The patient has been evaluated by Psych. She remains on no therapy for this as has been her preference.
The patient remains comfortable on ongoing treatment for her cancer. Psych input much appreciated.
Patient with ANC of <1500 on admission (761 on 7/14).  -ANC >4000  -Holding neutropenic precautions since her numbers have increased  -Neulasta to be given 24 hours after completion of chemotherapy; patient has ANC of 1600 at this time.

## 2019-08-07 NOTE — PROGRESS NOTE ADULT - SUBJECTIVE AND OBJECTIVE BOX
The patient is coughing a bit this am due to some throat irritation. She reports that she was up and ambulating yesterday and that she was eating better. The patient received neulasta earlier this am as planned.      CHEMOTHERAPY REGIMEN:        Day:                          Diet:  Protocol:    DCF completed.                                 IVF:        MEDICATIONS  (STANDING):  chlorhexidine 2% Cloths 1 Application(s) Topical <User Schedule>  cholecalciferol 1000 Unit(s) Oral every 24 hours  docusate sodium 100 milliGRAM(s) Oral every 24 hours  enoxaparin Injectable 40 milliGRAM(s) SubCutaneous every 24 hours  ferrous    sulfate 325 milliGRAM(s) Oral every 8 hours  polyethylene glycol 3350 17 Gram(s) Oral every 24 hours  senna 2 Tablet(s) Oral at bedtime    MEDICATIONS  (PRN):  benzocaine 15 mG/menthol 3.6 mG (Sugar-Free) Lozenge 1 Lozenge Oral four times a day PRN Sore Throat  FIRST- Mouthwash  BLM 10 milliLiter(s) Swish and Spit every 4 hours PRN Mouth Care  guaiFENesin    Syrup 200 milliGRAM(s) Oral every 6 hours PRN Cough  sodium chloride 0.9% lock flush 10 milliLiter(s) IV Push every 1 hour PRN Pre/post blood products, medications, blood draw, and to maintain line patency      Allergies    No Known Allergies    Intolerances        DVT Prophylaxis: [x ] YES [ ] NO      Antibiotics: [ ] YES [x ] NO    Pain Scale (1-10):       Location:    Vital Signs Last 24 Hrs  T(C): 36.7 (07 Aug 2019 06:10), Max: 36.9 (06 Aug 2019 21:45)  T(F): 98 (07 Aug 2019 06:10), Max: 98.4 (06 Aug 2019 21:45)  HR: 71 (07 Aug 2019 06:10) (71 - 98)  BP: 107/74 (07 Aug 2019 06:10) (107/74 - 116/75)  BP(mean): --  RR: 16 (07 Aug 2019 06:10) (16 - 16)  SpO2: 98% (07 Aug 2019 06:10) (98% - 100%)    Drug Dosing Weight  Height (cm): 165.1 (01 Aug 2019 00:32)  Weight (kg): 68 (01 Aug 2019 00:32)  BMI (kg/m2): 24.9 (01 Aug 2019 00:32)  BSA (m2): 1.75 (01 Aug 2019 00:32)    PHYSICAL EXAM:      Constitutional: feeling okay.  Eyes: conjunctival pallor.  ENMT: buccal mucosa without lesions. Oropharynx remains clear.  Neck: no masses palp.  Back: no SPT.  Respiratory: chest clear.  Cardiovascular: S1>S2 at apex. RSR.  Gastrointestinal: soft, nontender, active bowel sounds.  Genitourinary: voiding without difficulty.  Extremities: no leg edema.  Neurological: no gross focal deficits.  Skin: warm and dry.  Lymph Nodes: none palp.  Musculoskeletal: full ROM.  Psychiatric: affect normal.        URINARY CATHETER: [ ] YES [x ] NO     LABS:  CBC Full  -  ( 06 Aug 2019 07:06 )  WBC Count : 1.95 K/uL  RBC Count : 3.31 M/uL  Hemoglobin : 9.2 g/dL  Hematocrit : 29.1 %  Platelet Count - Automated : 167 K/uL  Mean Cell Volume : 87.9 fl  Mean Cell Hemoglobin : 27.8 pg  Mean Cell Hemoglobin Concentration : 31.6 gm/dL  Auto Neutrophil # : 1.03 K/uL  Auto Lymphocyte # : 0.76 K/uL  Auto Monocyte # : 0.09 K/uL  Auto Eosinophil # : 0.06 K/uL  Auto Basophil # : 0.00 K/uL  Auto Neutrophil % : 52.8 %  Auto Lymphocyte % : 39.0 %  Auto Monocyte % : 4.6 %  Auto Eosinophil % : 3.1 %  Auto Basophil % : 0.0 %    08-06    138  |  101  |  13  ----------------------------<  112<H>  3.3<L>   |  26  |  0.61    Ca    8.6      06 Aug 2019 07:06  Phos  3.2     08-06  Mg     1.2     08-06            CULTURES:    RADIOLOGY & ADDITIONAL STUDIES:

## 2019-08-07 NOTE — PROGRESS NOTE ADULT - PROBLEM SELECTOR PLAN 3
Patient has known iron-deficiency anemia.  Known Iron deficiency anemia  -c/w home 325mg ferrous sulfate TID  -bowel regimen while on iron: senna, miralax    #Low vitamin D  -c/w vit D 1000units daily
This is due to iron deficiency and recent systemic chemotherapy.

## 2019-08-07 NOTE — PROGRESS NOTE ADULT - PROBLEM SELECTOR PROBLEM 2
Neutropenia
Neutropenia
Schizoaffective disorder, unspecified type
Neutropenia
Neutropenia
Schizoaffective disorder
Schizoaffective disorder, unspecified type
Neutropenia

## 2019-08-07 NOTE — PROGRESS NOTE ADULT - PROVIDER SPECIALTY LIST ADULT
Heme/Onc
Internal Medicine
Heme/Onc
Internal Medicine

## 2019-08-07 NOTE — DISCHARGE NOTE NURSING/CASE MANAGEMENT/SOCIAL WORK - NSDCPETBCESMAN_GEN_ALL_CORE
If you are a smoker, it is important for your health to stop smoking. Please be aware that second hand smoke is also harmful. Left arm;

## 2019-08-07 NOTE — PROGRESS NOTE ADULT - ASSESSMENT
40yo F PMHx neutropenia, anemia, schizoaffective disorder, and depression with SCC of the left neck admitted for her 3rd cycle of chemotherapy.
H/H continues to improve. WBC is in a good range. The patient offers no complaints regarding ongoing chemotherapy.
39 year old F with history of depression and squamous cell carcinoma of the left neck (unclear primary), HPV related being admitted for 3rd cycle of induction chemotherapy. Anemia noted on labs, however patient asymptomatic.     #Head and Neck Cancer   Squamous cell carcinoma of the left neck (unclear primary), HPV related. s/p 2 cycles of induction DCF with an excellent response in left neck mass. Admitted for 3rd cycle.     -C3, 3rd bag of 5-FU hung at 2am, 4th to begin at 2am (8/5) Monday of induction DCF (Docetaxel 75 mg/m2 over 1 hour, Cisplatin 75/m2 over 24 hours, and infusional 5-FU 1000 mg/m2 over 96 hours), Currently received 5-FU, tolerating well   -Pt considering Neulasta Onpro to be given 24 hours after completion of chemotherapy; CTM CBC w/ diff Daily   -Will likely perform MRI or PET/CT to evaluate disease response and planning of radiation as outpatient in a few weeks    #Anemia  Secondary to chemotherapy, asymptomatic and stable.   -CTM CBC  -Transfuse if Hb < 7 g/dL    Shakir Teran  PGY-6    Case d/w Dr. Meeks
39 year old F with history of depression and squamous cell carcinoma of the left neck (unclear primary), HPV related being admitted for 3rd cycle of induction chemotherapy. Anemia noted on labs, however patient asymptomatic.     #Head and Neck Cancer   Squamous cell carcinoma of the left neck (unclear primary), HPV related. s/p 2 cycles of induction DCF with an excellent response in left neck mass. Admitted for 3rd cycle.     -C3D1/2 of induction DCF (Docetaxel 75 mg/m2 over 1 hour, Cisplatin 75/m2 over 24 hours, and infusional 5-FU 1000 mg/m2 over 96 hours)  -Neulasta to be given 24 hours after completion of chemotherapy; CTM CBC w/ diff Daily   -Will likely perform MRI or PET/CT to evaluate disease response and planning of radiation as outpatient in a few weeks    #Anemia  Secondary to chemotherapy, asymptomatic and stable.   -CTM CBC  -Transfuse if Hb < 7 g/dL    Nik Martinez  PGY-6    Case d/w Dr. Meeks
39 year old F with history of depression and squamous cell carcinoma of the left neck (unclear primary), HPV related being admitted for 3rd cycle of induction chemotherapy. Anemia noted on labs, however patient asymptomatic.     #Head and Neck Cancer   Squamous cell carcinoma of the left neck (unclear primary), HPV related. s/p 2 cycles of induction DCF with an excellent response in left neck mass. Admitted for 3rd cycle.     -C3D3-4, 4th bag of 5-FU hung at 3AM  -Will order Neulasta OnPro for patient to be placed tomorrow after chemotherapy and will be autoinjected 24h after   -Will likely perform MRI or PET/CT to evaluate disease response and planning of radiation as outpatient in a few weeks    #Cough  Non productive cough  -CXR ordered, will f/u results    #Anemia  Secondary to chemotherapy, asymptomatic and stable.   -CTM CBC  -Transfuse if Hb < 7 g/dL    Nik Martinez   PGY-6    Case d/w Dr. Meeks
39 year old F with history of depression and squamous cell carcinoma of the left neck (unclear primary), HPV related being admitted for 3rd cycle of induction chemotherapy. Anemia noted on labs, however patient asymptomatic.     #Head and Neck Cancer   Squamous cell carcinoma of the left neck (unclear primary), HPV related. s/p 2 cycles of induction DCF with an excellent response in left neck mass. Admitted for 3rd cycle.     -PICC line to be placed today via IR  -CTM CBC, CMP, magnesium and phos for duration of chemotherapy  -Height and weight to be entered by nursing staff, will proceed with cycle 3 of induction chemotherapy with Docetaxel 75 mg/m2 over 1 hour, Cisplatin 75/m2 over 24 hours, and infusional 5-FU 1000 mg/m2 over 96 hours  -Pre-hydration with NS at 500 cc/hr x 3 hours for a total of 1.5 L(already written on chemo order form)  -Neulasta to be given 24 hours after completion of chemotherapy; patient has ANC of 1600 at this time. CTM cbc wtih diff daily   -Will likely perform MRI or PET/CT to evaluate disease response and planning of radiation as outpatient in a few weeks    #Anemia  Secondary to chemotherapy, asymptomatic  -CTM CBC  -Transfuse if Hb < 7 g/dL    Nik Martinez  PGY-6    Case d/w Dr. Meeks
40yo F PMHx neutropenia, anemia, schizoaffective disorder, and depression with SCC of the left neck admitted for her 3rd cycle of chemotherapy.
Her neck tumor continues to respond to chemotherapy. Will give one additional course of induction chemotherapy before moving on to chemoradiation therapy. The patient is anemic due to the presence of the tumor and on going chemotherapy.
Last course of induction chemotherapy is now in progress.
The last course if induction chemotherapy is now completed. The patient is due for a neulasta injection tomorrow morning at 3:30 am.
Three courses of induction chemotherapy have now been completed. The patient has tolerated this well so far.
39 year old F with history of depression and squamous cell carcinoma of the left neck (unclear primary), HPV related being admitted for 3rd cycle of induction chemotherapy. Anemia noted on labs, however patient asymptomatic.     #Head and Neck Cancer   Squamous cell carcinoma of the left neck (unclear primary), HPV related. s/p 2 cycles of induction DCF with an excellent response in left neck mass. Admitted for 3rd cycle.     -C3D2/3 of induction DCF (Docetaxel 75 mg/m2 over 1 hour, Cisplatin 75/m2 over 24 hours, and infusional 5-FU 1000 mg/m2 over 96 hours), Currently received 5-FU, tolerating well   -Neulasta to be given 24 hours after completion of chemotherapy; CTM CBC w/ diff Daily   -Will likely perform MRI or PET/CT to evaluate disease response and planning of radiation as outpatient in a few weeks    #Anemia  Secondary to chemotherapy, asymptomatic and stable.   -CTM CBC  -Transfuse if Hb < 7 g/dL    Shakir Teran  PGY-6    Case d/w Dr. Meeks
The patient continues to tolerate chemotherapy well. There is currently no evidence of chemotherapy related mucostomatitis.
The patient continues to tolerate ongoing chemotherapy well.
38yo F PMHx neutropenia, anemia, schizoaffective disorder, and depression with SCC of the left neck admitted for her 3rd cycle of chemotherapy.
40yo F PMHx neutropenia, anemia, schizoaffective disorder, and depression with SCC of the left neck admitted for her 3rd cycle of chemotherapy.

## 2019-08-07 NOTE — PROGRESS NOTE ADULT - REASON FOR ADMISSION
Chemotherapy for Squamous Cell Cancer

## 2019-08-07 NOTE — DISCHARGE NOTE NURSING/CASE MANAGEMENT/SOCIAL WORK - NSDCDPATPORTLINK_GEN_ALL_CORE
You can access the [facility] Patient Portal, offered by [facility], by registering with the following website: [email or web address]

## 2019-08-11 ENCOUNTER — EMERGENCY (EMERGENCY)
Facility: HOSPITAL | Age: 39
LOS: 1 days | Discharge: HOME | End: 2019-08-11
Attending: EMERGENCY MEDICINE | Admitting: EMERGENCY MEDICINE
Payer: MEDICARE

## 2019-08-11 VITALS
DIASTOLIC BLOOD PRESSURE: 80 MMHG | RESPIRATION RATE: 17 BRPM | TEMPERATURE: 98 F | SYSTOLIC BLOOD PRESSURE: 131 MMHG | HEART RATE: 99 BPM | OXYGEN SATURATION: 100 % | WEIGHT: 125 LBS

## 2019-08-11 DIAGNOSIS — R07.0 PAIN IN THROAT: ICD-10-CM

## 2019-08-11 PROCEDURE — 99283 EMERGENCY DEPT VISIT LOW MDM: CPT

## 2019-08-11 RX ORDER — DIPHENHYDRAMINE HCL 50 MG
12.5 CAPSULE ORAL ONCE
Refills: 0 | Status: COMPLETED | OUTPATIENT
Start: 2019-08-11 | End: 2019-08-11

## 2019-08-11 RX ORDER — LIDOCAINE 4 G/100G
5 CREAM TOPICAL ONCE
Refills: 0 | Status: COMPLETED | OUTPATIENT
Start: 2019-08-11 | End: 2019-08-11

## 2019-08-11 RX ADMIN — Medication 30 MILLILITER(S): at 03:54

## 2019-08-11 RX ADMIN — LIDOCAINE 5 MILLILITER(S): 4 CREAM TOPICAL at 03:54

## 2019-08-11 RX ADMIN — Medication 12.5 MILLIGRAM(S): at 03:55

## 2019-08-11 NOTE — ED PROVIDER NOTE - PHYSICAL EXAMINATION
Physical Exam  GEN: Awake, alert, non-toxic appearing, NCAT  EYES: full EOMI  ENT: no tongue/lip swelling, no stridor, no pooling of secretion  HEAD: atraumatic  NECK: no obvious swelling  SKIN: no hive/erythema

## 2019-08-11 NOTE — ED ADULT NURSE NOTE - CHPI ED NUR SYMPTOMS NEG
no bleeding gums/no chills/no loss of consciousness/no numbness/no nausea/no fever/no vomiting/no weakness

## 2019-08-11 NOTE — ED ADULT TRIAGE NOTE - CHIEF COMPLAINT QUOTE
patient c/o throat discomfort states she has been having increased secretions. patient speaking complete full clear sentences asking for magic mouth wash during triage.

## 2019-08-11 NOTE — ED PROVIDER NOTE - OBJECTIVE STATEMENT
39 yof pw increased secretion to her throat x 2-3 days.  hx of SCC of neck.  states was seen here earlier for mouth sores and given magic wash in ED.  pt went to pharmacy and got OTC mouth wash instead.  pt concern maybe the OTC meds caused some allergic reaction vs post nasal drip vs her SCC.  no vomiting.  no fc.

## 2019-08-11 NOTE — ED PROVIDER NOTE - NSFOLLOWUPINSTRUCTIONS_ED_ALL_ED_FT
Follow up with your primary care doctor  Follow up with an ENT/allergist for further evaluation if your symptoms persist  Discuss with your oncologist about your throat symptoms  Return immediately for any new or worsening symptoms or any new concerns

## 2019-08-11 NOTE — ED PROVIDER NOTE - CLINICAL SUMMARY MEDICAL DECISION MAKING FREE TEXT BOX
reports mouth sores, requesting magic mouth wash, no stridor, no pooling of secretion, no tongue swelling, no facial swelling, also d/w goals of care and expectation of ER visit and informed pt to f/u her primary team

## 2019-08-14 DIAGNOSIS — Z51.11 ENCOUNTER FOR ANTINEOPLASTIC CHEMOTHERAPY: ICD-10-CM

## 2019-08-14 DIAGNOSIS — Z80.9 FAMILY HISTORY OF MALIGNANT NEOPLASM, UNSPECIFIED: ICD-10-CM

## 2019-08-14 DIAGNOSIS — C79.89 SECONDARY MALIGNANT NEOPLASM OF OTHER SPECIFIED SITES: ICD-10-CM

## 2019-08-14 DIAGNOSIS — F32.9 MAJOR DEPRESSIVE DISORDER, SINGLE EPISODE, UNSPECIFIED: ICD-10-CM

## 2019-08-14 DIAGNOSIS — B97.7 PAPILLOMAVIRUS AS THE CAUSE OF DISEASES CLASSIFIED ELSEWHERE: ICD-10-CM

## 2019-08-14 DIAGNOSIS — D70.9 NEUTROPENIA, UNSPECIFIED: ICD-10-CM

## 2019-08-14 DIAGNOSIS — C80.1 MALIGNANT (PRIMARY) NEOPLASM, UNSPECIFIED: ICD-10-CM

## 2019-08-14 DIAGNOSIS — Z82.49 FAMILY HISTORY OF ISCHEMIC HEART DISEASE AND OTHER DISEASES OF THE CIRCULATORY SYSTEM: ICD-10-CM

## 2019-08-14 DIAGNOSIS — E55.9 VITAMIN D DEFICIENCY, UNSPECIFIED: ICD-10-CM

## 2019-08-14 DIAGNOSIS — F25.9 SCHIZOAFFECTIVE DISORDER, UNSPECIFIED: ICD-10-CM

## 2019-08-14 DIAGNOSIS — D64.81 ANEMIA DUE TO ANTINEOPLASTIC CHEMOTHERAPY: ICD-10-CM

## 2019-08-14 DIAGNOSIS — J06.9 ACUTE UPPER RESPIRATORY INFECTION, UNSPECIFIED: ICD-10-CM

## 2019-08-14 DIAGNOSIS — R60.0 LOCALIZED EDEMA: ICD-10-CM

## 2019-08-14 DIAGNOSIS — D50.9 IRON DEFICIENCY ANEMIA, UNSPECIFIED: ICD-10-CM

## 2019-08-14 DIAGNOSIS — B97.89 OTHER VIRAL AGENTS AS THE CAUSE OF DISEASES CLASSIFIED ELSEWHERE: ICD-10-CM

## 2019-08-16 NOTE — H&P ADULT - PROBLEM SELECTOR PLAN 5
Cande Brothers MA, RD, CDN #[id number] States she has history of schizoaffective disorder but is not on any medications.   Reports she is asymptomatic

## 2019-08-21 NOTE — VITALS
[80: Normal activity with effort; some signs or symptoms of disease.] : 80: Normal activity with effort; some signs or symptoms of disease.  [ECOG Performance Status: 1 - Restricted in physically strenuous activity but ambulatory and able to carry out work of a light or sedentary nature] : Performance Status: 1 - Restricted in physically strenuous activity but ambulatory and able to carry out work of a light or sedentary nature, e.g., light house work, office work [Patient Refusal] : Patient refused psychosocial distress assessment

## 2019-08-26 ENCOUNTER — APPOINTMENT (OUTPATIENT)
Dept: RADIATION ONCOLOGY | Facility: CLINIC | Age: 39
End: 2019-08-26
Payer: MEDICARE

## 2019-08-26 VITALS
DIASTOLIC BLOOD PRESSURE: 87 MMHG | SYSTOLIC BLOOD PRESSURE: 127 MMHG | BODY MASS INDEX: 24.96 KG/M2 | HEART RATE: 77 BPM | RESPIRATION RATE: 16 BRPM | WEIGHT: 150 LBS | OXYGEN SATURATION: 100 %

## 2019-08-26 PROCEDURE — 99213 OFFICE O/P EST LOW 20 MIN: CPT

## 2019-08-28 NOTE — REVIEW OF SYSTEMS
[Negative] : Gastrointestinal [Cough] : no cough [Vomiting] : no vomiting [Constipation] : no constipation [Diarrhea] : no diarrhea [FreeTextEntry9] : aching to left side of the body [FreeTextEntry4] : see HPI

## 2019-08-28 NOTE — PHYSICAL EXAM
[General Appearance - Alert] : alert [General Appearance - In No Acute Distress] : in no acute distress [Normal] : normoactive bowel sounds, soft and nontender, no hepatosplenomegaly or masses appreciated [Oriented To Time, Place, And Person] : oriented to person, place, and time [de-identified] : ECOG 1 [de-identified] : While her overall dentition is in reasonable condition, she is missing two upper pre-molars and there is a remaining root on the left.    [de-identified] : No gross appearing nodes, but with palpation there is an indistinct fullness hopefully representing fibrosis and scar tissue.    [de-identified] : disorganized thinking at times, requires redirecting

## 2019-08-28 NOTE — DISEASE MANAGEMENT
Azithromycin Pregnancy And Lactation Text: This medication is considered safe during pregnancy and is also secreted in breast milk. Bactrim Counseling:  I discussed with the patient the risks of sulfa antibiotics including but not limited to GI upset, allergic reaction, drug rash, diarrhea, dizziness, photosensitivity, and yeast infections.  Rarely, more serious reactions can occur including but not limited to aplastic anemia, agranulocytosis, methemoglobinemia, blood dyscrasias, liver or kidney failure, lung infiltrates or desquamative/blistering drug rashes. [Clinical] : TNM Stage: c [III] : III Spironolactone Pregnancy And Lactation Text: This medication can cause feminization of the male fetus and should be avoided during pregnancy. The active metabolite is also found in breast milk. [TTNM] : X Doxycycline Pregnancy And Lactation Text: This medication is Pregnancy Category D and not consider safe during pregnancy. It is also excreted in breast milk but is considered safe for shorter treatment courses. [MTNM] : 0 Topical Sulfur Applications Counseling: Topical Sulfur Counseling: Patient counseled that this medication may cause skin irritation or allergic reactions.  In the event of skin irritation, the patient was advised to reduce the amount of the drug applied or use it less frequently.   The patient verbalized understanding of the proper use and possible adverse effects of topical sulfur application.  All of the patient's questions and concerns were addressed. [NTNM] : 3 Erythromycin Pregnancy And Lactation Text: This medication is Pregnancy Category B and is considered safe during pregnancy. It is also excreted in breast milk. High Dose Vitamin A Counseling: Side effects reviewed, pt to contact office should one occur. Dapsone Counseling: I discussed with the patient the risks of dapsone including but not limited to hemolytic anemia, agranulocytosis, rashes, methemoglobinemia, kidney failure, peripheral neuropathy, headaches, GI upset, and liver toxicity.  Patients who start dapsone require monitoring including baseline LFTs and weekly CBCs for the first month, then every month thereafter.  The patient verbalized understanding of the proper use and possible adverse effects of dapsone.  All of the patient's questions and concerns were addressed. Use Enhanced Medication Counseling?: No Dapsone Pregnancy And Lactation Text: This medication is Pregnancy Category C and is not considered safe during pregnancy or breast feeding. Birth Control Pills Counseling: Birth Control Pill Counseling: I discussed with the patient the potential side effects of OCPs including but not limited to increased risk of stroke, heart attack, thrombophlebitis, deep venous thrombosis, hepatic adenomas, breast changes, GI upset, headaches, and depression.  The patient verbalized understanding of the proper use and possible adverse effects of OCPs. All of the patient's questions and concerns were addressed. Tetracycline Counseling: Patient counseled regarding possible photosensitivity and increased risk for sunburn.  Patient instructed to avoid sunlight, if possible.  When exposed to sunlight, patients should wear protective clothing, sunglasses, and sunscreen.  The patient was instructed to call the office immediately if the following severe adverse effects occur:  hearing changes, easy bruising/bleeding, severe headache, or vision changes.  The patient verbalized understanding of the proper use and possible adverse effects of tetracycline.  All of the patient's questions and concerns were addressed. Patient understands to avoid pregnancy while on therapy due to potential birth defects. Spironolactone Counseling: Patient advised regarding risks of diarrhea, abdominal pain, hyperkalemia, birth defects (for female patients), liver toxicity and renal toxicity. The patient may need blood work to monitor liver and kidney function and potassium levels while on therapy. The patient verbalized understanding of the proper use and possible adverse effects of spironolactone.  All of the patient's questions and concerns were addressed. Topical Clindamycin Pregnancy And Lactation Text: This medication is Pregnancy Category B and is considered safe during pregnancy. It is unknown if it is excreted in breast milk. Topical Retinoid Pregnancy And Lactation Text: This medication is Pregnancy Category C. It is unknown if this medication is excreted in breast milk. Detail Level: Zone Minocycline Counseling: Patient advised regarding possible photosensitivity and discoloration of the teeth, skin, lips, tongue and gums.  Patient instructed to avoid sunlight, if possible.  When exposed to sunlight, patients should wear protective clothing, sunglasses, and sunscreen.  The patient was instructed to call the office immediately if the following severe adverse effects occur:  hearing changes, easy bruising/bleeding, severe headache, or vision changes.  The patient verbalized understanding of the proper use and possible adverse effects of minocycline.  All of the patient's questions and concerns were addressed. Tazorac Counseling:  Patient advised that medication is irritating and drying.  Patient may need to apply sparingly and wash off after an hour before eventually leaving it on overnight.  The patient verbalized understanding of the proper use and possible adverse effects of tazorac.  All of the patient's questions and concerns were addressed. Benzoyl Peroxide Pregnancy And Lactation Text: This medication is Pregnancy Category C. It is unknown if benzoyl peroxide is excreted in breast milk. Birth Control Pills Pregnancy And Lactation Text: This medication should be avoided if pregnant and for the first 30 days post-partum. Tetracycline Pregnancy And Lactation Text: This medication is Pregnancy Category D and not consider safe during pregnancy. It is also excreted in breast milk. High Dose Vitamin A Pregnancy And Lactation Text: High dose vitamin A therapy is contraindicated during pregnancy and breast feeding. Bactrim Pregnancy And Lactation Text: This medication is Pregnancy Category D and is known to cause fetal risk.  It is also excreted in breast milk. Isotretinoin Pregnancy And Lactation Text: This medication is Pregnancy Category X and is considered extremely dangerous during pregnancy. It is unknown if it is excreted in breast milk. Benzoyl Peroxide Counseling: Patient counseled that medicine may cause skin irritation and bleach clothing.  In the event of skin irritation, the patient was advised to reduce the amount of the drug applied or use it less frequently.   The patient verbalized understanding of the proper use and possible adverse effects of benzoyl peroxide.  All of the patient's questions and concerns were addressed. Azithromycin Counseling:  I discussed with the patient the risks of azithromycin including but not limited to GI upset, allergic reaction, drug rash, diarrhea, and yeast infections. Topical Retinoid counseling:  Patient advised to apply a pea-sized amount only at bedtime and wait 30 minutes after washing their face before applying.  If too drying, patient may add a non-comedogenic moisturizer. The patient verbalized understanding of the proper use and possible adverse effects of retinoids.  All of the patient's questions and concerns were addressed. Doxycycline Counseling:  Patient counseled regarding possible photosensitivity and increased risk for sunburn.  Patient instructed to avoid sunlight, if possible.  When exposed to sunlight, patients should wear protective clothing, sunglasses, and sunscreen.  The patient was instructed to call the office immediately if the following severe adverse effects occur:  hearing changes, easy bruising/bleeding, severe headache, or vision changes.  The patient verbalized understanding of the proper use and possible adverse effects of doxycycline.  All of the patient's questions and concerns were addressed. Tazorac Pregnancy And Lactation Text: This medication is not safe during pregnancy. It is unknown if this medication is excreted in breast milk. Topical Sulfur Applications Pregnancy And Lactation Text: This medication is Pregnancy Category C and has an unknown safety profile during pregnancy. It is unknown if this topical medication is excreted in breast milk. Isotretinoin Counseling: Patient should get monthly blood tests, not donate blood, not drive at night if vision affected, not share medication, and not undergo elective surgery for 6 months after tx completed. Side effects reviewed, pt to contact office should one occur. Topical Clindamycin Counseling: Patient counseled that this medication may cause skin irritation or allergic reactions.  In the event of skin irritation, the patient was advised to reduce the amount of the drug applied or use it less frequently.   The patient verbalized understanding of the proper use and possible adverse effects of clindamycin.  All of the patient's questions and concerns were addressed. Erythromycin Counseling:  I discussed with the patient the risks of erythromycin including but not limited to GI upset, allergic reaction, drug rash, diarrhea, increase in liver enzymes, and yeast infections.

## 2019-09-12 ENCOUNTER — APPOINTMENT (OUTPATIENT)
Dept: OTOLARYNGOLOGY | Facility: CLINIC | Age: 39
End: 2019-09-12
Payer: MEDICARE

## 2019-09-12 PROCEDURE — 92700 UNLISTED ORL SERVICE/PX: CPT

## 2019-09-12 PROCEDURE — 92550 TYMPANOMETRY & REFLEX THRESH: CPT

## 2019-09-12 PROCEDURE — 92557 COMPREHENSIVE HEARING TEST: CPT

## 2019-10-01 PROCEDURE — 84100 ASSAY OF PHOSPHORUS: CPT

## 2019-10-01 PROCEDURE — 83735 ASSAY OF MAGNESIUM: CPT

## 2019-10-01 PROCEDURE — 71045 X-RAY EXAM CHEST 1 VIEW: CPT

## 2019-10-01 PROCEDURE — 36573 INSJ PICC RS&I 5 YR+: CPT

## 2019-10-01 PROCEDURE — 93005 ELECTROCARDIOGRAM TRACING: CPT

## 2019-10-01 PROCEDURE — 85025 COMPLETE CBC W/AUTO DIFF WBC: CPT

## 2019-10-01 PROCEDURE — 84550 ASSAY OF BLOOD/URIC ACID: CPT

## 2019-10-01 PROCEDURE — 80053 COMPREHEN METABOLIC PANEL: CPT

## 2019-10-01 PROCEDURE — 87486 CHLMYD PNEUM DNA AMP PROBE: CPT

## 2019-10-01 PROCEDURE — 80048 BASIC METABOLIC PNL TOTAL CA: CPT

## 2019-10-01 PROCEDURE — 36415 COLL VENOUS BLD VENIPUNCTURE: CPT

## 2019-10-01 PROCEDURE — 85027 COMPLETE CBC AUTOMATED: CPT

## 2019-10-01 PROCEDURE — 86901 BLOOD TYPING SEROLOGIC RH(D): CPT

## 2019-10-01 PROCEDURE — 86850 RBC ANTIBODY SCREEN: CPT

## 2019-10-01 PROCEDURE — 84702 CHORIONIC GONADOTROPIN TEST: CPT

## 2019-10-01 PROCEDURE — 82607 VITAMIN B-12: CPT

## 2019-10-01 PROCEDURE — 82746 ASSAY OF FOLIC ACID SERUM: CPT

## 2019-10-01 PROCEDURE — 82962 GLUCOSE BLOOD TEST: CPT

## 2019-10-01 PROCEDURE — 86900 BLOOD TYPING SEROLOGIC ABO: CPT

## 2019-10-01 PROCEDURE — 87581 M.PNEUMON DNA AMP PROBE: CPT

## 2019-10-01 PROCEDURE — 87798 DETECT AGENT NOS DNA AMP: CPT

## 2019-10-01 PROCEDURE — 85610 PROTHROMBIN TIME: CPT

## 2019-10-01 PROCEDURE — 87633 RESP VIRUS 12-25 TARGETS: CPT

## 2019-10-01 PROCEDURE — 93306 TTE W/DOPPLER COMPLETE: CPT

## 2019-10-01 PROCEDURE — 82306 VITAMIN D 25 HYDROXY: CPT

## 2019-10-13 ENCOUNTER — FORM ENCOUNTER (OUTPATIENT)
Age: 39
End: 2019-10-13

## 2019-10-14 ENCOUNTER — OUTPATIENT (OUTPATIENT)
Dept: OUTPATIENT SERVICES | Facility: HOSPITAL | Age: 39
LOS: 1 days | End: 2019-10-14
Payer: MEDICARE

## 2019-10-14 LAB — GLUCOSE BLDC GLUCOMTR-MCNC: 90 MG/DL — SIGNIFICANT CHANGE UP (ref 70–99)

## 2019-10-14 PROCEDURE — A9552: CPT

## 2019-10-14 PROCEDURE — 82962 GLUCOSE BLOOD TEST: CPT

## 2019-10-14 PROCEDURE — 78815 PET IMAGE W/CT SKULL-THIGH: CPT

## 2019-10-14 PROCEDURE — 78815 PET IMAGE W/CT SKULL-THIGH: CPT | Mod: 26

## 2019-11-26 NOTE — HISTORY OF PRESENT ILLNESS
[FreeTextEntry1] : 8/26/19 - SNA\par Ms. Foy returns today for SNA.  She was last seen in this office by Dr. Londono on 3/4/19.  She was recommended further diagnostic workup to include panendoscopy and targeted biopsy, PET/CT and MRI and treatment consisting of induction chemo followed by radiation.  She was also advised to seek a second opinion given her concern that her diagnosis was a mistake.  This office was unable to reach her following that visit.  It is well documented in her medical record that she continued to delay further workup and initiation of treatment despite medical advice to the contrary.  She eventually followed-up with Dr. Alvarado and Dr. Meeks and underwent PET/CT, MRI orbit/face/neck, and CTA neck and brain.  \par \par PET/CT 6/9/19\par There is has been interval enlargement of the hypermetabolic left-sided neck mass which now measures up to 11.1 cm in craniocaudal dimension with an SUV of 21.3. There is invasion of the adjacent soft tissues structures, with involvement of the \par left common and internal carotid arteries, as well as the left-sided neck musculature. There are enlarged, hypermetabolic lymph nodes in the left retropharyngeal lymph region and cervical levels 1, 5, and 6. Additionally there are enlarged hypermetabolic lymph nodes at the right cervical levels 3 and 6, as well as the bilateral supraclavicular regions.  \par IMPRESSION: \par Compared with prior PET/CT dated 12/21/2018 has been interval enlargement of large left-sided hypermetabolic neck mass. \par There are an increased number of hypermetabolic lymph nodes in the bilateral cervical regions, retropharyngeal region, and supraclavicular region as above. \par \par MRI orbit/face/neck with and without contrast 6/10/19 - IMPRESSION: In this patient with left neck conglomerate klarissa mass that exceeds 10 cm in maximal dimension, there is complete encasement of the left common and internal carotid arteries as well as invasion of the skin, parotid and submandibular glands. There is also pathologic lymphadenopathy in the right infrahyoid neck. The primary tumor is not identified. Please see report above for further detail. \par \par CTA brain with contrast 6/20/19 - IMPRESSION: Unremarkable CTA examination of the brain.  \par \par CTA neck with contrast 6/20/19 - IMPRESSION: As seen on MRI, massive pathologic cervical lymphadenopathy encases the left \par CCA, ECA and ICA with mild luminal narrowing relative to the normal right side. No pseudoaneurysm or other evidence to portend a risk of impending blowout. CTA brain is normal. \par \par She completed 3 cycles of induction chemotherapy ([facility]) under the care of Dr. Meeks, completed 8/8/19.  She returns for consideration of radiation therapy.  She states Dr. Meeks has recommended concurrent chemo/RT.  She expresses satisfaction with her response to chemotherapy thus far, as the neck mass has significantly decreased in size.  Although, she is concerned about lymphadenopathy, which she reports is unchanged.  She complains of "aching" to the left side of her body as well as lower extremity edema since chemo.  She denies pain, dysphagia, odynophagia.  She notes weight fluctuation throughout chemo.  \par \par Of note, she states she is currently living "in a few different places."  She anticipates she will be residing on [facility] side [facility] by the time her radiation begins.  She denies smoking.  Admits to one drink every 1-2 months, most recently a glass of wine last weekend.  She denies drug use.  Her last menstrual period was in June.  She states she is working on obtaining dental insurance and that she needs an extraction and a root canal.  She also mentions needing to follow-up on an abnormal PAP smear from December 2018.  \par __________________________________________________________________________________\par ONCOLOGIC HISTORY (3/4/19)\par Ms. Rachael Foy is a 39F, former smoker (1/2 ppd x 15 years) referred by Dr. 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 \par \par Patient first noticed a left neck mass approx 2 years ago. At that time, she reportedly had drainage of the growth at [facility] that did not show any infection and received antibiotics. She states the mass was drained and she was told it was a cyst. She does not know if the specimen was sent for pathology. Remission of the mass occurred, and it reappeared approx 20 months ago. She again had FNA (non-diagnostic) and received antibiotics. Patient states she has seen other ENTs "at several other institutions," but would not tell us who she has seen or where. She reports feeling fatigued in the summer of 2018, had been to urgent care on a few occasions but did not receive a diagnosis.  Starting in the summer of 2018, she also became "displaced" due to family issues and breaking up with a boyfriend, and she was in and out of homeless shelters for several months. \par \par Patient reports she was having pain in her teeth and gums around November 2018. She went to the ED at [facility] because she thought she could get emergent dental work done at the ED.  Imaging was done. \par \par CT neck on 12/1/18 revealed the following:\par -Soft tissues mass noted in the LEFT neck extending deep to the mandible on the left compressing the submandibular gland anteriorly with internal heterogeneity which is predominantly solid. This mass is lateral to the carotid artery with apparent compression and effacement of the internal jugular vein. The mass measures up to 5 x 3.7 cm. This mass is deep to the flattened sternocleidomastoid muscle and extends to the lower left neck just  the supraclavicular fossa. The bulk of the mass appears below the left parotid gland- cannot exclude involvement of the lower portion of the parotid. \par -Soft tissue mass measuring up to 1.9 cm with central low attenuation possibly necrosis above this mass, possibly representing a necrotic lymph node. Other prominent left sided lymph nodes in the neck in the area of the jugulodigastric region and posterior triangle are also noted including a posterior triangle lymph node posterior to the sternocleidomastoid. \par \par She saw Dr. Weller for consultation on 12/5/18, and at the time reported left neck mass for many months with cervical nodes. She had FNA of the left neck mass at that time, and pathology showed squamous cell carcinoma, HPV related. She then saw Dr. Alvarado on 12/17/18 for consultation; discussed treatment options based on PET/ CT results. \par \par PET/ CT done 12/21/18 showed the following:\par Impression: Large supra and infrahyoid left neck mass measuring 5.4 cm, similar as compared to 12/1/2018, and increased in size as compared to 11/10/2016 as above. In view of the biopsy results, this is compatible with a diagnosis of squamous cell carcinoma. Left supraclavicular and right level 3 hypermetabolic lymphadenopathy is noted, likely associated pathologic lymphadenopathy.\par \par Her case was presented at [facility]; consensus was for surgery (left neck dissection, tonsillectomy, BOT biopsy), followed by chemoRT. She saw Dr. Alvarado on 1/7/19 to discuss surgical plan. However, patient grew increasingly uncomfortable with the biopsies because she does not want to undergo anesthesia. She ultimately opted against surgery and would like to proceed with chemoRT. She was referred here and to Dr. Meeks.  She has not yet seen Dr. Meeks or scheduled a consultation with him. \par \par Today, she expresses she is "not convinced" of her diagnosis and questions if the mass is due to Cushing's disease or if it could be related to high stress (she reports she has "family issues that have caused a great deal of stress"). She questions that she could have HPV related disease as she "does not partake in that kind of activity," and she fixated on this during the clinical encounter. She admits that she went to a hospital in [place] to try to get another biopsy but that they would not perform another biopsy since she has a known diagnosis of cancer. Today, she is evasive with answering questions and providing her health history. She denies any pain or dysphagia, fever, CP, SOB, cough, weight loss. She reports weight gain, as well as throat clearing. She also reports night sweats, fatigue, and "feeling cold all the time." She reports she sees a psychiatrist, who prescribes medications for anxiety and depression, but she declined to say which medications she takes or who her psychiatrist is. Upon further questioning, she stated that she is off her antidepressant. She notes that she does not have a dentist nor does she have dental coverage. She further declined to provide the name of her PCP; states she "might be looking for a new one." \par \par Of note, she had been living in shelters for a few months. She is now still homeless and has been staying with friends. She also reports she had been working but is on disability.  98

## 2020-02-13 ENCOUNTER — APPOINTMENT (OUTPATIENT)
Dept: RADIATION ONCOLOGY | Facility: CLINIC | Age: 40
End: 2020-02-13
Payer: MEDICARE

## 2020-02-13 VITALS
HEART RATE: 65 BPM | BODY MASS INDEX: 25.44 KG/M2 | WEIGHT: 152.9 LBS | OXYGEN SATURATION: 99 % | SYSTOLIC BLOOD PRESSURE: 114 MMHG | DIASTOLIC BLOOD PRESSURE: 73 MMHG | RESPIRATION RATE: 16 BRPM

## 2020-02-13 DIAGNOSIS — F41.9 ANXIETY DISORDER, UNSPECIFIED: ICD-10-CM

## 2020-02-13 DIAGNOSIS — Z59.0 HOMELESSNESS: ICD-10-CM

## 2020-02-13 PROCEDURE — 31575 DIAGNOSTIC LARYNGOSCOPY: CPT

## 2020-02-13 PROCEDURE — 99215 OFFICE O/P EST HI 40 MIN: CPT | Mod: 25

## 2020-02-13 SDOH — ECONOMIC STABILITY - HOUSING INSECURITY: HOMELESSNESS: Z59.0

## 2020-02-13 NOTE — VITALS
[80: Normal activity with effort; some signs or symptoms of disease.] : 80: Normal activity with effort; some signs or symptoms of disease.  [ECOG Performance Status: 1 - Restricted in physically strenuous activity but ambulatory and able to carry out work of a light or sedentary nature] : Performance Status: 1 - Restricted in physically strenuous activity but ambulatory and able to carry out work of a light or sedentary nature, e.g., light house work, office work [Maximal Pain Intensity: 0/10] : 0/10 [Least Pain Intensity: 0/10] : 0/10

## 2020-02-18 PROBLEM — Z59.0 HOMELESSNESS: Status: ACTIVE | Noted: 2019-03-04

## 2020-02-18 PROBLEM — F41.9 ANXIETY: Status: ACTIVE | Noted: 2020-02-18

## 2020-02-18 NOTE — PROCEDURE
[Surveillance] : monitor for disease recurrence [None] : none [Flexible Endoscope] : examined with the flexible endoscope [Photographs Taken] : photographs taken [Normal] : normal [de-identified] : Lubricating jelly only. [de-identified] : No primary tumor site could be identified.

## 2020-02-18 NOTE — DATA REVIEWED
[FreeTextEntry1] : I have personally reviewed all relevant imaging studies (PET, CT) and I have discussed the case with the referring physician Dr Meeks.\par

## 2020-02-18 NOTE — HISTORY OF PRESENT ILLNESS
[FreeTextEntry1] : Ms. 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 Meeks.  \par \par 2/13/20 - SNA\par Ms. Conway was last seen on 8/26/19 by Dr. Allen.  She was recommended radiation with concurrent chemotherapy to which she agreed.  She completed CT simulation on 9/12/19.  She had difficulty obtaining dental clearance due to insurance issues and was lost to follow-up.  She last saw Dr. Meeks last week.  She states he recommended she have chemoRT.  Most recent imaging as follows:\par \par PET/CT 10/14/19 - Impression: Since 6/9/2019, there has been dramatic improvement in the size and degree of FDG uptake of the left neck mass. There is persistent uptake in the left carotid space at the angle of the mandible, within the skin and subcutaneous \par tissues of the left neck at the level of the hyoid bone, as well as a few mildly FDG avid bilateral cervical lymph nodes as above. \par \par Today, she complains of intermittent nausea, vomiting, and severe fatigue since completing chemo.  She has been researching chronic fatigue syndrome and identifies with the symptoms profile.  She reports that following her PET/CT in October the masses in her neck began to enlarge and continued to grow until approximately 1 month ago.  She states the masses had been present since prior to chemo, but were very small, only palpable and not visible.  She feels that the size has been stable over the past month.  She notes that this happened with her previous PET/CT and wonders if she has been having "bad reactions" to the PET scans causing the cancer to grow.  She denies pain, dyspnea, cough, dysphagia, odynophagia.  She states she completed her dental evaluation and had wisdom teeth and an additional tooth extracted in December.  \par \par She also reports feeling anxious.  She states she has baseline anxiety and her cancer and chemo have exacerbated it.  She is considering starting an anxiolytic and/or SSRI.  She has not seen psych recently.  \par \par She has several hypotheses about her disease that she presents today. She suggests that the PET/CT scans made her cancer grow faster. She asks if it is possible her cancer was mis-diagnosed and she has Cushings syndrome and not cancer. She also wonders if her diagnosis is actually more consistent with "Bronchial cyst area." She thinks her level VI node emerged after she had dental work.\par \par Of note, she states she is currently living in [place] and thinks she will be living in [place] during her treatment.  She admits to occasional alcohol use.  She denies smoking and drug use.  \par \par 8/26/19 - SNA\par Ms. Conway returns today for SNA.  She was last seen in this office by Dr. Londono on 3/4/19.  She was recommended further diagnostic workup to include panendoscopy and targeted biopsy, PET/CT and MRI and treatment consisting of induction chemo followed by radiation.  She was also advised to seek a second opinion given her concern that her diagnosis was a mistake.  This office was unable to reach her following that visit.  It is well documented in her medical record that she continued to delay further workup and initiation of treatment despite medical advice to the contrary.  She eventually followed-up with Dr. Alvarado and Dr. Meeks and underwent PET/CT, MRI orbit/face/neck, and CTA neck and brain.  \par \par PET/CT 6/9/19\par There is has been interval enlargement of the hypermetabolic left-sided neck mass which now measures up to 11.1 cm in craniocaudal dimension with an SUV of 21.3. There is invasion of the adjacent soft tissues structures, with involvement of the \par left common and internal carotid arteries, as well as the left-sided neck musculature. There are enlarged, hypermetabolic lymph nodes in the left retropharyngeal lymph region and cervical levels 1, 5, and 6. Additionally there are enlarged hypermetabolic lymph nodes at the right cervical levels 3 and 6, as well as the bilateral supraclavicular regions.  \par IMPRESSION: \par Compared with prior PET/CT dated 12/21/2018 has been interval enlargement of large left-sided hypermetabolic neck mass. \par There are an increased number of hypermetabolic lymph nodes in the bilateral cervical regions, retropharyngeal region, and supraclavicular region as above. \par \par MRI orbit/face/neck with and without contrast 6/10/19 - IMPRESSION: In this patient with left neck conglomerate klarissa mass that exceeds 10 cm in maximal dimension, there is complete encasement of the left common and internal carotid arteries as well as invasion of the skin, parotid and submandibular glands. There is also pathologic lymphadenopathy in the right infrahyoid neck. The primary tumor is not identified. Please see report above for further detail. \par \par CTA brain with contrast 6/20/19 - IMPRESSION: Unremarkable CTA examination of the brain.  \par \par CTA neck with contrast 6/20/19 - IMPRESSION: As seen on MRI, massive pathologic cervical lymphadenopathy encases the left \par CCA, ECA and ICA with mild luminal narrowing relative to the normal right side. No pseudoaneurysm or other evidence to portend a risk of impending blowout. CTA brain is normal. \par \par She completed 3 cycles of induction chemotherapy (DCF) under the care of Dr. Meeks, completed 8/8/19.  She returns for consideration of radiation therapy.  She states Dr. Meeks has recommended concurrent chemo/RT.  She expresses satisfaction with her response to chemotherapy thus far, as the neck mass has significantly decreased in size.  Although, she is concerned about lymphadenopathy, which she reports is unchanged.  She complains of "aching" to the left side of her body as well as lower extremity edema since chemo.  She denies pain, dysphagia, odynophagia.  She notes weight fluctuation throughout chemo.  \par \par Of note, she states she is currently living "in a few different places."  She anticip[place] [facility] by the time her radiation begins.  She denies smoking.  Admits to one drink every 1-2 months, most recently a glass of wine last weekend.  She denies drug use.  Her last menstrual period was in June.  She states she is working on obtaining dental insurance and that she needs an extraction and a root canal.  She also mentions needing to follow-up on an abnormal PAP smear from December 2018.  \par __________________________________________________________________________________\par ONCOLOGIC HISTORY (3/4/19)\par Ms. Cherelle Conway is a 39F, former smoker (1/2 ppd x 15 years) referred by Dr. 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 \par \par Patient first noticed a left neck mass approx 2 years ago. At that time, she reportedly had drainage of the growth at [facility] that did not show any infection and received antibiotics. She states the mass was drained and she was told it was a cyst. She does not know if the specimen was sent for pathology. Remission of the mass occurred, and it reappeared approx 20 months ago. She again had FNA (non-diagnostic) and received antibiotics. Patient states she has seen other ENTs "at several other institutions," but would not tell us who she has seen or where. She reports feeling fatigued in the summer of 2018, had been to urgent care on a few occasions but did not receive a diagnosis.  Starting in the summer of 2018, she also became "displaced" due to family issues and breaking up with a boyfriend, and she was in and out of homeless shelters for several months. \par \par Patient reports she was having pain in her teeth and gums around November 2018. She went to the ED at [facility] because she thought she could get emergent dental work done at the ED.  Imaging was done. \par \par CT neck on 12/1/18 revealed the following:\par -Soft tissues mass noted in the LEFT neck extending deep to the mandible on the left compressing the submandibular gland anteriorly with internal heterogeneity which is predominantly solid. This mass is lateral to the carotid artery with apparent compression and effacement of the internal jugular vein. The mass measures up to 5 x 3.7 cm. This mass is deep to the flattened sternocleidomastoid muscle and extends to the lower left neck just  the supraclavicular fossa. The bulk of the mass appears below the left parotid gland- cannot exclude involvement of the lower portion of the parotid. \par -Soft tissue mass measuring up to 1.9 cm with central low attenuation possibly necrosis above this mass, possibly representing a necrotic lymph node. Other prominent left sided lymph nodes in the neck in the area of the jugulodigastric region and posterior triangle are also noted including a posterior triangle lymph node posterior to the sternocleidomastoid. \par \par She saw Dr. Weller for consultation on 12/5/18, and at the time reported left neck mass for many months with cervical nodes. She had FNA of the left neck mass at that time, and pathology showed squamous cell carcinoma, HPV related. She then saw Dr. Alvarado on 12/17/18 for consultation; discussed treatment options based on PET/ CT results. \par \par PET/ CT done 12/21/18 showed the following:\par Impression: Large supra and infrahyoid left neck mass measuring 5.4 cm, similar as compared to 12/1/2018, and increased in size as compared to 11/10/2016 as above. In view of the biopsy results, this is compatible with a diagnosis of squamous cell carcinoma. Left supraclavicular and right level 3 hypermetabolic lymphadenopathy is noted, likely associated pathologic lymphadenopathy.\par \par Her case was presented at [facility]; consensus was for surgery (left neck dissection, tonsillectomy, BOT biopsy), followed by chemoRT. She saw Dr. Alvarado on 1/7/19 to discuss surgical plan. However, patient grew increasingly uncomfortable with the biopsies because she does not want to undergo anesthesia. She ultimately opted against surgery and would like to proceed with chemoRT. She was referred here and to Dr. Meeks.  She has not yet seen Dr. Meeks or scheduled a consultation with him. \par \par Today, she expresses she is "not convinced" of her diagnosis and questions if the mass is due to Cushing's disease or if it could be related to high stress (she reports she has "family issues that have caused a great deal of stress"). She questions that she could have HPV related disease as she "does not partake in that kind of activity," and she fixated on this during the clinical encounter. She admits that she went to a hospital in [place] to try to get another biopsy but that they would not perform another biopsy since she has a known diagnosis of cancer. Today, she is evasive with answering questions and providing her health history. She denies any pain or dysphagia, fever, CP, SOB, cough, weight loss. She reports weight gain, as well as throat clearing. She also reports night sweats, fatigue, and "feeling cold all the time." She reports she sees a psychiatrist, who prescribes medications for anxiety and depression, but she declined to say which medications she takes or who her psychiatrist is. Upon further questioning, she stated that she is off her antidepressant. She notes that she does not have a dentist nor does she have dental coverage. She further declined to provide the name of her PCP; states she "might be looking for a new one." \par \par Of note, she had been living in shelters for a few months. She is now still homeless and has been staying with friends. She also reports she had been working but is on disability.

## 2020-02-18 NOTE — PHYSICAL EXAM
[General Appearance - Alert] : alert [General Appearance - In No Acute Distress] : in no acute distress [Oriented To Time, Place, And Person] : oriented to person, place, and time [Normal] : no focal deficits [de-identified] : ECOG 1 [de-identified] : large firm masses throughout left neck. New from prior exam. left level IIa and III, small firm mass anterior level IV and level VI [de-identified] : Normal tonsillar fossa. [de-identified] : scarring to left neck  [de-identified] : disorganized thinking at times, requires redirecting

## 2020-02-24 ENCOUNTER — APPOINTMENT (OUTPATIENT)
Dept: RADIATION ONCOLOGY | Facility: CLINIC | Age: 40
End: 2020-02-24
Payer: MEDICARE

## 2020-02-24 ENCOUNTER — FORM ENCOUNTER (OUTPATIENT)
Age: 40
End: 2020-02-24

## 2020-02-24 NOTE — PROCEDURE
[None] : none [Surveillance] : monitor for disease recurrence [Flexible Endoscope] : examined with the flexible endoscope [Photographs Taken] : photographs taken [Normal] : normal [de-identified] : Lubricating jelly only. [de-identified] : No primary tumor site could be identified.

## 2020-02-24 NOTE — HISTORY OF PRESENT ILLNESS
[FreeTextEntry1] : Ms. 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 Meeks.  \par \par 2/24/20 - Follow-up\par Ms. Conway was last seen on 2/13/20.  She was to complete PET/CT and provide a dental clearance letter.  PET/CT was scheduled for 2/21, but she did not complete the exam.  Today, she \par \par 2/13/20 - SNA\par Ms. Conway was last seen on 8/26/19 by Dr. Allen.  She was recommended radiation with concurrent chemotherapy to which she agreed.  She completed CT simulation on 9/12/19.  She had difficulty obtaining dental clearance due to insurance issues and was lost to follow-up.  She last saw Dr. Meeks last week.  She states he recommended she have chemoRT.  Most recent imaging as follows:\par \par PET/CT 10/14/19 - Impression: Since 6/9/2019, there has been dramatic improvement in the size and degree of FDG uptake of the left neck mass. There is persistent uptake in the left carotid space at the angle of the mandible, within the skin and subcutaneous \par tissues of the left neck at the level of the hyoid bone, as well as a few mildly FDG avid bilateral cervical lymph nodes as above. \par \par Today, she complains of intermittent nausea, vomiting, and severe fatigue since completing chemo.  She has been researching chronic fatigue syndrome and identifies with the symptoms profile.  She reports that following her PET/CT in October the masses in her neck began to enlarge and continued to grow until approximately 1 month ago.  She states the masses had been present since prior to chemo, but were very small, only palpable and not visible.  She feels that the size has been stable over the past month.  She notes that this happened with her previous PET/CT and wonders if she has been having "bad reactions" to the PET scans causing the cancer to grow.  She denies pain, dyspnea, cough, dysphagia, odynophagia.  She states she completed her dental evaluation and had wisdom teeth and an additional tooth extracted in December.  \par \par She also reports feeling anxious.  She states she has baseline anxiety and her cancer and chemo have exacerbated it.  She is considering starting an anxiolytic and/or SSRI.  She has not seen psych recently.  \par \par She has several hypotheses about her disease that she presents today. She suggests that the PET/CT scans made her cancer grow faster. She asks if it is possible her cancer was mis-diagnosed and she has Cushings syndrome and not cancer. She also wonders if her diagnosis is actually more consistent with "Bronchial cyst area." She thinks her level VI node emerged after she had dental work.\par \par Of note, she states she is currently living in [place] and thinks she will be living in [place] during her treatment.  She admits to occasional alcohol use.  She denies smoking and drug use.  \par \par 8/26/19 - SNA\par Ms. Conway returns today for SNA.  She was last seen in this office by Dr. Londono on 3/4/19.  She was recommended further diagnostic workup to include panendoscopy and targeted biopsy, PET/CT and MRI and treatment consisting of induction chemo followed by radiation.  She was also advised to seek a second opinion given her concern that her diagnosis was a mistake.  This office was unable to reach her following that visit.  It is well documented in her medical record that she continued to delay further workup and initiation of treatment despite medical advice to the contrary.  She eventually followed-up with Dr. Alvarado and Dr. Meeks and underwent PET/CT, MRI orbit/face/neck, and CTA neck and brain.  \par \par PET/CT 6/9/19\par There is has been interval enlargement of the hypermetabolic left-sided neck mass which now measures up to 11.1 cm in craniocaudal dimension with an SUV of 21.3. There is invasion of the adjacent soft tissues structures, with involvement of the \par left common and internal carotid arteries, as well as the left-sided neck musculature. There are enlarged, hypermetabolic lymph nodes in the left retropharyngeal lymph region and cervical levels 1, 5, and 6. Additionally there are enlarged hypermetabolic lymph nodes at the right cervical levels 3 and 6, as well as the bilateral supraclavicular regions.  \par IMPRESSION: \par Compared with prior PET/CT dated 12/21/2018 has been interval enlargement of large left-sided hypermetabolic neck mass. \par There are an increased number of hypermetabolic lymph nodes in the bilateral cervical regions, retropharyngeal region, and supraclavicular region as above. \par \par MRI orbit/face/neck with and without contrast 6/10/19 - IMPRESSION: In this patient with left neck conglomerate klarissa mass that exceeds 10 cm in maximal dimension, there is complete encasement of the left common and internal carotid arteries as well as invasion of the skin, parotid and submandibular glands. There is also pathologic lymphadenopathy in the right infrahyoid neck. The primary tumor is not identified. Please see report above for further detail. \par \par CTA brain with contrast 6/20/19 - IMPRESSION: Unremarkable CTA examination of the brain.  \par \par CTA neck with contrast 6/20/19 - IMPRESSION: As seen on MRI, massive pathologic cervical lymphadenopathy encases the left \par CCA, ECA and ICA with mild luminal narrowing relative to the normal right side. No pseudoaneurysm or other evidence to portend a risk of impending blowout. CTA brain is normal. \par \par She completed 3 cycles of induction chemotherapy (DCF) under the care of Dr. Meeks, completed 8/8/19.  She returns for consideration of radiation therapy.  She states Dr. Meeks has recommended concurrent chemo/RT.  She expresses satisfaction with her response to chemotherapy thus far, as the neck mass has significantly decreased in size.  Although, she is concerned about lymphadenopathy, which she reports is unchanged.  She complains of "aching" to the left side of her body as well as lower extremity edema since chemo.  She denies pain, dysphagia, odynophagia.  She notes weight fluctuation throughout chemo.  \par \par Of note, she states she is currently living "in a few different places."  She anticipates [place] on the [place] side of [place] by the time her radiation begins.  She denies smoking.  Admits to one drink every 1-2 months, most recently a glass of wine last weekend.  She denies drug use.  Her last menstrual period was in June.  She states she is working on obtaining dental insurance and that she needs an extraction and a root canal.  She also mentions needing to follow-up on an abnormal PAP smear from December 2018.  \par __________________________________________________________________________________\par ONCOLOGIC HISTORY (3/4/19)\par Ms. Cherelle Conway is a 39F, former smoker (1/2 ppd x 15 years) referred by Dr. 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 \par \par Patient first noticed a left neck mass approx 2 years ago. At that time, she reportedly had drainage of the growth at [facility] that did not show any infection and received antibiotics. She states the mass was drained and she was told it was a cyst. She does not know if the specimen was sent for pathology. Remission of the mass occurred, and it reappeared approx 20 months ago. She again had FNA (non-diagnostic) and received antibiotics. Patient states she has seen other ENTs "at several other institutions," but would not tell us who she has seen or where. She reports feeling fatigued in the summer of 2018, had been to urgent care on a few occasions but did not receive a diagnosis.  Starting in the summer of 2018, she also became "displaced" due to family issues and breaking up with a boyfriend, and she was in and out of homeless shelters for several months. \par \par Patient reports she was having pain in her teeth and gums around November 2018. She went to the ED at [facility] because she thought she could get emergent dental work done at the ED.  Imaging was done. \par \par CT neck on 12/1/18 revealed the following:\par -Soft tissues mass noted in the LEFT neck extending deep to the mandible on the left compressing the submandibular gland anteriorly with internal heterogeneity which is predominantly solid. This mass is lateral to the carotid artery with apparent compression and effacement of the internal jugular vein. The mass measures up to 5 x 3.7 cm. This mass is deep to the flattened sternocleidomastoid muscle and extends to the lower left neck just  the supraclavicular fossa. The bulk of the mass appears below the left parotid gland- cannot exclude involvement of the lower portion of the parotid. \par -Soft tissue mass measuring up to 1.9 cm with central low attenuation possibly necrosis above this mass, possibly representing a necrotic lymph node. Other prominent left sided lymph nodes in the neck in the area of the jugulodigastric region and posterior triangle are also noted including a posterior triangle lymph node posterior to the sternocleidomastoid. \par \par She saw Dr. Weller for consultation on 12/5/18, and at the time reported left neck mass for many months with cervical nodes. She had FNA of the left neck mass at that time, and pathology showed squamous cell carcinoma, HPV related. She then saw Dr. Alvarado on 12/17/18 for consultation; discussed treatment options based on PET/ CT results. \par \par PET/ CT done 12/21/18 showed the following:\par Impression: Large supra and infrahyoid left neck mass measuring 5.4 cm, similar as compared to 12/1/2018, and increased in size as compared to 11/10/2016 as above. In view of the biopsy results, this is compatible with a diagnosis of squamous cell carcinoma. Left supraclavicular and right level 3 hypermetabolic lymphadenopathy is noted, likely associated pathologic lymphadenopathy.\par \par Her case was presented at [facility]; consensus was for surgery (left neck dissection, tonsillectomy, BOT biopsy), followed by chemoRT. She saw Dr. Alvarado on 1/7/19 to discuss surgical plan. However, patient grew increasingly uncomfortable with the biopsies because she does not want to undergo anesthesia. She ultimately opted against surgery and would like to proceed with chemoRT. She was referred here and to Dr. Meeks.  She has not yet seen Dr. Meeks or scheduled a consultation with him. \par \par Today, she expresses she is "not convinced" of her diagnosis and questions if the mass is due to Cushing's disease or if it could be related to high stress (she reports she has "family issues that have caused a great deal of stress"). She questions that she could have HPV related disease as she "does not partake in that kind of activity," and she fixated on this during the clinical encounter. She admits that she went to a hospital in [place] to try to get another biopsy but that they would not perform another biopsy since she has a known diagnosis of cancer. Today, she is evasive with answering questions and providing her health history. She denies any pain or dysphagia, fever, CP, SOB, cough, weight loss. She reports weight gain, as well as throat clearing. She also reports night sweats, fatigue, and "feeling cold all the time." She reports she sees a psychiatrist, who prescribes medications for anxiety and depression, but she declined to say which medications she takes or who her psychiatrist is. Upon further questioning, she stated that she is off her antidepressant. She notes that she does not have a dentist nor does she have dental coverage. She further declined to provide the name of her PCP; states she "might be looking for a new one." \par \par Of note, she had been living in shelters for a few months. She is now still homeless and has been staying with friends. She also reports she had been working but is on disability.

## 2020-02-24 NOTE — PHYSICAL EXAM
[General Appearance - Alert] : alert [General Appearance - In No Acute Distress] : in no acute distress [Oriented To Time, Place, And Person] : oriented to person, place, and time [Normal] : no focal deficits [de-identified] : Normal tonsillar fossa. [de-identified] : ECOG 1 [de-identified] : large firm masses throughout left neck. New from prior exam. left level IIa and III, small firm mass anterior level IV and level VI [de-identified] : scarring to left neck  [de-identified] : disorganized thinking at times, requires redirecting

## 2020-02-25 ENCOUNTER — OUTPATIENT (OUTPATIENT)
Dept: OUTPATIENT SERVICES | Facility: HOSPITAL | Age: 40
LOS: 1 days | End: 2020-02-25
Payer: MEDICARE

## 2020-02-25 LAB — GLUCOSE BLDC GLUCOMTR-MCNC: 111 MG/DL — HIGH (ref 70–99)

## 2020-02-25 PROCEDURE — A9552: CPT

## 2020-02-25 PROCEDURE — 82962 GLUCOSE BLOOD TEST: CPT

## 2020-02-25 PROCEDURE — 78815 PET IMAGE W/CT SKULL-THIGH: CPT | Mod: 26

## 2020-02-25 PROCEDURE — 78815 PET IMAGE W/CT SKULL-THIGH: CPT

## 2020-02-27 NOTE — PROCEDURE
[None] : none [Surveillance] : monitor for disease recurrence [Photographs Taken] : photographs taken [Flexible Endoscope] : examined with the flexible endoscope [Normal] : normal [de-identified] : Lubricating jelly only. [de-identified] : No primary tumor site could be identified.

## 2020-03-02 ENCOUNTER — APPOINTMENT (OUTPATIENT)
Dept: RADIATION ONCOLOGY | Facility: CLINIC | Age: 40
End: 2020-03-02
Payer: MEDICARE

## 2020-03-02 VITALS
RESPIRATION RATE: 14 BRPM | WEIGHT: 155.1 LBS | BODY MASS INDEX: 25.81 KG/M2 | SYSTOLIC BLOOD PRESSURE: 114 MMHG | OXYGEN SATURATION: 100 % | HEART RATE: 98 BPM | DIASTOLIC BLOOD PRESSURE: 75 MMHG

## 2020-03-02 PROCEDURE — 99214 OFFICE O/P EST MOD 30 MIN: CPT | Mod: 25

## 2020-03-02 NOTE — HISTORY OF PRESENT ILLNESS
[FreeTextEntry1] : Ms. 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 Meeks.  \par \par 2/24/20 - Follow-up\par Ms. Conway was last seen on 2/13/20.  She was to complete PET/CT and provide a dental clearance letter.  \par \par PET/CT 2/25/20 - IMPRESSION: Impression: Since 10/14/2019: Mass in the left neck at the level of the hyoid is increased in width with increased FDG uptake. New FDG uptake within a soft tissue mass in the left parotid gland which may represent lymph node. Increased size and FDG uptake of multiple cervical lymph nodes as well as new FDG avid nodes. \par \par Today, she reports feeling fatigued.  She also states she has been having cyclical menstrual cramps despite not having a period since June.  She is otherwise without complaints.  She denies pain, dysphagia, odynophagia, dyspnea.  \par \par 2/13/20 - SNA\par Ms. Conway was last seen on 8/26/19 by Dr. Allen.  She was recommended radiation with concurrent chemotherapy to which she agreed.  She completed CT simulation on 9/12/19.  She had difficulty obtaining dental clearance due to insurance issues and was lost to follow-up.  She last saw Dr. Meeks last week.  She states he recommended she have chemoRT.  Most recent imaging as follows:\par \par PET/CT 10/14/19 - Impression: Since 6/9/2019, there has been dramatic improvement in the size and degree of FDG uptake of the left neck mass. There is persistent uptake in the left carotid space at the angle of the mandible, within the skin and subcutaneous \par tissues of the left neck at the level of the hyoid bone, as well as a few mildly FDG avid bilateral cervical lymph nodes as above. \par \par Today, she complains of intermittent nausea, vomiting, and severe fatigue since completing chemo.  She has been researching chronic fatigue syndrome and identifies with the symptoms profile.  She reports that following her PET/CT in October the masses in her neck began to enlarge and continued to grow until approximately 1 month ago.  She states the masses had been present since prior to chemo, but were very small, only palpable and not visible.  She feels that the size has been stable over the past month.  She notes that this happened with her previous PET/CT and wonders if she has been having "bad reactions" to the PET scans causing the cancer to grow.  She denies pain, dyspnea, cough, dysphagia, odynophagia.  She states she completed her dental evaluation and had wisdom teeth and an additional tooth extracted in December.  \par \par She also reports feeling anxious.  She states she has baseline anxiety and her cancer and chemo have exacerbated it.  She is considering starting an anxiolytic and/or SSRI.  She has not seen psych recently.  \par \par She has several hypotheses about her disease that she presents today. She suggests that the PET/CT scans made her cancer grow faster. She asks if it is possible her cancer was mis-diagnosed and she has Cushings syndrome and not cancer. She also wonders if her diagnosis is actually more consistent with "Bronchial cyst area." She thinks her level VI node emerged after she had dental work.\par \par Of note, she states she is currently living in [place] and thinks she will be living in [place] during her treatment.  She admits to occasional alcohol use.  She denies smoking and drug use.  \par \par 8/26/19 - SNA\par Ms. Conway returns today for SNA.  She was last seen in this office by Dr. Londono on 3/4/19.  She was recommended further diagnostic workup to include panendoscopy and targeted biopsy, PET/CT and MRI and treatment consisting of induction chemo followed by radiation.  She was also advised to seek a second opinion given her concern that her diagnosis was a mistake.  This office was unable to reach her following that visit.  It is well documented in her medical record that she continued to delay further workup and initiation of treatment despite medical advice to the contrary.  She eventually followed-up with Dr. Alvarado and Dr. Meeks and underwent PET/CT, MRI orbit/face/neck, and CTA neck and brain.  \par \par PET/CT 6/9/19\par There is has been interval enlargement of the hypermetabolic left-sided neck mass which now measures up to 11.1 cm in craniocaudal dimension with an SUV of 21.3. There is invasion of the adjacent soft tissues structures, with involvement of the \par left common and internal carotid arteries, as well as the left-sided neck musculature. There are enlarged, hypermetabolic lymph nodes in the left retropharyngeal lymph region and cervical levels 1, 5, and 6. Additionally there are enlarged hypermetabolic lymph nodes at the right cervical levels 3 and 6, as well as the bilateral supraclavicular regions.  \par IMPRESSION: \par Compared with prior PET/CT dated 12/21/2018 has been interval enlargement of large left-sided hypermetabolic neck mass. \par There are an increased number of hypermetabolic lymph nodes in the bilateral cervical regions, retropharyngeal region, and supraclavicular region as above. \par \par MRI orbit/face/neck with and without contrast 6/10/19 - IMPRESSION: In this patient with left neck conglomerate klarissa mass that exceeds 10 cm in maximal dimension, there is complete encasement of the left common and internal carotid arteries as well as invasion of the skin, parotid and submandibular glands. There is also pathologic lymphadenopathy in the right infrahyoid neck. The primary tumor is not identified. Please see report above for further detail. \par \par CTA brain with contrast 6/20/19 - IMPRESSION: Unremarkable CTA examination of the brain.  \par \par CTA neck with contrast 6/20/19 - IMPRESSION: As seen on MRI, massive pathologic cervical lymphadenopathy encases the left \par CCA, ECA and ICA with mild luminal narrowing relative to the normal right side. No pseudoaneurysm or other evidence to portend a risk of impending blowout. CTA brain is normal. \par \par She completed 3 cycles of induction chemotherapy (DCF) under the care of Dr. Meeks, completed 8/8/19.  She returns for consideration of radiation therapy.  She states Dr. Meeks has recommended concurrent chemo/RT.  She expresses satisfaction with her response to chemotherapy thus far, as the neck mass has significantly decreased in size.  Although, she is concerned about lymphadenopathy, which she reports is unchanged.  She complains of "aching" to the left side of her body as well as lower extremity edema since chemo.  She denies pain, dysphagia, odynophagia.  She notes weight fluctuation throughout chemo.  \par \par Of note, she states she is currently living "in a few different places."  She anticipates [place] on the [place] of [place] by the time her radiation begins.  She denies smoking.  Admits to one drink every 1-2 months, most recently a glass of wine last weekend.  She denies drug use.  Her last menstrual period was in June.  She states she is working on obtaining dental insurance and that she needs an extraction and a root canal.  She also mentions needing to follow-up on an abnormal PAP smear from December 2018.  \par __________________________________________________________________________________\par ONCOLOGIC HISTORY (3/4/19)\par Ms. Cherelle Conway is a 39F, former smoker (1/2 ppd x 15 years) referred by Dr. 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 \par \par Patient first noticed a left neck mass approx 2 years ago. At that time, she reportedly had drainage of the growth at [facility] that did not show any infection and received antibiotics. She states the mass was drained and she was told it was a cyst. She does not know if the specimen was sent for pathology. Remission of the mass occurred, and it reappeared approx 20 months ago. She again had FNA (non-diagnostic) and received antibiotics. Patient states she has seen other ENTs "at several other institutions," but would not tell us who she has seen or where. She reports feeling fatigued in the summer of 2018, had been to urgent care on a few occasions but did not receive a diagnosis.  Starting in the summer of 2018, she also became "displaced" due to family issues and breaking up with a boyfriend, and she was in and out of homeless shelters for several months. \par \par Patient reports she was having pain in her teeth and gums around November 2018. She went to the ED at [facility] because she thought she could get emergent dental work done at the ED.  Imaging was done. \par \par CT neck on 12/1/18 revealed the following:\par -Soft tissues mass noted in the LEFT neck extending deep to the mandible on the left compressing the submandibular gland anteriorly with internal heterogeneity which is predominantly solid. This mass is lateral to the carotid artery with apparent compression and effacement of the internal jugular vein. The mass measures up to 5 x 3.7 cm. This mass is deep to the flattened sternocleidomastoid muscle and extends to the lower left neck just  the supraclavicular fossa. The bulk of the mass appears below the left parotid gland- cannot exclude involvement of the lower portion of the parotid. \par -Soft tissue mass measuring up to 1.9 cm with central low attenuation possibly necrosis above this mass, possibly representing a necrotic lymph node. Other prominent left sided lymph nodes in the neck in the area of the jugulodigastric region and posterior triangle are also noted including a posterior triangle lymph node posterior to the sternocleidomastoid. \par \par She saw Dr. Weller for consultation on 12/5/18, and at the time reported left neck mass for many months with cervical nodes. She had FNA of the left neck mass at that time, and pathology showed squamous cell carcinoma, HPV related. She then saw Dr. Alvarado on 12/17/18 for consultation; discussed treatment options based on PET/ CT results. \par \par PET/ CT done 12/21/18 showed the following:\par Impression: Large supra and infrahyoid left neck mass measuring 5.4 cm, similar as compared to 12/1/2018, and increased in size as compared to 11/10/2016 as above. In view of the biopsy results, this is compatible with a diagnosis of squamous cell carcinoma. Left supraclavicular and right level 3 hypermetabolic lymphadenopathy is noted, likely associated pathologic lymphadenopathy.\par \par Her case was presented at [facility]; consensus was for surgery (left neck dissection, tonsillectomy, BOT biopsy), followed by chemoRT. She saw Dr. Alvarado on 1/7/19 to discuss surgical plan. However, patient grew increasingly uncomfortable with the biopsies because she does not want to undergo anesthesia. She ultimately opted against surgery and would like to proceed with chemoRT. She was referred here and to Dr. Meeks.  She has not yet seen Dr. Meeks or scheduled a consultation with him. \par \par Today, she expresses she is "not convinced" of her diagnosis and questions if the mass is due to Cushing's disease or if it could be related to high stress (she reports she has "family issues that have caused a great deal of stress"). She questions that she could have HPV related disease as she "does not partake in that kind of activity," and she fixated on this during the clinical encounter. She admits that she went to a hospital in [place] to try to get another biopsy but that they would not perform another biopsy since she has a known diagnosis of cancer. Today, she is evasive with answering questions and providing her health history. She denies any pain or dysphagia, fever, CP, SOB, cough, weight loss. She reports weight gain, as well as throat clearing. She also reports night sweats, fatigue, and "feeling cold all the time." She reports she sees a psychiatrist, who prescribes medications for anxiety and depression, but she declined to say which medications she takes or who her psychiatrist is. Upon further questioning, she stated that she is off her antidepressant. She notes that she does not have a dentist nor does she have dental coverage. She further declined to provide the name of her PCP; states she "might be looking for a new one." \par \par Of note, she had been living in shelters for a few months. She is now still homeless and has been staying with friends. She also reports she had been working but is on disability.

## 2020-03-02 NOTE — PHYSICAL EXAM
[General Appearance - Alert] : alert [General Appearance - In No Acute Distress] : in no acute distress [Oriented To Time, Place, And Person] : oriented to person, place, and time [Normal] : no focal deficits [de-identified] : Normal tonsillar fossa. [de-identified] : large firm masses throughout left neck. New from prior exam. left level IIa and III, small firm mass anterior level IV and level VI [de-identified] : ECOG 1 [de-identified] : disorganized thinking at times, requires redirecting  [de-identified] : scarring to left neck

## 2020-03-02 NOTE — DATA REVIEWED
[FreeTextEntry1] : I have personally reviewed all relevant imaging studies (PET, CT) and I have discussed the case with the referring physician Dr Meeks and Dr. Alvarado\par

## 2020-03-02 NOTE — ASSESSMENT
[FreeTextEntry1] : Recurrence of disease after induction chemo; never received definitive RT. Disease now in bilateral necks. No distant metastases.

## 2020-03-03 NOTE — DISCHARGE NOTE NURSING/CASE MANAGEMENT/SOCIAL WORK - NSTRANSFERBELONGINGSRESP_GEN_A_NUR
yes [FreeTextEntry1] : Check routine labs as above.\par He lost 17 lb with improved diet and exercise regimen.\par Vaccines all UTD.\par Prostate cancer screening with PSA.\par Colonoscopy screening at age 50.\par Screen for STD's. He is monogamous with girlfriend.\par Referred to dermatology for skin cancer screen. Penile lesion appears benign - check HSV titers. \par \par RTO in 1 yr for annual exam or earlier PRN for acute care.\par

## 2020-03-25 VITALS
RESPIRATION RATE: 15 BRPM | HEART RATE: 65 BPM | OXYGEN SATURATION: 100 % | DIASTOLIC BLOOD PRESSURE: 84 MMHG | WEIGHT: 144.4 LBS | SYSTOLIC BLOOD PRESSURE: 122 MMHG | BODY MASS INDEX: 24.03 KG/M2 | TEMPERATURE: 97.7 F

## 2020-03-25 RX ORDER — SODIUM FLUORIDE 5 MG/ML
1.1 PASTE, DENTIFRICE DENTAL
Qty: 2 | Refills: 5 | Status: ACTIVE | COMMUNITY
Start: 2020-03-25 | End: 1900-01-01

## 2020-03-25 NOTE — PHYSICAL EXAM
[General Appearance - Alert] : alert [General Appearance - In No Acute Distress] : in no acute distress [Normal] : no focal deficits [Oriented To Time, Place, And Person] : oriented to person, place, and time [de-identified] : ECOG 1 [de-identified] : Normal tonsillar fossa. [de-identified] : large firm masses throughout left neck. New from prior exam. left level IIa and III, small firm mass anterior level IV and level VI [de-identified] : scarring to left neck  [de-identified] : disorganized thinking at times, requires redirecting

## 2020-03-25 NOTE — HISTORY OF PRESENT ILLNESS
[FreeTextEntry1] : Ms. 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 Meeks.  \par \par 3/25/20 - OTV - Ms. Conway has completed 1000 cGy / 7000 cGy to the oropharynx/neck.  Today, she reports feeling stable and is without complaints.  She will see Dr. Meeks for chemo this afternoon.  \par __________________________________________________________________________________\par ONCOLOGIC HISTORY (3/4/19)\par Ms. Cherelle Conway is a 39F, former smoker (1/2 ppd x 15 years) referred by Dr. 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 \par \par Patient first noticed a left neck mass approx 2 years ago. At that time, she reportedly had drainage of the growth at [facility] that did not show any infection and received antibiotics. She states the mass was drained and she was told it was a cyst. She does not know if the specimen was sent for pathology. Remission of the mass occurred, and it reappeared approx 20 months ago. She again had FNA (non-diagnostic) and received antibiotics. Patient states she has seen other ENTs "at several other institutions," but would not tell us who she has seen or where. She reports feeling fatigued in the summer of 2018, had been to urgent care on a few occasions but did not receive a diagnosis.  Starting in the summer of 2018, she also became "displaced" due to family issues and breaking up with a boyfriend, and she was in and out of homeless shelters for several months. \par \par Patient reports she was having pain in her teeth and gums around November 2018. She went to the ED at [facility] because she thought she could get emergent dental work done at the ED.  Imaging was done. \par \par CT neck on 12/1/18 revealed the following:\par -Soft tissues mass noted in the LEFT neck extending deep to the mandible on the left compressing the submandibular gland anteriorly with internal heterogeneity which is predominantly solid. This mass is lateral to the carotid artery with apparent compression and effacement of the internal jugular vein. The mass measures up to 5 x 3.7 cm. This mass is deep to the flattened sternocleidomastoid muscle and extends to the lower left neck just  the supraclavicular fossa. The bulk of the mass appears below the left parotid gland- cannot exclude involvement of the lower portion of the parotid. \par -Soft tissue mass measuring up to 1.9 cm with central low attenuation possibly necrosis above this mass, possibly representing a necrotic lymph node. Other prominent left sided lymph nodes in the neck in the area of the jugulodigastric region and posterior triangle are also noted including a posterior triangle lymph node posterior to the sternocleidomastoid. \par \par She saw Dr. Weller for consultation on 12/5/18, and at the time reported left neck mass for many months with cervical nodes. She had FNA of the left neck mass at that time, and pathology showed squamous cell carcinoma, HPV related. She then saw Dr. Alvarado on 12/17/18 for consultation; discussed treatment options based on PET/ CT results. \par \par PET/ CT done 12/21/18 showed the following:\par Impression: Large supra and infrahyoid left neck mass measuring 5.4 cm, similar as compared to 12/1/2018, and increased in size as compared to 11/10/2016 as above. In view of the biopsy results, this is compatible with a diagnosis of squamous cell carcinoma. Left supraclavicular and right level 3 hypermetabolic lymphadenopathy is noted, likely associated pathologic lymphadenopathy.\par \par Her case was presented at [facility]; consensus was for surgery (left neck dissection, tonsillectomy, BOT biopsy), followed by chemoRT. She saw Dr. Alvarado on 1/7/19 to discuss surgical plan. However, patient grew increasingly uncomfortable with the biopsies because she does not want to undergo anesthesia. She ultimately opted against surgery and would like to proceed with chemoRT. She was referred here and to Dr. Meeks.  She has not yet seen Dr. Meeks or scheduled a consultation with him. \par \par Today, she expresses she is "not convinced" of her diagnosis and questions if the mass is due to Cushing's disease or if it could be related to high stress (she reports she has "family issues that have caused a great deal of stress"). She questions that she could have HPV related disease as she "does not partake in that kind of activity," and she fixated on this during the clinical encounter. She admits that she went to a hospital in [place] to try to get another biopsy but that they would not perform another biopsy since she has a known diagnosis of cancer. Today, she is evasive with answering questions and providing her health history. She denies any pain or dysphagia, fever, CP, SOB, cough, weight loss. She reports weight gain, as well as throat clearing. She also reports night sweats, fatigue, and "feeling cold all the time." She reports she sees a psychiatrist, who prescribes medications for anxiety and depression, but she declined to say which medications she takes or who her psychiatrist is. Upon further questioning, she stated that she is off her antidepressant. She notes that she does not have a dentist nor does she have dental coverage. She further declined to provide the name of her PCP; states she "might be looking for a new one." \par \par Of note, she had been living in shelters for a few months. She is now still homeless and has been staying with friends. She also reports she had been working but is on disability. \par \par 8/26/19 - SNA\par Ms. Conway returns today for SNA.  She was last seen in this office by Dr. Londono on 3/4/19.  She was recommended further diagnostic workup to include panendoscopy and targeted biopsy, PET/CT and MRI and treatment consisting of induction chemo followed by radiation.  She was also advised to seek a second opinion given her concern that her diagnosis was a mistake.  This office was unable to reach her following that visit.  It is well documented in her medical record that she continued to delay further workup and initiation of treatment despite medical advice to the contrary.  She eventually followed-up with Dr. Alvarado and Dr. Meeks and underwent PET/CT, MRI orbit/face/neck, and CTA neck and brain.  \par \par PET/CT 6/9/19\par There is has been interval enlargement of the hypermetabolic left-sided neck mass which now measures up to 11.1 cm in craniocaudal dimension with an SUV of 21.3. There is invasion of the adjacent soft tissues structures, with involvement of the \par left common and internal carotid arteries, as well as the left-sided neck musculature. There are enlarged, hypermetabolic lymph nodes in the left retropharyngeal lymph region and cervical levels 1, 5, and 6. Additionally there are enlarged hypermetabolic lymph nodes at the right cervical levels 3 and 6, as well as the bilateral supraclavicular regions.  \par IMPRESSION: \par Compared with prior PET/CT dated 12/21/2018 has been interval enlargement of large left-sided hypermetabolic neck mass. \par There are an increased number of hypermetabolic lymph nodes in the bilateral cervical regions, retropharyngeal region, and supraclavicular region as above. \par \par MRI orbit/face/neck with and without contrast 6/10/19 - IMPRESSION: In this patient with left neck conglomerate klarissa mass that exceeds 10 cm in maximal dimension, there is complete encasement of the left common and internal carotid arteries as well as invasion of the skin, parotid and submandibular glands. There is also pathologic lymphadenopathy in the right infrahyoid neck. The primary tumor is not identified. Please see report above for further detail. \par \par CTA brain with contrast 6/20/19 - IMPRESSION: Unremarkable CTA examination of the brain.  \par \par CTA neck with contrast 6/20/19 - IMPRESSION: As seen on MRI, massive pathologic cervical lymphadenopathy encases the left \par CCA, ECA and ICA with mild luminal narrowing relative to the normal right side. No pseudoaneurysm or other evidence to portend a risk of impending blowout. CTA brain is normal. \par \par She completed 3 cycles of induction chemotherapy (DCF) under the care of Dr. Meeks, completed 8/8/19.  She returns for consideration of radiation therapy.  She states Dr. Meeks has recommended concurrent chemo/RT.  She expresses satisfaction with her response to chemotherapy thus far, as the neck mass has significantly decreased in size.  Although, she is concerned about lymphadenopathy, which she reports is unchanged.  She complains of "aching" to the left side of her body as well as lower extremity edema since chemo.  She denies pain, dysphagia, odynophagia.  She notes weight fluctuation throughout chemo.  \par \par Of note, she states she is currently living "in a few different places."  She [place] [facility] by the time her radiation begins.  She denies smoking.  Admits to one drink every 1-2 months, most recently a glass of wine last weekend.  She denies drug use.  Her last menstrual period was in June.  She states she is working on obtaining dental insurance and that she needs an extraction and a root canal.  She also mentions needing to follow-up on an abnormal PAP smear from December 2018.  \par \par 2/13/20 - SNA\par Ms. Conway was last seen on 8/26/19 by Dr. Allen.  She was recommended radiation with concurrent chemotherapy to which she agreed.  She completed CT simulation on 9/12/19.  She had difficulty obtaining dental clearance due to insurance issues and was lost to follow-up.  She last saw Dr. Meeks last week.  She states he recommended she have chemoRT.  Most recent imaging as follows:\par \par PET/CT 10/14/19 - Impression: Since 6/9/2019, there has been dramatic improvement in the size and degree of FDG uptake of the left neck mass. There is persistent uptake in the left carotid space at the angle of the mandible, within the skin and subcutaneous \par tissues of the left neck at the level of the hyoid bone, as well as a few mildly FDG avid bilateral cervical lymph nodes as above. \par \par Today, she complains of intermittent nausea, vomiting, and severe fatigue since completing chemo.  She has been researching chronic fatigue syndrome and identifies with the symptoms profile.  She reports that following her PET/CT in October the masses in her neck began to enlarge and continued to grow until approximately 1 month ago.  She states the masses had been present since prior to chemo, but were very small, only palpable and not visible.  She feels that the size has been stable over the past month.  She notes that this happened with her previous PET/CT and wonders if she has been having "bad reactions" to the PET scans causing the cancer to grow.  She denies pain, dyspnea, cough, dysphagia, odynophagia.  She states she completed her dental evaluation and had wisdom teeth and an additional tooth extracted in December.  \par \par She also reports feeling anxious.  She states she has baseline anxiety and her cancer and chemo have exacerbated it.  She is considering starting an anxiolytic and/or SSRI.  She has not seen psych recently.  \par \par She has several hypotheses about her disease that she presents today. She suggests that the PET/CT scans made her cancer grow faster. She asks if it is possible her cancer was mis-diagnosed and she has Cushings syndrome and not cancer. She also wonders if her diagnosis is actually more consistent with "Bronchial cyst area." She thinks her level VI node emerged after she had dental work.\par \par Of note, she states she is currently living in [place] and thinks she will be living in [place] during her treatment.  She admits to occasional alcohol use.  She denies smoking and drug use.  \par \par 2/24/20 - Follow-up\par Ms. Conway was last seen on 2/13/20.  She was to complete PET/CT and provide a dental clearance letter.  \par \par PET/CT 2/25/20 - IMPRESSION: Impression: Since 10/14/2019: Mass in the left neck at the level of the hyoid is increased in width with increased FDG uptake. New FDG uptake within a soft tissue mass in the left parotid gland which may represent lymph node. Increased size and FDG uptake of multiple cervical lymph nodes as well as new FDG avid nodes. \par \par Today, she reports feeling fatigued.  She also states she has been having cyclical menstrual cramps despite not having a period since June.  She is otherwise without complaints.  She denies pain, dysphagia, odynophagia, dyspnea.

## 2020-03-25 NOTE — DISEASE MANAGEMENT
[Clinical] : TNM Stage: c [III] : III [TTNM] : X [NTNM] : 3 [MTNM] : 0 [de-identified] : 1000 cGy [de-identified] : 7000 cGy [de-identified] : oropharynx and neck

## 2020-03-31 VITALS
SYSTOLIC BLOOD PRESSURE: 98 MMHG | WEIGHT: 146 LBS | BODY MASS INDEX: 24.3 KG/M2 | HEART RATE: 66 BPM | RESPIRATION RATE: 16 BRPM | DIASTOLIC BLOOD PRESSURE: 72 MMHG | OXYGEN SATURATION: 100 % | TEMPERATURE: 97.9 F

## 2020-04-03 NOTE — PHYSICAL EXAM
[General Appearance - Alert] : alert [General Appearance - In No Acute Distress] : in no acute distress [Normal] : no focal deficits [Oriented To Time, Place, And Person] : oriented to person, place, and time [de-identified] : ECOG 1 [de-identified] : Normal tonsillar fossa. [de-identified] : large firm masses throughout left neck. New from prior exam. left level IIa and III, small firm mass anterior level IV and level VI [de-identified] : scarring to left neck  [de-identified] : disorganized thinking at times, requires redirecting

## 2020-04-03 NOTE — HISTORY OF PRESENT ILLNESS
[FreeTextEntry1] : Ms. 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 Meeks.  \par \par 3/31/20 - OTV - Ms. Conway has completed 1600 cGy / 7000 cGy to the oropharynx/neck.  She reports she has started having some side effects from treatment - sore throat, neck tightness, mouth sore, and decreased appetite.  She has not started oral rinses, Aquaphor, or head and neck exercises. She admits she sometimes feels paranoid and "ruminates" and asked if she can take ativan or similar if that happens. She is not medicated for her medical problems. \par \par 3/25/20 - OTV - Ms. Conway has completed 1000 cGy / 7000 cGy to the oropharynx/neck.  Today, she reports feeling stable and is without complaints.  She will see Dr. Meeks for chemo this afternoon.  \par __________________________________________________________________________________\par ONCOLOGIC HISTORY (3/4/19)\par Ms. Cherelle Conway is a 39F, former smoker (1/2 ppd x 15 years) referred by Dr. 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 \par \par Patient first noticed a left neck mass approx 2 years ago. At that time, she reportedly had drainage of the growth at [facility] that did not show any infection and received antibiotics. She states the mass was drained and she was told it was a cyst. She does not know if the specimen was sent for pathology. Remission of the mass occurred, and it reappeared approx 20 months ago. She again had FNA (non-diagnostic) and received antibiotics. Patient states she has seen other ENTs "at several other institutions," but would not tell us who she has seen or where. She reports feeling fatigued in the summer of 2018, had been to urgent care on a few occasions but did not receive a diagnosis.  Starting in the summer of 2018, she also became "displaced" due to family issues and breaking up with a boyfriend, and she was in and out of homeless shelters for several months. \par \par Patient reports she was having pain in her teeth and gums around November 2018. She went to the ED at [facility] because she thought she could get emergent dental work done at the ED.  Imaging was done. \par \par CT neck on 12/1/18 revealed the following:\par -Soft tissues mass noted in the LEFT neck extending deep to the mandible on the left compressing the submandibular gland anteriorly with internal heterogeneity which is predominantly solid. This mass is lateral to the carotid artery with apparent compression and effacement of the internal jugular vein. The mass measures up to 5 x 3.7 cm. This mass is deep to the flattened sternocleidomastoid muscle and extends to the lower left neck just  the supraclavicular fossa. The bulk of the mass appears below the left parotid gland- cannot exclude involvement of the lower portion of the parotid. \par -Soft tissue mass measuring up to 1.9 cm with central low attenuation possibly necrosis above this mass, possibly representing a necrotic lymph node. Other prominent left sided lymph nodes in the neck in the area of the jugulodigastric region and posterior triangle are also noted including a posterior triangle lymph node posterior to the sternocleidomastoid. \par \par She saw Dr. Weller for consultation on 12/5/18, and at the time reported left neck mass for many months with cervical nodes. She had FNA of the left neck mass at that time, and pathology showed squamous cell carcinoma, HPV related. She then saw Dr. Alvarado on 12/17/18 for consultation; discussed treatment options based on PET/ CT results. \par \par PET/ CT done 12/21/18 showed the following:\par Impression: Large supra and infrahyoid left neck mass measuring 5.4 cm, similar as compared to 12/1/2018, and increased in size as compared to 11/10/2016 as above. In view of the biopsy results, this is compatible with a diagnosis of squamous cell carcinoma. Left supraclavicular and right level 3 hypermetabolic lymphadenopathy is noted, likely associated pathologic lymphadenopathy.\par \par Her case was presented at [facility]; consensus was for surgery (left neck dissection, tonsillectomy, BOT biopsy), followed by chemoRT. She saw Dr. Alvarado on 1/7/19 to discuss surgical plan. However, patient grew increasingly uncomfortable with the biopsies because she does not want to undergo anesthesia. She ultimately opted against surgery and would like to proceed with chemoRT. She was referred here and to Dr. Meeks.  She has not yet seen Dr. Meeks or scheduled a consultation with him. \par \par Today, she expresses she is "not convinced" of her diagnosis and questions if the mass is due to Cushing's disease or if it could be related to high stress (she reports she has "family issues that have caused a great deal of stress"). She questions that she could have HPV related disease as she "does not partake in that kind of activity," and she fixated on this during the clinical encounter. She admits that she went to a hospital in [place] to try to get another biopsy but that they would not perform another biopsy since she has a known diagnosis of cancer. Today, she is evasive with answering questions and providing her health history. She denies any pain or dysphagia, fever, CP, SOB, cough, weight loss. She reports weight gain, as well as throat clearing. She also reports night sweats, fatigue, and "feeling cold all the time." She reports she sees a psychiatrist, who prescribes medications for anxiety and depression, but she declined to say which medications she takes or who her psychiatrist is. Upon further questioning, she stated that she is off her antidepressant. She notes that she does not have a dentist nor does she have dental coverage. She further declined to provide the name of her PCP; states she "might be looking for a new one." \par \par Of note, she had been living in shelters for a few months. She is now still homeless and has been staying with friends. She also reports she had been working but is on disability. \par \par 8/26/19 - SNA\par Ms. Conway returns today for SNA.  She was last seen in this office by Dr. Londono on 3/4/19.  She was recommended further diagnostic workup to include panendoscopy and targeted biopsy, PET/CT and MRI and treatment consisting of induction chemo followed by radiation.  She was also advised to seek a second opinion given her concern that her diagnosis was a mistake.  This office was unable to reach her following that visit.  It is well documented in her medical record that she continued to delay further workup and initiation of treatment despite medical advice to the contrary.  She eventually followed-up with Dr. Alvarado and Dr. Meeks and underwent PET/CT, MRI orbit/face/neck, and CTA neck and brain.  \par \par PET/CT 6/9/19\par There is has been interval enlargement of the hypermetabolic left-sided neck mass which now measures up to 11.1 cm in craniocaudal dimension with an SUV of 21.3. There is invasion of the adjacent soft tissues structures, with involvement of the \par left common and internal carotid arteries, as well as the left-sided neck musculature. There are enlarged, hypermetabolic lymph nodes in the left retropharyngeal lymph region and cervical levels 1, 5, and 6. Additionally there are enlarged hypermetabolic lymph nodes at the right cervical levels 3 and 6, as well as the bilateral supraclavicular regions.  \par IMPRESSION: \par Compared with prior PET/CT dated 12/21/2018 has been interval enlargement of large left-sided hypermetabolic neck mass. \par There are an increased number of hypermetabolic lymph nodes in the bilateral cervical regions, retropharyngeal region, and supraclavicular region as above. \par \par MRI orbit/face/neck with and without contrast 6/10/19 - IMPRESSION: In this patient with left neck conglomerate klarissa mass that exceeds 10 cm in maximal dimension, there is complete encasement of the left common and internal carotid arteries as well as invasion of the skin, parotid and submandibular glands. There is also pathologic lymphadenopathy in the right infrahyoid neck. The primary tumor is not identified. Please see report above for further detail. \par \par CTA brain with contrast 6/20/19 - IMPRESSION: Unremarkable CTA examination of the brain.  \par \par CTA neck with contrast 6/20/19 - IMPRESSION: As seen on MRI, massive pathologic cervical lymphadenopathy encases the left \par CCA, ECA and ICA with mild luminal narrowing relative to the normal right side. No pseudoaneurysm or other evidence to portend a risk of impending blowout. CTA brain is normal. \par \par She completed 3 cycles of induction chemotherapy (DCF) under the care of Dr. Meeks, completed 8/8/19.  She returns for consideration of radiation therapy.  She states Dr. Meeks has recommended concurrent chemo/RT.  She expresses satisfaction with her response to chemotherapy thus far, as the neck mass has significantly decreased in size.  Although, she is concerned about lymphadenopathy, which she reports is unchanged.  She complains of "aching" to the left side of her body as well as lower extremity edema since chemo.  She denies pain, dysphagia, odynophagia.  She notes weight fluctuation throughout chemo.  \par \par Of note, she states she is currently living "in a few different places."  She anticip[place] [facility] by the time her radiation begins.  She denies smoking.  Admits to one drink every 1-2 months, most recently a glass of wine last weekend.  She denies drug use.  Her last menstrual period was in June.  She states she is working on obtaining dental insurance and that she needs an extraction and a root canal.  She also mentions needing to follow-up on an abnormal PAP smear from December 2018.  \par \par 2/13/20 - SNA\par Ms. Conway was last seen on 8/26/19 by Dr. Allen.  She was recommended radiation with concurrent chemotherapy to which she agreed.  She completed CT simulation on 9/12/19.  She had difficulty obtaining dental clearance due to insurance issues and was lost to follow-up.  She last saw Dr. Meeks last week.  She states he recommended she have chemoRT.  Most recent imaging as follows:\par \par PET/CT 10/14/19 - Impression: Since 6/9/2019, there has been dramatic improvement in the size and degree of FDG uptake of the left neck mass. There is persistent uptake in the left carotid space at the angle of the mandible, within the skin and subcutaneous \par tissues of the left neck at the level of the hyoid bone, as well as a few mildly FDG avid bilateral cervical lymph nodes as above. \par \par Today, she complains of intermittent nausea, vomiting, and severe fatigue since completing chemo.  She has been researching chronic fatigue syndrome and identifies with the symptoms profile.  She reports that following her PET/CT in October the masses in her neck began to enlarge and continued to grow until approximately 1 month ago.  She states the masses had been present since prior to chemo, but were very small, only palpable and not visible.  She feels that the size has been stable over the past month.  She notes that this happened with her previous PET/CT and wonders if she has been having "bad reactions" to the PET scans causing the cancer to grow.  She denies pain, dyspnea, cough, dysphagia, odynophagia.  She states she completed her dental evaluation and had wisdom teeth and an additional tooth extracted in December.  \par \par She also reports feeling anxious.  She states she has baseline anxiety and her cancer and chemo have exacerbated it.  She is considering starting an anxiolytic and/or SSRI.  She has not seen psych recently.  \par \par She has several hypotheses about her disease that she presents today. She suggests that the PET/CT scans made her cancer grow faster. She asks if it is possible her cancer was mis-diagnosed and she has Cushings syndrome and not cancer. She also wonders if her diagnosis is actually more consistent with "Bronchial cyst area." She thinks her level VI node emerged after she had dental work.\par \par Of note, she states she is currently living in [place] and thinks she will be living in [place] during her treatment.  She admits to occasional alcohol use.  She denies smoking and drug use.  \par \par 2/24/20 - Follow-up\par Ms. Conway was last seen on 2/13/20.  She was to complete PET/CT and provide a dental clearance letter.  \par \par PET/CT 2/25/20 - IMPRESSION: Impression: Since 10/14/2019: Mass in the left neck at the level of the hyoid is increased in width with increased FDG uptake. New FDG uptake within a soft tissue mass in the left parotid gland which may represent lymph node. Increased size and FDG uptake of multiple cervical lymph nodes as well as new FDG avid nodes. \par \par Today, she reports feeling fatigued.  She also states she has been having cyclical menstrual cramps despite not having a period since June.  She is otherwise without complaints.  She denies pain, dysphagia, odynophagia, dyspnea.

## 2020-04-03 NOTE — DISEASE MANAGEMENT
[Clinical] : TNM Stage: c [III] : III [TTNM] : X [NTNM] : 3 [MTNM] : 0 [de-identified] : 1600 cGy [de-identified] : 7000 cGy [de-identified] : oropharynx and neck

## 2020-04-07 VITALS
HEART RATE: 73 BPM | SYSTOLIC BLOOD PRESSURE: 116 MMHG | OXYGEN SATURATION: 100 % | WEIGHT: 153.3 LBS | BODY MASS INDEX: 25.51 KG/M2 | DIASTOLIC BLOOD PRESSURE: 72 MMHG

## 2020-04-07 NOTE — HISTORY OF PRESENT ILLNESS
[FreeTextEntry1] : Ms. 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 Meeks.  \par \par 4/7/2020 OTV - Cherelle has started to develop sore throat with yawning or eating, up to 8 out of 10. Not taking pain meds. Has not started MMW, lost the instructions. Just started aquaphor for neck. Says her taste is terrible. No other new symptoms. Not using rinses or doing H&N exercises. She asked many questions about radiation effects and she is concerned radiation is going to her brain because she feels foggy and not clear-headed. She asked if she could have something in writing saying that her brain is not being irradiated. She then agreed that she has seen her chart and she knows that her neck is being treated. She asked me about proton RT, and she also asked about side effects that she heard about on the internet. I explained this could be a side effect of treatment - inflammatory response, similar to "chemo brain" but that the brain is not the target of RT.   \par \par 3/31/20 - OTV - Ms. Conway has completed 1600 cGy / 7000 cGy to the oropharynx/neck.  She reports she has started having some side effects from treatment - sore throat, neck tightness, mouth sore, and decreased appetite.  She has not started oral rinses, Aquaphor, or head and neck exercises. She admits she sometimes feels paranoid and "ruminates" and asked if she can take ativan or similar if that happens. She is not medicated for her medical problems. \par \par 3/25/20 - OTV - Ms. Conway has completed 1000 cGy / 7000 cGy to the oropharynx/neck.  Today, she reports feeling stable and is without complaints.  She will see Dr. Meeks for chemo this afternoon.  \par __________________________________________________________________________________\par ONCOLOGIC HISTORY (3/4/19)\par Ms. Cherelle Conway is a 39F, former smoker (1/2 ppd x 15 years) referred by Dr. 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 \par \par Patient first noticed a left neck mass approx 2 years ago. At that time, she reportedly had drainage of the growth at [facility] that did not show any infection and received antibiotics. She states the mass was drained and she was told it was a cyst. She does not know if the specimen was sent for pathology. Remission of the mass occurred, and it reappeared approx 20 months ago. She again had FNA (non-diagnostic) and received antibiotics. Patient states she has seen other ENTs "at several other institutions," but would not tell us who she has seen or where. She reports feeling fatigued in the summer of 2018, had been to urgent care on a few occasions but did not receive a diagnosis.  Starting in the summer of 2018, she also became "displaced" due to family issues and breaking up with a boyfriend, and she was in and out of homeless shelters for several months. \par \par Patient reports she was having pain in her teeth and gums around November 2018. She went to the ED at [facility] because she thought she could get emergent dental work done at the ED.  Imaging was done. \par \par CT neck on 12/1/18 revealed the following:\par -Soft tissues mass noted in the LEFT neck extending deep to the mandible on the left compressing the submandibular gland anteriorly with internal heterogeneity which is predominantly solid. This mass is lateral to the carotid artery with apparent compression and effacement of the internal jugular vein. The mass measures up to 5 x 3.7 cm. This mass is deep to the flattened sternocleidomastoid muscle and extends to the lower left neck just  the supraclavicular fossa. The bulk of the mass appears below the left parotid gland- cannot exclude involvement of the lower portion of the parotid. \par -Soft tissue mass measuring up to 1.9 cm with central low attenuation possibly necrosis above this mass, possibly representing a necrotic lymph node. Other prominent left sided lymph nodes in the neck in the area of the jugulodigastric region and posterior triangle are also noted including a posterior triangle lymph node posterior to the sternocleidomastoid. \par \par She saw Dr. Weller for consultation on 12/5/18, and at the time reported left neck mass for many months with cervical nodes. She had FNA of the left neck mass at that time, and pathology showed squamous cell carcinoma, HPV related. She then saw Dr. Alvarado on 12/17/18 for consultation; discussed treatment options based on PET/ CT results. \par \par PET/ CT done 12/21/18 showed the following:\par Impression: Large supra and infrahyoid left neck mass measuring 5.4 cm, similar as compared to 12/1/2018, and increased in size as compared to 11/10/2016 as above. In view of the biopsy results, this is compatible with a diagnosis of squamous cell carcinoma. Left supraclavicular and right level 3 hypermetabolic lymphadenopathy is noted, likely associated pathologic lymphadenopathy.\par \par Her case was presented at [facility]; consensus was for surgery (left neck dissection, tonsillectomy, BOT biopsy), followed by chemoRT. She saw Dr. Alvarado on 1/7/19 to discuss surgical plan. However, patient grew increasingly uncomfortable with the biopsies because she does not want to undergo anesthesia. She ultimately opted against surgery and would like to proceed with chemoRT. She was referred here and to Dr. Meeks.  She has not yet seen Dr. Meeks or scheduled a consultation with him. \par \par Today, she expresses she is "not convinced" of her diagnosis and questions if the mass is due to Cushing's disease or if it could be related to high stress (she reports she has "family issues that have caused a great deal of stress"). She questions that she could have HPV related disease as she "does not partake in that kind of activity," and she fixated on this during the clinical encounter. She admits that she went to a hospital in [place] to try to get another biopsy but that they would not perform another biopsy since she has a known diagnosis of cancer. Today, she is evasive with answering questions and providing her health history. She denies any pain or dysphagia, fever, CP, SOB, cough, weight loss. She reports weight gain, as well as throat clearing. She also reports night sweats, fatigue, and "feeling cold all the time." She reports she sees a psychiatrist, who prescribes medications for anxiety and depression, but she declined to say which medications she takes or who her psychiatrist is. Upon further questioning, she stated that she is off her antidepressant. She notes that she does not have a dentist nor does she have dental coverage. She further declined to provide the name of her PCP; states she "might be looking for a new one." \par \par Of note, she had been living in shelters for a few months. She is now still homeless and has been staying with friends. She also reports she had been working but is on disability. \par \par 8/26/19 - SNA\par Ms. Conway returns today for SNA.  She was last seen in this office by Dr. Londono on 3/4/19.  She was recommended further diagnostic workup to include panendoscopy and targeted biopsy, PET/CT and MRI and treatment consisting of induction chemo followed by radiation.  She was also advised to seek a second opinion given her concern that her diagnosis was a mistake.  This office was unable to reach her following that visit.  It is well documented in her medical record that she continued to delay further workup and initiation of treatment despite medical advice to the contrary.  She eventually followed-up with Dr. Alvarado and Dr. Meeks and underwent PET/CT, MRI orbit/face/neck, and CTA neck and brain.  \par \par PET/CT 6/9/19\par There is has been interval enlargement of the hypermetabolic left-sided neck mass which now measures up to 11.1 cm in craniocaudal dimension with an SUV of 21.3. There is invasion of the adjacent soft tissues structures, with involvement of the \par left common and internal carotid arteries, as well as the left-sided neck musculature. There are enlarged, hypermetabolic lymph nodes in the left retropharyngeal lymph region and cervical levels 1, 5, and 6. Additionally there are enlarged hypermetabolic lymph nodes at the right cervical levels 3 and 6, as well as the bilateral supraclavicular regions.  \par IMPRESSION: \par Compared with prior PET/CT dated 12/21/2018 has been interval enlargement of large left-sided hypermetabolic neck mass. \par There are an increased number of hypermetabolic lymph nodes in the bilateral cervical regions, retropharyngeal region, and supraclavicular region as above. \par \par MRI orbit/face/neck with and without contrast 6/10/19 - IMPRESSION: In this patient with left neck conglomerate klarissa mass that exceeds 10 cm in maximal dimension, there is complete encasement of the left common and internal carotid arteries as well as invasion of the skin, parotid and submandibular glands. There is also pathologic lymphadenopathy in the right infrahyoid neck. The primary tumor is not identified. Please see report above for further detail. \par \par CTA brain with contrast 6/20/19 - IMPRESSION: Unremarkable CTA examination of the brain.  \par \par CTA neck with contrast 6/20/19 - IMPRESSION: As seen on MRI, massive pathologic cervical lymphadenopathy encases the left \par CCA, ECA and ICA with mild luminal narrowing relative to the normal right side. No pseudoaneurysm or other evidence to portend a risk of impending blowout. CTA brain is normal. \par \par She completed 3 cycles of induction chemotherapy (DCF) under the care of Dr. Meeks, completed 8/8/19.  She returns for consideration of radiation therapy.  She states Dr. Meeks has recommended concurrent chemo/RT.  She expresses satisfaction with her response to chemotherapy thus far, as the neck mass has significantly decreased in size.  Although, she is concerned about lymphadenopathy, which she reports is unchanged.  She complains of "aching" to the left side of her body as well as lower extremity edema since chemo.  She denies pain, dysphagia, odynophagia.  She notes weight fluctuation throughout chemo.  \par \par Of note, she states she is currently living "in a few different places."  She anticip[place] of [place] by the time her radiation begins.  She denies smoking.  Admits to one drink every 1-2 months, most recently a glass of wine last weekend.  She denies drug use.  Her last menstrual period was in June.  She states she is working on obtaining dental insurance and that she needs an extraction and a root canal.  She also mentions needing to follow-up on an abnormal PAP smear from December 2018.  \par \par 2/13/20 - SNA\par Ms. Conway was last seen on 8/26/19 by Dr. Allen.  She was recommended radiation with concurrent chemotherapy to which she agreed.  She completed CT simulation on 9/12/19.  She had difficulty obtaining dental clearance due to insurance issues and was lost to follow-up.  She last saw Dr. Meeks last week.  She states he recommended she have chemoRT.  Most recent imaging as follows:\par \par PET/CT 10/14/19 - Impression: Since 6/9/2019, there has been dramatic improvement in the size and degree of FDG uptake of the left neck mass. There is persistent uptake in the left carotid space at the angle of the mandible, within the skin and subcutaneous \par tissues of the left neck at the level of the hyoid bone, as well as a few mildly FDG avid bilateral cervical lymph nodes as above. \par \par Today, she complains of intermittent nausea, vomiting, and severe fatigue since completing chemo.  She has been researching chronic fatigue syndrome and identifies with the symptoms profile.  She reports that following her PET/CT in October the masses in her neck began to enlarge and continued to grow until approximately 1 month ago.  She states the masses had been present since prior to chemo, but were very small, only palpable and not visible.  She feels that the size has been stable over the past month.  She notes that this happened with her previous PET/CT and wonders if she has been having "bad reactions" to the PET scans causing the cancer to grow.  She denies pain, dyspnea, cough, dysphagia, odynophagia.  She states she completed her dental evaluation and had wisdom teeth and an additional tooth extracted in December.  \par \par She also reports feeling anxious.  She states she has baseline anxiety and her cancer and chemo have exacerbated it.  She is considering starting an anxiolytic and/or SSRI.  She has not seen psych recently.  \par \par She has several hypotheses about her disease that she presents today. She suggests that the PET/CT scans made her cancer grow faster. She asks if it is possible her cancer was mis-diagnosed and she has Cushings syndrome and not cancer. She also wonders if her diagnosis is actually more consistent with "Bronchial cyst area." She thinks her level VI node emerged after she had dental work.\par \par Of note, she states she is currently living in [place] and thinks she will be living in [place] during her treatment.  She admits to occasional alcohol use.  She denies smoking and drug use.  \par \par 2/24/20 - Follow-up\par Ms. Conway was last seen on 2/13/20.  She was to complete PET/CT and provide a dental clearance letter.  \par \par PET/CT 2/25/20 - IMPRESSION: Impression: Since 10/14/2019: Mass in the left neck at the level of the hyoid is increased in width with increased FDG uptake. New FDG uptake within a soft tissue mass in the left parotid gland which may represent lymph node. Increased size and FDG uptake of multiple cervical lymph nodes as well as new FDG avid nodes. \par \par Today, she reports feeling fatigued.  She also states she has been having cyclical menstrual cramps despite not having a period since June.  She is otherwise without complaints.  She denies pain, dysphagia, odynophagia, dyspnea.

## 2020-04-07 NOTE — PHYSICAL EXAM
[General Appearance - Alert] : alert [General Appearance - In No Acute Distress] : in no acute distress [Normal] : no focal deficits [Oriented To Time, Place, And Person] : oriented to person, place, and time [de-identified] : ECOG 1 [de-identified] : grade 1 mucositis, no thrush.  [de-identified] : smaller masses throughout left neck.  [de-identified] : scarring to left neck  [de-identified] : disorganized thinking at times, requires redirecting

## 2020-04-07 NOTE — DISEASE MANAGEMENT
[Clinical] : TNM Stage: c [III] : III [TTNM] : X [NTNM] : 3 [MTNM] : 0 [de-identified] : 2200cGy [de-identified] : 7000 cGy [de-identified] : oropharynx and neck

## 2020-04-14 DIAGNOSIS — B37.0 CANDIDAL STOMATITIS: ICD-10-CM

## 2020-04-14 RX ORDER — FLUCONAZOLE 100 MG/1
100 TABLET ORAL
Qty: 11 | Refills: 0 | Status: ACTIVE | COMMUNITY
Start: 2020-04-14 | End: 1900-01-01

## 2020-04-14 NOTE — PHYSICAL EXAM
[General Appearance - Alert] : alert [General Appearance - In No Acute Distress] : in no acute distress [Normal] : no focal deficits [Oriented To Time, Place, And Person] : oriented to person, place, and time [de-identified] : ECOG 1 [de-identified] : grade 1 mucositis, notable for tongue thrush.  [de-identified] : smaller masses throughout left neck.  [de-identified] : scarring to left neck  [de-identified] : seems anxious, questions seem paranoid

## 2020-04-14 NOTE — DISEASE MANAGEMENT
[Clinical] : TNM Stage: c [III] : III [TTNM] : X [NTNM] : 3 [MTNM] : 0 [de-identified] : 3200 cGy [de-identified] : 7000  cGy [de-identified] : oropharynx and neck

## 2020-04-14 NOTE — HISTORY OF PRESENT ILLNESS
[FreeTextEntry1] : Ms. 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 Meeks.  \par \par On 4/10/20 I spoke with her for 15 minutes and showed her the RT plan and explained there is minimal RT to her brain. \par \par 4/14/2020- OTV- Ms. Conway has completed  3200 cGy/ 7000 cGy to the oropharynx/ neck.  She saw RICHARD George last week. Today, she continues to feel very anxious about treatment and wants to know if quitting is an option.\par She explains that she has a history of very negative interactions with healthcare surrounding a psychiatric experience and that is why she "seems paranoid" and asks so many questions.\par She again uses the word "ruminate"\par She has not taken the lidocaine or MMW and is complaining of much worse sore throat and trouble eating. She uses aquaphor "sometimes". Is using biotene. \par \par  \par 4/7/2020 OTV - Cherelle has started to develop sore throat with yawning or eating, up to 8 out of 10. Not taking pain meds. Has not started MMW, lost the instructions. Just started aquaphor for neck. Says her taste is terrible. No other new symptoms. Not using rinses or doing H&N exercises. She asked many questions about radiation effects and she is concerned radiation is going to her brain because she feels foggy and not clear-headed. She asked if she could have something in writing saying that her brain is not being irradiated. She then agreed that she has seen her chart and she knows that her neck is being treated. She asked me about proton RT, and she also asked about side effects that she heard about on the internet. I explained this could be a side effect of treatment - inflammatory response, similar to "chemo brain" but that the brain is not the target of RT.   \par \par 3/31/20 - OTV - Ms. Conway has completed 1600 cGy / 7000 cGy to the oropharynx/neck.  She reports she has started having some side effects from treatment - sore throat, neck tightness, mouth sore, and decreased appetite.  She has not started oral rinses, Aquaphor, or head and neck exercises. She admits she sometimes feels paranoid and "ruminates" and asked if she can take ativan or similar if that happens. She is not medicated for her medical problems. \par \par 3/25/20 - OTV - Ms. Conway has completed 1000 cGy / 7000 cGy to the oropharynx/neck.  Today, she reports feeling stable and is without complaints.  She will see Dr. Meeks for chemo this afternoon.  \par __________________________________________________________________________________\par ONCOLOGIC HISTORY (3/4/19)\par Ms. Cherelle Conway is a 39F, former smoker (1/2 ppd x 15 years) referred by Dr. 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 \par \par Patient first noticed a left neck mass approx 2 years ago. At that time, she reportedly had drainage of the growth at [facility] that did not show any infection and received antibiotics. She states the mass was drained and she was told it was a cyst. She does not know if the specimen was sent for pathology. Remission of the mass occurred, and it reappeared approx 20 months ago. She again had FNA (non-diagnostic) and received antibiotics. Patient states she has seen other ENTs "at several other institutions," but would not tell us who she has seen or where. She reports feeling fatigued in the summer of 2018, had been to urgent care on a few occasions but did not receive a diagnosis.  Starting in the summer of 2018, she also became "displaced" due to family issues and breaking up with a boyfriend, and she was in and out of homeless shelters for several months. \par \par Patient reports she was having pain in her teeth and gums around November 2018. She went to the ED at [facility] because she thought she could get emergent dental work done at the ED.  Imaging was done. \par \par CT neck on 12/1/18 revealed the following:\par -Soft tissues mass noted in the LEFT neck extending deep to the mandible on the left compressing the submandibular gland anteriorly with internal heterogeneity which is predominantly solid. This mass is lateral to the carotid artery with apparent compression and effacement of the internal jugular vein. The mass measures up to 5 x 3.7 cm. This mass is deep to the flattened sternocleidomastoid muscle and extends to the lower left neck just  the supraclavicular fossa. The bulk of the mass appears below the left parotid gland- cannot exclude involvement of the lower portion of the parotid. \par -Soft tissue mass measuring up to 1.9 cm with central low attenuation possibly necrosis above this mass, possibly representing a necrotic lymph node. Other prominent left sided lymph nodes in the neck in the area of the jugulodigastric region and posterior triangle are also noted including a posterior triangle lymph node posterior to the sternocleidomastoid. \par \par She saw Dr. Weller for consultation on 12/5/18, and at the time reported left neck mass for many months with cervical nodes. She had FNA of the left neck mass at that time, and pathology showed squamous cell carcinoma, HPV related. She then saw Dr. Alvarado on 12/17/18 for consultation; discussed treatment options based on PET/ CT results. \par \par PET/ CT done 12/21/18 showed the following:\par Impression: Large supra and infrahyoid left neck mass measuring 5.4 cm, similar as compared to 12/1/2018, and increased in size as compared to 11/10/2016 as above. In view of the biopsy results, this is compatible with a diagnosis of squamous cell carcinoma. Left supraclavicular and right level 3 hypermetabolic lymphadenopathy is noted, likely associated pathologic lymphadenopathy.\par \par Her case was presented at [facility]; consensus was for surgery (left neck dissection, tonsillectomy, BOT biopsy), followed by chemoRT. She saw Dr. Alvarado on 1/7/19 to discuss surgical plan. However, patient grew increasingly uncomfortable with the biopsies because she does not want to undergo anesthesia. She ultimately opted against surgery and would like to proceed with chemoRT. She was referred here and to Dr. Meeks.  She has not yet seen Dr. Meeks or scheduled a consultation with him. \par \par Today, she expresses she is "not convinced" of her diagnosis and questions if the mass is due to Cushing's disease or if it could be related to high stress (she reports she has "family issues that have caused a great deal of stress"). She questions that she could have HPV related disease as she "does not partake in that kind of activity," and she fixated on this during the clinical encounter. She admits that she went to a hospital in [place] to try to get another biopsy but that they would not perform another biopsy since she has a known diagnosis of cancer. Today, she is evasive with answering questions and providing her health history. She denies any pain or dysphagia, fever, CP, SOB, cough, weight loss. She reports weight gain, as well as throat clearing. She also reports night sweats, fatigue, and "feeling cold all the time." She reports she sees a psychiatrist, who prescribes medications for anxiety and depression, but she declined to say which medications she takes or who her psychiatrist is. Upon further questioning, she stated that she is off her antidepressant. She notes that she does not have a dentist nor does she have dental coverage. She further declined to provide the name of her PCP; states she "might be looking for a new one." \par \par Of note, she had been living in shelters for a few months. She is now still homeless and has been staying with friends. She also reports she had been working but is on disability. \par \par 8/26/19 - SNA\par Ms. Conway returns today for SNA.  She was last seen in this office by Dr. Londono on 3/4/19.  She was recommended further diagnostic workup to include panendoscopy and targeted biopsy, PET/CT and MRI and treatment consisting of induction chemo followed by radiation.  She was also advised to seek a second opinion given her concern that her diagnosis was a mistake.  This office was unable to reach her following that visit.  It is well documented in her medical record that she continued to delay further workup and initiation of treatment despite medical advice to the contrary.  She eventually followed-up with Dr. Alvarado and Dr. Meeks and underwent PET/CT, MRI orbit/face/neck, and CTA neck and brain.  \par \par PET/CT 6/9/19\par There is has been interval enlargement of the hypermetabolic left-sided neck mass which now measures up to 11.1 cm in craniocaudal dimension with an SUV of 21.3. There is invasion of the adjacent soft tissues structures, with involvement of the \par left common and internal carotid arteries, as well as the left-sided neck musculature. There are enlarged, hypermetabolic lymph nodes in the left retropharyngeal lymph region and cervical levels 1, 5, and 6. Additionally there are enlarged hypermetabolic lymph nodes at the right cervical levels 3 and 6, as well as the bilateral supraclavicular regions.  \par IMPRESSION: \par Compared with prior PET/CT dated 12/21/2018 has been interval enlargement of large left-sided hypermetabolic neck mass. \par There are an increased number of hypermetabolic lymph nodes in the bilateral cervical regions, retropharyngeal region, and supraclavicular region as above. \par \par MRI orbit/face/neck with and without contrast 6/10/19 - IMPRESSION: In this patient with left neck conglomerate klarissa mass that exceeds 10 cm in maximal dimension, there is complete encasement of the left common and internal carotid arteries as well as invasion of the skin, parotid and submandibular glands. There is also pathologic lymphadenopathy in the right infrahyoid neck. The primary tumor is not identified. Please see report above for further detail. \par \par CTA brain with contrast 6/20/19 - IMPRESSION: Unremarkable CTA examination of the brain.  \par \par CTA neck with contrast 6/20/19 - IMPRESSION: As seen on MRI, massive pathologic cervical lymphadenopathy encases the left \par CCA, ECA and ICA with mild luminal narrowing relative to the normal right side. No pseudoaneurysm or other evidence to portend a risk of impending blowout. CTA brain is normal. \par \par She completed 3 cycles of induction chemotherapy (DCF) under the care of Dr. Meeks, completed 8/8/19.  She returns for consideration of radiation therapy.  She states Dr. Meeks has recommended concurrent chemo/RT.  She expresses satisfaction with her response to chemotherapy thus far, as the neck mass has significantly decreased in size.  Although, she is concerned about lymphadenopathy, which she reports is unchanged.  She complains of "aching" to the left side of her body as well as lower extremity edema since chemo.  She denies pain, dysphagia, odynophagia.  She notes weight fluctuation throughout chemo.  \par \par Of note, she states she is currently living "in a few different places."  She anticipates [place] [facility] by the time her radiation begins.  She denies smoking.  Admits to one drink every 1-2 months, most recently a glass of wine last weekend.  She denies drug use.  Her last menstrual period was in June.  She states she is working on obtaining dental insurance and that she needs an extraction and a root canal.  She also mentions needing to follow-up on an abnormal PAP smear from December 2018.  \par \par 2/13/20 - SNA\par Ms. Conway was last seen on 8/26/19 by Dr. Allen.  She was recommended radiation with concurrent chemotherapy to which she agreed.  She completed CT simulation on 9/12/19.  She had difficulty obtaining dental clearance due to insurance issues and was lost to follow-up.  She last saw Dr. Meeks last week.  She states he recommended she have chemoRT.  Most recent imaging as follows:\par \par PET/CT 10/14/19 - Impression: Since 6/9/2019, there has been dramatic improvement in the size and degree of FDG uptake of the left neck mass. There is persistent uptake in the left carotid space at the angle of the mandible, within the skin and subcutaneous \par tissues of the left neck at the level of the hyoid bone, as well as a few mildly FDG avid bilateral cervical lymph nodes as above. \par \par Today, she complains of intermittent nausea, vomiting, and severe fatigue since completing chemo.  She has been researching chronic fatigue syndrome and identifies with the symptoms profile.  She reports that following her PET/CT in October the masses in her neck began to enlarge and continued to grow until approximately 1 month ago.  She states the masses had been present since prior to chemo, but were very small, only palpable and not visible.  She feels that the size has been stable over the past month.  She notes that this happened with her previous PET/CT and wonders if she has been having "bad reactions" to the PET scans causing the cancer to grow.  She denies pain, dyspnea, cough, dysphagia, odynophagia.  She states she completed her dental evaluation and had wisdom teeth and an additional tooth extracted in December.  \par \par She also reports feeling anxious.  She states she has baseline anxiety and her cancer and chemo have exacerbated it.  She is considering starting an anxiolytic and/or SSRI.  She has not seen psych recently.  \par \par She has several hypotheses about her disease that she presents today. She suggests that the PET/CT scans made her cancer grow faster. She asks if it is possible her cancer was mis-diagnosed and she has Cushings syndrome and not cancer. She also wonders if her diagnosis is actually more consistent with "Bronchial cyst area." She thinks her level VI node emerged after she had dental work.\par \par Of note, she states she is currently living in [place] and thinks she will be living in [place] during her treatment.  She admits to occasional alcohol use.  She denies smoking and drug use.  \par \par 2/24/20 - Follow-up\par Ms. Conway was last seen on 2/13/20.  She was to complete PET/CT and provide a dental clearance letter.  \par \par PET/CT 2/25/20 - IMPRESSION: Impression: Since 10/14/2019: Mass in the left neck at the level of the hyoid is increased in width with increased FDG uptake. New FDG uptake within a soft tissue mass in the left parotid gland which may represent lymph node. Increased size and FDG uptake of multiple cervical lymph nodes as well as new FDG avid nodes. \par \par Today, she reports feeling fatigued.  She also states she has been having cyclical menstrual cramps despite not having a period since June.  She is otherwise without complaints.  She denies pain, dysphagia, odynophagia, dyspnea.

## 2020-04-28 VITALS
SYSTOLIC BLOOD PRESSURE: 109 MMHG | RESPIRATION RATE: 15 BRPM | DIASTOLIC BLOOD PRESSURE: 80 MMHG | BODY MASS INDEX: 22.63 KG/M2 | TEMPERATURE: 97.8 F | WEIGHT: 136 LBS | HEART RATE: 98 BPM | OXYGEN SATURATION: 100 %

## 2020-04-28 VITALS — HEART RATE: 110 BPM | SYSTOLIC BLOOD PRESSURE: 79 MMHG | DIASTOLIC BLOOD PRESSURE: 55 MMHG

## 2020-04-28 DIAGNOSIS — R22.1 LOCALIZED SWELLING, MASS AND LUMP, NECK: ICD-10-CM

## 2020-04-29 ENCOUNTER — EMERGENCY (EMERGENCY)
Facility: HOSPITAL | Age: 40
LOS: 1 days | Discharge: HOME | End: 2020-04-29
Attending: EMERGENCY MEDICINE | Admitting: EMERGENCY MEDICINE
Payer: MEDICARE

## 2020-04-29 VITALS
SYSTOLIC BLOOD PRESSURE: 99 MMHG | RESPIRATION RATE: 17 BRPM | WEIGHT: 136.03 LBS | HEART RATE: 86 BPM | TEMPERATURE: 98 F | OXYGEN SATURATION: 97 % | HEIGHT: 65 IN | DIASTOLIC BLOOD PRESSURE: 57 MMHG

## 2020-04-29 VITALS
RESPIRATION RATE: 18 BRPM | OXYGEN SATURATION: 100 % | DIASTOLIC BLOOD PRESSURE: 76 MMHG | TEMPERATURE: 98 F | HEART RATE: 76 BPM | SYSTOLIC BLOOD PRESSURE: 107 MMHG

## 2020-04-29 LAB
ALBUMIN SERPL ELPH-MCNC: 4 G/DL — SIGNIFICANT CHANGE UP (ref 3.3–5)
ALP SERPL-CCNC: 67 U/L — SIGNIFICANT CHANGE UP (ref 40–120)
ALT FLD-CCNC: 85 U/L — HIGH (ref 10–45)
ANION GAP SERPL CALC-SCNC: 16 MMOL/L — SIGNIFICANT CHANGE UP (ref 5–17)
APTT BLD: 30.7 SEC — SIGNIFICANT CHANGE UP (ref 27.5–36.3)
AST SERPL-CCNC: 117 U/L — HIGH (ref 10–40)
BASOPHILS # BLD AUTO: 0.07 K/UL — SIGNIFICANT CHANGE UP (ref 0–0.2)
BASOPHILS NFR BLD AUTO: 2.6 % — HIGH (ref 0–2)
BILIRUB SERPL-MCNC: 0.4 MG/DL — SIGNIFICANT CHANGE UP (ref 0.2–1.2)
BLD GP AB SCN SERPL QL: NEGATIVE — SIGNIFICANT CHANGE UP
BUN SERPL-MCNC: 17 MG/DL — SIGNIFICANT CHANGE UP (ref 7–23)
CALCIUM SERPL-MCNC: 8.9 MG/DL — SIGNIFICANT CHANGE UP (ref 8.4–10.5)
CHLORIDE SERPL-SCNC: 102 MMOL/L — SIGNIFICANT CHANGE UP (ref 96–108)
CO2 SERPL-SCNC: 21 MMOL/L — LOW (ref 22–31)
CREAT SERPL-MCNC: 0.86 MG/DL — SIGNIFICANT CHANGE UP (ref 0.5–1.3)
EOSINOPHIL # BLD AUTO: 0.02 K/UL — SIGNIFICANT CHANGE UP (ref 0–0.5)
EOSINOPHIL NFR BLD AUTO: 0.9 % — SIGNIFICANT CHANGE UP (ref 0–6)
GLUCOSE SERPL-MCNC: 68 MG/DL — LOW (ref 70–99)
HCG SERPL-ACNC: 2 MIU/ML — SIGNIFICANT CHANGE UP
HCT VFR BLD CALC: 36.8 % — SIGNIFICANT CHANGE UP (ref 34.5–45)
HGB BLD-MCNC: 11.9 G/DL — SIGNIFICANT CHANGE UP (ref 11.5–15.5)
INR BLD: 1.14 — SIGNIFICANT CHANGE UP (ref 0.88–1.16)
LYMPHOCYTES # BLD AUTO: 0.52 K/UL — LOW (ref 1–3.3)
LYMPHOCYTES # BLD AUTO: 19.8 % — SIGNIFICANT CHANGE UP (ref 13–44)
MCHC RBC-ENTMCNC: 28.3 PG — SIGNIFICANT CHANGE UP (ref 27–34)
MCHC RBC-ENTMCNC: 32.3 GM/DL — SIGNIFICANT CHANGE UP (ref 32–36)
MCV RBC AUTO: 87.4 FL — SIGNIFICANT CHANGE UP (ref 80–100)
MONOCYTES # BLD AUTO: 0.16 K/UL — SIGNIFICANT CHANGE UP (ref 0–0.9)
MONOCYTES NFR BLD AUTO: 6 % — SIGNIFICANT CHANGE UP (ref 2–14)
NEUTROPHILS # BLD AUTO: 1.86 K/UL — SIGNIFICANT CHANGE UP (ref 1.8–7.4)
NEUTROPHILS NFR BLD AUTO: 70.7 % — SIGNIFICANT CHANGE UP (ref 43–77)
PLATELET # BLD AUTO: 185 K/UL — SIGNIFICANT CHANGE UP (ref 150–400)
POTASSIUM SERPL-MCNC: 4.1 MMOL/L — SIGNIFICANT CHANGE UP (ref 3.5–5.3)
POTASSIUM SERPL-SCNC: 4.1 MMOL/L — SIGNIFICANT CHANGE UP (ref 3.5–5.3)
PROT SERPL-MCNC: 6.8 G/DL — SIGNIFICANT CHANGE UP (ref 6–8.3)
PROTHROM AB SERPL-ACNC: 13 SEC — HIGH (ref 10–12.9)
RBC # BLD: 4.21 M/UL — SIGNIFICANT CHANGE UP (ref 3.8–5.2)
RBC # FLD: 13.3 % — SIGNIFICANT CHANGE UP (ref 10.3–14.5)
RH IG SCN BLD-IMP: POSITIVE — SIGNIFICANT CHANGE UP
SODIUM SERPL-SCNC: 139 MMOL/L — SIGNIFICANT CHANGE UP (ref 135–145)
WBC # BLD: 2.63 K/UL — LOW (ref 3.8–10.5)
WBC # FLD AUTO: 2.63 K/UL — LOW (ref 3.8–10.5)

## 2020-04-29 PROCEDURE — 76856 US EXAM PELVIC COMPLETE: CPT

## 2020-04-29 PROCEDURE — 36415 COLL VENOUS BLD VENIPUNCTURE: CPT

## 2020-04-29 PROCEDURE — 76830 TRANSVAGINAL US NON-OB: CPT

## 2020-04-29 PROCEDURE — 77386: CPT

## 2020-04-29 PROCEDURE — 99284 EMERGENCY DEPT VISIT MOD MDM: CPT | Mod: 25

## 2020-04-29 PROCEDURE — 76856 US EXAM PELVIC COMPLETE: CPT | Mod: 26

## 2020-04-29 PROCEDURE — 86850 RBC ANTIBODY SCREEN: CPT

## 2020-04-29 PROCEDURE — 99284 EMERGENCY DEPT VISIT MOD MDM: CPT

## 2020-04-29 PROCEDURE — 80053 COMPREHEN METABOLIC PANEL: CPT

## 2020-04-29 PROCEDURE — 85730 THROMBOPLASTIN TIME PARTIAL: CPT

## 2020-04-29 PROCEDURE — 85025 COMPLETE CBC W/AUTO DIFF WBC: CPT

## 2020-04-29 PROCEDURE — 76830 TRANSVAGINAL US NON-OB: CPT | Mod: 26

## 2020-04-29 PROCEDURE — 84702 CHORIONIC GONADOTROPIN TEST: CPT

## 2020-04-29 PROCEDURE — 86901 BLOOD TYPING SEROLOGIC RH(D): CPT

## 2020-04-29 PROCEDURE — 85610 PROTHROMBIN TIME: CPT

## 2020-04-29 RX ORDER — SODIUM CHLORIDE 9 MG/ML
1000 INJECTION INTRAMUSCULAR; INTRAVENOUS; SUBCUTANEOUS ONCE
Refills: 0 | Status: COMPLETED | OUTPATIENT
Start: 2020-04-29 | End: 2020-04-29

## 2020-04-29 RX ADMIN — SODIUM CHLORIDE 1000 MILLILITER(S): 9 INJECTION INTRAMUSCULAR; INTRAVENOUS; SUBCUTANEOUS at 13:08

## 2020-04-29 NOTE — ED PROVIDER NOTE - NS ED ROS FT
Constitutional: No fever or chills.   Eyes: No pain, blurry vision, or discharge.  ENMT: No hearing changes, pain, discharge or infections. No neck pain or stiffness.  Cardiac: No chest pain, SOB or edema. No chest pain with exertion.  Respiratory: No cough or respiratory distress. No hemoptysis. No history of asthma or RAD.  GI: No nausea, vomiting, diarrhea or abdominal pain.  : + Vaginal bleeding.  No dysuria, frequency or burning.  MS: No myalgia, muscle weakness, joint pain or back pain.  Neuro: No headache or weakness. No LOC.  Skin: No skin rash.   Endocrine: No history of thyroid disease or diabetes.  Except as documented in the HPI, all other systems are negative.

## 2020-04-29 NOTE — ED PROVIDER NOTE - CLINICAL SUMMARY MEDICAL DECISION MAKING FREE TEXT BOX
Patient in ED w concern for vaginal bleeding x 1 month (apx).  Patient notes she had not been menstruating since completing chemo in August, and began menstrual cycle for the first time in beginning of this month.  She notes bleeding for apx 21 days, and now states bleeding is subsiding.  She denies any additional accompanying symptoms.  Labs and imaging reviewed.  Results are discussed with patient and also with ob/gyn to help facilitate follow up in setting of recent office closures, etc with COVID-19 pandemic.  OB/gyn team recommending Dr. Smith follow up within several days via teleconference.  Dr. Smith's information is provided to patient who is in agreement to plan for prompt follow up.  She is also educated re elevated LFTs and need to monitor with PCP.  She again denies abd pain, n/v, etc.  Patient is advised to follow up with their PCP in 1-2 days without fail.  Patient instructed to return to ED immediately should their symptoms worsen or if there is any concern prior to the recommended PCP follow up.  Patient is aware of plan and verbalizes their understanding.  Will discharge home at this time. Patient in ED w concern for vaginal bleeding x 1 month (apx).  Patient notes she had not been menstruating since completing chemo in August, and began menstrual cycle for the first time in beginning of this month.  She notes bleeding for apx 21 days, and now states bleeding is subsiding.  She denies any additional accompanying symptoms.  Labs and imaging reviewed.  Small fibroid noted and discussed w patient fibroid as possible cause of DUB.  Patient declines pelvic exam stating she is no longer bleeding for the past few days, however "wanted to make sure it wasn't cancer."  Patient is made aware that the ER is not the place for comprehensive evaluation for malignancy and that she will require PCP and gyn follow up for further evaluation.  Results are discussed with patient and also with ob/gyn to help facilitate follow up in setting of recent office closures, etc with COVID-19 pandemic.  OB/gyn team recommending Dr. Smith follow up within several days via teleconference.  Dr. Smith's information is provided to patient who is in agreement to plan for prompt follow up.  She is also educated re elevated LFTs and need to monitor with PCP.  She again denies abd pain, n/v, etc.  Patient is advised to follow up with their PCP in 1-2 days without fail.  Patient instructed to return to ED immediately should their symptoms worsen or if there is any concern prior to the recommended PCP follow up.  Patient is aware of plan and verbalizes their understanding.  Will discharge home at this time.

## 2020-04-29 NOTE — ED PROVIDER NOTE - CARE PROVIDER_API CALL
Francisca Smith)  OBGYN  [address], [facility] [address]  [place] [id number]  Phone: [phone]  Fax: [phone]  Follow Up Time:

## 2020-04-29 NOTE — ED PROVIDER NOTE - CARE PLAN
Principal Discharge DX:	Vaginal bleeding  Secondary Diagnosis:	Uterine leiomyoma, unspecified location

## 2020-04-29 NOTE — ED PROVIDER NOTE - PATIENT PORTAL LINK FT
You can access the FollowMyHealth Patient Portal offered by [facility] by registering at the following website: [email or web address]. By joining [employer]’s FollowMyHealth portal, you will also be able to view your health information using other applications (apps) compatible with our system.

## 2020-04-29 NOTE — ED PROVIDER NOTE - PHYSICAL EXAMINATION
VITAL SIGNS: I have reviewed nursing notes and confirm.  CONSTITUTIONAL: Well-developed; well-nourished; in no acute distress.   SKIN:  warm and dry, no acute rash.   HEAD:  normocephalic, atraumatic.  EYES: PERRL.  EOM intact; conjunctiva and sclera clear.  ENT: No nasal discharge; airway clear.   NECK: Supple; non tender.  CARD: S1, S2 normal; no murmurs, gallops, or rubs. Regular rate and rhythm.   RESP:  Clear to auscultation b/l, no wheezes, rales or rhonchi.  ABD: Normal bowel sounds; soft; non-distended; non-tender; no guarding/rebound.  EXT: Normal ROM. No clubbing, cyanosis or edema. 2+ pulses to b/l ue/le.  NEURO: Alert, oriented, grossly unremarkable.  5/5 strength x 4 extremities against gravity and external force.  No drift x 4 extremities.  Sensation intact and symmetric x 4 extremities.  No facial asymmetry.    PSYCH: Cooperative, mood and affect appropriate. VITAL SIGNS: I have reviewed nursing notes and confirm.  CONSTITUTIONAL: Well-developed; well-nourished; in no acute distress.   SKIN:  warm and dry, no acute rash.   HEAD:  normocephalic, atraumatic.  EYES: PERRL.  EOM intact; conjunctiva and sclera clear.  ENT: No nasal discharge; airway clear.   NECK: Supple; non tender.  CARD: S1, S2 normal; no murmurs, gallops, or rubs. Regular rate and rhythm.   RESP:  Clear to auscultation b/l, no wheezes, rales or rhonchi.  ABD: Normal bowel sounds; soft; non-distended; non-tender; no guarding/rebound.  : Patient declines exam.    EXT: Normal ROM. No clubbing, cyanosis or edema. 2+ pulses to b/l ue/le.  NEURO: Alert, oriented, grossly unremarkable.  5/5 strength x 4 extremities against gravity and external force.  No drift x 4 extremities.  Sensation intact and symmetric x 4 extremities.  No facial asymmetry.    PSYCH: Cooperative, mood and affect appropriate.

## 2020-04-29 NOTE — ED ADULT TRIAGE NOTE - CHIEF COMPLAINT QUOTE
Patient complaining of vaginal bleeding for 21 days, now decreasing.  Patient denies any blood thinners, SOB, CP or any other complaints at this time.  Patient states currently under radiation for neck cancer.

## 2020-04-29 NOTE — ED PROVIDER NOTE - OBJECTIVE STATEMENT
40 year old female with history of Squamous Cell Carcinoma (completed chemo, currently receiving radiation) presents to ED with concern for vaginal bleeding x 21 days.  Patient notes bleeding is now slowing/has stopped, however states she was reading online that the prolonged bleeding could be due to fibroids, cysts, etc and wanted to be evaluated.  She denies associated fever, chills, chest pain, shortness of breath, abdominal pain, urinary symptoms, nausea, emesis, changes to bowel movements, history of dysfunctional uterine bleeding or any additional acute complaints or concerns at this time.

## 2020-04-29 NOTE — ED PROVIDER NOTE - NSFOLLOWUPINSTRUCTIONS_ED_ALL_ED_FT
Please follow up with Dr. Smith at [phone] to schedule appointment.  Please follow up with your primary physician as well as Dr. Smith in 1-2 days for re evaluation.  Please return to ER immediately should your symptoms worsen or if you have any concern prior to this recommended follow up.    Dysfunctional Uterine Bleeding    WHAT YOU NEED TO KNOW:    Dysfunctional uterine bleeding (DUB) is abnormal uterine bleeding that is caused by a problem with your hormones. You may have bleeding from your uterus at times other than your normal monthly period. Your monthly periods may last longer or shorter, and bleeding may be heavier or lighter than usual.     DISCHARGE INSTRUCTIONS:    Medicines:     Hormones help decrease bleeding by making your monthly periods more regular. Sometimes this medicine may be given as birth control pills.      NSAIDs help decrease swelling, pain, and fever. This medicine is available with or without a doctor's order. NSAIDs can cause stomach bleeding or kidney problems in certain people. If you take blood thinner medicine, always ask your healthcare provider if NSAIDs are safe for you. Always read the medicine label and follow directions.      Iron supplements may be given if your blood iron level decreases because of heavy bleeding. Iron may make you constipated. Ask your healthcare provider for ways to prevent or treat constipation. Iron may also make your bowel movements turn dark or black.      Take your medicine as directed. Contact your healthcare provider if you think your medicine is not helping or if you have side effects. Tell him or her if you are allergic to any medicine. Keep a list of the medicines, vitamins, and herbs you take. Include the amounts, and when and why you take them. Bring the list or the pill bottles to follow-up visits. Carry your medicine list with you in case of an emergency.    Follow up with your healthcare provider as directed: Write down your questions so you remember to ask them during your visits.    Self-care:     Apply heat on your lower abdomen for 20 to 30 minutes every 2 hours for as many days as directed. Heat helps decrease pain and muscle spasms.      Include foods high in iron if needed. Examples of foods high in iron are leafy green vegetables, beef, pork, liver, eggs, and whole-grain breads and cereals.      Keep a diary of your menstrual cycles. Keep track of the number of tampons or pads you use each day.      Talk to your healthcare provider before you start a weight loss program. You may need to wait until the abnormal bleeding has stopped before you try to lose weight. The amount of iron in your blood should be normal before you lose weight.     Contact your healthcare provider if:     You need to change your sanitary pad or tampon more than once an hour.      Your medicine causes nausea, vomiting, or diarrhea.      You have questions or concerns about your condition or care.     Return to the emergency department if:     You continue to bleed heavily, or you feel faint.             © Copyright [employer] 2020       back to top                      © Copyright [employer] 2020

## 2020-04-29 NOTE — ED ADULT NURSE NOTE - OBJECTIVE STATEMENT
Patient complaining of vaginal bleeding for 21 days, none in the past two days. states she was using two sanitary pads a day. states she is being tx for neck cancer with radiation. denies any cough., fever/chills, n/v, bowel/bladder complaints, dizziness, weakness, trauma. c.o intermittent abd pain, none now. states she has chronic back pain

## 2020-04-30 PROBLEM — R22.1 NECK MASS: Status: ACTIVE | Noted: 2018-12-06

## 2020-04-30 NOTE — DISEASE MANAGEMENT
[Clinical] : TNM Stage: c [III] : III [TTNM] : X [MTNM] : 0 [NTNM] : 3 [de-identified] : 3600 cGy [de-identified] : 7000  cGy [de-identified] : oropharynx and neck

## 2020-04-30 NOTE — PHYSICAL EXAM
[General Appearance - Alert] : alert [General Appearance - In No Acute Distress] : in no acute distress [Normal] : normoactive bowel sounds, soft and nontender, no hepatosplenomegaly or masses appreciated [Oriented To Time, Place, And Person] : oriented to person, place, and time [de-identified] : grade 1 mucositis, no thrush. dryness  [de-identified] : smaller masses throughout left neck.  [de-identified] : ECOG 1 [de-identified] : seems anxious, questions seem paranoid [de-identified] : scarring to left neck

## 2020-04-30 NOTE — HISTORY OF PRESENT ILLNESS
[FreeTextEntry1] : Ms. 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 Meeks.  \par \par She had a treatment break from 4/17 - 4/27 for re-planning due to significant disease response. \par \par 4/28/20 - OTV - Ms. Conway has completed 3600 cGy / 7000 cGy to the oropharynx/neck.  Today, she reports feeling sluggish and fatigued.  She is on a soft/pureed diet.  She admits to minimal PO intake over the past few weeks (estimates 500 calories per day) due to dysgeusia, throat dryness, and decreased appetite.  She stopped drinking Ensure because she felt it was making the inside of her mouth worse.  \par \par \par \par 4/14/2020- OTV- Ms. Conway has completed  3200 cGy/ 7000 cGy to the oropharynx/ neck.  She saw RICHARD George last week. Today, she continues to feel very anxious about treatment and wants to know if quitting is an option.\par She explains that she has a history of very negative interactions with healthcare surrounding a psychiatric experience and that is why she "seems paranoid" and asks so many questions.\par She again uses the word "ruminate"\par She has not taken the lidocaine or MMW and is complaining of much worse sore throat and trouble eating. She uses aquaphor "sometimes". Is using biotene. \par \par  On 4/10/20 I spoke with her for 15 minutes and showed her the RT plan and explained there is minimal RT to her brain. \par \par 4/7/2020 OTV - Cherelle has started to develop sore throat with yawning or eating, up to 8 out of 10. Not taking pain meds. Has not started MMW, lost the instructions. Just started aquaphor for neck. Says her taste is terrible. No other new symptoms. Not using rinses or doing H&N exercises. She asked many questions about radiation effects and she is concerned radiation is going to her brain because she feels foggy and not clear-headed. She asked if she could have something in writing saying that her brain is not being irradiated. She then agreed that she has seen her chart and she knows that her neck is being treated. She asked me about proton RT, and she also asked about side effects that she heard about on the internet. I explained this could be a side effect of treatment - inflammatory response, similar to "chemo brain" but that the brain is not the target of RT.   \par \par 3/31/20 - OTV - Ms. Conway has completed 1600 cGy / 7000 cGy to the oropharynx/neck.  She reports she has started having some side effects from treatment - sore throat, neck tightness, mouth sore, and decreased appetite.  She has not started oral rinses, Aquaphor, or head and neck exercises. She admits she sometimes feels paranoid and "ruminates" and asked if she can take ativan or similar if that happens. She is not medicated for her medical problems. \par \par 3/25/20 - OTV - Ms. Conway has completed 1000 cGy / 7000 cGy to the oropharynx/neck.  Today, she reports feeling stable and is without complaints.  She will see Dr. Meeks for chemo this afternoon.  \par __________________________________________________________________________________\par ONCOLOGIC HISTORY (3/4/19)\par Ms. Cherelle Conway is a 39F, former smoker (1/2 ppd x 15 years) referred by Dr. 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 \par \par Patient first noticed a left neck mass approx 2 years ago. At that time, she reportedly had drainage of the growth at [facility] that did not show any infection and received antibiotics. She states the mass was drained and she was told it was a cyst. She does not know if the specimen was sent for pathology. Remission of the mass occurred, and it reappeared approx 20 months ago. She again had FNA (non-diagnostic) and received antibiotics. Patient states she has seen other ENTs "at several other institutions," but would not tell us who she has seen or where. She reports feeling fatigued in the summer of 2018, had been to urgent care on a few occasions but did not receive a diagnosis.  Starting in the summer of 2018, she also became "displaced" due to family issues and breaking up with a boyfriend, and she was in and out of homeless shelters for several months. \par \par Patient reports she was having pain in her teeth and gums around November 2018. She went to the ED at [facility] because she thought she could get emergent dental work done at the ED.  Imaging was done. \par \par CT neck on 12/1/18 revealed the following:\par -Soft tissues mass noted in the LEFT neck extending deep to the mandible on the left compressing the submandibular gland anteriorly with internal heterogeneity which is predominantly solid. This mass is lateral to the carotid artery with apparent compression and effacement of the internal jugular vein. The mass measures up to 5 x 3.7 cm. This mass is deep to the flattened sternocleidomastoid muscle and extends to the lower left neck just  the supraclavicular fossa. The bulk of the mass appears below the left parotid gland- cannot exclude involvement of the lower portion of the parotid. \par -Soft tissue mass measuring up to 1.9 cm with central low attenuation possibly necrosis above this mass, possibly representing a necrotic lymph node. Other prominent left sided lymph nodes in the neck in the area of the jugulodigastric region and posterior triangle are also noted including a posterior triangle lymph node posterior to the sternocleidomastoid. \par \par She saw Dr. Weller for consultation on 12/5/18, and at the time reported left neck mass for many months with cervical nodes. She had FNA of the left neck mass at that time, and pathology showed squamous cell carcinoma, HPV related. She then saw Dr. Alvarado on 12/17/18 for consultation; discussed treatment options based on PET/ CT results. \par \par PET/ CT done 12/21/18 showed the following:\par Impression: Large supra and infrahyoid left neck mass measuring 5.4 cm, similar as compared to 12/1/2018, and increased in size as compared to 11/10/2016 as above. In view of the biopsy results, this is compatible with a diagnosis of squamous cell carcinoma. Left supraclavicular and right level 3 hypermetabolic lymphadenopathy is noted, likely associated pathologic lymphadenopathy.\par \par Her case was presented at [facility]; consensus was for surgery (left neck dissection, tonsillectomy, BOT biopsy), followed by chemoRT. She saw Dr. Alvarado on 1/7/19 to discuss surgical plan. However, patient grew increasingly uncomfortable with the biopsies because she does not want to undergo anesthesia. She ultimately opted against surgery and would like to proceed with chemoRT. She was referred here and to Dr. Meeks.  She has not yet seen Dr. Meeks or scheduled a consultation with him. \par \par Today, she expresses she is "not convinced" of her diagnosis and questions if the mass is due to Cushing's disease or if it could be related to high stress (she reports she has "family issues that have caused a great deal of stress"). She questions that she could have HPV related disease as she "does not partake in that kind of activity," and she fixated on this during the clinical encounter. She admits that she went to a hospital in [place] to try to get another biopsy but that they would not perform another biopsy since she has a known diagnosis of cancer. Today, she is evasive with answering questions and providing her health history. She denies any pain or dysphagia, fever, CP, SOB, cough, weight loss. She reports weight gain, as well as throat clearing. She also reports night sweats, fatigue, and "feeling cold all the time." She reports she sees a psychiatrist, who prescribes medications for anxiety and depression, but she declined to say which medications she takes or who her psychiatrist is. Upon further questioning, she stated that she is off her antidepressant. She notes that she does not have a dentist nor does she have dental coverage. She further declined to provide the name of her PCP; states she "might be looking for a new one." \par \par Of note, she had been living in shelters for a few months. She is now still homeless and has been staying with friends. She also reports she had been working but is on disability. \par \par 8/26/19 - SNA\par Ms. Conway returns today for SNA.  She was last seen in this office by Dr. Londono on 3/4/19.  She was recommended further diagnostic workup to include panendoscopy and targeted biopsy, PET/CT and MRI and treatment consisting of induction chemo followed by radiation.  She was also advised to seek a second opinion given her concern that her diagnosis was a mistake.  This office was unable to reach her following that visit.  It is well documented in her medical record that she continued to delay further workup and initiation of treatment despite medical advice to the contrary.  She eventually followed-up with Dr. Alvarado and Dr. Meeks and underwent PET/CT, MRI orbit/face/neck, and CTA neck and brain.  \par \par PET/CT 6/9/19\par There is has been interval enlargement of the hypermetabolic left-sided neck mass which now measures up to 11.1 cm in craniocaudal dimension with an SUV of 21.3. There is invasion of the adjacent soft tissues structures, with involvement of the \par left common and internal carotid arteries, as well as the left-sided neck musculature. There are enlarged, hypermetabolic lymph nodes in the left retropharyngeal lymph region and cervical levels 1, 5, and 6. Additionally there are enlarged hypermetabolic lymph nodes at the right cervical levels 3 and 6, as well as the bilateral supraclavicular regions.  \par IMPRESSION: \par Compared with prior PET/CT dated 12/21/2018 has been interval enlargement of large left-sided hypermetabolic neck mass. \par There are an increased number of hypermetabolic lymph nodes in the bilateral cervical regions, retropharyngeal region, and supraclavicular region as above. \par \par MRI orbit/face/neck with and without contrast 6/10/19 - IMPRESSION: In this patient with left neck conglomerate klarissa mass that exceeds 10 cm in maximal dimension, there is complete encasement of the left common and internal carotid arteries as well as invasion of the skin, parotid and submandibular glands. There is also pathologic lymphadenopathy in the right infrahyoid neck. The primary tumor is not identified. Please see report above for further detail. \par \par CTA brain with contrast 6/20/19 - IMPRESSION: Unremarkable CTA examination of the brain.  \par \par CTA neck with contrast 6/20/19 - IMPRESSION: As seen on MRI, massive pathologic cervical lymphadenopathy encases the left \par CCA, ECA and ICA with mild luminal narrowing relative to the normal right side. No pseudoaneurysm or other evidence to portend a risk of impending blowout. CTA brain is normal. \par \par She completed 3 cycles of induction chemotherapy (DCF) under the care of Dr. Meeks, completed 8/8/19.  She returns for consideration of radiation therapy.  She states Dr. Meeks has recommended concurrent chemo/RT.  She expresses satisfaction with her response to chemotherapy thus far, as the neck mass has significantly decreased in size.  Although, she is concerned about lymphadenopathy, which she reports is unchanged.  She complains of "aching" to the left side of her body as well as lower extremity edema since chemo.  She denies pain, dysphagia, odynophagia.  She notes weight fluctuation throughout chemo.  \par \par Of note, she states she is currently living "in a few different places."  She anticipates she will be residing on the [facility] by the time her radiation begins.  She denies smoking.  Admits to one drink every 1-2 months, most recently a glass of wine last weekend.  She denies drug use.  Her last menstrual period was in June.  She states she is working on obtaining dental insurance and that she needs an extraction and a root canal.  She also mentions needing to follow-up on an abnormal PAP smear from December 2018.  \par \par 2/13/20 - SNA\par Ms. Conway was last seen on 8/26/19 by Dr. Allen.  She was recommended radiation with concurrent chemotherapy to which she agreed.  She completed CT simulation on 9/12/19.  She had difficulty obtaining dental clearance due to insurance issues and was lost to follow-up.  She last saw Dr. Meeks last week.  She states he recommended she have chemoRT.  Most recent imaging as follows:\par \par PET/CT 10/14/19 - Impression: Since 6/9/2019, there has been dramatic improvement in the size and degree of FDG uptake of the left neck mass. There is persistent uptake in the left carotid space at the angle of the mandible, within the skin and subcutaneous \par tissues of the left neck at the level of the hyoid bone, as well as a few mildly FDG avid bilateral cervical lymph nodes as above. \par \par Today, she complains of intermittent nausea, vomiting, and severe fatigue since completing chemo.  She has been researching chronic fatigue syndrome and identifies with the symptoms profile.  She reports that following her PET/CT in October the masses in her neck began to enlarge and continued to grow until approximately 1 month ago.  She states the masses had been present since prior to chemo, but were very small, only palpable and not visible.  She feels that the size has been stable over the past month.  She notes that this happened with her previous PET/CT and wonders if she has been having "bad reactions" to the PET scans causing the cancer to grow.  She denies pain, dyspnea, cough, dysphagia, odynophagia.  She states she completed her dental evaluation and had wisdom teeth and an additional tooth extracted in December.  \par \par She also reports feeling anxious.  She states she has baseline anxiety and her cancer and chemo have exacerbated it.  She is considering starting an anxiolytic and/or SSRI.  She has not seen psych recently.  \par \par She has several hypotheses about her disease that she presents today. She suggests that the PET/CT scans made her cancer grow faster. She asks if it is possible her cancer was mis-diagnosed and she has Cushings syndrome and not cancer. She also wonders if her diagnosis is actually more consistent with "Bronchial cyst area." She thinks her level VI node emerged after she had dental work.\par \par Of note, she states she is currently living in [place] and thinks she will be living in [place] during her treatment.  She admits to occasional alcohol use.  She denies smoking and drug use.  \par \par 2/24/20 - Follow-up\par Ms. Conway was last seen on 2/13/20.  She was to complete PET/CT and provide a dental clearance letter.  \par \par PET/CT 2/25/20 - IMPRESSION: Impression: Since 10/14/2019: Mass in the left neck at the level of the hyoid is increased in width with increased FDG uptake. New FDG uptake within a soft tissue mass in the left parotid gland which may represent lymph node. Increased size and FDG uptake of multiple cervical lymph nodes as well as new FDG avid nodes. \par \par Today, she reports feeling fatigued.  She also states she has been having cyclical menstrual cramps despite not having a period since June.  She is otherwise without complaints.  She denies pain, dysphagia, odynophagia, dyspnea.

## 2020-05-03 DIAGNOSIS — D25.9 LEIOMYOMA OF UTERUS, UNSPECIFIED: ICD-10-CM

## 2020-05-03 DIAGNOSIS — N93.9 ABNORMAL UTERINE AND VAGINAL BLEEDING, UNSPECIFIED: ICD-10-CM

## 2020-05-04 NOTE — VITALS
[ECOG Performance Status: 1 - Restricted in physically strenuous activity but ambulatory and able to carry out work of a light or sedentary nature] : Performance Status: 1 - Restricted in physically strenuous activity but ambulatory and able to carry out work of a light or sedentary nature, e.g., light house work, office work [80: Normal activity with effort; some signs or symptoms of disease.] : 80: Normal activity with effort; some signs or symptoms of disease.

## 2020-05-05 VITALS — HEART RATE: 99 BPM | SYSTOLIC BLOOD PRESSURE: 89 MMHG | DIASTOLIC BLOOD PRESSURE: 65 MMHG

## 2020-05-05 VITALS
SYSTOLIC BLOOD PRESSURE: 100 MMHG | HEART RATE: 72 BPM | WEIGHT: 134.5 LBS | RESPIRATION RATE: 16 BRPM | DIASTOLIC BLOOD PRESSURE: 76 MMHG | BODY MASS INDEX: 22.38 KG/M2 | OXYGEN SATURATION: 100 %

## 2020-05-05 RX ORDER — LIDOCAINE HYDROCHLORIDE 20 MG/ML
2 SOLUTION ORAL; TOPICAL
Qty: 400 | Refills: 2 | Status: ACTIVE | COMMUNITY
Start: 2020-03-31 | End: 1900-01-01

## 2020-05-05 NOTE — HISTORY OF PRESENT ILLNESS
[FreeTextEntry1] : Ms. 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 Meeks.  \par \par She had a treatment break from 4/17 - 4/27 for re-planning due to significant disease response. \par \par 5/5/20 - OTV - Ms. Conway has completed 4400 cGy / 7000 cGy to the oropharynx/neck.  Today, she reports having more energy. She continues with minimal PO intake - water, juice, milk.  Had one small piece of bread yesterday.  She continues to have dysgeusia, xerostomia and throat dryness.  She is using Biotene. \par \par 4/28/20 - OTV - Ms. Conway has completed 3600 cGy / 7000 cGy to the oropharynx/neck.  Today, she reports feeling sluggish and fatigued.  She is on a soft/pureed diet.  She admits to minimal PO intake over the past few weeks (estimates 500 calories per day) due to dysgeusia, throat dryness, and decreased appetite.  She stopped drinking Ensure because she felt it was making the inside of her mouth worse.  \par \par \par \par 4/14/2020- OTV- Ms. Conway has completed  3200 cGy/ 7000 cGy to the oropharynx/ neck.  She saw RICHARD George last week. Today, she continues to feel very anxious about treatment and wants to know if quitting is an option.\par She explains that she has a history of very negative interactions with healthcare surrounding a psychiatric experience and that is why she "seems paranoid" and asks so many questions.\par She again uses the word "ruminate"\par She has not taken the lidocaine or MMW and is complaining of much worse sore throat and trouble eating. She uses aquaphor "sometimes". Is using biotene. \par \par  On 4/10/20 I spoke with her for 15 minutes and showed her the RT plan and explained there is minimal RT to her brain. \par \par 4/7/2020 OTV - Cherelle has started to develop sore throat with yawning or eating, up to 8 out of 10. Not taking pain meds. Has not started MMW, lost the instructions. Just started aquaphor for neck. Says her taste is terrible. No other new symptoms. Not using rinses or doing H&N exercises. She asked many questions about radiation effects and she is concerned radiation is going to her brain because she feels foggy and not clear-headed. She asked if she could have something in writing saying that her brain is not being irradiated. She then agreed that she has seen her chart and she knows that her neck is being treated. She asked me about proton RT, and she also asked about side effects that she heard about on the internet. I explained this could be a side effect of treatment - inflammatory response, similar to "chemo brain" but that the brain is not the target of RT.   \par \par 3/31/20 - OTV - Ms. Conway has completed 1600 cGy / 7000 cGy to the oropharynx/neck.  She reports she has started having some side effects from treatment - sore throat, neck tightness, mouth sore, and decreased appetite.  She has not started oral rinses, Aquaphor, or head and neck exercises. She admits she sometimes feels paranoid and "ruminates" and asked if she can take ativan or similar if that happens. She is not medicated for her medical problems. \par \par 3/25/20 - OTV - Ms. Conway has completed 1000 cGy / 7000 cGy to the oropharynx/neck.  Today, she reports feeling stable and is without complaints.  She will see Dr. Meeks for chemo this afternoon.  \par __________________________________________________________________________________\par ONCOLOGIC HISTORY (3/4/19)\par Ms. Cherelle Conway is a 39F, former smoker (1/2 ppd x 15 years) referred by Dr. 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 \par \par Patient first noticed a left neck mass approx 2 years ago. At that time, she reportedly had drainage of the growth at [facility] that did not show any infection and received antibiotics. She states the mass was drained and she was told it was a cyst. She does not know if the specimen was sent for pathology. Remission of the mass occurred, and it reappeared approx 20 months ago. She again had FNA (non-diagnostic) and received antibiotics. Patient states she has seen other ENTs "at several other institutions," but would not tell us who she has seen or where. She reports feeling fatigued in the summer of 2018, had been to urgent care on a few occasions but did not receive a diagnosis.  Starting in the summer of 2018, she also became "displaced" due to family issues and breaking up with a boyfriend, and she was in and out of homeless shelters for several months. \par \par Patient reports she was having pain in her teeth and gums around November 2018. She went to the ED at [facility] because she thought she could get emergent dental work done at the ED.  Imaging was done. \par \par CT neck on 12/1/18 revealed the following:\par -Soft tissues mass noted in the LEFT neck extending deep to the mandible on the left compressing the submandibular gland anteriorly with internal heterogeneity which is predominantly solid. This mass is lateral to the carotid artery with apparent compression and effacement of the internal jugular vein. The mass measures up to 5 x 3.7 cm. This mass is deep to the flattened sternocleidomastoid muscle and extends to the lower left neck just  the supraclavicular fossa. The bulk of the mass appears below the left parotid gland- cannot exclude involvement of the lower portion of the parotid. \par -Soft tissue mass measuring up to 1.9 cm with central low attenuation possibly necrosis above this mass, possibly representing a necrotic lymph node. Other prominent left sided lymph nodes in the neck in the area of the jugulodigastric region and posterior triangle are also noted including a posterior triangle lymph node posterior to the sternocleidomastoid. \par \par She saw Dr. Weller for consultation on 12/5/18, and at the time reported left neck mass for many months with cervical nodes. She had FNA of the left neck mass at that time, and pathology showed squamous cell carcinoma, HPV related. She then saw Dr. Alvarado on 12/17/18 for consultation; discussed treatment options based on PET/ CT results. \par \par PET/ CT done 12/21/18 showed the following:\par Impression: Large supra and infrahyoid left neck mass measuring 5.4 cm, similar as compared to 12/1/2018, and increased in size as compared to 11/10/2016 as above. In view of the biopsy results, this is compatible with a diagnosis of squamous cell carcinoma. Left supraclavicular and right level 3 hypermetabolic lymphadenopathy is noted, likely associated pathologic lymphadenopathy.\par \par Her case was presented at [facility]; consensus was for surgery (left neck dissection, tonsillectomy, BOT biopsy), followed by chemoRT. She saw Dr. Alvarado on 1/7/19 to discuss surgical plan. However, patient grew increasingly uncomfortable with the biopsies because she does not want to undergo anesthesia. She ultimately opted against surgery and would like to proceed with chemoRT. She was referred here and to Dr. Meeks.  She has not yet seen Dr. Meeks or scheduled a consultation with him. \par \par Today, she expresses she is "not convinced" of her diagnosis and questions if the mass is due to Cushing's disease or if it could be related to high stress (she reports she has "family issues that have caused a great deal of stress"). She questions that she could have HPV related disease as she "does not partake in that kind of activity," and she fixated on this during the clinical encounter. She admits that she went to a hospital in [place] to try to get another biopsy but that they would not perform another biopsy since she has a known diagnosis of cancer. Today, she is evasive with answering questions and providing her health history. She denies any pain or dysphagia, fever, CP, SOB, cough, weight loss. She reports weight gain, as well as throat clearing. She also reports night sweats, fatigue, and "feeling cold all the time." She reports she sees a psychiatrist, who prescribes medications for anxiety and depression, but she declined to say which medications she takes or who her psychiatrist is. Upon further questioning, she stated that she is off her antidepressant. She notes that she does not have a dentist nor does she have dental coverage. She further declined to provide the name of her PCP; states she "might be looking for a new one." \par \par Of note, she had been living in shelters for a few months. She is now still homeless and has been staying with friends. She also reports she had been working but is on disability. \par \par 8/26/19 - SNA\par Ms. Conway returns today for SNA.  She was last seen in this office by Dr. Londono on 3/4/19.  She was recommended further diagnostic workup to include panendoscopy and targeted biopsy, PET/CT and MRI and treatment consisting of induction chemo followed by radiation.  She was also advised to seek a second opinion given her concern that her diagnosis was a mistake.  This office was unable to reach her following that visit.  It is well documented in her medical record that she continued to delay further workup and initiation of treatment despite medical advice to the contrary.  She eventually followed-up with Dr. Alvarado and Dr. Meeks and underwent PET/CT, MRI orbit/face/neck, and CTA neck and brain.  \par \par PET/CT 6/9/19\par There is has been interval enlargement of the hypermetabolic left-sided neck mass which now measures up to 11.1 cm in craniocaudal dimension with an SUV of 21.3. There is invasion of the adjacent soft tissues structures, with involvement of the \par left common and internal carotid arteries, as well as the left-sided neck musculature. There are enlarged, hypermetabolic lymph nodes in the left retropharyngeal lymph region and cervical levels 1, 5, and 6. Additionally there are enlarged hypermetabolic lymph nodes at the right cervical levels 3 and 6, as well as the bilateral supraclavicular regions.  \par IMPRESSION: \par Compared with prior PET/CT dated 12/21/2018 has been interval enlargement of large left-sided hypermetabolic neck mass. \par There are an increased number of hypermetabolic lymph nodes in the bilateral cervical regions, retropharyngeal region, and supraclavicular region as above. \par \par MRI orbit/face/neck with and without contrast 6/10/19 - IMPRESSION: In this patient with left neck conglomerate klarissa mass that exceeds 10 cm in maximal dimension, there is complete encasement of the left common and internal carotid arteries as well as invasion of the skin, parotid and submandibular glands. There is also pathologic lymphadenopathy in the right infrahyoid neck. The primary tumor is not identified. Please see report above for further detail. \par \par CTA brain with contrast 6/20/19 - IMPRESSION: Unremarkable CTA examination of the brain.  \par \par CTA neck with contrast 6/20/19 - IMPRESSION: As seen on MRI, massive pathologic cervical lymphadenopathy encases the left \par CCA, ECA and ICA with mild luminal narrowing relative to the normal right side. No pseudoaneurysm or other evidence to portend a risk of impending blowout. CTA brain is normal. \par \par She completed 3 cycles of induction chemotherapy (DCF) under the care of Dr. Meeks, completed 8/8/19.  She returns for consideration of radiation therapy.  She states Dr. Meeks has recommended concurrent chemo/RT.  She expresses satisfaction with her response to chemotherapy thus far, as the neck mass has significantly decreased in size.  Although, she is concerned about lymphadenopathy, which she reports is unchanged.  She complains of "aching" to the left side of her body as well as lower extremity edema since chemo.  She denies pain, dysphagia, odynophagia.  She notes weight fluctuation throughout chemo.  \par \par Of note, she states she is currently living "in a few different places."  [place] [facility] by the time her radiation begins.  She denies smoking.  Admits to one drink every 1-2 months, most recently a glass of wine last weekend.  She denies drug use.  Her last menstrual period was in June.  She states she is working on obtaining dental insurance and that she needs an extraction and a root canal.  She also mentions needing to follow-up on an abnormal PAP smear from December 2018.  \par \par 2/13/20 - SNA\par Ms. Conway was last seen on 8/26/19 by Dr. Allen.  She was recommended radiation with concurrent chemotherapy to which she agreed.  She completed CT simulation on 9/12/19.  She had difficulty obtaining dental clearance due to insurance issues and was lost to follow-up.  She last saw Dr. Meeks last week.  She states he recommended she have chemoRT.  Most recent imaging as follows:\par \par PET/CT 10/14/19 - Impression: Since 6/9/2019, there has been dramatic improvement in the size and degree of FDG uptake of the left neck mass. There is persistent uptake in the left carotid space at the angle of the mandible, within the skin and subcutaneous \par tissues of the left neck at the level of the hyoid bone, as well as a few mildly FDG avid bilateral cervical lymph nodes as above. \par \par Today, she complains of intermittent nausea, vomiting, and severe fatigue since completing chemo.  She has been researching chronic fatigue syndrome and identifies with the symptoms profile.  She reports that following her PET/CT in October the masses in her neck began to enlarge and continued to grow until approximately 1 month ago.  She states the masses had been present since prior to chemo, but were very small, only palpable and not visible.  She feels that the size has been stable over the past month.  She notes that this happened with her previous PET/CT and wonders if she has been having "bad reactions" to the PET scans causing the cancer to grow.  She denies pain, dyspnea, cough, dysphagia, odynophagia.  She states she completed her dental evaluation and had wisdom teeth and an additional tooth extracted in December.  \par \par She also reports feeling anxious.  She states she has baseline anxiety and her cancer and chemo have exacerbated it.  She is considering starting an anxiolytic and/or SSRI.  She has not seen psych recently.  \par \par She has several hypotheses about her disease that she presents today. She suggests that the PET/CT scans made her cancer grow faster. She asks if it is possible her cancer was mis-diagnosed and she has Cushings syndrome and not cancer. She also wonders if her diagnosis is actually more consistent with "Bronchial cyst area." She thinks her level VI node emerged after she had dental work.\par \par Of note, she states she is currently living in [place] and thinks she will be living in [place] during her treatment.  She admits to occasional alcohol use.  She denies smoking and drug use.  \par \par 2/24/20 - Follow-up\par Ms. Conway was last seen on 2/13/20.  She was to complete PET/CT and provide a dental clearance letter.  \par \par PET/CT 2/25/20 - IMPRESSION: Impression: Since 10/14/2019: Mass in the left neck at the level of the hyoid is increased in width with increased FDG uptake. New FDG uptake within a soft tissue mass in the left parotid gland which may represent lymph node. Increased size and FDG uptake of multiple cervical lymph nodes as well as new FDG avid nodes. \par \par Today, she reports feeling fatigued.  She also states she has been having cyclical menstrual cramps despite not having a period since June.  She is otherwise without complaints.  She denies pain, dysphagia, odynophagia, dyspnea.

## 2020-05-05 NOTE — DISEASE MANAGEMENT
[Clinical] : TNM Stage: c [III] : III [NTNM] : 3 [TTNM] : X [de-identified] : 4400 cGy [MTNM] : 0 [de-identified] : oropharynx and neck [de-identified] : 7000  cGy

## 2020-05-05 NOTE — PHYSICAL EXAM
[General Appearance - Alert] : alert [General Appearance - In No Acute Distress] : in no acute distress [Normal] : no focal deficits [Oriented To Time, Place, And Person] : oriented to person, place, and time [de-identified] : ECOG 1 [de-identified] : smaller masses throughout left neck.  [de-identified] : grade 1 mucositis, no thrush. dryness  [de-identified] : scarring to left neck  [de-identified] : seems anxious, questions seem paranoid

## 2020-05-11 ENCOUNTER — RESULT REVIEW (OUTPATIENT)
Age: 40
End: 2020-05-11

## 2020-05-11 VITALS
WEIGHT: 131.9 LBS | RESPIRATION RATE: 15 BRPM | DIASTOLIC BLOOD PRESSURE: 76 MMHG | SYSTOLIC BLOOD PRESSURE: 106 MMHG | BODY MASS INDEX: 21.95 KG/M2 | HEART RATE: 76 BPM | OXYGEN SATURATION: 100 %

## 2020-05-11 VITALS — SYSTOLIC BLOOD PRESSURE: 95 MMHG | HEART RATE: 78 BPM | DIASTOLIC BLOOD PRESSURE: 74 MMHG

## 2020-05-11 NOTE — PHYSICAL EXAM
[General Appearance - Alert] : alert [General Appearance - In No Acute Distress] : in no acute distress [Normal] : normoactive bowel sounds, soft and nontender, no hepatosplenomegaly or masses appreciated [Oriented To Time, Place, And Person] : oriented to person, place, and time [de-identified] : ECOG 1 [de-identified] : grade 1 mucositis, no thrush. dryness throughout.  [de-identified] : smaller masses throughout left neck.  [de-identified] : scarring to left neck. grade 1 dermatitis throughout neck but no desquamation.

## 2020-05-11 NOTE — HISTORY OF PRESENT ILLNESS
[FreeTextEntry1] : Ms. 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 Meeks.  \par \par She had a treatment break from 4/17 - 4/27 for re-planning due to significant disease response. \par \par 5/12/20 - OTV - Ms. Conway has completed 5000 cGy / 7000 cGy to the oropharynx/neck. Today, she states she is "fried."  She complains of odynophagia, mouth sores, and dry skin on her neck.  She does not want to received radiation today due to these side effects and is unsure if she will complete the course of treatment.  She is reluctant to take pain medication because she does not want to "mask" the effects of radiation and she does not want to try gabapentin because of the risk of psychosis in patients with mental health history. She is not sure about other pain medicines; she tried liquid tylenol but not consistently. She has concerns about permanent damage to her throat and wonders if other treatment options are available.  \par \par 5/5/20 - OTV - Ms. Conway has completed 4400 cGy / 7000 cGy to the oropharynx/neck.  Today, she reports having more energy. She continues with minimal PO intake - water, juice, milk.  Had one small piece of bread yesterday.  She continues to have dysgeusia, xerostomia and throat dryness.  She is using Biotene. \par \par 4/28/20 - OTV - Ms. Conway has completed 3600 cGy / 7000 cGy to the oropharynx/neck.  Today, she reports feeling sluggish and fatigued.  She is on a soft/pureed diet.  She admits to minimal PO intake over the past few weeks (estimates 500 calories per day) due to dysgeusia, throat dryness, and decreased appetite.  She stopped drinking Ensure because she felt it was making the inside of her mouth worse.  \par \par \par \par 4/14/2020- OTV- Ms. Conway has completed  3200 cGy/ 7000 cGy to the oropharynx/ neck.  She saw RICHARD George last week. Today, she continues to feel very anxious about treatment and wants to know if quitting is an option.\par She explains that she has a history of very negative interactions with healthcare surrounding a psychiatric experience and that is why she "seems paranoid" and asks so many questions.\par She again uses the word "ruminate"\par She has not taken the lidocaine or MMW and is complaining of much worse sore throat and trouble eating. She uses aquaphor "sometimes". Is using biotene. \par \par  On 4/10/20 I spoke with her for 15 minutes and showed her the RT plan and explained there is minimal RT to her brain. \par \par 4/7/2020 OTV - Cherelle has started to develop sore throat with yawning or eating, up to 8 out of 10. Not taking pain meds. Has not started MMW, lost the instructions. Just started aquaphor for neck. Says her taste is terrible. No other new symptoms. Not using rinses or doing H&N exercises. She asked many questions about radiation effects and she is concerned radiation is going to her brain because she feels foggy and not clear-headed. She asked if she could have something in writing saying that her brain is not being irradiated. She then agreed that she has seen her chart and she knows that her neck is being treated. She asked me about proton RT, and she also asked about side effects that she heard about on the internet. I explained this could be a side effect of treatment - inflammatory response, similar to "chemo brain" but that the brain is not the target of RT.   \par \par 3/31/20 - OTV - Ms. Conway has completed 1600 cGy / 7000 cGy to the oropharynx/neck.  She reports she has started having some side effects from treatment - sore throat, neck tightness, mouth sore, and decreased appetite.  She has not started oral rinses, Aquaphor, or head and neck exercises. She admits she sometimes feels paranoid and "ruminates" and asked if she can take ativan or similar if that happens. She is not medicated for her medical problems. \par \par 3/25/20 - OTV - Ms. Conway has completed 1000 cGy / 7000 cGy to the oropharynx/neck.  Today, she reports feeling stable and is without complaints.  She will see Dr. Meeks for chemo this afternoon.  \par __________________________________________________________________________________\par ONCOLOGIC HISTORY (3/4/19)\par Ms. Cherelle Conway is a 39F, former smoker (1/2 ppd x 15 years) referred by Dr. 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 \par \par Patient first noticed a left neck mass approx 2 years ago. At that time, she reportedly had drainage of the growth at [facility] that did not show any infection and received antibiotics. She states the mass was drained and she was told it was a cyst. She does not know if the specimen was sent for pathology. Remission of the mass occurred, and it reappeared approx 20 months ago. She again had FNA (non-diagnostic) and received antibiotics. Patient states she has seen other ENTs "at several other institutions," but would not tell us who she has seen or where. She reports feeling fatigued in the summer of 2018, had been to urgent care on a few occasions but did not receive a diagnosis.  Starting in the summer of 2018, she also became "displaced" due to family issues and breaking up with a boyfriend, and she was in and out of homeless shelters for several months. \par \par Patient reports she was having pain in her teeth and gums around November 2018. She went to the ED at [facility] because she thought she could get emergent dental work done at the ED.  Imaging was done. \par \par CT neck on 12/1/18 revealed the following:\par -Soft tissues mass noted in the LEFT neck extending deep to the mandible on the left compressing the submandibular gland anteriorly with internal heterogeneity which is predominantly solid. This mass is lateral to the carotid artery with apparent compression and effacement of the internal jugular vein. The mass measures up to 5 x 3.7 cm. This mass is deep to the flattened sternocleidomastoid muscle and extends to the lower left neck just  the supraclavicular fossa. The bulk of the mass appears below the left parotid gland- cannot exclude involvement of the lower portion of the parotid. \par -Soft tissue mass measuring up to 1.9 cm with central low attenuation possibly necrosis above this mass, possibly representing a necrotic lymph node. Other prominent left sided lymph nodes in the neck in the area of the jugulodigastric region and posterior triangle are also noted including a posterior triangle lymph node posterior to the sternocleidomastoid. \par \par She saw Dr. Weller for consultation on 12/5/18, and at the time reported left neck mass for many months with cervical nodes. She had FNA of the left neck mass at that time, and pathology showed squamous cell carcinoma, HPV related. She then saw Dr. Alvarado on 12/17/18 for consultation; discussed treatment options based on PET/ CT results. \par \par PET/ CT done 12/21/18 showed the following:\par Impression: Large supra and infrahyoid left neck mass measuring 5.4 cm, similar as compared to 12/1/2018, and increased in size as compared to 11/10/2016 as above. In view of the biopsy results, this is compatible with a diagnosis of squamous cell carcinoma. Left supraclavicular and right level 3 hypermetabolic lymphadenopathy is noted, likely associated pathologic lymphadenopathy.\par \par Her case was presented at [facility]; consensus was for surgery (left neck dissection, tonsillectomy, BOT biopsy), followed by chemoRT. She saw Dr. Alvarado on 1/7/19 to discuss surgical plan. However, patient grew increasingly uncomfortable with the biopsies because she does not want to undergo anesthesia. She ultimately opted against surgery and would like to proceed with chemoRT. She was referred here and to Dr. Meeks.  She has not yet seen Dr. Meeks or scheduled a consultation with him. \par \par Today, she expresses she is "not convinced" of her diagnosis and questions if the mass is due to Cushing's disease or if it could be related to high stress (she reports she has "family issues that have caused a great deal of stress"). She questions that she could have HPV related disease as she "does not partake in that kind of activity," and she fixated on this during the clinical encounter. She admits that she went to a hospital in [place] to try to get another biopsy but that they would not perform another biopsy since she has a known diagnosis of cancer. Today, she is evasive with answering questions and providing her health history. She denies any pain or dysphagia, fever, CP, SOB, cough, weight loss. She reports weight gain, as well as throat clearing. She also reports night sweats, fatigue, and "feeling cold all the time." She reports she sees a psychiatrist, who prescribes medications for anxiety and depression, but she declined to say which medications she takes or who her psychiatrist is. Upon further questioning, she stated that she is off her antidepressant. She notes that she does not have a dentist nor does she have dental coverage. She further declined to provide the name of her PCP; states she "might be looking for a new one." \par \par Of note, she had been living in shelters for a few months. She is now still homeless and has been staying with friends. She also reports she had been working but is on disability. \par \par 8/26/19 - SNA\par Ms. Conway returns today for SNA.  She was last seen in this office by Dr. Londono on 3/4/19.  She was recommended further diagnostic workup to include panendoscopy and targeted biopsy, PET/CT and MRI and treatment consisting of induction chemo followed by radiation.  She was also advised to seek a second opinion given her concern that her diagnosis was a mistake.  This office was unable to reach her following that visit.  It is well documented in her medical record that she continued to delay further workup and initiation of treatment despite medical advice to the contrary.  She eventually followed-up with Dr. Alvarado and Dr. Meeks and underwent PET/CT, MRI orbit/face/neck, and CTA neck and brain.  \par \par PET/CT 6/9/19\par There is has been interval enlargement of the hypermetabolic left-sided neck mass which now measures up to 11.1 cm in craniocaudal dimension with an SUV of 21.3. There is invasion of the adjacent soft tissues structures, with involvement of the \par left common and internal carotid arteries, as well as the left-sided neck musculature. There are enlarged, hypermetabolic lymph nodes in the left retropharyngeal lymph region and cervical levels 1, 5, and 6. Additionally there are enlarged hypermetabolic lymph nodes at the right cervical levels 3 and 6, as well as the bilateral supraclavicular regions.  \par IMPRESSION: \par Compared with prior PET/CT dated 12/21/2018 has been interval enlargement of large left-sided hypermetabolic neck mass. \par There are an increased number of hypermetabolic lymph nodes in the bilateral cervical regions, retropharyngeal region, and supraclavicular region as above. \par \par MRI orbit/face/neck with and without contrast 6/10/19 - IMPRESSION: In this patient with left neck conglomerate klarissa mass that exceeds 10 cm in maximal dimension, there is complete encasement of the left common and internal carotid arteries as well as invasion of the skin, parotid and submandibular glands. There is also pathologic lymphadenopathy in the right infrahyoid neck. The primary tumor is not identified. Please see report above for further detail. \par \par CTA brain with contrast 6/20/19 - IMPRESSION: Unremarkable CTA examination of the brain.  \par \par CTA neck with contrast 6/20/19 - IMPRESSION: As seen on MRI, massive pathologic cervical lymphadenopathy encases the left \par CCA, ECA and ICA with mild luminal narrowing relative to the normal right side. No pseudoaneurysm or other evidence to portend a risk of impending blowout. CTA brain is normal. \par \par She completed 3 cycles of induction chemotherapy (DCF) under the care of Dr. Meeks, completed 8/8/19.  She returns for consideration of radiation therapy.  She states Dr. Meeks has recommended concurrent chemo/RT.  She expresses satisfaction with her response to chemotherapy thus far, as the neck mass has significantly decreased in size.  Although, she is concerned about lymphadenopathy, which she reports is unchanged.  She complains of "aching" to the left side of her body as well as lower extremity edema since chemo.  She denies pain, dysphagia, odynophagia.  She notes weight fluctuation throughout chemo.  \par \par Of note, she states she is currently living "in a few different places."  She [place] [facility] by the time her radiation begins.  She denies smoking.  Admits to one drink every 1-2 months, most recently a glass of wine last weekend.  She denies drug use.  Her last menstrual period was in June.  She states she is working on obtaining dental insurance and that she needs an extraction and a root canal.  She also mentions needing to follow-up on an abnormal PAP smear from December 2018.  \par \par 2/13/20 - SNA\par Ms. Conway was last seen on 8/26/19 by Dr. Allen.  She was recommended radiation with concurrent chemotherapy to which she agreed.  She completed CT simulation on 9/12/19.  She had difficulty obtaining dental clearance due to insurance issues and was lost to follow-up.  She last saw Dr. Meeks last week.  She states he recommended she have chemoRT.  Most recent imaging as follows:\par \par PET/CT 10/14/19 - Impression: Since 6/9/2019, there has been dramatic improvement in the size and degree of FDG uptake of the left neck mass. There is persistent uptake in the left carotid space at the angle of the mandible, within the skin and subcutaneous \par tissues of the left neck at the level of the hyoid bone, as well as a few mildly FDG avid bilateral cervical lymph nodes as above. \par \par Today, she complains of intermittent nausea, vomiting, and severe fatigue since completing chemo.  She has been researching chronic fatigue syndrome and identifies with the symptoms profile.  She reports that following her PET/CT in October the masses in her neck began to enlarge and continued to grow until approximately 1 month ago.  She states the masses had been present since prior to chemo, but were very small, only palpable and not visible.  She feels that the size has been stable over the past month.  She notes that this happened with her previous PET/CT and wonders if she has been having "bad reactions" to the PET scans causing the cancer to grow.  She denies pain, dyspnea, cough, dysphagia, odynophagia.  She states she completed her dental evaluation and had wisdom teeth and an additional tooth extracted in December.  \par \par She also reports feeling anxious.  She states she has baseline anxiety and her cancer and chemo have exacerbated it.  She is considering starting an anxiolytic and/or SSRI.  She has not seen psych recently.  \par \par She has several hypotheses about her disease that she presents today. She suggests that the PET/CT scans made her cancer grow faster. She asks if it is possible her cancer was mis-diagnosed and she has Cushings syndrome and not cancer. She also wonders if her diagnosis is actually more consistent with "Bronchial cyst area." She thinks her level VI node emerged after she had dental work.\par \par Of note, she states she is currently living in [place] and thinks she will be living in [place] during her treatment.  She admits to occasional alcohol use.  She denies smoking and drug use.  \par \par 2/24/20 - Follow-up\par Ms. Conway was last seen on 2/13/20.  She was to complete PET/CT and provide a dental clearance letter.  \par \par PET/CT 2/25/20 - IMPRESSION: Impression: Since 10/14/2019: Mass in the left neck at the level of the hyoid is increased in width with increased FDG uptake. New FDG uptake within a soft tissue mass in the left parotid gland which may represent lymph node. Increased size and FDG uptake of multiple cervical lymph nodes as well as new FDG avid nodes. \par \par Today, she reports feeling fatigued.  She also states she has been having cyclical menstrual cramps despite not having a period since June.  She is otherwise without complaints.  She denies pain, dysphagia, odynophagia, dyspnea.

## 2020-05-11 NOTE — DISEASE MANAGEMENT
[Clinical] : TNM Stage: c [III] : III [TTNM] : X [NTNM] : 3 [de-identified] : 5000 cGy [MTNM] : 0 [de-identified] : oropharynx and neck [de-identified] : 7000  cGy

## 2020-05-13 ENCOUNTER — EMERGENCY (EMERGENCY)
Facility: HOSPITAL | Age: 40
LOS: 1 days | Discharge: HOME | End: 2020-05-13
Admitting: EMERGENCY MEDICINE
Payer: MEDICARE

## 2020-05-13 VITALS
DIASTOLIC BLOOD PRESSURE: 73 MMHG | OXYGEN SATURATION: 100 % | HEART RATE: 69 BPM | SYSTOLIC BLOOD PRESSURE: 120 MMHG | RESPIRATION RATE: 16 BRPM

## 2020-05-13 VITALS
HEIGHT: 65 IN | SYSTOLIC BLOOD PRESSURE: 114 MMHG | RESPIRATION RATE: 16 BRPM | TEMPERATURE: 97 F | HEART RATE: 75 BPM | OXYGEN SATURATION: 100 % | DIASTOLIC BLOOD PRESSURE: 76 MMHG | WEIGHT: 128.09 LBS

## 2020-05-13 PROCEDURE — 99282 EMERGENCY DEPT VISIT SF MDM: CPT

## 2020-05-13 NOTE — ED ADULT TRIAGE NOTE - NS ED NURSE BANDS TYPE
Chart reviewed. HCV RNA noted along with recent labs. U/s done on 11/30.   Attempted to speak with pt. Left message on mobile number listed asking that he call back to schedule. Voicemail is full for home number.   Will continue to try and reach pt.      Name band;

## 2020-05-13 NOTE — ED PROVIDER NOTE - OBJECTIVE STATEMENT
39 y/o F with squamous cell carcinoma receiving chemotherapy and radiation presents to ED c/o thick saliva.  She called her oncologist who stated there isn't much than can be done.  She has been using biotene but stopped using it a 3 days ago thinking it may be making her symptoms worse.  She is also using Magic Mouthwash She is able to swallow and does not have difficulty breathing.  Pt is hoping there is a medication that can help with the thickness of her saliva.  She also c/o ulcers to her tongue.  She uses Oragel with relief of pain.  She denies cough, chest pain, SOB.

## 2020-05-13 NOTE — ED PROVIDER NOTE - CPE EDP GASTRO NORM
Pt arrives from home with daughter via POV with c/o chest pressure. Pt has had this x 2 weeks, worsening today. Pt notices tongue swelling after taking Lisinopril and also reports dry cough. Pt has seasonal allergies and gastric reflux, otherwise no c/o pain or discomfort. Pt walks 2.5 miles/day and did so today. Pt denies NVD, skin is warm and dry, denies dizziness or SOB. normal...

## 2020-05-13 NOTE — ED PROVIDER NOTE - PATIENT PORTAL LINK FT
You can access the FollowMyHealth Patient Portal offered by [facility] by registering at the following website: [email or web address]. By joining [employer]’s FollowMyHealth portal, you will also be able to view your health information using other applications (apps) compatible with our system.

## 2020-05-13 NOTE — ED PROVIDER NOTE - CLINICAL SUMMARY MEDICAL DECISION MAKING FREE TEXT BOX
41 y/o F presents to ED c/o thick oral secretions secondary to chemo/radiation treatment.  Pt advised to continue Biotene, Magic Mouthwash and Oragel and f/u with oncologist.  Pt also advised to consider acupuncture as this may also be helpful.

## 2020-05-13 NOTE — ED PROVIDER NOTE - NSFOLLOWUPINSTRUCTIONS_ED_ALL_ED_FT
Follow up with oncology to discuss further management of side effects.    Return for inability to swallowing or difficulty breathing.

## 2020-05-17 DIAGNOSIS — K11.7 DISTURBANCES OF SALIVARY SECRETION: ICD-10-CM

## 2020-05-17 DIAGNOSIS — K14.8 OTHER DISEASES OF TONGUE: ICD-10-CM

## 2020-05-19 ENCOUNTER — APPOINTMENT (OUTPATIENT)
Dept: RADIATION ONCOLOGY | Facility: CLINIC | Age: 40
End: 2020-05-19
Payer: MEDICARE

## 2020-05-19 VITALS
OXYGEN SATURATION: 100 % | WEIGHT: 127.1 LBS | RESPIRATION RATE: 15 BRPM | HEART RATE: 80 BPM | BODY MASS INDEX: 21.15 KG/M2 | SYSTOLIC BLOOD PRESSURE: 105 MMHG | DIASTOLIC BLOOD PRESSURE: 74 MMHG | TEMPERATURE: 97.9 F

## 2020-05-19 DIAGNOSIS — C10.9 MALIGNANT NEOPLASM OF OROPHARYNX, UNSPECIFIED: ICD-10-CM

## 2020-05-19 PROCEDURE — 99024 POSTOP FOLLOW-UP VISIT: CPT

## 2020-05-19 NOTE — HISTORY OF PRESENT ILLNESS
[FreeTextEntry1] : Ms. 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 Meeks.  \par \par She had a treatment break from 4/17 - 4/27 for re-planning due to significant disease response. \par \par 5/19/20 - PTE. She arrived 90 minutes late for her appointment. Ms. Conway has been on self-imposed treatment break. She completed 5000 cGy/ 7000 cGy to the oropharynx/neck.  She has not received RT since 5/8.  She was last seen on 5/11, at which time she was undecided if she wanted to continue with treatment.  She agreed to consider her options and let us know within the next 2 days.  She had a phone conversation with Dr. Londono on 5/13, at which point she was still undecided.  Plan was to hold RT for the rest of the week and reconsider by Monday 5/18.  Today, she states that she is still undecided and wants to know what her options are.  She feels that chemotherapy and "2 or 3 more days of radiation" may be effective and wants to know if this is an option.  She also suggests an alternative treatment from [place] that is available for purchase on [employer].  She again brings up having a biopsy.  She states that her side effects of xerostomia, lack of taste and appetite, and mouth sores persist.  She continues to have minimal PO intake, although she feels she has been "liquidating" better over the past week.  \par \par 5/12/20 - OTV - Ms. Conway has completed 5000 cGy / 7000 cGy to the oropharynx/neck. Today, she states she is "fried."  She complains of odynophagia, mouth sores, and dry skin on her neck.  She does not want to received radiation today due to these side effects and is unsure if she will complete the course of treatment.  She is reluctant to take pain medication because she does not want to "mask" the effects of radiation and she does not want to try gabapentin because of the risk of psychosis in patients with mental health history. She is not sure about other pain medicines; she tried liquid tylenol but not consistently. She has concerns about permanent damage to her throat and wonders if other treatment options are available.  \par \par 5/5/20 - OTV - Ms. Conway has completed 4400 cGy / 7000 cGy to the oropharynx/neck.  Today, she reports having more energy. She continues with minimal PO intake - water, juice, milk.  Had one small piece of bread yesterday.  She continues to have dysgeusia, xerostomia and throat dryness.  She is using Biotene. \par \par 4/28/20 - OTV - Ms. Conway has completed 3600 cGy / 7000 cGy to the oropharynx/neck.  Today, she reports feeling sluggish and fatigued.  She is on a soft/pureed diet.  She admits to minimal PO intake over the past few weeks (estimates 500 calories per day) due to dysgeusia, throat dryness, and decreased appetite.  She stopped drinking Ensure because she felt it was making the inside of her mouth worse.  \par \par \par \par 4/14/2020- OTV- Ms. Conway has completed  3200 cGy/ 7000 cGy to the oropharynx/ neck.  She saw RICHARD George last week. Today, she continues to feel very anxious about treatment and wants to know if quitting is an option.\par She explains that she has a history of very negative interactions with healthcare surrounding a psychiatric experience and that is why she "seems paranoid" and asks so many questions.\par She again uses the word "ruminate"\par She has not taken the lidocaine or MMW and is complaining of much worse sore throat and trouble eating. She uses aquaphor "sometimes". Is using biotene. \par \par  On 4/10/20 I spoke with her for 15 minutes and showed her the RT plan and explained there is minimal RT to her brain. \par \par 4/7/2020 OTV - Cherelle has started to develop sore throat with yawning or eating, up to 8 out of 10. Not taking pain meds. Has not started MMW, lost the instructions. Just started aquaphor for neck. Says her taste is terrible. No other new symptoms. Not using rinses or doing H&N exercises. She asked many questions about radiation effects and she is concerned radiation is going to her brain because she feels foggy and not clear-headed. She asked if she could have something in writing saying that her brain is not being irradiated. She then agreed that she has seen her chart and she knows that her neck is being treated. She asked me about proton RT, and she also asked about side effects that she heard about on the internet. I explained this could be a side effect of treatment - inflammatory response, similar to "chemo brain" but that the brain is not the target of RT.   \par \par 3/31/20 - OTV - Ms. Conway has completed 1600 cGy / 7000 cGy to the oropharynx/neck.  She reports she has started having some side effects from treatment - sore throat, neck tightness, mouth sore, and decreased appetite.  She has not started oral rinses, Aquaphor, or head and neck exercises. She admits she sometimes feels paranoid and "ruminates" and asked if she can take ativan or similar if that happens. She is not medicated for her medical problems. \par \par 3/25/20 - OTV - Ms. Conway has completed 1000 cGy / 7000 cGy to the oropharynx/neck.  Today, she reports feeling stable and is without complaints.  She will see Dr. Meeks for chemo this afternoon.  \par __________________________________________________________________________________\par ONCOLOGIC HISTORY (3/4/19)\par Ms. Cherelle Conway is a 39F, former smoker (1/2 ppd x 15 years) referred by Dr. 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 \par \par Patient first noticed a left neck mass approx 2 years ago. At that time, she reportedly had drainage of the growth at [facility] that did not show any infection and received antibiotics. She states the mass was drained and she was told it was a cyst. She does not know if the specimen was sent for pathology. Remission of the mass occurred, and it reappeared approx 20 months ago. She again had FNA (non-diagnostic) and received antibiotics. Patient states she has seen other ENTs "at several other institutions," but would not tell us who she has seen or where. She reports feeling fatigued in the summer of 2018, had been to urgent care on a few occasions but did not receive a diagnosis.  Starting in the summer of 2018, she also became "displaced" due to family issues and breaking up with a boyfriend, and she was in and out of homeless shelters for several months. \par \par Patient reports she was having pain in her teeth and gums around November 2018. She went to the ED at [facility] because she thought she could get emergent dental work done at the ED.  Imaging was done. \par \par CT neck on 12/1/18 revealed the following:\par -Soft tissues mass noted in the LEFT neck extending deep to the mandible on the left compressing the submandibular gland anteriorly with internal heterogeneity which is predominantly solid. This mass is lateral to the carotid artery with apparent compression and effacement of the internal jugular vein. The mass measures up to 5 x 3.7 cm. This mass is deep to the flattened sternocleidomastoid muscle and extends to the lower left neck just  the supraclavicular fossa. The bulk of the mass appears below the left parotid gland- cannot exclude involvement of the lower portion of the parotid. \par -Soft tissue mass measuring up to 1.9 cm with central low attenuation possibly necrosis above this mass, possibly representing a necrotic lymph node. Other prominent left sided lymph nodes in the neck in the area of the jugulodigastric region and posterior triangle are also noted including a posterior triangle lymph node posterior to the sternocleidomastoid. \par \par She saw Dr. Weller for consultation on 12/5/18, and at the time reported left neck mass for many months with cervical nodes. She had FNA of the left neck mass at that time, and pathology showed squamous cell carcinoma, HPV related. She then saw Dr. Alvarado on 12/17/18 for consultation; discussed treatment options based on PET/ CT results. \par \par PET/ CT done 12/21/18 showed the following:\par Impression: Large supra and infrahyoid left neck mass measuring 5.4 cm, similar as compared to 12/1/2018, and increased in size as compared to 11/10/2016 as above. In view of the biopsy results, this is compatible with a diagnosis of squamous cell carcinoma. Left supraclavicular and right level 3 hypermetabolic lymphadenopathy is noted, likely associated pathologic lymphadenopathy.\par \par Her case was presented at [facility]; consensus was for surgery (left neck dissection, tonsillectomy, BOT biopsy), followed by chemoRT. She saw Dr. Alvarado on 1/7/19 to discuss surgical plan. However, patient grew increasingly uncomfortable with the biopsies because she does not want to undergo anesthesia. She ultimately opted against surgery and would like to proceed with chemoRT. She was referred here and to Dr. Meeks.  She has not yet seen Dr. Meeks or scheduled a consultation with him. \par \par Today, she expresses she is "not convinced" of her diagnosis and questions if the mass is due to Cushing's disease or if it could be related to high stress (she reports she has "family issues that have caused a great deal of stress"). She questions that she could have HPV related disease as she "does not partake in that kind of activity," and she fixated on this during the clinical encounter. She admits that she went to a hospital in [place] to try to get another biopsy but that they would not perform another biopsy since she has a known diagnosis of cancer. Today, she is evasive with answering questions and providing her health history. She denies any pain or dysphagia, fever, CP, SOB, cough, weight loss. She reports weight gain, as well as throat clearing. She also reports night sweats, fatigue, and "feeling cold all the time." She reports she sees a psychiatrist, who prescribes medications for anxiety and depression, but she declined to say which medications she takes or who her psychiatrist is. Upon further questioning, she stated that she is off her antidepressant. She notes that she does not have a dentist nor does she have dental coverage. She further declined to provide the name of her PCP; states she "might be looking for a new one." \par \par Of note, she had been living in shelters for a few months. She is now still homeless and has been staying with friends. She also reports she had been working but is on disability. \par \par 8/26/19 - SNA\par Ms. Conway returns today for SNA.  She was last seen in this office by Dr. Londono on 3/4/19.  She was recommended further diagnostic workup to include panendoscopy and targeted biopsy, PET/CT and MRI and treatment consisting of induction chemo followed by radiation.  She was also advised to seek a second opinion given her concern that her diagnosis was a mistake.  This office was unable to reach her following that visit.  It is well documented in her medical record that she continued to delay further workup and initiation of treatment despite medical advice to the contrary.  She eventually followed-up with Dr. Alvarado and Dr. Meeks and underwent PET/CT, MRI orbit/face/neck, and CTA neck and brain.  \par \par PET/CT 6/9/19\par There is has been interval enlargement of the hypermetabolic left-sided neck mass which now measures up to 11.1 cm in craniocaudal dimension with an SUV of 21.3. There is invasion of the adjacent soft tissues structures, with involvement of the \par left common and internal carotid arteries, as well as the left-sided neck musculature. There are enlarged, hypermetabolic lymph nodes in the left retropharyngeal lymph region and cervical levels 1, 5, and 6. Additionally there are enlarged hypermetabolic lymph nodes at the right cervical levels 3 and 6, as well as the bilateral supraclavicular regions.  \par IMPRESSION: \par Compared with prior PET/CT dated 12/21/2018 has been interval enlargement of large left-sided hypermetabolic neck mass. \par There are an increased number of hypermetabolic lymph nodes in the bilateral cervical regions, retropharyngeal region, and supraclavicular region as above. \par \par MRI orbit/face/neck with and without contrast 6/10/19 - IMPRESSION: In this patient with left neck conglomerate klarissa mass that exceeds 10 cm in maximal dimension, there is complete encasement of the left common and internal carotid arteries as well as invasion of the skin, parotid and submandibular glands. There is also pathologic lymphadenopathy in the right infrahyoid neck. The primary tumor is not identified. Please see report above for further detail. \par \par CTA brain with contrast 6/20/19 - IMPRESSION: Unremarkable CTA examination of the brain.  \par \par CTA neck with contrast 6/20/19 - IMPRESSION: As seen on MRI, massive pathologic cervical lymphadenopathy encases the left \par CCA, ECA and ICA with mild luminal narrowing relative to the normal right side. No pseudoaneurysm or other evidence to portend a risk of impending blowout. CTA brain is normal. \par \par She completed 3 cycles of induction chemotherapy (DCF) under the care of Dr. Meeks, completed 8/8/19.  She returns for consideration of radiation therapy.  She states Dr. Meeks has recommended concurrent chemo/RT.  She expresses satisfaction with her response to chemotherapy thus far, as the neck mass has significantly decreased in size.  Although, she is concerned about lymphadenopathy, which she reports is unchanged.  She complains of "aching" to the left side of her body as well as lower extremity edema since chemo.  She denies pain, dysphagia, odynophagia.  She notes weight fluctuation throughout chemo.  \par \par Of note, she states she is currently living "in a few different places."  She anticip[place] [facility] by the time her radiation begins.  She denies smoking.  Admits to one drink every 1-2 months, most recently a glass of wine last weekend.  She denies drug use.  Her last menstrual period was in June.  She states she is working on obtaining dental insurance and that she needs an extraction and a root canal.  She also mentions needing to follow-up on an abnormal PAP smear from December 2018.  \par \par 2/13/20 - SNA\par Ms. Conway was last seen on 8/26/19 by Dr. Allen.  She was recommended radiation with concurrent chemotherapy to which she agreed.  She completed CT simulation on 9/12/19.  She had difficulty obtaining dental clearance due to insurance issues and was lost to follow-up.  She last saw Dr. Meeks last week.  She states he recommended she have chemoRT.  Most recent imaging as follows:\par \par PET/CT 10/14/19 - Impression: Since 6/9/2019, there has been dramatic improvement in the size and degree of FDG uptake of the left neck mass. There is persistent uptake in the left carotid space at the angle of the mandible, within the skin and subcutaneous \par tissues of the left neck at the level of the hyoid bone, as well as a few mildly FDG avid bilateral cervical lymph nodes as above. \par \par Today, she complains of intermittent nausea, vomiting, and severe fatigue since completing chemo.  She has been researching chronic fatigue syndrome and identifies with the symptoms profile.  She reports that following her PET/CT in October the masses in her neck began to enlarge and continued to grow until approximately 1 month ago.  She states the masses had been present since prior to chemo, but were very small, only palpable and not visible.  She feels that the size has been stable over the past month.  She notes that this happened with her previous PET/CT and wonders if she has been having "bad reactions" to the PET scans causing the cancer to grow.  She denies pain, dyspnea, cough, dysphagia, odynophagia.  She states she completed her dental evaluation and had wisdom teeth and an additional tooth extracted in December.  \par \par She also reports feeling anxious.  She states she has baseline anxiety and her cancer and chemo have exacerbated it.  She is considering starting an anxiolytic and/or SSRI.  She has not seen psych recently.  \par \par She has several hypotheses about her disease that she presents today. She suggests that the PET/CT scans made her cancer grow faster. She asks if it is possible her cancer was mis-diagnosed and she has Cushings syndrome and not cancer. She also wonders if her diagnosis is actually more consistent with "Bronchial cyst area." She thinks her level VI node emerged after she had dental work.\par \par Of note, she states she is currently living in [place] and thinks she will be living in [place] during her treatment.  She admits to occasional alcohol use.  She denies smoking and drug use.  \par \par 2/24/20 - Follow-up\par Ms. Conway was last seen on 2/13/20.  She was to complete PET/CT and provide a dental clearance letter.  \par \par PET/CT 2/25/20 - IMPRESSION: Impression: Since 10/14/2019: Mass in the left neck at the level of the hyoid is increased in width with increased FDG uptake. New FDG uptake within a soft tissue mass in the left parotid gland which may represent lymph node. Increased size and FDG uptake of multiple cervical lymph nodes as well as new FDG avid nodes. \par \par Today, she reports feeling fatigued.  She also states she has been having cyclical menstrual cramps despite not having a period since June.  She is otherwise without complaints.  She denies pain, dysphagia, odynophagia, dyspnea.

## 2020-05-19 NOTE — DISEASE MANAGEMENT
[TTNM] : X [NTNM] : 3 [MTNM] : 0 [de-identified] : 5000 cGy [de-identified] : 7000  cGy [de-identified] : oropharynx and neck

## 2020-05-19 NOTE — PHYSICAL EXAM
[de-identified] : ECOG 1 [de-identified] : grade 1 mucositis, no thrush. dryness throughout.  [de-identified] : smaller masses throughout left neck. palpable right neck level 2  node. [de-identified] : scarring to left neck. grade 1 dermatitis throughout neck but no desquamation.

## 2020-06-02 ENCOUNTER — APPOINTMENT (OUTPATIENT)
Dept: RADIATION ONCOLOGY | Facility: CLINIC | Age: 40
End: 2020-06-02
Payer: MEDICARE

## 2020-06-02 DIAGNOSIS — C77.9 SECONDARY AND UNSPECIFIED MALIGNANT NEOPLASM OF LYMPH NODE, UNSPECIFIED: ICD-10-CM

## 2020-06-02 DIAGNOSIS — F99 MENTAL DISORDER, NOT OTHERWISE SPECIFIED: ICD-10-CM

## 2020-06-02 DIAGNOSIS — C76.0 MALIGNANT NEOPLASM OF HEAD, FACE AND NECK: ICD-10-CM

## 2020-06-02 PROCEDURE — 99024 POSTOP FOLLOW-UP VISIT: CPT

## 2020-06-04 NOTE — HISTORY OF PRESENT ILLNESS
[FreeTextEntry1] : Ms. 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 Meeks.  She had an excellent clinical response to induction chemo and was recommended subsequent concurrent chemoRT.  She underwent CT simulation for RT planning in September 2019.  She had difficulty obtaining dental clearance due to insurance issues and was lost to follow-up.  She presented again in February 2020 with recurrence of multiple firm masses in the left neck.  She completed 5000 / 7000 cGy to the oropharynx and neck from 3/18/20 - 5/8/20 with concurrent chemo under the care of Dr. Meeks.  She had a treatment break from 4/17 - 4/27 for re-planning due to significant disease response.  She has now been on self-imposed treatment break since 5/8/20.  \par \par 6/2/20 - Follow-up\reva Cano returns today for further discussion of her concerns and treatment plan.  She arrived 90 minutes late for her appointment. She came into the office on 5/22 and expressed to the  that she would like to complete her radiation treatment.  We were unable to reach her after multiple attempts the following week.  She called our answering service on 5/31 and asked for a call back.  She was contacted by phone on 6/1 by CHOLO Cannon and expressed several questions and concerns, which are documented.  She was asked to come into the office today for further discussion of her concerns and treatment plan.  \par \par She remains undecided about whether she wants to complete her prescribed treatment plan.  She asks many questions regarding the effectiveness of treatment, the need for a biopsy, and the possibility of her cancer already being eradicated that demonstrate continued suspicion of her diagnosis and treatment plan.  It was clearly expressed to her several times that we recommend she complete her treatment.  She stated that she "can't commit to that", noting that she is "indecisive" and can not force herself to be decisive.  \par \par 5/19/20 - PTE. She arrived 90 minutes late for her appointment. Ms. Foy has been on self-imposed treatment break. She completed 5000 cGy/ 7000 cGy to the oropharynx/neck.  She has not received RT since 5/8.  She was last seen on 5/11, at which time she was undecided if she wanted to continue with treatment.  She agreed to consider her options and let us know within the next 2 days.  She had a phone conversation with Dr. Londono on 5/13, at which point she was still undecided.  Plan was to hold RT for the rest of the week and reconsider by Monday 5/18.  Today, she states that she is still undecided and wants to know what her options are.  She feels that chemotherapy and "2 or 3 more days of radiation" may be effective and wants to know if this is an option.  She also suggests an alternative treatment from [place] that is available for purchase on [employer].  She again brings up having a biopsy.  She states that her side effects of xerostomia, lack of taste and appetite, and mouth sores persist.  She continues to have minimal PO intake, although she feels she has been "liquidating" better over the past week.  \par __________________________________________________________________________________\par ONCOLOGIC HISTORY (3/4/19)\par Ms. Rachael Foy is a 39F, former smoker (1/2 ppd x 15 years) referred by Dr. 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 \par \par Patient first noticed a left neck mass approx 2 years ago. At that time, she reportedly had drainage of the growth at [facility] that did not show any infection and received antibiotics. She states the mass was drained and she was told it was a cyst. She does not know if the specimen was sent for pathology. Remission of the mass occurred, and it reappeared approx 20 months ago. She again had FNA (non-diagnostic) and received antibiotics. Patient states she has seen other ENTs "at several other institutions," but would not tell us who she has seen or where. She reports feeling fatigued in the summer of 2018, had been to urgent care on a few occasions but did not receive a diagnosis.  Starting in the summer of 2018, she also became "displaced" due to family issues and breaking up with a boyfriend, and she was in and out of homeless shelters for several months. \par \par Patient reports she was having pain in her teeth and gums around November 2018. She went to the ED at [facility] because she thought she could get emergent dental work done at the ED.  Imaging was done. \par \par CT neck on 12/1/18 revealed the following:\par -Soft tissues mass noted in the LEFT neck extending deep to the mandible on the left compressing the submandibular gland anteriorly with internal heterogeneity which is predominantly solid. This mass is lateral to the carotid artery with apparent compression and effacement of the internal jugular vein. The mass measures up to 5 x 3.7 cm. This mass is deep to the flattened sternocleidomastoid muscle and extends to the lower left neck just  the supraclavicular fossa. The bulk of the mass appears below the left parotid gland- cannot exclude involvement of the lower portion of the parotid. \par -Soft tissue mass measuring up to 1.9 cm with central low attenuation possibly necrosis above this mass, possibly representing a necrotic lymph node. Other prominent left sided lymph nodes in the neck in the area of the jugulodigastric region and posterior triangle are also noted including a posterior triangle lymph node posterior to the sternocleidomastoid. \par \par She saw Dr. Weller for consultation on 12/5/18, and at the time reported left neck mass for many months with cervical nodes. She had FNA of the left neck mass at that time, and pathology showed squamous cell carcinoma, HPV related. She then saw Dr. Alvarado on 12/17/18 for consultation; discussed treatment options based on PET/ CT results. \par \par PET/ CT done 12/21/18 showed the following:\par Impression: Large supra and infrahyoid left neck mass measuring 5.4 cm, similar as compared to 12/1/2018, and increased in size as compared to 11/10/2016 as above. In view of the biopsy results, this is compatible with a diagnosis of squamous cell carcinoma. Left supraclavicular and right level 3 hypermetabolic lymphadenopathy is noted, likely associated pathologic lymphadenopathy.\par \par Her case was presented at [facility]; consensus was for surgery (left neck dissection, tonsillectomy, BOT biopsy), followed by chemoRT. She saw Dr. Alvarado on 1/7/19 to discuss surgical plan. However, patient grew increasingly uncomfortable with the biopsies because she does not want to undergo anesthesia. She ultimately opted against surgery and would like to proceed with chemoRT. She was referred here and to Dr. Meeks.  She has not yet seen Dr. Meeks or scheduled a consultation with him. \par \par Today, she expresses she is "not convinced" of her diagnosis and questions if the mass is due to Cushing's disease or if it could be related to high stress (she reports she has "family issues that have caused a great deal of stress"). She questions that she could have HPV related disease as she "does not partake in that kind of activity," and she fixated on this during the clinical encounter. She admits that she went to a hospital in [place] to try to get another biopsy but that they would not perform another biopsy since she has a known diagnosis of cancer. Today, she is evasive with answering questions and providing her health history. She denies any pain or dysphagia, fever, CP, SOB, cough, weight loss. She reports weight gain, as well as throat clearing. She also reports night sweats, fatigue, and "feeling cold all the time." She reports she sees a psychiatrist, who prescribes medications for anxiety and depression, but she declined to say which medications she takes or who her psychiatrist is. Upon further questioning, she stated that she is off her antidepressant. She notes that she does not have a dentist nor does she have dental coverage. She further declined to provide the name of her PCP; states she "might be looking for a new one." \par \par Of note, she had been living in shelters for a few months. She is now still homeless and has been staying with friends. She also reports she had been working but is on disability. \par \par 8/26/19 - SNA\par Ms. Foy returns today for SNA.  She was last seen in this office by Dr. Londono on 3/4/19.  She was recommended further diagnostic workup to include panendoscopy and targeted biopsy, PET/CT and MRI and treatment consisting of induction chemo followed by radiation.  She was also advised to seek a second opinion given her concern that her diagnosis was a mistake.  This office was unable to reach her following that visit.  It is well documented in her medical record that she continued to delay further workup and initiation of treatment despite medical advice to the contrary.  She eventually followed-up with Dr. Alvarado and Dr. Meeks and underwent PET/CT, MRI orbit/face/neck, and CTA neck and brain.  \par \par PET/CT 6/9/19\par There is has been interval enlargement of the hypermetabolic left-sided neck mass which now measures up to 11.1 cm in craniocaudal dimension with an SUV of 21.3. There is invasion of the adjacent soft tissues structures, with involvement of the \par left common and internal carotid arteries, as well as the left-sided neck musculature. There are enlarged, hypermetabolic lymph nodes in the left retropharyngeal lymph region and cervical levels 1, 5, and 6. Additionally there are enlarged hypermetabolic lymph nodes at the right cervical levels 3 and 6, as well as the bilateral supraclavicular regions.  \par IMPRESSION: \par Compared with prior PET/CT dated 12/21/2018 has been interval enlargement of large left-sided hypermetabolic neck mass. \par There are an increased number of hypermetabolic lymph nodes in the bilateral cervical regions, retropharyngeal region, and supraclavicular region as above. \par \par MRI orbit/face/neck with and without contrast 6/10/19 - IMPRESSION: In this patient with left neck conglomerate klarissa mass that exceeds 10 cm in maximal dimension, there is complete encasement of the left common and internal carotid arteries as well as invasion of the skin, parotid and submandibular glands. There is also pathologic lymphadenopathy in the right infrahyoid neck. The primary tumor is not identified. Please see report above for further detail. \par \par CTA brain with contrast 6/20/19 - IMPRESSION: Unremarkable CTA examination of the brain.  \par \par CTA neck with contrast 6/20/19 - IMPRESSION: As seen on MRI, massive pathologic cervical lymphadenopathy encases the left \par CCA, ECA and ICA with mild luminal narrowing relative to the normal right side. No pseudoaneurysm or other evidence to portend a risk of impending blowout. CTA brain is normal. \par \par She completed 3 cycles of induction chemotherapy (DCF) under the care of Dr. Meeks, completed 8/8/19.  She returns for consideration of radiation therapy.  She states Dr. Meeks has recommended concurrent chemo/RT.  She expresses satisfaction with her response to chemotherapy thus far, as the neck mass has significantly decreased in size.  Although, she is concerned about lymphadenopathy, which she reports is unchanged.  She complains of "aching" to the left side of her body as well as lower extremity edema since chemo.  She denies pain, dysphagia, odynophagia.  She notes weight fluctuation throughout chemo.  \par \par Of note, she states she is currently living "in a few different places."  She anticip[place] [facility] by the time her radiation begins.  She denies smoking.  Admits to one drink every 1-2 months, most recently a glass of wine last weekend.  She denies drug use.  Her last menstrual period was in June.  She states she is working on obtaining dental insurance and that she needs an extraction and a root canal.  She also mentions needing to follow-up on an abnormal PAP smear from December 2018.  \par \par 2/13/20 - SNA\par Ms. Foy was last seen on 8/26/19 by Dr. Allen.  She was recommended radiation with concurrent chemotherapy to which she agreed.  She completed CT simulation on 9/12/19.  She had difficulty obtaining dental clearance due to insurance issues and was lost to follow-up.  She last saw Dr. Meeks last week.  She states he recommended she have chemoRT.  Most recent imaging as follows:\par \par PET/CT 10/14/19 - Impression: Since 6/9/2019, there has been dramatic improvement in the size and degree of FDG uptake of the left neck mass. There is persistent uptake in the left carotid space at the angle of the mandible, within the skin and subcutaneous \par tissues of the left neck at the level of the hyoid bone, as well as a few mildly FDG avid bilateral cervical lymph nodes as above. \par \par Today, she complains of intermittent nausea, vomiting, and severe fatigue since completing chemo.  She has been researching chronic fatigue syndrome and identifies with the symptoms profile.  She reports that following her PET/CT in October the masses in her neck began to enlarge and continued to grow until approximately 1 month ago.  She states the masses had been present since prior to chemo, but were very small, only palpable and not visible.  She feels that the size has been stable over the past month.  She notes that this happened with her previous PET/CT and wonders if she has been having "bad reactions" to the PET scans causing the cancer to grow.  She denies pain, dyspnea, cough, dysphagia, odynophagia.  She states she completed her dental evaluation and had wisdom teeth and an additional tooth extracted in December.  \par \par She also reports feeling anxious.  She states she has baseline anxiety and her cancer and chemo have exacerbated it.  She is considering starting an anxiolytic and/or SSRI.  She has not seen psych recently.  \par \par She has several hypotheses about her disease that she presents today. She suggests that the PET/CT scans made her cancer grow faster. She asks if it is possible her cancer was mis-diagnosed and she has Cushings syndrome and not cancer. She also wonders if her diagnosis is actually more consistent with "Bronchial cyst area." She thinks her level VI node emerged after she had dental work.\par \par Of note, she states she is currently living in [place] and thinks she will be living in [place] during her treatment.  She admits to occasional alcohol use.  She denies smoking and drug use.  \par \par 2/24/20 - Follow-up\par Ms. Foy was last seen on 2/13/20.  She was to complete PET/CT and provide a dental clearance letter.  \par \par PET/CT 2/25/20 - IMPRESSION: Impression: Since 10/14/2019: Mass in the left neck at the level of the hyoid is increased in width with increased FDG uptake. New FDG uptake within a soft tissue mass in the left parotid gland which may represent lymph node. Increased size and FDG uptake of multiple cervical lymph nodes as well as new FDG avid nodes. \par \par Today, she reports feeling fatigued.  She also states she has been having cyclical menstrual cramps despite not having a period since June.  She is otherwise without complaints.  She denies pain, dysphagia, odynophagia, dyspnea.

## 2020-06-04 NOTE — DISEASE MANAGEMENT
[Clinical] : TNM Stage: c [III] : III [TTNM] : X [NTNM] : 3 [MTNM] : 0 [de-identified] : 5000 cGy [de-identified] : 7000  cGy [de-identified] : oropharynx and neck

## 2020-06-04 NOTE — PHYSICAL EXAM
[General Appearance - Alert] : alert [General Appearance - In No Acute Distress] : in no acute distress [Normal] : well developed, well nourished, in no acute distress [Oriented To Time, Place, And Person] : oriented to person, place, and time [de-identified] : persistent adenopathy in left upper neck and right lower neck

## 2020-07-13 NOTE — ED PROVIDER NOTE - NSTIMEPROVIDERCAREINITIATE_GEN_ER
23-May-2019 20:20 Arava Counseling:  Patient counseled regarding adverse effects of Arava including but not limited to nausea, vomiting, abnormalities in liver function tests. Patients may develop mouth sores, rash, diarrhea, and abnormalities in blood counts. The patient understands that monitoring is required including LFTs and blood counts.  There is a rare possibility of scarring of the liver and lung problems that can occur when taking methotrexate. Persistent nausea, loss of appetite, pale stools, dark urine, cough, and shortness of breath should be reported immediately. Patient advised to discontinue Arava treatment and consult with a physician prior to attempting conception. The patient will have to undergo a treatment to eliminate Arava from the body prior to conception.

## 2020-08-06 NOTE — ED ADULT TRIAGE NOTE - CHIEF COMPLAINT QUOTE
pt. with hx of neck tumor (Lt side) presents with c/o intermittent bleeding from the tumor and pain to the site, pt. reports an episode of chest discomfort and Rt arm numbness around 10-11am.
no

## 2020-08-20 NOTE — ED PROVIDER NOTE - CROS ED NEURO ALL NEG
SOB HPI





- General


Chief Complaint: Shortness of Breath


Stated Complaint: infection in urine


Time Seen by Provider: 08/20/20 15:40


Source: patient, RN notes reviewed, old records reviewed


Mode of arrival: ambulatory


Limitations: no limitations





- History of Present Illness


Initial Comments: 





This is an 85-year-old male DF for evaluation patient presents today for 

evaluation regards to multiple, symptomatic primary care for recurrent pleural 

effusion renal failure and urinary tract infection patient is fully placed.  

Patient self is complaining of weakness immobility any new with have developing 

sacral ulcer


MD Complaint: shortness of breath, pain with inspiration


-: days(s)


Severity: moderate


Severity scale (1-10): 5


Consistency: constant


Improves With: nothing


Worsens With: nothing


Known History Of: other (Recurrent effusions)


Context: recent URI


Associated Symptoms: fever, cough


Treatments Prior to Arrival: none





- Related Data


                                Home Medications











 Medication  Instructions  Recorded  Confirmed


 


Dorzolamide 2% [Trusopt 2%] 1 drops LEFT EYE TID 05/12/17 01/20/20


 


Latanoprost [Xalatan 0.005%] 1 drop BOTH EYES HS 05/15/17 01/20/20


 


metFORMIN HCL [Glucophage] 500 mg PO AC-TID 05/15/17 01/20/20


 


Metoprolol Tartrate [Lopressor] 50 mg PO BID 12/20/18 01/20/20


 


Pravastatin Sodium [Pravachol] 40 mg PO HS 12/20/18 01/20/20


 


Aspirin EC [Ecotrin Low Dose] 81 mg PO DAILY 12/21/18 01/20/20


 


Omega-3 Fatty Acids/Fish Oil [Fish 1 cap PO TID 01/30/19 01/20/20





Oil 1,000 mg Softgel]   


 


Furosemide [Lasix] 40 mg PO DAILY 10/10/19 01/20/20


 


Hyaluronic Acid 100 mg PO BID 11/01/19 01/20/20


 


Spironolactone 100 mg PO DAILY 11/01/19 01/20/20


 


Tamsulosin [Flomax] 0.4 mg PO DAILY 11/01/19 01/20/20


 


prednisoLONE [prednisoLONE Oral 1 drop BOTH EYES MOTH 11/01/19 01/20/20





Soln]   








                                  Previous Rx's











 Medication  Instructions  Recorded


 


Warfarin Sodium [Coumadin] 7.5 mg PO SUTUTHSA #0 11/02/19


 


Warfarin [Coumadin] 5 mg PO MOWEFR #30 tab 11/02/19











                                    Allergies











Allergy/AdvReac Type Severity Reaction Status Date / Time


 


Penicillins Allergy  Rash/Hives Verified 08/20/20 15:34


 


codeine AdvReac  Hallucinati Verified 08/20/20 15:34





   ons  


 


tape AdvReac  Rash/Hives Uncoded 01/20/20 09:43














Review of Systems


ROS Statement: 


Those systems with pertinent positive or pertinent negative responses have been 

documented in the HPI.





ROS Other: All systems not noted in ROS Statement are negative.





Past Medical History


Past Medical History: Diabetes Mellitus, Deep Vein Thrombosis (DVT), Eye 

Disorder, Hyperlipidemia, Liver Disease, Osteoarthritis (OA), Prostate Disorder,

 Pulmonary Embolus (PE), Renal Disease


Additional Past Medical History / Comment(s): Three-vessel coronary artery 

disease, shingles, Cirrhosis


History of Any Multi-Drug Resistant Organisms: None Reported


Past Surgical History: Coronary Bypass/CABG


Additional Past Surgical History / Comment(s): corneal transplant, thoracentesis


Past Anesthesia/Blood Transfusion Reactions: No Reported Reaction


Past Psychological History: No Psychological Hx Reported


Smoking Status: Never smoker


Past Alcohol Use History: None Reported


Past Drug Use History: None Reported





- Past Family History


  ** Mother


Family Medical History: No Reported History





  ** Brother(s)


Family Medical History: Cancer


Additional Family Medical History / Comment(s): bone cancer





  ** Father


Family Medical History: Cancer


Additional Family Medical History / Comment(s): skin/bone cancer





General Exam


Limitations: no limitations


General appearance: alert, in no apparent distress, anxious


Head exam: Present: atraumatic, normocephalic, normal inspection


Eye exam: Present: normal appearance, PERRL, EOMI.  Absent: scleral icterus, 

conjunctival injection, periorbital swelling


ENT exam: Present: normal exam, mucous membranes moist


Neck exam: Present: normal inspection.  Absent: tenderness, meningismus, 

lymphadenopathy


Respiratory exam: Present: normal lung sounds bilaterally.  Absent: respiratory 

distress, wheezes, rales, rhonchi, stridor


Cardiovascular Exam: Present: normal rhythm, bradycardia, normal heart sounds.  

Absent: systolic murmur, diastolic murmur, rubs, gallop, clicks


GI/Abdominal exam: Present: soft, normal bowel sounds.  Absent: distended, 

tenderness, guarding, rebound, rigid


Extremities exam: Present: normal inspection, full ROM, normal capillary refill.

  Absent: tenderness, pedal edema, joint swelling, calf tenderness


Back exam: Present: normal inspection


Neurological exam: Present: alert, oriented X3, CN II-XII intact


Psychiatric exam: Present: normal affect, normal mood


Skin exam: Present: warm, dry, intact, normal color.  Absent: rash





Course


                                   Vital Signs











  08/20/20 08/20/20 08/20/20





  15:34 16:00 16:30


 


Temperature 97 F L  


 


Pulse Rate 51 L 54 L 58 L


 


Respiratory 18  





Rate   


 


Blood Pressure 84/49 95/59 91/55


 


O2 Sat by Pulse 97 95 96





Oximetry   














  08/20/20





  17:30


 


Temperature 


 


Pulse Rate 61


 


Respiratory 





Rate 


 


Blood Pressure 112/62


 


O2 Sat by Pulse 99





Oximetry 














- Reevaluation(s)


Reevaluation #1: 





08/20/20 16:11


Medical records reviewed





Medical Decision Making





- Lab Data


Result diagrams: 


                                 08/20/20 16:31





                                 08/20/20 16:31





- EKG Data


-: EKG Interpreted by Me (EKG is sinus rhythm 61  QRS 76 QTc 453)





Critical Care Time


Critical Care Time: Yes


Total Critical Care Time: 31





Disposition


Clinical Impression: 


 Dyspnea, Pleural effusion, Bradycardia, UTI (urinary tract infection), Pleural 

effusion on left, ARF (acute renal failure), Hyperkalemia





Disposition: ADMITTED AS IP TO THIS HOSP


Condition: Serious


Is patient prescribed a controlled substance at d/c from ED?: No
negative...

## 2020-09-04 NOTE — DISCHARGE NOTE PROVIDER - NSDCQMAMI_CARD_ALL_CORE
Medical Necessity Information: It is in your best interest to select a reason for this procedure from the list below. All of these items fulfill various CMS LCD requirements except lesion extends to a margin. Include Z78.9 (Other Specified Conditions Influencing Health Status) As An Associated Diagnosis?: No Medical Necessity Clause: This procedure was medically necessary because the lesion that was treated was: Lab: [id number] Lab Facility:  Body Location Override (Optional - Billing Will Still Be Based On Selected Body Map Location If Applicable): superior mid occipital scalp Biopsy Photograph Reviewed: Yes Size Of Lesion In Cm: 0.5 X Size Of Lesion In Cm (Optional): 0 Anesthesia Volume In Cc: 6 Excision Method: Slit Repair Type: None (Simple) Suturegard Retention Suture: 2-0 Nylon Retention Suture Bite Size: 3 mm Length To Time In Minutes Device Was In Place: 10 Number Of Hemigard Strips Per Side: 1 No Undermining Type: Entire Wound Debridement Text: The wound edges were debrided prior to proceeding with the closure to facilitate wound healing. Helical Rim Text: The closure involved the helical rim. Vermilion Border Text: The closure involved the vermilion border. Nostril Rim Text: The closure involved the nostril rim. Retention Suture Text: Retention sutures were placed to support the closure and prevent dehiscence. Suture Removal: 8 days Epidermal Closure Graft Donor Site (Optional): simple interrupted Graft Donor Site Bandage (Optional-Leave Blank If You Don't Want In Note): Steri-strips and a pressure bandage were applied to the donor site. Detail Level: Detailed Excision Depth: adipose tissue Scalpel Size: 15 blade Anesthesia Type: 1% lidocaine with epinephrine Hemostasis: Electrocautery Estimated Blood Loss (Cc): minimal Epidermal Sutures: 4-0 Nylon Wound Care: Petrolatum Dressing: pressure dressing Suturegard Intro: Intraoperative tissue expansion was performed, utilizing the SUTUREGARD device, in order to reduce wound tension. Suturegard Body: The suture ends were repeatedly re-tightened and re-clamped to achieve the desired tissue expansion. Hemigard Intro: Due to skin fragility and wound tension, it was decided to use HEMIGARD adhesive retention suture devices to permit a linear closure. The skin was cleaned and dried for a 6cm distance away from the wound. Excessive hair, if present, was removed to allow for adhesion. Hemigard Postcare Instructions: The HEMIGARD strips are to remain completely dry for at least 5-7 days. Complex Repair Preamble Text (Leave Blank If You Do Not Want): Extensive wide undermining was performed. Intermediate Repair Preamble Text (Leave Blank If You Do Not Want): Undermining was performed with blunt dissection. Fusiform Excision Additional Text (Leave Blank If You Do Not Want): The margin was drawn around the clinically apparent lesion.  A fusiform shape was then drawn on the skin incorporating the lesion and margins.  Incisions were then made along these lines to the appropriate tissue plane and the lesion was extirpated. Eliptical Excision Additional Text (Leave Blank If You Do Not Want): The margin was drawn around the clinically apparent lesion.  An elliptical shape was then drawn on the skin incorporating the lesion and margins.  Incisions were then made along these lines to the appropriate tissue plane and the lesion was extirpated. Saucerization Excision Additional Text (Leave Blank If You Do Not Want): The margin was drawn around the clinically apparent lesion.  Incisions were then made along these lines, in a tangential fashion, to the appropriate tissue plane and the lesion was extirpated. Slit Excision Additional Text (Leave Blank If You Do Not Want): A linear line was drawn on the skin overlying the lesion. An incision was made slowly until the lesion was visualized.  Once visualized, the lesion was removed with blunt dissection. Excisional Biopsy Additional Text (Leave Blank If You Do Not Want): The margin was drawn around the clinically apparent lesion. An elliptical shape was then drawn on the skin incorporating the lesion and margins.  Incisions were then made along these lines to the appropriate tissue plane and the lesion was extirpated. Perilesional Excision Additional Text (Leave Blank If You Do Not Want): The margin was drawn around the clinically apparent lesion. Incisions were then made along these lines to the appropriate tissue plane and the lesion was extirpated. Repair Performed By Another Provider Text (Leave Blank If You Do Not Want): After the tissue was excised the defect was repaired by another provider. No Repair - Repaired With Adjacent Surgical Defect Text (Leave Blank If You Do Not Want): After the excision the defect was repaired concurrently with another surgical defect which was in close approximation. Advancement Flap (Single) Text: The defect edges were debeveled with a #15 scalpel blade.  Given the location of the defect and the proximity to free margins a single advancement flap was deemed most appropriate.  Using a sterile surgical marker, an appropriate advancement flap was drawn incorporating the defect and placing the expected incisions within the relaxed skin tension lines where possible.    The area thus outlined was incised deep to adipose tissue with a #15 scalpel blade.  The skin margins were undermined to an appropriate distance in all directions utilizing iris scissors. Advancement Flap (Double) Text: The defect edges were debeveled with a #15 scalpel blade.  Given the location of the defect and the proximity to free margins a double advancement flap was deemed most appropriate.  Using a sterile surgical marker, the appropriate advancement flaps were drawn incorporating the defect and placing the expected incisions within the relaxed skin tension lines where possible.    The area thus outlined was incised deep to adipose tissue with a #15 scalpel blade.  The skin margins were undermined to an appropriate distance in all directions utilizing iris scissors. Burow's Advancement Flap Text: The defect edges were debeveled with a #15 scalpel blade.  Given the location of the defect and the proximity to free margins a Burow's advancement flap was deemed most appropriate.  Using a sterile surgical marker, the appropriate advancement flap was drawn incorporating the defect and placing the expected incisions within the relaxed skin tension lines where possible.    The area thus outlined was incised deep to adipose tissue with a #15 scalpel blade.  The skin margins were undermined to an appropriate distance in all directions utilizing iris scissors. Chonodrocutaneous Helical Advancement Flap Text: The defect edges were debeveled with a #15 scalpel blade.  Given the location of the defect and the proximity to free margins a chondrocutaneous helical advancement flap was deemed most appropriate.  Using a sterile surgical marker, the appropriate advancement flap was drawn incorporating the defect and placing the expected incisions within the relaxed skin tension lines where possible.    The area thus outlined was incised deep to adipose tissue with a #15 scalpel blade.  The skin margins were undermined to an appropriate distance in all directions utilizing iris scissors. Crescentic Advancement Flap Text: The defect edges were debeveled with a #15 scalpel blade.  Given the location of the defect and the proximity to free margins a crescentic advancement flap was deemed most appropriate.  Using a sterile surgical marker, the appropriate advancement flap was drawn incorporating the defect and placing the expected incisions within the relaxed skin tension lines where possible.    The area thus outlined was incised deep to adipose tissue with a #15 scalpel blade.  The skin margins were undermined to an appropriate distance in all directions utilizing iris scissors. A-T Advancement Flap Text: The defect edges were debeveled with a #15 scalpel blade.  Given the location of the defect, shape of the defect and the proximity to free margins an A-T advancement flap was deemed most appropriate.  Using a sterile surgical marker, an appropriate advancement flap was drawn incorporating the defect and placing the expected incisions within the relaxed skin tension lines where possible.    The area thus outlined was incised deep to adipose tissue with a #15 scalpel blade.  The skin margins were undermined to an appropriate distance in all directions utilizing iris scissors. O-T Advancement Flap Text: The defect edges were debeveled with a #15 scalpel blade.  Given the location of the defect, shape of the defect and the proximity to free margins an O-T advancement flap was deemed most appropriate.  Using a sterile surgical marker, an appropriate advancement flap was drawn incorporating the defect and placing the expected incisions within the relaxed skin tension lines where possible.    The area thus outlined was incised deep to adipose tissue with a #15 scalpel blade.  The skin margins were undermined to an appropriate distance in all directions utilizing iris scissors. O-L Flap Text: The defect edges were debeveled with a #15 scalpel blade.  Given the location of the defect, shape of the defect and the proximity to free margins an O-L flap was deemed most appropriate.  Using a sterile surgical marker, an appropriate advancement flap was drawn incorporating the defect and placing the expected incisions within the relaxed skin tension lines where possible.    The area thus outlined was incised deep to adipose tissue with a #15 scalpel blade.  The skin margins were undermined to an appropriate distance in all directions utilizing iris scissors. O-Z Flap Text: The defect edges were debeveled with a #15 scalpel blade.  Given the location of the defect, shape of the defect and the proximity to free margins an O-Z flap was deemed most appropriate.  Using a sterile surgical marker, an appropriate transposition flap was drawn incorporating the defect and placing the expected incisions within the relaxed skin tension lines where possible. The area thus outlined was incised deep to adipose tissue with a #15 scalpel blade.  The skin margins were undermined to an appropriate distance in all directions utilizing iris scissors. Double O-Z Flap Text: The defect edges were debeveled with a #15 scalpel blade.  Given the location of the defect, shape of the defect and the proximity to free margins a Double O-Z flap was deemed most appropriate.  Using a sterile surgical marker, an appropriate transposition flap was drawn incorporating the defect and placing the expected incisions within the relaxed skin tension lines where possible. The area thus outlined was incised deep to adipose tissue with a #15 scalpel blade.  The skin margins were undermined to an appropriate distance in all directions utilizing iris scissors. V-Y Flap Text: The defect edges were debeveled with a #15 scalpel blade.  Given the location of the defect, shape of the defect and the proximity to free margins a V-Y flap was deemed most appropriate.  Using a sterile surgical marker, an appropriate advancement flap was drawn incorporating the defect and placing the expected incisions within the relaxed skin tension lines where possible.    The area thus outlined was incised deep to adipose tissue with a #15 scalpel blade.  The skin margins were undermined to an appropriate distance in all directions utilizing iris scissors. Mercedes Flap Text: The defect edges were debeveled with a #15 scalpel blade.  Given the location of the defect, shape of the defect and the proximity to free margins a Mercedes flap was deemed most appropriate.  Using a sterile surgical marker, an appropriate advancement flap was drawn incorporating the defect and placing the expected incisions within the relaxed skin tension lines where possible. The area thus outlined was incised deep to adipose tissue with a #15 scalpel blade.  The skin margins were undermined to an appropriate distance in all directions utilizing iris scissors. Modified Advancement Flap Text: The defect edges were debeveled with a #15 scalpel blade.  Given the location of the defect, shape of the defect and the proximity to free margins a modified advancement flap was deemed most appropriate.  Using a sterile surgical marker, an appropriate advancement flap was drawn incorporating the defect and placing the expected incisions within the relaxed skin tension lines where possible.    The area thus outlined was incised deep to adipose tissue with a #15 scalpel blade.  The skin margins were undermined to an appropriate distance in all directions utilizing iris scissors. Mucosal Advancement Flap Text: Given the location of the defect, shape of the defect and the proximity to free margins a mucosal advancement flap was deemed most appropriate. Incisions were made with a 15 blade scalpel in the appropriate fashion along the cutaneous vermillion border and the mucosal lip. The remaining actinically damaged mucosal tissue was excised.  The mucosal advancement flap was then elevated to the gingival sulcus with care taken to preserve the neurovascular structures and advanced into the primary defect. Care was taken to ensure that precise realignment of the vermilion border was achieved. Hatchet Flap Text: The defect edges were debeveled with a #15 scalpel blade.  Given the location of the defect, shape of the defect and the proximity to free margins a hatchet flap was deemed most appropriate.  Using a sterile surgical marker, an appropriate hatchet flap was drawn incorporating the defect and placing the expected incisions within the relaxed skin tension lines where possible.    The area thus outlined was incised deep to adipose tissue with a #15 scalpel blade.  The skin margins were undermined to an appropriate distance in all directions utilizing iris scissors. Rotation Flap Text: The defect edges were debeveled with a #15 scalpel blade.  Given the location of the defect, shape of the defect and the proximity to free margins a rotation flap was deemed most appropriate.  Using a sterile surgical marker, an appropriate rotation flap was drawn incorporating the defect and placing the expected incisions within the relaxed skin tension lines where possible.    The area thus outlined was incised deep to adipose tissue with a #15 scalpel blade.  The skin margins were undermined to an appropriate distance in all directions utilizing iris scissors. Spiral Flap Text: The defect edges were debeveled with a #15 scalpel blade.  Given the location of the defect, shape of the defect and the proximity to free margins a spiral flap was deemed most appropriate.  Using a sterile surgical marker, an appropriate rotation flap was drawn incorporating the defect and placing the expected incisions within the relaxed skin tension lines where possible. The area thus outlined was incised deep to adipose tissue with a #15 scalpel blade.  The skin margins were undermined to an appropriate distance in all directions utilizing iris scissors. Star Wedge Flap Text: The defect edges were debeveled with a #15 scalpel blade.  Given the location of the defect, shape of the defect and the proximity to free margins a star wedge flap was deemed most appropriate.  Using a sterile surgical marker, an appropriate rotation flap was drawn incorporating the defect and placing the expected incisions within the relaxed skin tension lines where possible. The area thus outlined was incised deep to adipose tissue with a #15 scalpel blade.  The skin margins were undermined to an appropriate distance in all directions utilizing iris scissors. Transposition Flap Text: The defect edges were debeveled with a #15 scalpel blade.  Given the location of the defect and the proximity to free margins a transposition flap was deemed most appropriate.  Using a sterile surgical marker, an appropriate transposition flap was drawn incorporating the defect.    The area thus outlined was incised deep to adipose tissue with a #15 scalpel blade.  The skin margins were undermined to an appropriate distance in all directions utilizing iris scissors. Muscle Hinge Flap Text: The defect edges were debeveled with a #15 scalpel blade.  Given the size, depth and location of the defect and the proximity to free margins a muscle hinge flap was deemed most appropriate.  Using a sterile surgical marker, an appropriate hinge flap was drawn incorporating the defect. The area thus outlined was incised with a #15 scalpel blade.  The skin margins were undermined to an appropriate distance in all directions utilizing iris scissors. Melolabial Transposition Flap Text: The defect edges were debeveled with a #15 scalpel blade.  Given the location of the defect and the proximity to free margins a melolabial flap was deemed most appropriate.  Using a sterile surgical marker, an appropriate melolabial transposition flap was drawn incorporating the defect.    The area thus outlined was incised deep to adipose tissue with a #15 scalpel blade.  The skin margins were undermined to an appropriate distance in all directions utilizing iris scissors. Rhombic Flap Text: The defect edges were debeveled with a #15 scalpel blade.  Given the location of the defect and the proximity to free margins a rhombic flap was deemed most appropriate.  Using a sterile surgical marker, an appropriate rhombic flap was drawn incorporating the defect.    The area thus outlined was incised deep to adipose tissue with a #15 scalpel blade.  The skin margins were undermined to an appropriate distance in all directions utilizing iris scissors. Rhomboid Transposition Flap Text: The defect edges were debeveled with a #15 scalpel blade.  Given the location of the defect and the proximity to free margins a rhomboid transposition flap was deemed most appropriate.  Using a sterile surgical marker, an appropriate rhomboid flap was drawn incorporating the defect.    The area thus outlined was incised deep to adipose tissue with a #15 scalpel blade.  The skin margins were undermined to an appropriate distance in all directions utilizing iris scissors. Bi-Rhombic Flap Text: The defect edges were debeveled with a #15 scalpel blade.  Given the location of the defect and the proximity to free margins a bi-rhombic flap was deemed most appropriate.  Using a sterile surgical marker, an appropriate rhombic flap was drawn incorporating the defect. The area thus outlined was incised deep to adipose tissue with a #15 scalpel blade.  The skin margins were undermined to an appropriate distance in all directions utilizing iris scissors. Helical Rim Advancement Flap Text: The defect edges were debeveled with a #15 blade scalpel.  Given the location of the defect and the proximity to free margins (helical rim) a double helical rim advancement flap was deemed most appropriate.  Using a sterile surgical marker, the appropriate advancement flaps were drawn incorporating the defect and placing the expected incisions between the helical rim and antihelix where possible.  The area thus outlined was incised through and through with a #15 scalpel blade.  With a skin hook and iris scissors, the flaps were gently and sharply undermined and freed up. Bilateral Helical Rim Advancement Flap Text: The defect edges were debeveled with a #15 blade scalpel.  Given the location of the defect and the proximity to free margins (helical rim) a bilateral helical rim advancement flap was deemed most appropriate.  Using a sterile surgical marker, the appropriate advancement flaps were drawn incorporating the defect and placing the expected incisions between the helical rim and antihelix where possible.  The area thus outlined was incised through and through with a #15 scalpel blade.  With a skin hook and iris scissors, the flaps were gently and sharply undermined and freed up. Ear Star Wedge Flap Text: The defect edges were debeveled with a #15 blade scalpel.  Given the location of the defect and the proximity to free margins (helical rim) an ear star wedge flap was deemed most appropriate.  Using a sterile surgical marker, the appropriate flap was drawn incorporating the defect and placing the expected incisions between the helical rim and antihelix where possible.  The area thus outlined was incised through and through with a #15 scalpel blade. Banner Transposition Flap Text: The defect edges were debeveled with a #15 scalpel blade.  Given the location of the defect and the proximity to free margins a Banner transposition flap was deemed most appropriate.  Using a sterile surgical marker, an appropriate flap drawn around the defect. The area thus outlined was incised deep to adipose tissue with a #15 scalpel blade.  The skin margins were undermined to an appropriate distance in all directions utilizing iris scissors. Bilobed Flap Text: The defect edges were debeveled with a #15 scalpel blade.  Given the location of the defect and the proximity to free margins a bilobe flap was deemed most appropriate.  Using a sterile surgical marker, an appropriate bilobe flap drawn around the defect.    The area thus outlined was incised deep to adipose tissue with a #15 scalpel blade.  The skin margins were undermined to an appropriate distance in all directions utilizing iris scissors. Bilobed Transposition Flap Text: The defect edges were debeveled with a #15 scalpel blade.  Given the location of the defect and the proximity to free margins a bilobed transposition flap was deemed most appropriate.  Using a sterile surgical marker, an appropriate bilobe flap drawn around the defect.    The area thus outlined was incised deep to adipose tissue with a #15 scalpel blade.  The skin margins were undermined to an appropriate distance in all directions utilizing iris scissors. Trilobed Flap Text: The defect edges were debeveled with a #15 scalpel blade.  Given the location of the defect and the proximity to free margins a trilobed flap was deemed most appropriate.  Using a sterile surgical marker, an appropriate trilobed flap drawn around the defect.    The area thus outlined was incised deep to adipose tissue with a #15 scalpel blade.  The skin margins were undermined to an appropriate distance in all directions utilizing iris scissors. Dorsal Nasal Flap Text: The defect edges were debeveled with a #15 scalpel blade.  Given the location of the defect and the proximity to free margins a dorsal nasal flap was deemed most appropriate.  Using a sterile surgical marker, an appropriate dorsal nasal flap was drawn around the defect.    The area thus outlined was incised deep to adipose tissue with a #15 scalpel blade.  The skin margins were undermined to an appropriate distance in all directions utilizing iris scissors. Island Pedicle Flap Text: The defect edges were debeveled with a #15 scalpel blade.  Given the location of the defect, shape of the defect and the proximity to free margins an island pedicle advancement flap was deemed most appropriate.  Using a sterile surgical marker, an appropriate advancement flap was drawn incorporating the defect, outlining the appropriate donor tissue and placing the expected incisions within the relaxed skin tension lines where possible.    The area thus outlined was incised deep to adipose tissue with a #15 scalpel blade.  The skin margins were undermined to an appropriate distance in all directions around the primary defect and laterally outward around the island pedicle utilizing iris scissors.  There was minimal undermining beneath the pedicle flap. Island Pedicle Flap With Canthal Suspension Text: The defect edges were debeveled with a #15 scalpel blade.  Given the location of the defect, shape of the defect and the proximity to free margins an island pedicle advancement flap was deemed most appropriate.  Using a sterile surgical marker, an appropriate advancement flap was drawn incorporating the defect, outlining the appropriate donor tissue and placing the expected incisions within the relaxed skin tension lines where possible. The area thus outlined was incised deep to adipose tissue with a #15 scalpel blade.  The skin margins were undermined to an appropriate distance in all directions around the primary defect and laterally outward around the island pedicle utilizing iris scissors.  There was minimal undermining beneath the pedicle flap. A suspension suture was placed in the canthal tendon to prevent tension and prevent ectropion. Alar Island Pedicle Flap Text: The defect edges were debeveled with a #15 scalpel blade.  Given the location of the defect, shape of the defect and the proximity to the alar rim an island pedicle advancement flap was deemed most appropriate.  Using a sterile surgical marker, an appropriate advancement flap was drawn incorporating the defect, outlining the appropriate donor tissue and placing the expected incisions within the nasal ala running parallel to the alar rim. The area thus outlined was incised with a #15 scalpel blade.  The skin margins were undermined minimally to an appropriate distance in all directions around the primary defect and laterally outward around the island pedicle utilizing iris scissors.  There was minimal undermining beneath the pedicle flap. Double Island Pedicle Flap Text: The defect edges were debeveled with a #15 scalpel blade.  Given the location of the defect, shape of the defect and the proximity to free margins a double island pedicle advancement flap was deemed most appropriate.  Using a sterile surgical marker, an appropriate advancement flap was drawn incorporating the defect, outlining the appropriate donor tissue and placing the expected incisions within the relaxed skin tension lines where possible.    The area thus outlined was incised deep to adipose tissue with a #15 scalpel blade.  The skin margins were undermined to an appropriate distance in all directions around the primary defect and laterally outward around the island pedicle utilizing iris scissors.  There was minimal undermining beneath the pedicle flap. Island Pedicle Flap-Requiring Vessel Identification Text: The defect edges were debeveled with a #15 scalpel blade.  Given the location of the defect, shape of the defect and the proximity to free margins an island pedicle advancement flap was deemed most appropriate.  Using a sterile surgical marker, an appropriate advancement flap was drawn, based on the axial vessel mentioned above, incorporating the defect, outlining the appropriate donor tissue and placing the expected incisions within the relaxed skin tension lines where possible.    The area thus outlined was incised deep to adipose tissue with a #15 scalpel blade.  The skin margins were undermined to an appropriate distance in all directions around the primary defect and laterally outward around the island pedicle utilizing iris scissors.  There was minimal undermining beneath the pedicle flap. Keystone Flap Text: The defect edges were debeveled with a #15 scalpel blade.  Given the location of the defect, shape of the defect a keystone flap was deemed most appropriate.  Using a sterile surgical marker, an appropriate keystone flap was drawn incorporating the defect, outlining the appropriate donor tissue and placing the expected incisions within the relaxed skin tension lines where possible. The area thus outlined was incised deep to adipose tissue with a #15 scalpel blade.  The skin margins were undermined to an appropriate distance in all directions around the primary defect and laterally outward around the flap utilizing iris scissors. O-T Plasty Text: The defect edges were debeveled with a #15 scalpel blade.  Given the location of the defect, shape of the defect and the proximity to free margins an O-T plasty was deemed most appropriate.  Using a sterile surgical marker, an appropriate O-T plasty was drawn incorporating the defect and placing the expected incisions within the relaxed skin tension lines where possible.    The area thus outlined was incised deep to adipose tissue with a #15 scalpel blade.  The skin margins were undermined to an appropriate distance in all directions utilizing iris scissors. O-Z Plasty Text: The defect edges were debeveled with a #15 scalpel blade.  Given the location of the defect, shape of the defect and the proximity to free margins an O-Z plasty (double transposition flap) was deemed most appropriate.  Using a sterile surgical marker, the appropriate transposition flaps were drawn incorporating the defect and placing the expected incisions within the relaxed skin tension lines where possible.    The area thus outlined was incised deep to adipose tissue with a #15 scalpel blade.  The skin margins were undermined to an appropriate distance in all directions utilizing iris scissors.  Hemostasis was achieved with electrocautery.  The flaps were then transposed into place, one clockwise and the other counterclockwise, and anchored with interrupted buried subcutaneous sutures. Double O-Z Plasty Text: The defect edges were debeveled with a #15 scalpel blade.  Given the location of the defect, shape of the defect and the proximity to free margins a Double O-Z plasty (double transposition flap) was deemed most appropriate.  Using a sterile surgical marker, the appropriate transposition flaps were drawn incorporating the defect and placing the expected incisions within the relaxed skin tension lines where possible. The area thus outlined was incised deep to adipose tissue with a #15 scalpel blade.  The skin margins were undermined to an appropriate distance in all directions utilizing iris scissors.  Hemostasis was achieved with electrocautery.  The flaps were then transposed into place, one clockwise and the other counterclockwise, and anchored with interrupted buried subcutaneous sutures. V-Y Plasty Text: The defect edges were debeveled with a #15 scalpel blade.  Given the location of the defect, shape of the defect and the proximity to free margins an V-Y advancement flap was deemed most appropriate.  Using a sterile surgical marker, an appropriate advancement flap was drawn incorporating the defect and placing the expected incisions within the relaxed skin tension lines where possible.    The area thus outlined was incised deep to adipose tissue with a #15 scalpel blade.  The skin margins were undermined to an appropriate distance in all directions utilizing iris scissors. H Plasty Text: Given the location of the defect, shape of the defect and the proximity to free margins a H-plasty was deemed most appropriate for repair.  Using a sterile surgical marker, the appropriate advancement arms of the H-plasty were drawn incorporating the defect and placing the expected incisions within the relaxed skin tension lines where possible. The area thus outlined was incised deep to adipose tissue with a #15 scalpel blade. The skin margins were undermined to an appropriate distance in all directions utilizing iris scissors.  The opposing advancement arms were then advanced into place in opposite direction and anchored with interrupted buried subcutaneous sutures. W Plasty Text: The lesion was extirpated to the level of the fat with a #15 scalpel blade.  Given the location of the defect, shape of the defect and the proximity to free margins a W-plasty was deemed most appropriate for repair.  Using a sterile surgical marker, the appropriate transposition arms of the W-plasty were drawn incorporating the defect and placing the expected incisions within the relaxed skin tension lines where possible.    The area thus outlined was incised deep to adipose tissue with a #15 scalpel blade.  The skin margins were undermined to an appropriate distance in all directions utilizing iris scissors.  The opposing transposition arms were then transposed into place in opposite direction and anchored with interrupted buried subcutaneous sutures. Z Plasty Text: The lesion was extirpated to the level of the fat with a #15 scalpel blade.  Given the location of the defect, shape of the defect and the proximity to free margins a Z-plasty was deemed most appropriate for repair.  Using a sterile surgical marker, the appropriate transposition arms of the Z-plasty were drawn incorporating the defect and placing the expected incisions within the relaxed skin tension lines where possible.    The area thus outlined was incised deep to adipose tissue with a #15 scalpel blade.  The skin margins were undermined to an appropriate distance in all directions utilizing iris scissors.  The opposing transposition arms were then transposed into place in opposite direction and anchored with interrupted buried subcutaneous sutures. Zygomaticofacial Flap Text: Given the location of the defect, shape of the defect and the proximity to free margins a zygomaticofacial flap was deemed most appropriate for repair.  Using a sterile surgical marker, the appropriate flap was drawn incorporating the defect and placing the expected incisions within the relaxed skin tension lines where possible. The area thus outlined was incised deep to adipose tissue with a #15 scalpel blade with preservation of a vascular pedicle.  The skin margins were undermined to an appropriate distance in all directions utilizing iris scissors.  The flap was then placed into the defect and anchored with interrupted buried subcutaneous sutures. Cheek Interpolation Flap Text: A decision was made to reconstruct the defect utilizing an interpolation axial flap and a staged reconstruction.  A telfa template was made of the defect.  This telfa template was then used to outline the Cheek Interpolation flap.  The donor area for the pedicle flap was then injected with anesthesia.  The flap was excised through the skin and subcutaneous tissue down to the layer of the underlying musculature.  The interpolation flap was carefully excised within this deep plane to maintain its blood supply.  The edges of the donor site were undermined.   The donor site was closed in a primary fashion.  The pedicle was then rotated into position and sutured.  Once the tube was sutured into place, adequate blood supply was confirmed with blanching and refill.  The pedicle was then wrapped with xeroform gauze and dressed appropriately with a telfa and gauze bandage to ensure continued blood supply and protect the attached pedicle. Cheek-To-Nose Interpolation Flap Text: A decision was made to reconstruct the defect utilizing an interpolation axial flap and a staged reconstruction.  A telfa template was made of the defect.  This telfa template was then used to outline the Cheek-To-Nose Interpolation flap.  The donor area for the pedicle flap was then injected with anesthesia.  The flap was excised through the skin and subcutaneous tissue down to the layer of the underlying musculature.  The interpolation flap was carefully excised within this deep plane to maintain its blood supply.  The edges of the donor site were undermined.   The donor site was closed in a primary fashion.  The pedicle was then rotated into position and sutured.  Once the tube was sutured into place, adequate blood supply was confirmed with blanching and refill.  The pedicle was then wrapped with xeroform gauze and dressed appropriately with a telfa and gauze bandage to ensure continued blood supply and protect the attached pedicle. Interpolation Flap Text: A decision was made to reconstruct the defect utilizing an interpolation axial flap and a staged reconstruction.  A telfa template was made of the defect.  This telfa template was then used to outline the interpolation flap.  The donor area for the pedicle flap was then injected with anesthesia.  The flap was excised through the skin and subcutaneous tissue down to the layer of the underlying musculature.  The interpolation flap was carefully excised within this deep plane to maintain its blood supply.  The edges of the donor site were undermined.   The donor site was closed in a primary fashion.  The pedicle was then rotated into position and sutured.  Once the tube was sutured into place, adequate blood supply was confirmed with blanching and refill.  The pedicle was then wrapped with xeroform gauze and dressed appropriately with a telfa and gauze bandage to ensure continued blood supply and protect the attached pedicle. Melolabial Interpolation Flap Text: A decision was made to reconstruct the defect utilizing an interpolation axial flap and a staged reconstruction.  A telfa template was made of the defect.  This telfa template was then used to outline the melolabial interpolation flap.  The donor area for the pedicle flap was then injected with anesthesia.  The flap was excised through the skin and subcutaneous tissue down to the layer of the underlying musculature.  The pedicle flap was carefully excised within this deep plane to maintain its blood supply.  The edges of the donor site were undermined.   The donor site was closed in a primary fashion.  The pedicle was then rotated into position and sutured.  Once the tube was sutured into place, adequate blood supply was confirmed with blanching and refill.  The pedicle was then wrapped with xeroform gauze and dressed appropriately with a telfa and gauze bandage to ensure continued blood supply and protect the attached pedicle. Mastoid Interpolation Flap Text: A decision was made to reconstruct the defect utilizing an interpolation axial flap and a staged reconstruction.  A telfa template was made of the defect.  This telfa template was then used to outline the mastoid interpolation flap.  The donor area for the pedicle flap was then injected with anesthesia.  The flap was excised through the skin and subcutaneous tissue down to the layer of the underlying musculature.  The pedicle flap was carefully excised within this deep plane to maintain its blood supply.  The edges of the donor site were undermined.   The donor site was closed in a primary fashion.  The pedicle was then rotated into position and sutured.  Once the tube was sutured into place, adequate blood supply was confirmed with blanching and refill.  The pedicle was then wrapped with xeroform gauze and dressed appropriately with a telfa and gauze bandage to ensure continued blood supply and protect the attached pedicle. Posterior Auricular Interpolation Flap Text: A decision was made to reconstruct the defect utilizing an interpolation axial flap and a staged reconstruction.  A telfa template was made of the defect.  This telfa template was then used to outline the posterior auricular interpolation flap.  The donor area for the pedicle flap was then injected with anesthesia.  The flap was excised through the skin and subcutaneous tissue down to the layer of the underlying musculature.  The pedicle flap was carefully excised within this deep plane to maintain its blood supply.  The edges of the donor site were undermined.   The donor site was closed in a primary fashion.  The pedicle was then rotated into position and sutured.  Once the tube was sutured into place, adequate blood supply was confirmed with blanching and refill.  The pedicle was then wrapped with xeroform gauze and dressed appropriately with a telfa and gauze bandage to ensure continued blood supply and protect the attached pedicle. Paramedian Forehead Flap Text: A decision was made to reconstruct the defect utilizing an interpolation axial flap and a staged reconstruction.  A telfa template was made of the defect.  This telfa template was then used to outline the paramedian forehead pedicle flap.  The donor area for the pedicle flap was then injected with anesthesia.  The flap was excised through the skin and subcutaneous tissue down to the layer of the underlying musculature.  The pedicle flap was carefully excised within this deep plane to maintain its blood supply.  The edges of the donor site were undermined.   The donor site was closed in a primary fashion.  The pedicle was then rotated into position and sutured.  Once the tube was sutured into place, adequate blood supply was confirmed with blanching and refill.  The pedicle was then wrapped with xeroform gauze and dressed appropriately with a telfa and gauze bandage to ensure continued blood supply and protect the attached pedicle. Lip Wedge Excision Repair Text: Given the location of the defect and the proximity to free margins a full thickness wedge repair was deemed most appropriate.  Using a sterile surgical marker, the appropriate repair was drawn incorporating the defect and placing the expected incisions perpendicular to the vermilion border.  The vermilion border was also meticulously outlined to ensure appropriate reapproximation during the repair.  The area thus outlined was incised through and through with a #15 scalpel blade.  The muscularis and dermis were reaproximated with deep sutures following hemostasis. Care was taken to realign the vermilion border before proceeding with the superficial closure.  Once the vermilion was realigned the superfical and mucosal closure was finished. Ftsg Text: The defect edges were debeveled with a #15 scalpel blade.  Given the location of the defect, shape of the defect and the proximity to free margins a full thickness skin graft was deemed most appropriate.  Using a sterile surgical marker, the primary defect shape was transferred to the donor site. The area thus outlined was incised deep to adipose tissue with a #15 scalpel blade.  The harvested graft was then trimmed of adipose tissue until only dermis and epidermis was left.  The skin margins of the secondary defect were undermined to an appropriate distance in all directions utilizing iris scissors.  The secondary defect was closed with interrupted buried subcutaneous sutures.  The skin edges were then re-apposed with running  sutures.  The skin graft was then placed in the primary defect and oriented appropriately. Split-Thickness Skin Graft Text: The defect edges were debeveled with a #15 scalpel blade.  Given the location of the defect, shape of the defect and the proximity to free margins a split thickness skin graft was deemed most appropriate.  Using a sterile surgical marker, the primary defect shape was transferred to the donor site. The split thickness graft was then harvested.  The skin graft was then placed in the primary defect and oriented appropriately. Burow's Graft Text: The defect edges were debeveled with a #15 scalpel blade.  Given the location of the defect, shape of the defect, the proximity to free margins and the presence of a standing cone deformity a Burow's skin graft was deemed most appropriate. The standing cone was removed and this tissue was then trimmed to the shape of the primary defect. The adipose tissue was also removed until only dermis and epidermis were left.  The skin margins of the secondary defect were undermined to an appropriate distance in all directions utilizing iris scissors.  The secondary defect was closed with interrupted buried subcutaneous sutures.  The skin edges were then re-apposed with running  sutures.  The skin graft was then placed in the primary defect and oriented appropriately. Cartilage Graft Text: The defect edges were debeveled with a #15 scalpel blade.  Given the location of the defect, shape of the defect, the fact the defect involved a full thickness cartilage defect a cartilage graft was deemed most appropriate.  An appropriate donor site was identified, cleansed, and anesthetized. The cartilage graft was then harvested and transferred to the recipient site, oriented appropriately and then sutured into place.  The secondary defect was then repaired using a primary closure. Composite Graft Text: The defect edges were debeveled with a #15 scalpel blade.  Given the location of the defect, shape of the defect, the proximity to free margins and the fact the defect was full thickness a composite graft was deemed most appropriate.  The defect was outline and then transferred to the donor site.  A full thickness graft was then excised from the donor site. The graft was then placed in the primary defect, oriented appropriately and then sutured into place.  The secondary defect was then repaired using a primary closure. Epidermal Autograft Text: The defect edges were debeveled with a #15 scalpel blade.  Given the location of the defect, shape of the defect and the proximity to free margins an epidermal autograft was deemed most appropriate.  Using a sterile surgical marker, the primary defect shape was transferred to the donor site. The epidermal graft was then harvested.  The skin graft was then placed in the primary defect and oriented appropriately. Dermal Autograft Text: The defect edges were debeveled with a #15 scalpel blade.  Given the location of the defect, shape of the defect and the proximity to free margins a dermal autograft was deemed most appropriate.  Using a sterile surgical marker, the primary defect shape was transferred to the donor site. The area thus outlined was incised deep to adipose tissue with a #15 scalpel blade.  The harvested graft was then trimmed of adipose and epidermal tissue until only dermis was left.  The skin graft was then placed in the primary defect and oriented appropriately. Skin Substitute Text: The defect edges were debeveled with a #15 scalpel blade.  Given the location of the defect, shape of the defect and the proximity to free margins a skin substitute graft was deemed most appropriate.  The graft material was trimmed to fit the size of the defect. The graft was then placed in the primary defect and oriented appropriately. Tissue Cultured Epidermal Autograft Text: The defect edges were debeveled with a #15 scalpel blade.  Given the location of the defect, shape of the defect and the proximity to free margins a tissue cultured epidermal autograft was deemed most appropriate.  The graft was then trimmed to fit the size of the defect.  The graft was then placed in the primary defect and oriented appropriately. Xenograft Text: The defect edges were debeveled with a #15 scalpel blade.  Given the location of the defect, shape of the defect and the proximity to free margins a xenograft was deemed most appropriate.  The graft was then trimmed to fit the size of the defect.  The graft was then placed in the primary defect and oriented appropriately. Purse String (Intermediate) Text: Given the location of the defect and the characteristics of the surrounding skin a purse string intermediate closure was deemed most appropriate.  Undermining was performed circumferentially around the surgical defect.  A purse string suture was then placed and tightened. Purse String (Simple) Text: Given the location of the defect and the characteristics of the surrounding skin a purse string simple closure was deemed most appropriate.  Undermining was performed circumferentially around the surgical defect.  A purse string suture was then placed and tightened. Complex Repair And Single Advancement Flap Text: The defect edges were debeveled with a #15 scalpel blade.  The primary defect was closed partially with a complex linear closure.  Given the location of the remaining defect, shape of the defect and the proximity to free margins a single advancement flap was deemed most appropriate for complete closure of the defect.  Using a sterile surgical marker, an appropriate advancement flap was drawn incorporating the defect and placing the expected incisions within the relaxed skin tension lines where possible.    The area thus outlined was incised deep to adipose tissue with a #15 scalpel blade.  The skin margins were undermined to an appropriate distance in all directions utilizing iris scissors. Complex Repair And Double Advancement Flap Text: The defect edges were debeveled with a #15 scalpel blade.  The primary defect was closed partially with a complex linear closure.  Given the location of the remaining defect, shape of the defect and the proximity to free margins a double advancement flap was deemed most appropriate for complete closure of the defect.  Using a sterile surgical marker, an appropriate advancement flap was drawn incorporating the defect and placing the expected incisions within the relaxed skin tension lines where possible.    The area thus outlined was incised deep to adipose tissue with a #15 scalpel blade.  The skin margins were undermined to an appropriate distance in all directions utilizing iris scissors. Complex Repair And Modified Advancement Flap Text: The defect edges were debeveled with a #15 scalpel blade.  The primary defect was closed partially with a complex linear closure.  Given the location of the remaining defect, shape of the defect and the proximity to free margins a modified advancement flap was deemed most appropriate for complete closure of the defect.  Using a sterile surgical marker, an appropriate advancement flap was drawn incorporating the defect and placing the expected incisions within the relaxed skin tension lines where possible.    The area thus outlined was incised deep to adipose tissue with a #15 scalpel blade.  The skin margins were undermined to an appropriate distance in all directions utilizing iris scissors. Complex Repair And A-T Advancement Flap Text: The defect edges were debeveled with a #15 scalpel blade.  The primary defect was closed partially with a complex linear closure.  Given the location of the remaining defect, shape of the defect and the proximity to free margins an A-T advancement flap was deemed most appropriate for complete closure of the defect.  Using a sterile surgical marker, an appropriate advancement flap was drawn incorporating the defect and placing the expected incisions within the relaxed skin tension lines where possible.    The area thus outlined was incised deep to adipose tissue with a #15 scalpel blade.  The skin margins were undermined to an appropriate distance in all directions utilizing iris scissors. Complex Repair And O-T Advancement Flap Text: The defect edges were debeveled with a #15 scalpel blade.  The primary defect was closed partially with a complex linear closure.  Given the location of the remaining defect, shape of the defect and the proximity to free margins an O-T advancement flap was deemed most appropriate for complete closure of the defect.  Using a sterile surgical marker, an appropriate advancement flap was drawn incorporating the defect and placing the expected incisions within the relaxed skin tension lines where possible.    The area thus outlined was incised deep to adipose tissue with a #15 scalpel blade.  The skin margins were undermined to an appropriate distance in all directions utilizing iris scissors. Complex Repair And O-L Flap Text: The defect edges were debeveled with a #15 scalpel blade.  The primary defect was closed partially with a complex linear closure.  Given the location of the remaining defect, shape of the defect and the proximity to free margins an O-L flap was deemed most appropriate for complete closure of the defect.  Using a sterile surgical marker, an appropriate flap was drawn incorporating the defect and placing the expected incisions within the relaxed skin tension lines where possible.    The area thus outlined was incised deep to adipose tissue with a #15 scalpel blade.  The skin margins were undermined to an appropriate distance in all directions utilizing iris scissors. Complex Repair And Bilobe Flap Text: The defect edges were debeveled with a #15 scalpel blade.  The primary defect was closed partially with a complex linear closure.  Given the location of the remaining defect, shape of the defect and the proximity to free margins a bilobe flap was deemed most appropriate for complete closure of the defect.  Using a sterile surgical marker, an appropriate advancement flap was drawn incorporating the defect and placing the expected incisions within the relaxed skin tension lines where possible.    The area thus outlined was incised deep to adipose tissue with a #15 scalpel blade.  The skin margins were undermined to an appropriate distance in all directions utilizing iris scissors. Complex Repair And Melolabial Flap Text: The defect edges were debeveled with a #15 scalpel blade.  The primary defect was closed partially with a complex linear closure.  Given the location of the remaining defect, shape of the defect and the proximity to free margins a melolabial flap was deemed most appropriate for complete closure of the defect.  Using a sterile surgical marker, an appropriate advancement flap was drawn incorporating the defect and placing the expected incisions within the relaxed skin tension lines where possible.    The area thus outlined was incised deep to adipose tissue with a #15 scalpel blade.  The skin margins were undermined to an appropriate distance in all directions utilizing iris scissors. Complex Repair And Rotation Flap Text: The defect edges were debeveled with a #15 scalpel blade.  The primary defect was closed partially with a complex linear closure.  Given the location of the remaining defect, shape of the defect and the proximity to free margins a rotation flap was deemed most appropriate for complete closure of the defect.  Using a sterile surgical marker, an appropriate advancement flap was drawn incorporating the defect and placing the expected incisions within the relaxed skin tension lines where possible.    The area thus outlined was incised deep to adipose tissue with a #15 scalpel blade.  The skin margins were undermined to an appropriate distance in all directions utilizing iris scissors. Complex Repair And Rhombic Flap Text: The defect edges were debeveled with a #15 scalpel blade.  The primary defect was closed partially with a complex linear closure.  Given the location of the remaining defect, shape of the defect and the proximity to free margins a rhombic flap was deemed most appropriate for complete closure of the defect.  Using a sterile surgical marker, an appropriate advancement flap was drawn incorporating the defect and placing the expected incisions within the relaxed skin tension lines where possible.    The area thus outlined was incised deep to adipose tissue with a #15 scalpel blade.  The skin margins were undermined to an appropriate distance in all directions utilizing iris scissors. Complex Repair And Transposition Flap Text: The defect edges were debeveled with a #15 scalpel blade.  The primary defect was closed partially with a complex linear closure.  Given the location of the remaining defect, shape of the defect and the proximity to free margins a transposition flap was deemed most appropriate for complete closure of the defect.  Using a sterile surgical marker, an appropriate advancement flap was drawn incorporating the defect and placing the expected incisions within the relaxed skin tension lines where possible.    The area thus outlined was incised deep to adipose tissue with a #15 scalpel blade.  The skin margins were undermined to an appropriate distance in all directions utilizing iris scissors. Complex Repair And V-Y Plasty Text: The defect edges were debeveled with a #15 scalpel blade.  The primary defect was closed partially with a complex linear closure.  Given the location of the remaining defect, shape of the defect and the proximity to free margins a V-Y plasty was deemed most appropriate for complete closure of the defect.  Using a sterile surgical marker, an appropriate advancement flap was drawn incorporating the defect and placing the expected incisions within the relaxed skin tension lines where possible.    The area thus outlined was incised deep to adipose tissue with a #15 scalpel blade.  The skin margins were undermined to an appropriate distance in all directions utilizing iris scissors. Complex Repair And M Plasty Text: The defect edges were debeveled with a #15 scalpel blade.  The primary defect was closed partially with a complex linear closure.  Given the location of the remaining defect, shape of the defect and the proximity to free margins an M plasty was deemed most appropriate for complete closure of the defect.  Using a sterile surgical marker, an appropriate advancement flap was drawn incorporating the defect and placing the expected incisions within the relaxed skin tension lines where possible.    The area thus outlined was incised deep to adipose tissue with a #15 scalpel blade.  The skin margins were undermined to an appropriate distance in all directions utilizing iris scissors. Complex Repair And Double M Plasty Text: The defect edges were debeveled with a #15 scalpel blade.  The primary defect was closed partially with a complex linear closure.  Given the location of the remaining defect, shape of the defect and the proximity to free margins a double M plasty was deemed most appropriate for complete closure of the defect.  Using a sterile surgical marker, an appropriate advancement flap was drawn incorporating the defect and placing the expected incisions within the relaxed skin tension lines where possible.    The area thus outlined was incised deep to adipose tissue with a #15 scalpel blade.  The skin margins were undermined to an appropriate distance in all directions utilizing iris scissors. Complex Repair And W Plasty Text: The defect edges were debeveled with a #15 scalpel blade.  The primary defect was closed partially with a complex linear closure.  Given the location of the remaining defect, shape of the defect and the proximity to free margins a W plasty was deemed most appropriate for complete closure of the defect.  Using a sterile surgical marker, an appropriate advancement flap was drawn incorporating the defect and placing the expected incisions within the relaxed skin tension lines where possible.    The area thus outlined was incised deep to adipose tissue with a #15 scalpel blade.  The skin margins were undermined to an appropriate distance in all directions utilizing iris scissors. Complex Repair And Z Plasty Text: The defect edges were debeveled with a #15 scalpel blade.  The primary defect was closed partially with a complex linear closure.  Given the location of the remaining defect, shape of the defect and the proximity to free margins a Z plasty was deemed most appropriate for complete closure of the defect.  Using a sterile surgical marker, an appropriate advancement flap was drawn incorporating the defect and placing the expected incisions within the relaxed skin tension lines where possible.    The area thus outlined was incised deep to adipose tissue with a #15 scalpel blade.  The skin margins were undermined to an appropriate distance in all directions utilizing iris scissors. Complex Repair And Dorsal Nasal Flap Text: The defect edges were debeveled with a #15 scalpel blade.  The primary defect was closed partially with a complex linear closure.  Given the location of the remaining defect, shape of the defect and the proximity to free margins a dorsal nasal flap was deemed most appropriate for complete closure of the defect.  Using a sterile surgical marker, an appropriate flap was drawn incorporating the defect and placing the expected incisions within the relaxed skin tension lines where possible.    The area thus outlined was incised deep to adipose tissue with a #15 scalpel blade.  The skin margins were undermined to an appropriate distance in all directions utilizing iris scissors. Complex Repair And Ftsg Text: The defect edges were debeveled with a #15 scalpel blade.  The primary defect was closed partially with a complex linear closure.  Given the location of the defect, shape of the defect and the proximity to free margins a full thickness skin graft was deemed most appropriate to repair the remaining defect.  The graft was trimmed to fit the size of the remaining defect.  The graft was then placed in the primary defect, oriented appropriately, and sutured into place. Complex Repair And Burow's Graft Text: The defect edges were debeveled with a #15 scalpel blade.  The primary defect was closed partially with a complex linear closure.  Given the location of the defect, shape of the defect, the proximity to free margins and the presence of a standing cone deformity a Burow's graft was deemed most appropriate to repair the remaining defect.  The graft was trimmed to fit the size of the remaining defect.  The graft was then placed in the primary defect, oriented appropriately, and sutured into place. Complex Repair And Split-Thickness Skin Graft Text: The defect edges were debeveled with a #15 scalpel blade.  The primary defect was closed partially with a complex linear closure.  Given the location of the defect, shape of the defect and the proximity to free margins a split thickness skin graft was deemed most appropriate to repair the remaining defect.  The graft was trimmed to fit the size of the remaining defect.  The graft was then placed in the primary defect, oriented appropriately, and sutured into place. Complex Repair And Epidermal Autograft Text: The defect edges were debeveled with a #15 scalpel blade.  The primary defect was closed partially with a complex linear closure.  Given the location of the defect, shape of the defect and the proximity to free margins an epidermal autograft was deemed most appropriate to repair the remaining defect.  The graft was trimmed to fit the size of the remaining defect.  The graft was then placed in the primary defect, oriented appropriately, and sutured into place. Complex Repair And Dermal Autograft Text: The defect edges were debeveled with a #15 scalpel blade.  The primary defect was closed partially with a complex linear closure.  Given the location of the defect, shape of the defect and the proximity to free margins an dermal autograft was deemed most appropriate to repair the remaining defect.  The graft was trimmed to fit the size of the remaining defect.  The graft was then placed in the primary defect, oriented appropriately, and sutured into place. Complex Repair And Tissue Cultured Epidermal Autograft Text: The defect edges were debeveled with a #15 scalpel blade.  The primary defect was closed partially with a complex linear closure.  Given the location of the defect, shape of the defect and the proximity to free margins an tissue cultured epidermal autograft was deemed most appropriate to repair the remaining defect.  The graft was trimmed to fit the size of the remaining defect.  The graft was then placed in the primary defect, oriented appropriately, and sutured into place. Complex Repair And Xenograft Text: The defect edges were debeveled with a #15 scalpel blade.  The primary defect was closed partially with a complex linear closure.  Given the location of the defect, shape of the defect and the proximity to free margins a xenograft was deemed most appropriate to repair the remaining defect.  The graft was trimmed to fit the size of the remaining defect.  The graft was then placed in the primary defect, oriented appropriately, and sutured into place. Complex Repair And Skin Substitute Graft Text: The defect edges were debeveled with a #15 scalpel blade.  The primary defect was closed partially with a complex linear closure.  Given the location of the remaining defect, shape of the defect and the proximity to free margins a skin substitute graft was deemed most appropriate to repair the remaining defect.  The graft was trimmed to fit the size of the remaining defect.  The graft was then placed in the primary defect, oriented appropriately, and sutured into place. Path Notes (To The Dermatopathologist): Please check margins. Consent was obtained from the patient. The risks and benefits to therapy were discussed in detail. Specifically, the risks of infection, scarring, bleeding, prolonged wound healing, incomplete removal, allergy to anesthesia, nerve injury and recurrence were addressed. Prior to the procedure, the treatment site was clearly identified and confirmed by the patient. All components of Universal Protocol/PAUSE Rule completed. Post-Care Instructions: I reviewed with the patient in detail post-care instructions. Patient is not to engage in any heavy lifting, exercise, or swimming for the next 14 days. Should the patient develop any fevers, chills, bleeding, severe pain patient will contact the office immediately. Home Suture Removal Text: Patient was provided a home suture removal kit and will remove their sutures at home.  If they have any questions or difficulties they will call the office. Where Do You Want The Question To Include Opioid Counseling Located?: Case Summary Tab Billing Type: Third-Party Bill Information: Selecting Yes will display possible errors in your note based on the variables you have selected. This validation is only offered as a suggestion for you. PLEASE NOTE THAT THE VALIDATION TEXT WILL BE REMOVED WHEN YOU FINALIZE YOUR NOTE. IF YOU WANT TO FAX A PRELIMINARY NOTE YOU WILL NEED TO TOGGLE THIS TO 'NO' IF YOU DO NOT WANT IT IN YOUR FAXED NOTE. Size Of Lesion In Cm: 0.3 Excision Depth: dermis Epidermal Sutures: 5-0 Nylon Intermediate / Complex Repair - Final Wound Length In Cm: 0.7 Size Of Margin In Cm: 0.1 Excision Method: Elliptical Intermediate / Complex Repair - Final Wound Length In Cm: 0.9

## 2020-12-18 ENCOUNTER — NON-APPOINTMENT (OUTPATIENT)
Age: 40
End: 2020-12-18

## 2020-12-24 NOTE — H&P ADULT - PROBLEM/PLAN-5
----- Message from Olga Leon MD sent at 12/18/2020  7:55 AM CST -----  Please, let the pt know that her blood work (lipid profile) is back and that is EXCELLENT.  She should continue the same Atorvastatin dose daily.   DISPLAY PLAN FREE TEXT

## 2021-02-26 NOTE — PROCEDURE NOTE - PICC: IMPLANT LOT NUMBER
[General Appearance - Well Developed] : well developed [Normal Appearance] : normal appearance [Well Groomed] : well groomed [General Appearance - Well Nourished] : well nourished [No Deformities] : no deformities [General Appearance - In No Acute Distress] : no acute distress length- 39cm BARD [Normal Conjunctiva] : the conjunctiva exhibited no abnormalities [Eyelids - No Xanthelasma] : the eyelids demonstrated no xanthelasmas [Normal Oral Mucosa] : normal oral mucosa [No Oral Pallor] : no oral pallor [No Oral Cyanosis] : no oral cyanosis [Normal Jugular Venous A Waves Present] : normal jugular venous A waves present [Normal Jugular Venous V Waves Present] : normal jugular venous V waves present [No Jugular Venous Briceno A Waves] : no jugular venous briceno A waves [Heart Rate And Rhythm] : heart rate and rhythm were normal [Heart Sounds] : normal S1 and S2 [Murmurs] : no murmurs present [Respiration, Rhythm And Depth] : normal respiratory rhythm and effort [Exaggerated Use Of Accessory Muscles For Inspiration] : no accessory muscle use [Auscultation Breath Sounds / Voice Sounds] : lungs were clear to auscultation bilaterally [Abdomen Soft] : soft [Abdomen Tenderness] : non-tender [Abdomen Mass (___ Cm)] : no abdominal mass palpated [Abnormal Walk] : normal gait [Gait - Sufficient For Exercise Testing] : the gait was sufficient for exercise testing [Nail Clubbing] : no clubbing of the fingernails [Cyanosis, Localized] : no localized cyanosis [Petechial Hemorrhages (___cm)] : no petechial hemorrhages [Skin Color & Pigmentation] : normal skin color and pigmentation [] : no rash [No Venous Stasis] : no venous stasis [Skin Lesions] : no skin lesions [No Skin Ulcers] : no skin ulcer [No Xanthoma] : no  xanthoma was observed [Oriented To Time, Place, And Person] : oriented to person, place, and time [Affect] : the affect was normal [Mood] : the mood was normal [No Anxiety] : not feeling anxious

## 2021-03-19 ENCOUNTER — NON-APPOINTMENT (OUTPATIENT)
Age: 41
End: 2021-03-19

## 2021-05-11 NOTE — H&P ADULT - NSHPPHYSICALEXAM_GEN_ALL_CORE
Patient reports that she is having abdominal pain, has been constipated and tried miralax and suppositories at home without much relief. The pain started on Sunday and is located in the middle of her stomach just above her belly button.  
Constitutional: Female, NAD, at times tangential and anxious   HEENT: NCAT, sclera non-icteric, neck supple, trachea midline, left neck with midline wound which is healed, MMM, good dentition, no oral ulcers   Respiratory: CTAB, no w/r/r  Cardiovascular: RRR, normal S1S2, no M/R/G  Gastrointestinal: soft, NTND, no masses palpable, BS normal  Extremities: Warm, well perfused, pulses equal bilateral upper and lower extremities, bilateral extremities edematous but nonpitting   Neurological: AAOx3  Skin: Normal temperature, warm, dry

## 2021-07-08 ENCOUNTER — NON-APPOINTMENT (OUTPATIENT)
Age: 41
End: 2021-07-08

## 2021-07-20 NOTE — ED ADULT NURSE NOTE - NS ED NURSE LEVEL OF CONSCIOUSNESS MENTAL STATUS
Verified with client that she is taking Levothyroxine 75 mcg daily and that she recently picked up a 90 day supply at pharmacy.   Awake/Alert

## 2021-07-25 NOTE — PROGRESS NOTE ADULT - PROBLEM SELECTOR PROBLEM 5
Schizoaffective disorder, unspecified type Nutrition, metabolism, and development symptoms without difficulty

## 2021-07-28 NOTE — ED PROVIDER NOTE - NS ED ATTENDING STATEMENT MOD
I have personally performed a face to face diagnostic evaluation on this patient. I have reviewed the ACP note and agree with the history, exam and plan of care, except as noted. Rhombic Flap Text: The defect edges were debeveled with a #15 scalpel blade.  Given the location of the defect and the proximity to free margins a rhombic flap was deemed most appropriate.  Using a sterile surgical marker, an appropriate rhombic flap was drawn incorporating the defect.    The area thus outlined was incised deep to adipose tissue with a #15 scalpel blade.  The skin margins were undermined to an appropriate distance in all directions utilizing iris scissors.

## 2021-10-05 NOTE — ED PROVIDER NOTE - CPE EDP NEURO NORM
normal... Complex Repair And Flap Additional Text (Will Appearing After The Standard Complex Repair Text): The complex repair was not sufficient to completely close the primary defect. The remaining additional defect was repaired with the flap mentioned below.

## 2021-11-18 NOTE — ED PROVIDER NOTE - CROS ED ROS STATEMENT
Alert-The patient is alert, awake and responds to voice. The patient is oriented to time, place, and person. The triage nurse is able to obtain subjective information. all other ROS negative except as per HPI

## 2022-02-23 NOTE — DIETITIAN INITIAL EVALUATION ADULT. - PROBLEM SELECTOR PLAN 3
Yes -Patient has a history of depression and also anxiety  -Currently not taking any medications and does not want any current treatments  -Denies suicidal ideation

## 2022-06-21 NOTE — PROGRESS NOTE ADULT - PROBLEM SELECTOR PLAN 3
Patient has a history of depression and also anxiety. Currently not taking any medications and does not want any current treatments. Denies suicidal ideation  - Pt with previous issues with psychiatry and health care in general   - pt exhibiting signs of paranoid type behavior. Not trusting medical team. Although in agreeance with plan as of now.   - Pt does not want family to know of her hospital stay  - pt refusing to be seen by psychiatry Picato Counseling:  I discussed with the patient the risks of Picato including but not limited to erythema, scaling, itching, weeping, crusting, and pain.

## 2022-11-22 NOTE — ED PROVIDER NOTE - NS ED MD DISPO DISCHARGE CCDA
Patient Name: Gabi Dudley  : 1947    MRN: [id number]                              Today's Date: 2022       Admit Date: 2022    Visit Dx:     ICD-10-CM ICD-9-CM   1. COPD exacerbation (HCC)  J44.1 491.21   2. Bronchitis  J40 490   3. Acute on chronic respiratory failure with hypoxia (HCC)  J96.21 518.84     799.02     Patient Active Problem List   Diagnosis   • Adrenal adenoma   • Anxiety   • Chronic fatigue   • Fibromyalgia   • Hyperlipidemia   • Essential hypertension   • Insomnia   • Osteoarthritis of knee   • Osteoporosis   • Pulmonary emphysema (HCC)   • Simple renal cyst   • Tension headache   • Vitamin D deficiency   • Diverticulosis   • Inversion of nipple   • Paroxysmal atrial fibrillation (HCC)   • Coronary artery disease involving native coronary artery of native heart without angina pectoris   • Autonomic dysfunction   • Gastroesophageal reflux disease without esophagitis   • Urge incontinence   • Erythrasma   • Compression fracture of T5 vertebra (HCC)   • Lung nodule   • COPD exacerbation (HCC)   • Overweight (BMI 25.0-29.9)   • Acute on chronic respiratory failure with hypoxia (HCC)     Past Medical History:   Diagnosis Date   • Adrenal adenoma    • Anemia    • Arrhythmia    • Asthma    • Atrial fibrillation (HCC)    • Back pain    • Benign colonic polyp    • Benign tumor of adrenal gland    • Cataract     bilateral   • Cholelithiasis    • Chronic bronchitis (HCC)    • Chronic bronchitis with COPD (chronic obstructive pulmonary disease) (HCC)    • COPD (chronic obstructive pulmonary disease) (HCC)    • Coronary artery disease    • Depression    • Elevated cholesterol    • Environmental and seasonal allergies    • Fibromyalgia    • Gastritis    • Generalized anxiety disorder    • GERD (gastroesophageal reflux disease)    • H/O mammogram    • Hemorrhoids    • History of blood transfusion    • History of blood transfusion    • History of echocardiogram    • History of  endometriosis    • History of nuclear stress test    • Hypertension    • IBS (irritable bowel syndrome)    • Impaired functional mobility, balance, gait, and endurance    • Inverted nipple    • Kidney disease    • Kidney stone    • Liver cyst    • Nodular radiologic density    • Nodule of left lung    • On home oxygen therapy     2 liters NC QHS   • Osteoarthritis    • Osteoporosis    • PONV (postoperative nausea and vomiting)    • Renal cyst    • Sinus problem     2014   • Sinusitis    • Skin cancer     basal cell carcinoma   • SOB (shortness of breath)    • Tobacco use    • Urinary frequency    • Vitamin D deficiency    • Wears glasses    • Wears partial dentures     upper plate     Past Surgical History:   Procedure Laterality Date   • APPENDECTOMY  1980s   • CARDIAC CATHETERIZATION  2003   • CATARACT EXTRACTION  2013    both eyes   • CHOLECYSTECTOMY WITH INTRAOPERATIVE CHOLANGIOGRAM N/A 10/30/2020    Procedure: CHOLECYSTECTOMY LAPAROSCOPIC INTRAOPERATIVE CHOLANGIOGRAPHY;  Surgeon: Sima Moses MD;  Location: [facility] OR;  Service: General;  Laterality: N/A;   • COLONOSCOPY  03/11/2013   • COLONOSCOPY  06/21/2016   • COLONOSCOPY N/A 7/24/2019    Procedure: COLONOSCOPY W/ COLD FORCEP POLYPECTOMIES; HOT SNARE POLYPECTOMIES; COLD SNARE POLYPECTOMY;  Surgeon: Goyo Nunez MD;  Location: [facility] ENDOSCOPY;  Service: Gastroenterology   • COLONOSCOPY W/ BIOPSIES AND POLYPECTOMY     • EXPLORATORY LAPAROTOMY N/A 11/23/2020    Procedure: colectomy, right, closure of enterotomy x 2, reduction of internal volvulus;  Surgeon: Sima Moses MD;  Location: [facility] OR;  Service: General;  Laterality: N/A;   • HYSTERECTOMY  1980s    partial   • LYSIS OF ABDOMINAL ADHESIONS     • TONSILLECTOMY  1987   • UPPER GASTROINTESTINAL ENDOSCOPY  01/13/2015   • VAGINAL DELIVERY      x2      General Information     Row Name 11/22/22 1528          Physical Therapy Time and Intention    Document Type evaluation  -MS     Mode of Treatment  physical therapy  -MS     Row Name 11/22/22 1528          General Information    Patient Profile Reviewed yes  -MS     Prior Level of Function min assist:;all household mobility;gait;transfer  -MS     Existing Precautions/Restrictions oxygen therapy device and L/min;fall  -MS     Barriers to Rehab medically complex;previous functional deficit  -MS     Row Name 11/22/22 1528          Living Environment    People in Home child(ashely), adult  -MS     Row Name 11/22/22 1528          Home Main Entrance    Number of Stairs, Main Entrance three  -MS     Stair Railings, Main Entrance none  -MS     Row Name 11/22/22 1528          Stairs Within Home, Primary    Stairs, Within Home, Primary 16 steps, railing on L side  -MS     Row Name 11/22/22 1528          Cognition    Orientation Status (Cognition) oriented x 4  -MS     Row Name 11/22/22 1528          Safety Issues, Functional Mobility    Safety Issues Affecting Function (Mobility) insight into deficits/self-awareness;awareness of need for assistance;impulsivity;sequencing abilities;safety precautions follow-through/compliance  -MS     Impairments Affecting Function (Mobility) endurance/activity tolerance;strength;shortness of breath;pain  -MS           User Key  (r) = Recorded By, (t) = Taken By, (c) = Cosigned By    Initials Name Provider Type    MS Farzad Crowder, PT Physical Therapist               Mobility     Row Name 11/22/22 1533          Bed Mobility    Bed Mobility bed mobility (all) activities  -MS     All Activities, [place] (Bed Mobility) modified independence  -MS     Assistive Device (Bed Mobility) head of bed elevated  -MS     Row Name 11/22/22 1533          Sit-Stand Transfer    Sit-Stand [place] (Transfers) contact guard  -MS     Assistive Device (Sit-Stand Transfers) walker, front-wheeled  -MS     Row Name 11/22/22 1533          Gait/Stairs (Locomotion)    [place] Level (Gait) contact guard  -MS     Assistive Device (Gait) walker,  front-wheeled  -MS     Distance in Feet (Gait) 30  -MS     Deviations/Abnormal Patterns (Gait) festinating/shuffling;base of support, narrow;gait speed decreased  -MS     Bilateral Gait Deviations forward flexed posture  -MS           User Key  (r) = Recorded By, (t) = Taken By, (c) = Cosigned By    Initials Name Provider Type    Farzad Arndt, PT Physical Therapist               Obj/Interventions     Row Name 11/22/22 1535          Range of Motion Comprehensive    General Range of Motion bilateral lower extremity ROM WFL  -MS     Row Name 11/22/22 1535          Strength Comprehensive (MMT)    General Manual Muscle Testing (MMT) Assessment lower extremity strength deficits identified  -MS     Comment, General Manual Muscle Testing (MMT) Assessment B LE 4-/5  -MS     Row Name 11/22/22 1539          Sensory Assessment (Somatosensory)    Sensory Assessment (Somatosensory) sensation intact  -MS           User Key  (r) = Recorded By, (t) = Taken By, (c) = Cosigned By    Initials Name Provider Type    Farzad Arndt, PT Physical Therapist               Goals/Plan     Row Name 11/22/22 1559          Bed Mobility Goal 1 (PT)    Activity/Assistive Device (Bed Mobility Goal 1, PT) bed mobility activities, all  -MS     [place] Level/Cues Needed (Bed Mobility Goal 1, PT) modified independence  -MS     Time Frame (Bed Mobility Goal 1, PT) long term goal (LTG);2 weeks  -MS     Row Name 11/22/22 0039          Transfer Goal 1 (PT)    Activity/Assistive Device (Transfer Goal 1, PT) transfers, all;sit-to-stand/stand-to-sit  -MS     [place] Level/Cues Needed (Transfer Goal 1, PT) standby assist  -MS     Time Frame (Transfer Goal 1, PT) long term goal (LTG);2 weeks  -MS     Row Name 11/22/22 6774          Gait Training Goal 1 (PT)    Activity/Assistive Device (Gait Training Goal 1, PT) gait (walking locomotion);assistive device use  -MS     [place] Level (Gait Training Goal 1, PT) standby assist  -MS      Distance (Gait Training Goal 1, PT) 150  -MS     Time Frame (Gait Training Goal 1, PT) long term goal (LTG);2 weeks  -MS     Row Name 11/22/22 1555          Patient Education Goal (PT)    Activity (Patient Education Goal, PT) ther exer x15 reps ea  -MS     [place]/Cues/Accuracy (Memory Goal 2, PT) demonstrates adequately  -MS     Time Frame (Patient Education Goal, PT) long term goal (LTG);2 weeks  -MS     Row Name 11/22/22 1552          Therapy Assessment/Plan (PT)    Planned Therapy Interventions (PT) balance training;gait training;bed mobility training;home exercise program;ROM (range of motion);strengthening;stair training;patient/family education;transfer training  -MS           User Key  (r) = Recorded By, (t) = Taken By, (c) = Cosigned By    Initials Name Provider Type    Farzad Arndt, PT Physical Therapist               Clinical Impression     Row Name 11/22/22 5791          Pain    Pretreatment Pain Rating 6/10  -MS     Posttreatment Pain Rating 6/10  -MS     Pain Location generalized  -MS     Pain Intervention(s) Repositioned;Ambulation/increased activity  -MS     Row Name 11/22/22 1538          Plan of Care Review    Plan of Care Reviewed With patient;daughter  -MS     Progress no change  -MS     Outcome Evaluation Pt participated in PT eval this date. Pt presents with deficits in strength, gait and activity tolerance. Pt transferred to EOB on 5L O2 with MI. Pt very quick to engage and complete mobility due to SOA. Pt walked 30 ft with Rwx with cues to slow pace and CGA. Pt's O2 sats 91% after activity. Pt is expected to benefit from skilled PTx during this inpatient stay to address deficits in strength, gait and mobility.  -MS     Row Name 11/22/22 1532          Therapy Assessment/Plan (PT)    Rehab Potential (PT) good, to achieve stated therapy goals  -MS     Criteria for Skilled Interventions Met (PT) yes;meets criteria  -MS     Therapy Frequency (PT) 3 times/wk  -MS     Row Name 11/22/22  1535          Vital Signs    Pre SpO2 (%) 98  -MS     O2 Delivery Pre Treatment supplemental O2  -MS     Intra SpO2 (%) 91  -MS     O2 Delivery Intra Treatment supplemental O2  -MS     Post SpO2 (%) 98  -MS     O2 Delivery Post Treatment supplemental O2  -MS     Pre Patient Position Supine  -MS     Intra Patient Position Standing  -MS     Post Patient Position Supine  -MS     Row Name 11/22/22 1535          Positioning and Restraints    Pre-Treatment Position in bed  -MS     Post Treatment Position bed  -MS     In Bed sitting;call light within reach;encouraged to call for assist;with family/caregiver  -MS           User Key  (r) = Recorded By, (t) = Taken By, (c) = Cosigned By    Initials Name Provider Type    Farzad Arndt, PT Physical Therapist               Outcome Measures     Row Name 11/22/22 1602 11/22/22 0800       How much help from another person do you currently need...    Turning from your back to your side while in flat bed without using bedrails? 4  -MS 4  -LA    Moving from lying on back to sitting on the side of a flat bed without bedrails? 3  -MS 3  -LA    Moving to and from a bed to a chair (including a wheelchair)? 4  -MS 4  -LA    Standing up from a chair using your arms (e.g., wheelchair, bedside chair)? 4  -MS 4  -LA    Climbing 3-5 steps with a railing? 3  -MS 3  -LA    To walk in hospital room? 3  -MS 3  -LA    AM-PAC 6 Clicks Score (PT) 21  -MS 21  -LA    Highest level of mobility 6 --> Walked 10 steps or more  -MS 6 --> Walked 10 steps or more  -LA    Row Name 11/22/22 1422          Functional Assessment    Outcome Measure Options AM-PAC 6 Clicks Daily Activity (OT)  -           User Key  (r) = Recorded By, (t) = Taken By, (c) = Cosigned By    Initials Name Provider Type    Clarita [place] Occupational Therapist    Farzad Arndt, PT Physical Therapist    Harry Lewis, RN Registered Nurse                             Physical Therapy Education     Title: PT OT SLP Therapies  (In Progress)     Topic: Physical Therapy (In Progress)     Point: Mobility training (Done)     Learning Progress Summary           Patient Acceptance, E, VU by MS at 11/22/2022 1605    Comment: importance of mobility                   Point: Home exercise program (Done)     Learning Progress Summary           Patient Acceptance, E, VU by MS at 11/22/2022 1605    Comment: importance of mobility                   Point: Body mechanics (Not Started)     Learner Progress:  Not documented in this visit.          Point: Precautions (Not Started)     Learner Progress:  Not documented in this visit.                      User Key     Initials Effective Dates Name Provider Type Discipline    MS 07/01/22 -  Farzad Crowder, PT Physical Therapist PT              PT Recommendation and Plan  Planned Therapy Interventions (PT): balance training, gait training, bed mobility training, home exercise program, ROM (range of motion), strengthening, stair training, patient/family education, transfer training  Plan of Care Reviewed With: patient, daughter  Progress: no change  Outcome Evaluation: Pt participated in PT eval this date. Pt presents with deficits in strength, gait and activity tolerance. Pt transferred to EOB on 5L O2 with MI. Pt very quick to engage and complete mobility due to SOA. Pt walked 30 ft with Rwx with cues to slow pace and CGA. Pt's O2 sats 91% after activity. Pt is expected to benefit from skilled PTx during this inpatient stay to address deficits in strength, gait and mobility.     Time Calculation:    PT Charges     Row Name 11/22/22 1605             Time Calculation    Start Time 1336  -MS      PT Received On 11/22/22  -MS      PT Goal Re-Cert Due Date 12/02/22  -MS         Untimed Charges    PT Eval/Re-eval Minutes 45  -MS         Total Minutes    Untimed Charges Total Minutes 45  -MS       Total Minutes 45  -MS            User Key  (r) = Recorded By, (t) = Taken By, (c) = Cosigned By    Initials Name  Provider Type    MS Farzad Crowder, PT Physical Therapist              Therapy Charges for Today     Code Description Service Date Service Provider Modifiers Qty    30781123151 HC PT EVAL MOD COMPLEXITY 3 11/22/2022 Farzad Crowder, PT GP 1          PT G-Codes  Outcome Measure Options: AM-PAC 6 Clicks Daily Activity (OT)  AM-PAC 6 Clicks Score (PT): 21  AM-PAC 6 Clicks Score (OT): 21  PT Discharge Summary  Anticipated Discharge Disposition (PT): home with assist, home with home health    Farzad Crowder, HANS  11/22/2022     Patient/Caregiver provided printed discharge information.

## 2022-11-28 NOTE — ED ADULT TRIAGE NOTE - CCCP TRG CHIEF CMPLNT
POST-OPERATIVE CARE INSTRUCTION SHEET      ABOUT POST-OPERATIVE CARE VISITS    Post-operative visits are an indispensable part of the surgery, since they help promote healing and prevent persistent or recurrent disease  You should plan on remaining within the [facility] area for at least one day following surgery  You will usually be seen within 7-10 days post-operatively for debridement (removal of crusts from your nose)  You should anticipate 2 office visits over the first 4 weeks after surgery  Continued debridement will be done at these visits, and persistent inflammation or scar tissue will be removed under local anesthesia  Although chances of complications from these manipulations are rare, the potential risks are the same as the surgery itself  WHAT YOU CAN EXPECT TO EXPERIENCE    You can expect some bleeding from your nose for several days after the surgery and again after each office debridement  When bleeding occurs down the front of your nose or into the back of your throat, you should tilt your head back while sitting up and breathe gently through your nose  If bleeding persists for an extended period of time notify our office  Over-the-counter Afrin nose spray (oxymetazoline) can be used to open your nose  and reduce bleeding during the first two nights after surgery if needed  As the sinuses begin to clear themselves, you can expect to have some thick brown drainage from your nose  This is mucus and old blood and does not indicate an infection  You may experience some discomfort post-operatively due to manipulation and inflammation  Take your pain medication as directed  Often extra-strength Tylenolâ is sufficient  You may wish to take medication for pain prior to your post-operative visits (particularly early on, when the nose is most sensitive)  If the medication is sedating, be sure to have someone available to drive you        SOME IMPORTANT POST-OPERATIVE DO'S AND DO NOT'S    DO NOT blow your nose until you have been given permission to do so  DO NOT bend, lift or strain for at least one week after surgery  These activities will promote bleeding from your nose  You should not plan on participating in rigorous activity until healing is completed  DO NOT suppress the need to cough or sneeze, but cough/sneeze with your mouth open  DO NOT resume use of any aspirin-containing products or other blood thinners until after discussing this with us  Typically, you can restart after 7-10 days  DO use nasal saline spray (without decongestant) every hour while you are awake beginning the day after surgery  This helps moisten your nose and prevents large crusts from forming  The preferred brand is Simply Saline due to lack of a preservative which is irritating to some people  DO use nasal saline irrigation 4-6 times daily beginning on post-operative day three if there is no nasal packing  DO continue your steroids (Medrol or Prednisone) and antibiotics (if they have been prescribed for you)  Diarrhea from antibiotic usage can lead to a serious health problem  This can often be prevented by taking probiotics daily, which is found in yogurt with active cultures or as tablets in a health food store  If you should experience diarrhea, stop the antibiotic and notify us  Further evaluation may be required  DO notify us for any of the following: temperature elevations above 100 5 F, clear watery drainage from your nose, changes in vision, swelling of the eyes, worsening headache or neck stiffness  Contact our office for an emergency or go to the emergency room nearest to you  medical evaluation

## 2022-12-18 NOTE — H&P ADULT - NSICDXPASTMEDICALHX_GEN_ALL_CORE_FT
EMERGENCY DEPARTMENT HISTORY AND PHYSICAL EXAM      Date: 2022  Patient Name: Amy Marie    History of Presenting Illness     Chief Complaint   Patient presents with    Abdominal Pain    Vomiting    Fever       History Provided By: Patient's Mother    HPI: Amy Marie, 15 m.o. male with a past medical history significant for  delivery at 39 weeks gestation who presents this ED with his mother for evaluation of vomiting. Mother reports 2 episodes of emesis earlier this evening. States the child is also been having several episodes of diarrhea a day for the past 2 days. Denies any associated fevers, rashes, decreased urine output. Denies treating symptoms with anything. Mother also noticed a firm area in the child's lower abdomen earlier this evening. She was concerned for possible hernia which prompted her to ED for evaluation. She states that the child is tolerating p.o. fluids per his usual, making a normal amount of wet diapers, and otherwise acting his normal self. She further reports the child without any prior hospitalizations or surgical history, is up-to-date on immunizations, and is meeting all his milestones. She has no further concerns reports the child otherwise well. There are no other complaints, changes, or physical findings at this time. Past History     Past Medical History:  No past medical history on file. Past Surgical History:  No past surgical history on file. Family History:  No family history on file. Social History:  Social History     Tobacco Use    Smoking status: Never    Smokeless tobacco: Never       Allergies:  No Known Allergies    PCP: Alireza Cardona MD    No current facility-administered medications on file prior to encounter.      Current Outpatient Medications on File Prior to Encounter   Medication Sig Dispense Refill    albuterol (PROVENTIL VENTOLIN) 2.5 mg /3 mL (0.083 %) nebu 3 mL by Nebulization route every four (4) hours as needed for Wheezing or Cough. 20 Nebule 0    amoxicillin (AMOXIL) 200 mg/5 mL suspension Take 200 mg by mouth two (2) times a day. Review of Systems   Review of Systems   Unable to perform ROS: Age   Gastrointestinal:  Positive for diarrhea and vomiting. Physical Exam   Physical Exam  Vitals and nursing note reviewed. Constitutional:       General: He is active. He is not in acute distress. Appearance: He is well-developed. He is not toxic-appearing. HENT:      Head: Normocephalic and atraumatic. Right Ear: Tympanic membrane normal. Tympanic membrane is not erythematous or bulging. Left Ear: Tympanic membrane normal. Tympanic membrane is not erythematous or bulging. Nose: Congestion present. Mouth/Throat:      Mouth: Mucous membranes are dry. Pharynx: Oropharynx is clear. No oropharyngeal exudate or posterior oropharyngeal erythema. Eyes:      Conjunctiva/sclera: Conjunctivae normal.   Cardiovascular:      Rate and Rhythm: Normal rate and regular rhythm. Pulses: Normal pulses. Heart sounds: No murmur heard. No friction rub. No gallop. Pulmonary:      Effort: Pulmonary effort is normal. No respiratory distress. Breath sounds: Normal breath sounds. No stridor or decreased air movement. No wheezing, rhonchi or rales. Abdominal:      General: Bowel sounds are normal. There is no distension. Palpations: Abdomen is soft. Tenderness: There is no abdominal tenderness. There is no guarding or rebound. Hernia: No hernia is present. Comments: Benign abdominal exam   Musculoskeletal:      Cervical back: Neck supple. No rigidity. Lymphadenopathy:      Cervical: No cervical adenopathy. Skin:     General: Skin is warm and dry. Neurological:      General: No focal deficit present. Mental Status: He is alert.        Lab and Diagnostic Study Results   Labs -   No results found for this or any previous visit (from the past 12 hour(s)). Radiologic Studies -   @lastxrresult@  CT Results  (Last 48 hours)      None          CXR Results  (Last 48 hours)      None            Medical Decision Making and ED Course   Differential Diagnosis & Medical Decision Making Provider Note:   Patient presents with his mother for evaluation of vomiting and diarrhea. vital signs stable with currently a non-peritoneal exam; DDx includes: Gastroenteritis, viral illness, constipation, IBD, hepatitis, obstruction, appendicitis. Will order x-ray KUB, treat symptomatically, p.o. challenge, and obtain serial abdominal exams to determine if additional imaging is indicated. Will reassess and monitor closely. - I am the first provider for this patient. I reviewed the vital signs, available nursing notes, past medical history, past surgical history, family history and social history. The patients presenting problems have been discussed, and they are in agreement with the care plan formulated and outlined with them. I have encouraged them to ask questions as they arise throughout their visit. Vital Signs-Reviewed the patient's vital signs. Patient Vitals for the past 12 hrs:   Temp Pulse Resp SpO2   12/17/22 2113 98.6 °F (37 °C) -- -- --   12/17/22 2103 -- 132 22 100 %       ED Course:   11:27 PM  Patient reassessed and mother updated on imaging results, which are negative for acute pathology. Patient tolerating p.o. without subsequent episodes of emesis. He has had no vomiting or diarrhea during entire ED stay. Upon reexam, he continues to have a benign abdominal exam.  Mother encouraged to schedule close follow-up with child's pediatrician within the next week, return to ER sooner if worse. She is educated on strict return precautions and conveys good understanding and agreement with care plan as outlined. She has no new complaints or concerns and all her questions have been answered. Patient stable for discharge home.          Procedures Performed by: Denice Burns PA-C  Procedures      Disposition   Disposition: DC- Pediatric Discharges: All of the diagnostic tests were reviewed with the parent and their questions were answered. The parent verbally convey understanding and agreement of the signs, symptoms, diagnosis, treatment and prognosis for the child and additionally agrees to follow up as recommended with the child's PCP in 24 - 48 hours. They also agree with the care-plan and conveys that all of their questions have been answered. I have put together some discharge instructions for them that include: 1) educational information regarding their diagnosis, 2) how to care for the child's diagnosis at home, as well a 3) list of reasons why they would want to return the child to the ED prior to their follow-up appointment, should their condition change. DISCHARGE PLAN:  1. Current Discharge Medication List        CONTINUE these medications which have NOT CHANGED    Details   albuterol (PROVENTIL VENTOLIN) 2.5 mg /3 mL (0.083 %) nebu 3 mL by Nebulization route every four (4) hours as needed for Wheezing or Cough. Qty: 20 Nebule, Refills: 0      amoxicillin (AMOXIL) 200 mg/5 mL suspension Take 200 mg by mouth two (2) times a day. 2.   Follow-up Information       Follow up With Specialties Details Why Contact Info    Pediatrician  Schedule an appointment as soon as possible for a visit       [address] [facility] EMERGENCY DEPT Emergency Medicine  If symptoms worsen [address] [id number] [phone]          3. Return to ED if worse   4. Current Discharge Medication List        START taking these medications    Details   ondansetron (ZOFRAN ODT) 4 mg disintegrating tablet Take 0.5 Tablets by mouth two (2) times daily as needed for Nausea or Vomiting. Qty: 4 Tablet, Refills: 0  Start date: 12/17/2022           Remove if admitted/transferred    Diagnosis/Clinical Impression     Clinical Impression:   1.  Nausea vomiting and diarrhea    2. Viral gastroenteritis        Attestations: Dominique Wolf PA-C, am the primary clinician of record. Please note that this dictation was completed with [employer], the computer voice recognition software. Quite often unanticipated grammatical, syntax, homophones, and other interpretive errors are inadvertently transcribed by the computer software. Please disregard these errors. Please excuse any errors that have escaped final proofreading. Thank you. PAST MEDICAL HISTORY:  Depression     Mass left neck    Schizoaffective disorder     Squamous cell carcinoma of neck

## 2023-03-09 NOTE — ED ADULT TRIAGE NOTE - AS O2 DELIVERY
room air Render Note In Bullet Format When Appropriate: No Detail Level: Detailed Number Of Freeze-Thaw Cycles: 1 freeze-thaw cycle Duration Of Freeze Thaw-Cycle (Seconds): 3 Post-Care Instructions: I reviewed with the patient in detail post-care instructions. Patient is to wear sunprotection, and avoid picking at any of the treated lesions. Pt may apply Vaseline to crusted or scabbing areas. Show Applicator Variable?: Yes Consent: The patient's consent was obtained including but not limited to risks of crusting, scabbing, blistering, scarring, darker or lighter pigmentary change, recurrence, incomplete removal and infection.

## 2023-08-24 NOTE — PROGRESS NOTE ADULT - ATTENDING COMMENTS
Management per dermatology   Systemic chemotherapy to continue. Run IV normal saline at 75 cc/hr around the clock. Continue to monitor blood counts and chems. Replace electrolytes as required. Add Vit B-12, folic acid and Vit-D levels to blood work. Tolerating chemotherapy well thus far. Run IV NS at 75 cc/hr around the clock. Monitor blood counts and chems. Replace electrolytes as needed. Add Vit-B12. folate and Vit-D to blood work.

## 2024-05-01 NOTE — ED ADULT TRIAGE NOTE - PAIN: PRESENCE, MLM
Detail Level: Zone Patient Specific Counseling (Will Not Stick From Patient To Patient): DTO'S REGARDING FURTHER CARE Detail Level: Generalized complains of pain/discomfort

## 2025-01-02 NOTE — PATIENT PROFILE ADULT - PUBLIC BENEFITS
patient was located at Home: [address] [place] [address]  Provider was located at Facility (Appt Dept): [address] [place] [address], [address]  [place] [address]  Confirm you are appropriately licensed, registered, or certified to deliver care in the state where the patient is located as indicated above. If you are not or unsure, please re-schedule the visit: Yes, I confirm.       Total time spent on this encounter: Not billed by time    --Abraham Grover MD on 1/2/2025 at 4:46 PM    An electronic signature was used to authenticate this note.    
no

## 2025-01-19 NOTE — ED ADULT TRIAGE NOTE - SOURCE OF INFORMATION
Problem: Discharge Planning  Goal: Discharge to home or other facility with appropriate resources  1/18/2025 2227 by Jose A Muir, RN  Outcome: Progressing  1/18/2025 1538 by Meagan Joel, RN  Outcome: Progressing  Flowsheets (Taken 1/18/2025 5968)  Discharge to home or other facility with appropriate resources:   Identify barriers to discharge with patient and caregiver   Arrange for needed discharge resources and transportation as appropriate     
  Problem: Discharge Planning  Goal: Discharge to home or other facility with appropriate resources  Outcome: Progressing  Flowsheets (Taken 1/18/2025 9855)  Discharge to home or other facility with appropriate resources:   Identify barriers to discharge with patient and caregiver   Arrange for needed discharge resources and transportation as appropriate     Problem: Skin/Tissue Integrity  Goal: Absence of new skin breakdown  Description: 1.  Monitor for areas of redness and/or skin breakdown  2.  Assess vascular access sites hourly  3.  Every 4-6 hours minimum:  Change oxygen saturation probe site  4.  Every 4-6 hours:  If on nasal continuous positive airway pressure, respiratory therapy assess nares and determine need for appliance change or resting period.  Outcome: Progressing     Problem: Safety - Adult  Goal: Free from fall injury  Outcome: Progressing     
  Problem: Occupational Therapy - Adult  Goal: By Discharge: Performs self-care activities at highest level of function for planned discharge setting.  See evaluation for individualized goals.  Description: FUNCTIONAL STATUS PRIOR TO ADMISSION: The patient was modified independent with ADLs and functional mobility with RW or rollator.    HOME SUPPORT: Patient lived with her daughter who can provide assistance.    Occupational Therapy Goals:  Initiated 1/19/2025  1.  Patient will perform grooming with Modified [place] in standing within 7 day(s).  2.  Patient will perform lower body dressing with Modified [place] within 7 day(s).  3.  Patient will perform toilet transfers with Modified [place]  within 7 day(s).  4.  Patient will perform all aspects of toileting with Modified [place] within 7 day(s).  5.  Patient will participate in upper extremity therapeutic exercise/activities with [place] for 5 minutes within 7 day(s).    6.  Patient will utilize energy conservation techniques during functional activities with verbal cues within 7 day(s).   Outcome: Progressing  OCCUPATIONAL THERAPY EVALUATION    Patient: Gemma Smith (89 y.o. female)  Date: 1/19/2025  Primary Diagnosis: Acute pulmonary edema [J81.0]  Dyspnea on exertion [R06.09]  Heart failure (HCC) [I50.9]  Atrial fibrillation, unspecified type (HCC) [I48.91]  Acute on chronic congestive heart failure, unspecified heart failure type (HCC) [I50.9]         Precautions:                    ASSESSMENT :  The patient is limited by decreased functional mobility, independence in ADLs, strength, activity tolerance, and balance following admission for acute pulmonary edema and Afib. She was received seated in chair, agreeable to participate, family present. Using RW pt ambulated to bathroom with SBA and transferred to toilet, min cues for safe transfer technique. She performed seated hygiene with setup, required mod A for clothing management 
[phone]; DOI: 10.2522/ptj.40301627  2. Fabian BRANTLEY, Rick J, Shannon J, Roselyn J. Association of AM-PAC \"6-Clicks\" Basic Mobility and Daily Activity Scores With Discharge Destination. Phys Ther. 2021 Apr 4;101(4):vnij251. doi: 10.1093/ptj/dufd268. PMID: 81064758.  3. Mari BRASWELL, Anju FAIRCHILD, Irish S, Marleny K, Jamal S. Activity Measure for Post-Acute Care \"6-Clicks\" Basic Mobility Scores Predict Discharge Destination After Acute Care Hospitalization in Select Patient Groups: A Retrospective, Observational Study. Arch Rehabil Res Clin Transl. 2022 Jul 16;4(3):936491. doi: 10.1016/j.arrct.2022.154869. PMID: 50160094; PMCID: BGB1764866.  4. Taylor FOREMAN, Irene S, Aria W, Cece P. AM-PAC Short Forms Manual 4.0. Revised 2/2020.                                                                                                                                                                                                                               Pain Rating:  None reported    Activity Tolerance:   Good and Fair     After treatment:   Patient left in no apparent distress sitting up in chair, Call bell within reach, and Bed/ chair alarm activated    COMMUNICATION/EDUCATION:   The patient's plan of care was discussed with: registered nurse    Patient Education  Education Given To: Patient  Education Provided: Plan of Care  Education Method: Verbal  Barriers to Learning: None  Education Outcome: Verbalized understanding    Thank you for this referral.  Vania Haley PT,DPT,NCS,CLT  Minutes: 22      Physical Therapy Evaluation Charge Determination   History Examination Presentation Decision-Making   MEDIUM  Complexity : 1-2 comorbidities / personal factors will impact the outcome/ POC  LOW Complexity : 1-2 Standardized tests and measures addressing body structure, function, activity limitation and / or participation in recreation  LOW Complexity : Stable, uncomplicated  AM-PAC  LOW    Based on the above components, the 
Patient

## 2025-03-12 NOTE — PATIENT PROFILE ADULT - NSASFUNCLEVELADLTRANSFER_GEN_A_NUR
-- DO NOT REPLY / DO NOT REPLY ALL --  -- This inbox is not monitored. If this was sent to the wrong provider or department, reroute message to P ECO Reroute pool. --  -- Message is from Engagement Center Operations (ECO) --      Message Type:  Refill Medication   Refill request for Pended medication named: drospirenone-ethinyl estradiol (RITO) 3-0.03 MG per tablet   Preferred pharmacy verified, and selected.   [id number] [address] AT [address] [place] [address]    Is the patient OUT of Medication?  Yes and During Work Hours Medication Refills handled by Care Site - route as high priority to care site pool on KB. Patient has been advised it will be addressed within 1 business day.     Message: Refills                 Copied from CRM #[id number]. Topic: MW Medication/Rx - MW Rx Refill  >> Mar 12, 2025 11:15 AM Nhung ATKINS wrote:  Eason called to request a medication refill and is out of medications or critically low during working hours. Medication is:  Listed on Med Management list. Care Site to process refill: Selected 'Wrap Up CRM' and created new Refill Med Encounter after clicking 'Convert to Clinical Call'. Selected reason for call 'Refill Request'. Pended medication. Sent Med template and routed as high priority to appropriate clinician pool. Readback full message.  
0 = independent

## 2025-07-10 NOTE — ED ADULT NURSE NOTE - CAS EDN DISCHARGE ASSESSMENT
conjuntivae and eyelids appear normal, sclerae white without injection
Alert and oriented to person, place and time/Patient baseline mental status